# Patient Record
Sex: FEMALE | Race: WHITE | NOT HISPANIC OR LATINO | Employment: OTHER | URBAN - METROPOLITAN AREA
[De-identification: names, ages, dates, MRNs, and addresses within clinical notes are randomized per-mention and may not be internally consistent; named-entity substitution may affect disease eponyms.]

---

## 2017-01-03 ENCOUNTER — OFFICE VISIT (OUTPATIENT)
Dept: INTERNAL MEDICINE | Facility: CLINIC | Age: 82
End: 2017-01-03
Attending: INTERNAL MEDICINE
Payer: MEDICARE

## 2017-01-03 VITALS
SYSTOLIC BLOOD PRESSURE: 122 MMHG | HEART RATE: 76 BPM | HEIGHT: 65 IN | DIASTOLIC BLOOD PRESSURE: 68 MMHG | WEIGHT: 129 LBS | BODY MASS INDEX: 21.49 KG/M2 | OXYGEN SATURATION: 97 %

## 2017-01-03 DIAGNOSIS — M25.561 ACUTE PAIN OF RIGHT KNEE: ICD-10-CM

## 2017-01-03 DIAGNOSIS — I48.20 CHRONIC ATRIAL FIBRILLATION: ICD-10-CM

## 2017-01-03 DIAGNOSIS — I10 ESSENTIAL HYPERTENSION: Primary | ICD-10-CM

## 2017-01-03 DIAGNOSIS — I42.9 CARDIOMYOPATHY: ICD-10-CM

## 2017-01-03 DIAGNOSIS — R29.6 FREQUENT FALLS: ICD-10-CM

## 2017-01-03 DIAGNOSIS — I25.10 CORONARY ARTERY DISEASE INVOLVING NATIVE CORONARY ARTERY WITHOUT ANGINA PECTORIS, UNSPECIFIED WHETHER NATIVE OR TRANSPLANTED HEART: ICD-10-CM

## 2017-01-03 PROCEDURE — 1160F RVW MEDS BY RX/DR IN RCRD: CPT | Mod: S$GLB,,, | Performed by: INTERNAL MEDICINE

## 2017-01-03 PROCEDURE — 1157F ADVNC CARE PLAN IN RCRD: CPT | Mod: S$GLB,,, | Performed by: INTERNAL MEDICINE

## 2017-01-03 PROCEDURE — 99214 OFFICE O/P EST MOD 30 MIN: CPT | Mod: S$GLB,,, | Performed by: INTERNAL MEDICINE

## 2017-01-03 PROCEDURE — 1159F MED LIST DOCD IN RCRD: CPT | Mod: S$GLB,,, | Performed by: INTERNAL MEDICINE

## 2017-01-03 NOTE — MR AVS SNAPSHOT
Jan  [facility], [address] [address]  Phone: [phone]  Fax: [phone]                  Elizabeth DAILEY Rosalba   1/3/2017 9:45 AM   Office Visit    Description:  Female : 1928   Provider:  CHADWICK Montoya MD   Department:  [facility]           Reason for Visit     Follow-up           Diagnoses this Visit        Comments    Essential hypertension    -  Primary     Coronary artery disease involving native coronary artery without angina pectoris, unspecified whether native or transplanted heart         Chronic atrial fibrillation         Cardiomyopathy                To Do List           Future Appointments        Provider Department Dept Phone    2017 9:45 AM MD Jan Breen [phone]      Goals (5 Years of Data)     None      Follow-Up and Disposition     Return in about 4 months (around 5/3/2017).      [facility] -  Assistance  Registered nurses in the [facility] provide clinical advisement, health education, appointment booking, and other advisory services.  Call for this free service at [phone].             Medications           Message regarding Medications     Verify the changes and/or additions to your medication regime listed below are the same as discussed with your clinician today.  If any of these changes or additions are incorrect, please notify your healthcare provider.             Verify that the below list of medications is an accurate representation of the medications you are currently taking.  If none reported, the list may be blank. If incorrect, please contact your healthcare provider. Carry this list with you in case of emergency.           Current Medications     amlodipine (NORVASC) 5 MG tablet TAKE 1 TABLET BY MOUTH DAILY    aspirin (ECOTRIN) 325 MG EC tablet Take 325 mg by mouth once daily.    carvedilol (COREG) 25 MG tablet TAKE 1 TABLET BY MOUTH TWICE DAILY    cholecalciferol,  "vitamin D3, (VITAMIN D3) 1,000 unit capsule Take 1,000 Units by mouth once daily.    diclofenac sodium (VOLTAREN) 1 % Gel Apply 4 g topically 4 (four) times daily as needed.    enalapril (VASOTEC) 20 MG tablet TAKE 1 TABLET BY MOUTH TWICE DAILY    fish oil-omega-3 fatty acids 300-1,000 mg capsule Take 1 g by mouth once daily.    glucosamine-chondroitin 500-400 mg tablet Take 1 tablet by mouth 3 (three) times daily.    omeprazole (PRILOSEC) 20 MG capsule Take 20 mg by mouth once daily.    PNV w/o calcium-iron fum-FA (M-VIT) 27-1 mg Tab Take by mouth.    simvastatin (ZOCOR) 20 MG tablet TAKE 1 TABLET BY MOUTH AT BEDTIME    venlafaxine (EFFEXOR-XR) 75 MG 24 hr capsule TAKE 1 CAPSULE BY MOUTH EVERY MORNING WITH FOOD           Clinical Reference Information           Vital Signs - Last Recorded  Most recent update: 1/3/2017 10:02 AM by Karey Rasmussen MA    BP Pulse Ht Wt SpO2 BMI    122/68 76 5' 5" (1.651 m) 58.5 kg (129 lb) 97% 21.47 kg/m2      Blood Pressure          Most Recent Value    BP  122/68      Allergies as of 1/3/2017     Pcn [Penicillins]      Immunizations Administered on Date of Encounter - 1/3/2017     None      "

## 2017-01-04 NOTE — PROGRESS NOTES
Subjective:       Patient ID: Elizabeth Navarrete is a 88 y.o. female.    Chief Complaint: Follow-up    HPI Comments: Fell 5 days ago and landed on right knee and butt. Tripped on her feet. Scooted to phone and called daughter.  Feels fine now.  Last fall about 4-6 months prior.    Hypertension   This is a chronic problem. The current episode started more than 1 year ago. The problem is controlled. Pertinent negatives include no chest pain or shortness of breath.     Review of Systems   Constitutional: Negative.    Respiratory: Negative for shortness of breath.    Cardiovascular: Negative for chest pain.   Musculoskeletal: Positive for arthralgias.   Psychiatric/Behavioral: Negative for dysphoric mood.       Objective:      Physical Exam   Cardiovascular: Normal rate.  An irregularly irregular rhythm present. Exam reveals no gallop and no friction rub.    Murmur heard.   Systolic murmur is present with a grade of 2/6   Pulses:       Carotid pulses are 2+ on the right side, and 2+ on the left side.  Musculoskeletal: She exhibits no edema.        Right knee: She exhibits decreased range of motion, deformity, LCL laxity, abnormal patellar mobility and MCL laxity. Tenderness found. Lateral joint line tenderness noted.        Left knee: Normal.        Arms:       Legs:  Has DP pulse through Ramiro's. Toes are warm with no obvious probs.    Calcinosis on right patellar tendon.    Ecchymosis under left arm.       Assessment:       1. Essential hypertension    2. Coronary artery disease involving native coronary artery without angina pectoris, unspecified whether native or transplanted heart    3. Chronic atrial fibrillation    4. Cardiomyopathy    5. Acute pain of right knee    6. Frequent falls        Plan:       Per orders and D/C instructions.  Continue meds/diet for CAD, A. Fib., DCMP, HTN, and high cholest. which are stable. Seh refuses a walker or cane, and refuses to carry a cell phone or alert device.       Screening: The  patient was screened for depression with the PHQ2 questionnaire and possible health consequences were discussed with the patient, who understands (15 minutes spent). The patient was screened for the misuse of alcohol, by asking the number of drinks per average week, and if pt has had more than 5 drinks (more than 3 for women and elderly) in 1 day within the past year. The health and legal consequences of misuse were discussed (15 minutes spent). The patient was screened for obesity (BMI>30), If the current BMI > 30, then the possible consequences of obesity, as well as the benefits of diet, exercise, and weight loss were discussed (15 minutes spent).

## 2017-05-17 ENCOUNTER — OFFICE VISIT (OUTPATIENT)
Dept: INTERNAL MEDICINE | Facility: CLINIC | Age: 82
End: 2017-05-17
Attending: INTERNAL MEDICINE
Payer: MEDICARE

## 2017-05-17 VITALS
HEIGHT: 65 IN | WEIGHT: 138 LBS | OXYGEN SATURATION: 91 % | BODY MASS INDEX: 22.99 KG/M2 | HEART RATE: 76 BPM | SYSTOLIC BLOOD PRESSURE: 138 MMHG | DIASTOLIC BLOOD PRESSURE: 72 MMHG

## 2017-05-17 DIAGNOSIS — I10 ESSENTIAL HYPERTENSION: ICD-10-CM

## 2017-05-17 DIAGNOSIS — I42.9 CARDIOMYOPATHY, UNSPECIFIED TYPE: ICD-10-CM

## 2017-05-17 DIAGNOSIS — M17.11 PRIMARY OSTEOARTHRITIS OF RIGHT KNEE: ICD-10-CM

## 2017-05-17 DIAGNOSIS — I34.0 NON-RHEUMATIC MITRAL REGURGITATION: ICD-10-CM

## 2017-05-17 DIAGNOSIS — Z86.59 HISTORY OF DEPRESSION: ICD-10-CM

## 2017-05-17 DIAGNOSIS — I27.20 PULMONARY HTN: ICD-10-CM

## 2017-05-17 DIAGNOSIS — I48.20 CHRONIC ATRIAL FIBRILLATION: ICD-10-CM

## 2017-05-17 DIAGNOSIS — I65.29 INTERNAL CAROTID ARTERY STENOSIS, UNSPECIFIED LATERALITY: ICD-10-CM

## 2017-05-17 DIAGNOSIS — I25.10 CORONARY ARTERY DISEASE INVOLVING NATIVE CORONARY ARTERY WITHOUT ANGINA PECTORIS, UNSPECIFIED WHETHER NATIVE OR TRANSPLANTED HEART: Primary | ICD-10-CM

## 2017-05-17 PROCEDURE — G0439 PPPS, SUBSEQ VISIT: HCPCS | Mod: S$GLB,,, | Performed by: INTERNAL MEDICINE

## 2017-05-17 NOTE — PROGRESS NOTES
Subjective:       Patient ID: Elizabeth Navarrete is a 89 y.o. female.    Chief Complaint: Follow-up (4 month)    HPI Comments: Gained 9 lb.  Gets SOB walking from garage to my office.    Annual Wellness Exam:    Cognitive Impairment: None    Mental Conditions: None    Depression Risk Factors: None    Functional Ability:    Steady gait: No  Self reliant: No   Safe home: Yes  Hearing Difficulties: Yes   Vision Difficulties: N0o  Physical Impairment: None    Living Will: See Patient Demographics    Functional assessment:  Able to do all ADL's  Fall Risk: Moderate  Urinary incontinence: yes  Energy level: Good  Mental well-being: Fair    HEIDS Measure screening dates:  BMI:  23     DEXA:               See Health Maintenance Report  colon screen:    See Health Maintenance Report      Mammogram:   See Health Maintenance Report                   Glaucoma screen:  per Opth[facility]                    Vaccines: See Health Maintenance Report  Flu:            Pneumovax:  Zostavax:       Pain management: Mild knee pain      The patient's current health status is: Fair   Patient was educated on all medical problems. See A/P.                             Hypertension   This is a chronic problem. The current episode started more than 1 year ago. The problem is controlled. Pertinent negatives include no chest pain or shortness of breath.     Review of Systems   Constitutional: Negative.    Respiratory: Negative for shortness of breath.    Cardiovascular: Negative for chest pain.   Psychiatric/Behavioral: Negative for dysphoric mood.       Objective:      Physical Exam   Cardiovascular: Normal rate.  An irregularly irregular rhythm present. Exam reveals no gallop and no friction rub.    Murmur heard.   Systolic murmur is present with a grade of 2/6   Pulses:       Carotid pulses are 2+ on the right side, and 2+ on the left side.  Pulmonary/Chest: She has rales in the right lower field and the left lower field.   Musculoskeletal: She exhibits no  edema.        Right knee: She exhibits decreased range of motion, deformity, LCL laxity, abnormal patellar mobility and MCL laxity. Tenderness found. Lateral joint line tenderness noted.        Left knee: Normal.        Arms:       Legs:  Has DP pulse through Ramiro's. Toes are warm with no obvious probs.    Calcinosis on right patellar tendon.    Ecchymosis under left arm.       Assessment:       1. Coronary artery disease involving native coronary artery without angina pectoris, unspecified whether native or transplanted heart    2. Cardiomyopathy, unspecified type    3. Chronic atrial fibrillation    4. Essential hypertension    5. Internal carotid artery stenosis, unspecified laterality    6. Non-rheumatic mitral regurgitation    7. Primary osteoarthritis of right knee    8. Pulmonary HTN    9. History of depression        Plan:       Per orders and D/C instructions.  Continue meds/diet for A fib, CAD, CMP, MVR, pulm HTN, carotid stenosis, DJD, depression, HTN, and high cholest. which are stable.

## 2017-05-17 NOTE — MR AVS SNAPSHOT
Jan  [facility] [facility], [address] [address]  Phone: [phone]  Fax: [phone]                  Elizabeth Navarrete   2017 9:45 AM   Office Visit    Description:  Female : 1928   Provider:  CHADWICK Montoya MD   Department:  Jan           Reason for Visit     Follow-up           Diagnoses this Visit        Comments    Coronary artery disease involving native coronary artery without angina pectoris, unspecified whether native or transplanted heart    -  Primary     Cardiomyopathy, unspecified type         Chronic atrial fibrillation         Essential hypertension                To Do List           Future Appointments        Provider Department Dept Phone    2017 10:15 AM MD Jan Breen [phone]      Goals (5 Years of Data)     None      Follow-Up and Disposition     Return in about 4 months (around 2017).      [facility]     [facility] Care [facility] -  Assistance  Unless otherwise directed by your provider, please contact [facility], our nurse care line that is available for  assistance.     Registered nurses in the [facility] provide: appointment scheduling, clinical advisement, health education, and other advisory services.  Call: [phone] (toll free)               Medications           Message regarding Medications     Verify the changes and/or additions to your medication regime listed below are the same as discussed with your clinician today.  If any of these changes or additions are incorrect, please notify your healthcare provider.             Verify that the below list of medications is an accurate representation of the medications you are currently taking.  If none reported, the list may be blank. If incorrect, please contact your healthcare provider. Carry this list with you in case of emergency.           Current Medications     amlodipine (NORVASC) 5 MG tablet TAKE 1 TABLET BY MOUTH DAILY    aspirin  "(ECOTRIN) 325 MG EC tablet Take 325 mg by mouth once daily.    carvedilol (COREG) 25 MG tablet TAKE 1 TABLET BY MOUTH TWICE DAILY    cholecalciferol, vitamin D3, (VITAMIN D3) 1,000 unit capsule Take 1,000 Units by mouth once daily.    diclofenac sodium (VOLTAREN) 1 % Gel Apply 4 g topically 4 (four) times daily as needed.    enalapril (VASOTEC) 20 MG tablet TAKE 1 TABLET BY MOUTH TWICE DAILY    fish oil-omega-3 fatty acids 300-1,000 mg capsule Take 1 g by mouth once daily.    glucosamine-chondroitin 500-400 mg tablet Take 1 tablet by mouth 3 (three) times daily.    omeprazole (PRILOSEC) 20 MG capsule Take 20 mg by mouth once daily.    PNV w/o calcium-iron fum-FA (M-VIT) 27-1 mg Tab Take by mouth.    simvastatin (ZOCOR) 20 MG tablet TAKE 1 TABLET BY MOUTH AT BEDTIME    venlafaxine (EFFEXOR-XR) 75 MG 24 hr capsule TAKE 1 CAPSULE BY MOUTH EVERY MORNING WITH FOOD           Clinical Reference Information           Your Vitals Were     BP Pulse Height Weight SpO2 BMI    138/72 101 5' 5" (1.651 m) 62.6 kg (138 lb) 91% 22.96 kg/m2      Blood Pressure          Most Recent Value    BP  138/72      Allergies as of 5/17/2017     Pcn [Penicillins]      Immunizations Administered on Date of Encounter - 5/17/2017     None      Language Assistance Services     ATTENTION: Language assistance services are available, free of charge. Please call [phone].      ATENCIÓN: Si habla estelleañol, tiene a naidu disposición servicios gratuitos de asistencia lingüística. Llkaylie al [phone].     SESAR Ý: N?u b?n nói Ti?ng Vi?t, có các d?ch v? h? tr? ngôn ng? mi?n phí dành cho b?n. G?i s? [phone].         [facility] complies with applicable Federal civil rights laws and does not discriminate on the basis of race, color, national origin, age, disability, or sex.        "

## 2017-06-15 ENCOUNTER — HOSPITAL ENCOUNTER (INPATIENT)
Facility: HOSPITAL | Age: 82
LOS: 8 days | Discharge: NURSING, REHAB OR LONG-TERM CARE | DRG: 064 | End: 2017-06-23
Attending: EMERGENCY MEDICINE | Admitting: NURSE PRACTITIONER
Payer: MEDICARE

## 2017-06-15 DIAGNOSIS — R13.12 OROPHARYNGEAL DYSPHAGIA: ICD-10-CM

## 2017-06-15 DIAGNOSIS — G81.94 HEMIPARESIS, LEFT: ICD-10-CM

## 2017-06-15 DIAGNOSIS — I63.411 EMBOLIC STROKE INVOLVING RIGHT MIDDLE CEREBRAL ARTERY: ICD-10-CM

## 2017-06-15 DIAGNOSIS — I63.131 EMBOLIC STROKE INVOLVING RIGHT CAROTID ARTERY: ICD-10-CM

## 2017-06-15 DIAGNOSIS — I51.7 ENLARGED LA (LEFT ATRIUM): ICD-10-CM

## 2017-06-15 DIAGNOSIS — I48.20 CHRONIC ATRIAL FIBRILLATION: ICD-10-CM

## 2017-06-15 DIAGNOSIS — I65.22 INTERNAL CAROTID ARTERY STENOSIS, LEFT: ICD-10-CM

## 2017-06-15 DIAGNOSIS — I50.32 CHRONIC DIASTOLIC HEART FAILURE: ICD-10-CM

## 2017-06-15 DIAGNOSIS — I10 ESSENTIAL HYPERTENSION: ICD-10-CM

## 2017-06-15 DIAGNOSIS — I63.9 CEREBROVASCULAR ACCIDENT (CVA), UNSPECIFIED MECHANISM: Primary | ICD-10-CM

## 2017-06-15 LAB
ALBUMIN SERPL BCP-MCNC: 3.6 G/DL
ALP SERPL-CCNC: 91 U/L
ALT SERPL W/O P-5'-P-CCNC: 14 U/L
ANION GAP SERPL CALC-SCNC: 9 MMOL/L
AST SERPL-CCNC: 26 U/L
BASOPHILS # BLD AUTO: 0.02 K/UL
BASOPHILS NFR BLD: 0.2 %
BILIRUB SERPL-MCNC: 0.7 MG/DL
BUN SERPL-MCNC: 19 MG/DL
CALCIUM SERPL-MCNC: 9.6 MG/DL
CHLORIDE SERPL-SCNC: 107 MMOL/L
CHOLEST/HDLC SERPL: 2.9 {RATIO}
CO2 SERPL-SCNC: 25 MMOL/L
CREAT SERPL-MCNC: 0.9 MG/DL
DIFFERENTIAL METHOD: ABNORMAL
EOSINOPHIL # BLD AUTO: 0 K/UL
EOSINOPHIL NFR BLD: 0.1 %
ERYTHROCYTE [DISTWIDTH] IN BLOOD BY AUTOMATED COUNT: 12.6 %
EST. GFR  (AFRICAN AMERICAN): >60 ML/MIN/1.73 M^2
EST. GFR  (NON AFRICAN AMERICAN): 56.9 ML/MIN/1.73 M^2
GLUCOSE SERPL-MCNC: 127 MG/DL
HCT VFR BLD AUTO: 40.1 %
HDL/CHOLESTEROL RATIO: 34.7 %
HDLC SERPL-MCNC: 118 MG/DL
HDLC SERPL-MCNC: 41 MG/DL
HGB BLD-MCNC: 13.5 G/DL
LDLC SERPL CALC-MCNC: 67.4 MG/DL
LYMPHOCYTES # BLD AUTO: 0.5 K/UL
LYMPHOCYTES NFR BLD: 4.5 %
MCH RBC QN AUTO: 30.3 PG
MCHC RBC AUTO-ENTMCNC: 33.7 %
MCV RBC AUTO: 90 FL
MONOCYTES # BLD AUTO: 0.6 K/UL
MONOCYTES NFR BLD: 5.6 %
NEUTROPHILS # BLD AUTO: 10.2 K/UL
NEUTROPHILS NFR BLD: 89.3 %
NONHDLC SERPL-MCNC: 77 MG/DL
PLATELET # BLD AUTO: 248 K/UL
PMV BLD AUTO: 10.6 FL
POTASSIUM SERPL-SCNC: 3.8 MMOL/L
PROT SERPL-MCNC: 7.1 G/DL
RBC # BLD AUTO: 4.45 M/UL
SODIUM SERPL-SCNC: 141 MMOL/L
TRIGL SERPL-MCNC: 48 MG/DL
TSH SERPL DL<=0.005 MIU/L-ACNC: 1.04 UIU/ML
WBC # BLD AUTO: 11.46 K/UL

## 2017-06-15 PROCEDURE — 99291 CRITICAL CARE FIRST HOUR: CPT | Mod: ,,, | Performed by: EMERGENCY MEDICINE

## 2017-06-15 PROCEDURE — 80053 COMPREHEN METABOLIC PANEL: CPT

## 2017-06-15 PROCEDURE — 84443 ASSAY THYROID STIM HORMONE: CPT

## 2017-06-15 PROCEDURE — 99291 CRITICAL CARE FIRST HOUR: CPT | Mod: 25

## 2017-06-15 PROCEDURE — 93005 ELECTROCARDIOGRAM TRACING: CPT

## 2017-06-15 PROCEDURE — 12000002 HC ACUTE/MED SURGE SEMI-PRIVATE ROOM

## 2017-06-15 PROCEDURE — 80061 LIPID PANEL: CPT

## 2017-06-15 PROCEDURE — 93010 ELECTROCARDIOGRAM REPORT: CPT | Mod: ,,, | Performed by: INTERNAL MEDICINE

## 2017-06-15 PROCEDURE — 94761 N-INVAS EAR/PLS OXIMETRY MLT: CPT

## 2017-06-15 PROCEDURE — 25500020 PHARM REV CODE 255: Performed by: EMERGENCY MEDICINE

## 2017-06-15 PROCEDURE — 83036 HEMOGLOBIN GLYCOSYLATED A1C: CPT

## 2017-06-15 PROCEDURE — 27000221 HC OXYGEN, UP TO 24 HOURS

## 2017-06-15 PROCEDURE — 85025 COMPLETE CBC W/AUTO DIFF WBC: CPT

## 2017-06-15 RX ORDER — METHYLPREDNISOLONE 4 MG
1000 TABLET, DOSE PACK ORAL DAILY
Status: ON HOLD | COMMUNITY
End: 2017-06-23 | Stop reason: HOSPADM

## 2017-06-15 RX ORDER — ASPIRIN 325 MG
325 TABLET, DELAYED RELEASE (ENTERIC COATED) ORAL DAILY
Status: DISCONTINUED | OUTPATIENT
Start: 2017-06-16 | End: 2017-06-16

## 2017-06-15 RX ORDER — SODIUM CHLORIDE 0.9 % (FLUSH) 0.9 %
3 SYRINGE (ML) INJECTION EVERY 8 HOURS
Status: DISCONTINUED | OUTPATIENT
Start: 2017-06-15 | End: 2017-06-23 | Stop reason: HOSPADM

## 2017-06-15 RX ORDER — CLOPIDOGREL BISULFATE 75 MG/1
75 TABLET ORAL DAILY
Status: DISCONTINUED | OUTPATIENT
Start: 2017-06-16 | End: 2017-06-16

## 2017-06-15 RX ORDER — PANTOPRAZOLE SODIUM 40 MG/1
40 TABLET, DELAYED RELEASE ORAL DAILY
Status: DISCONTINUED | OUTPATIENT
Start: 2017-06-16 | End: 2017-06-19

## 2017-06-15 RX ORDER — ATORVASTATIN CALCIUM 20 MG/1
40 TABLET, FILM COATED ORAL DAILY
Status: DISCONTINUED | OUTPATIENT
Start: 2017-06-16 | End: 2017-06-16

## 2017-06-15 RX ORDER — VENLAFAXINE HYDROCHLORIDE 75 MG/1
75 CAPSULE, EXTENDED RELEASE ORAL DAILY
Status: DISCONTINUED | OUTPATIENT
Start: 2017-06-16 | End: 2017-06-16

## 2017-06-15 RX ORDER — LABETALOL HYDROCHLORIDE 5 MG/ML
10 INJECTION, SOLUTION INTRAVENOUS
Status: DISCONTINUED | OUTPATIENT
Start: 2017-06-15 | End: 2017-06-23 | Stop reason: HOSPADM

## 2017-06-15 RX ORDER — HEPARIN SODIUM 5000 [USP'U]/ML
5000 INJECTION, SOLUTION INTRAVENOUS; SUBCUTANEOUS EVERY 8 HOURS
Status: DISCONTINUED | OUTPATIENT
Start: 2017-06-16 | End: 2017-06-23 | Stop reason: HOSPADM

## 2017-06-15 RX ADMIN — IOHEXOL 75 ML: 350 INJECTION, SOLUTION INTRAVENOUS at 08:06

## 2017-06-16 PROBLEM — R13.12 OROPHARYNGEAL DYSPHAGIA: Status: ACTIVE | Noted: 2017-06-16

## 2017-06-16 PROBLEM — I63.131 EMBOLIC STROKE INVOLVING RIGHT CAROTID ARTERY: Status: ACTIVE | Noted: 2017-06-15

## 2017-06-16 PROBLEM — G81.94 HEMIPARESIS, LEFT: Status: ACTIVE | Noted: 2017-06-16

## 2017-06-16 LAB
ALBUMIN SERPL BCP-MCNC: 3.3 G/DL
ALP SERPL-CCNC: 84 U/L
ALT SERPL W/O P-5'-P-CCNC: 15 U/L
ANION GAP SERPL CALC-SCNC: 12 MMOL/L
APTT BLDCRRT: 24.5 SEC
AST SERPL-CCNC: 29 U/L
BASOPHILS # BLD AUTO: 0.02 K/UL
BASOPHILS NFR BLD: 0.2 %
BILIRUB SERPL-MCNC: 0.8 MG/DL
BUN SERPL-MCNC: 15 MG/DL
CALCIUM SERPL-MCNC: 9.5 MG/DL
CHLORIDE SERPL-SCNC: 106 MMOL/L
CK MB SERPL-MCNC: 7.3 NG/ML
CK MB SERPL-RTO: 2.1 %
CK SERPL-CCNC: 348 U/L
CO2 SERPL-SCNC: 24 MMOL/L
CREAT SERPL-MCNC: 0.8 MG/DL
DIASTOLIC DYSFUNCTION: YES
DIFFERENTIAL METHOD: ABNORMAL
EOSINOPHIL # BLD AUTO: 0 K/UL
EOSINOPHIL NFR BLD: 0.3 %
ERYTHROCYTE [DISTWIDTH] IN BLOOD BY AUTOMATED COUNT: 12.4 %
EST. GFR  (AFRICAN AMERICAN): >60 ML/MIN/1.73 M^2
EST. GFR  (NON AFRICAN AMERICAN): >60 ML/MIN/1.73 M^2
ESTIMATED AVG GLUCOSE: 114 MG/DL
ESTIMATED PA SYSTOLIC PRESSURE: 32.16
GLUCOSE SERPL-MCNC: 84 MG/DL
HBA1C MFR BLD HPLC: 5.6 %
HCT VFR BLD AUTO: 39.7 %
HGB BLD-MCNC: 12.9 G/DL
INR PPP: 1.1
LYMPHOCYTES # BLD AUTO: 0.9 K/UL
LYMPHOCYTES NFR BLD: 9.1 %
MAGNESIUM SERPL-MCNC: 1.9 MG/DL
MCH RBC QN AUTO: 29.7 PG
MCHC RBC AUTO-ENTMCNC: 32.5 %
MCV RBC AUTO: 91 FL
MITRAL VALVE REGURGITATION: ABNORMAL
MONOCYTES # BLD AUTO: 1.1 K/UL
MONOCYTES NFR BLD: 10.7 %
NEUTROPHILS # BLD AUTO: 8.1 K/UL
NEUTROPHILS NFR BLD: 79.5 %
PHOSPHATE SERPL-MCNC: 2.9 MG/DL
PLATELET # BLD AUTO: 240 K/UL
PMV BLD AUTO: 10.9 FL
POTASSIUM SERPL-SCNC: 3.7 MMOL/L
PROT SERPL-MCNC: 6.6 G/DL
PROTHROMBIN TIME: 11.3 SEC
RBC # BLD AUTO: 4.35 M/UL
RETIRED EF AND QEF - SEE NOTES: 50 (ref 55–65)
SODIUM SERPL-SCNC: 142 MMOL/L
TRICUSPID VALVE REGURGITATION: ABNORMAL
TROPONIN I SERPL DL<=0.01 NG/ML-MCNC: 0.04 NG/ML
WBC # BLD AUTO: 10.17 K/UL

## 2017-06-16 PROCEDURE — 84484 ASSAY OF TROPONIN QUANT: CPT

## 2017-06-16 PROCEDURE — 99222 1ST HOSP IP/OBS MODERATE 55: CPT | Mod: ,,, | Performed by: NURSE PRACTITIONER

## 2017-06-16 PROCEDURE — 84100 ASSAY OF PHOSPHORUS: CPT

## 2017-06-16 PROCEDURE — 80053 COMPREHEN METABOLIC PANEL: CPT

## 2017-06-16 PROCEDURE — 97530 THERAPEUTIC ACTIVITIES: CPT

## 2017-06-16 PROCEDURE — 93306 TTE W/DOPPLER COMPLETE: CPT | Mod: PBBFAC | Performed by: INTERNAL MEDICINE

## 2017-06-16 PROCEDURE — 92610 EVALUATE SWALLOWING FUNCTION: CPT

## 2017-06-16 PROCEDURE — G8997 SWALLOW GOAL STATUS: HCPCS | Mod: CL

## 2017-06-16 PROCEDURE — G8996 SWALLOW CURRENT STATUS: HCPCS | Mod: CN

## 2017-06-16 PROCEDURE — 83735 ASSAY OF MAGNESIUM: CPT

## 2017-06-16 PROCEDURE — 99223 1ST HOSP IP/OBS HIGH 75: CPT | Mod: AI,GC,, | Performed by: PSYCHIATRY & NEUROLOGY

## 2017-06-16 PROCEDURE — 97165 OT EVAL LOW COMPLEX 30 MIN: CPT

## 2017-06-16 PROCEDURE — 25000003 PHARM REV CODE 250: Performed by: NURSE PRACTITIONER

## 2017-06-16 PROCEDURE — 20600001 HC STEP DOWN PRIVATE ROOM

## 2017-06-16 PROCEDURE — 97802 MEDICAL NUTRITION INDIV IN: CPT

## 2017-06-16 PROCEDURE — 97161 PT EVAL LOW COMPLEX 20 MIN: CPT

## 2017-06-16 PROCEDURE — 63600175 PHARM REV CODE 636 W HCPCS: Performed by: PSYCHIATRY & NEUROLOGY

## 2017-06-16 PROCEDURE — 82553 CREATINE MB FRACTION: CPT

## 2017-06-16 PROCEDURE — 85610 PROTHROMBIN TIME: CPT

## 2017-06-16 PROCEDURE — 36415 COLL VENOUS BLD VENIPUNCTURE: CPT

## 2017-06-16 PROCEDURE — 85025 COMPLETE CBC W/AUTO DIFF WBC: CPT

## 2017-06-16 PROCEDURE — 85730 THROMBOPLASTIN TIME PARTIAL: CPT

## 2017-06-16 PROCEDURE — 93306 TTE W/DOPPLER COMPLETE: CPT

## 2017-06-16 PROCEDURE — 86580 TB INTRADERMAL TEST: CPT | Performed by: PSYCHIATRY & NEUROLOGY

## 2017-06-16 RX ORDER — ATORVASTATIN CALCIUM 20 MG/1
40 TABLET, FILM COATED ORAL DAILY
Status: DISCONTINUED | OUTPATIENT
Start: 2017-06-17 | End: 2017-06-21

## 2017-06-16 RX ORDER — ACETAMINOPHEN 325 MG/1
650 TABLET ORAL EVERY 6 HOURS PRN
Status: DISCONTINUED | OUTPATIENT
Start: 2017-06-16 | End: 2017-06-21

## 2017-06-16 RX ORDER — METOPROLOL TARTRATE 25 MG/1
12.5 TABLET ORAL 2 TIMES DAILY
Status: DISCONTINUED | OUTPATIENT
Start: 2017-06-16 | End: 2017-06-17

## 2017-06-16 RX ORDER — METOPROLOL TARTRATE 25 MG/1
12.5 TABLET ORAL 2 TIMES DAILY
Status: DISCONTINUED | OUTPATIENT
Start: 2017-06-16 | End: 2017-06-16

## 2017-06-16 RX ORDER — CLOPIDOGREL BISULFATE 75 MG/1
75 TABLET ORAL DAILY
Status: DISCONTINUED | OUTPATIENT
Start: 2017-06-17 | End: 2017-06-20

## 2017-06-16 RX ORDER — ASPIRIN 81 MG/1
81 TABLET ORAL DAILY
Status: DISCONTINUED | OUTPATIENT
Start: 2017-06-17 | End: 2017-06-19

## 2017-06-16 RX ADMIN — Medication 3 ML: at 02:06

## 2017-06-16 RX ADMIN — Medication 5 UNITS: at 03:06

## 2017-06-16 RX ADMIN — HEPARIN SODIUM 5000 UNITS: 5000 INJECTION, SOLUTION INTRAVENOUS; SUBCUTANEOUS at 02:06

## 2017-06-16 RX ADMIN — HEPARIN SODIUM 5000 UNITS: 5000 INJECTION, SOLUTION INTRAVENOUS; SUBCUTANEOUS at 06:06

## 2017-06-16 RX ADMIN — Medication 3 ML: at 06:06

## 2017-06-16 NOTE — CONSULTS
"  [facility]  Adult Nutrition  Consult Note    SUMMARY     Recommendations    1. TF recommendations: Isosource 1.5 @ 35 mL/hr to provide 1260 calories, 57 grams of protein, 642 mL fluid.    - Hold for residuals >400 mL; additional fluid per MD.  2. If able to advance diet, recommend regular, texture per SLP.   3. RD to monitor & follow-up.    Goals: Meet % EEN, EPN  Nutrition Goal Status: new  Communication of RD Recs: reviewed with RN    Reason for Assessment    Reason for Assessment: nurse/nurse practitioner consult  Diagnosis: stroke/CVA  Relevent Medical History: CAD, HTN   Interdisciplinary Rounds: did not attend     General Information Comments: ST recommends pt remain NPO. Pt disoriented at time of visit.  Nutrition Discharge Planning: Unable to determine.    Nutrition Prescription Ordered    Current Diet Order: NPO    Nutrition Risk Screen     Nutrition Risk Screen: dysphagia or difficulty swallowing    Nutrition/Diet History    Patient Reported Diet/Restrictions/Preferences: other (see comments) (FLAQUITA)     Factors Affecting Nutritional Intake: NPO, difficulty/impaired swallowing    Labs/Tests/Procedures/Meds    Pertinent Labs Reviewed: reviewed, pertinent  Pertinent Medications Reviewed: reviewed, pertinent  Pertinent Medications Comments: Statin, Heparin    Physical Findings    Overall Physical Appearance: underweight  Oral/Mouth Cavity: WDL  Skin: intact    Anthropometrics    Height: 5' 5" (165.1 cm)  Weight Method: Bed Scale  Weight: 57.8 kg (127 lb 6.8 oz)     Ideal Body Weight (IBW), Female: 125 lb  % Ideal Body Weight, Female (lb): 101.94 lb     BMI (Calculated): 21.2  BMI Grade: 18.5-24.9 - normal    Estimated/Assessed Needs    Weight Used For Calorie Calculations: 57.8 kg (127 lb 6.8 oz)      Energy Need Method: Bertie-St Yeung, other (see comments) (0036-5364 kcal/d)     Weight Used For Protein Calculations: 57.8 kg (127 lb 6.8 oz)  Protein Requirements: 58-70 g/d    Fluid " Need Method: RDA Method, other (see comments) (Per MD or 1 mL/kcal)     Assessment and Plan    Inadequate energy intake r/t inability to consume sufficient energy AEB NPO with no alternate means of nutrition.  Status: New    Monitor and Evaluation    Food and Nutrient Intake: energy intake, food and beverage intake, enteral nutrition intake  Food and Nutrient Adminstration: diet order, enteral and parenteral nutrition administration     Physical Activity and Function: nutrition-related ADLs and IADLs  Anthropometric Measurements: weight change, weight, body mass index  Biochemical Data, Medical Tests and Procedures: electrolyte and renal panel, gastrointestinal profile, glucose/endocrine profile, inflammatory profile, lipid profile  Nutrition-Focused Physical Findings: overall appearance    Nutrition Risk    Level of Risk: other (see comments) (2x/week)    Nutrition Follow-Up    RD Follow-up?: Yes

## 2017-06-16 NOTE — ASSESSMENT & PLAN NOTE
89 y/o female with PMHx of A fib, HTN presenting with left hemiparesis. Imaging remarkable for R hemispheric infarcts; embolic pattern. CTA remarkable for 90% stenosis of the right carotid artery and R M2. Etiology of stroke is likely artery to artery - atheroembolic from symptomatic R IC rather than cardio-embolic. Vascular surgery consult deferred as patient does not want surgical management of the R IC even if offered; will medically optimize.     Of note patient has prior history of strokes noted on imaging with a CHADsVASc of 7 => high risk; 1 year stroke risk of 15.7%. Patient and family aware that anticoagulation would be optimal management in the setting of known A fib and prior strokes for secondary stroke prevention but patient chosen to NOT be anticoagulated due to concerns by the family regarding patient's frequent falls.   -Antithrombotics for secondary stroke prevention: Antiplatelets:  Aspirin: 325 mg oral now and daily and Clopidogrel: 75 mg oral daily   -Statins for secondary stroke prevention and hyperlipidemia, if present: Atorvastatin- 40 mg oral daily,   -Aggressive risk factor modification: Hypertension, Carotid Artery disease and CHF   -Rehab Efforts: Physical Therapy, Occupational Therapy and Speech and Language Pathology,   -Diagnostics: Ordered/Pending: none  -VTE Prophylaxis: Heparin 5000 units SQ every 8 hours

## 2017-06-16 NOTE — PLAN OF CARE
Problem: Patient Care Overview  Goal: Plan of Care Review  Outcome: Ongoing (interventions implemented as appropriate)  Plan of care reviewed with patient and daughter.  Verbalized understanding.  Patient is alert and aphasic. Fail barium swallow.  NGT in place. Waiting for confirmation of placement to start feeding and medication.  VSS.  Resting comfortably in bed will continue to monitor.

## 2017-06-16 NOTE — PROGRESS NOTES
[facility]  Vascular Neurology  Comprehensive Stroke Center  Progress Note    Neurologic Chief Complaint: left hemiparesis    Subjective:     Interval History: Patient is seen for follow-up neurological assessment and treatment recommendations:   NPO per SLP; NG tube placed today for meds and feeds. No events overnight    HPI, Past Medical, Family, and Social History remains the same as documented in the initial encounter.     Review of Systems   Constitutional: Positive for fatigue. Negative for fever.   Eyes: Negative for visual disturbance.   Respiratory: Negative for cough and shortness of breath.    Cardiovascular: Negative for chest pain.     Scheduled Meds:   aspirin  325 mg Oral Daily    atorvastatin  40 mg Oral Daily    clopidogrel  75 mg Oral Daily    heparin (porcine)  5,000 Units Subcutaneous Q8H    metoprolol tartrate  12.5 mg Oral BID    pantoprazole  40 mg Oral Daily    sodium chloride 0.9%  3 mL Intravenous Q8H    tuberculin  5 Units Intradermal Once    venlafaxine  75 mg Oral Daily     Continuous Infusions:   sodium chloride 0.9%       PRN Meds:labetalol, sodium chloride 0.9%    Objective:     Vital Signs (Most Recent):  Temp: 98.1 °F (36.7 °C) (06/16/17 1205)  Pulse: 90 (06/16/17 1500)  Resp: 18 (06/16/17 1205)  BP: (!) 155/85 (06/16/17 0805)  SpO2: (!) 92 % (06/16/17 1205)  BP Location: Right arm    Vital Signs Range (Last 24H):  Temp:  [98.1 °F (36.7 °C)-98.8 °F (37.1 °C)]   Pulse:  []   Resp:  [16-61]   BP: (155-197)/(73-98)   SpO2:  [92 %-98 %]   BP Location: Right arm    Physical Exam   Constitutional: She appears well-developed and well-nourished.   HENT:   Head: Normocephalic and atraumatic.   Eyes: Pupils are equal, round, and reactive to light.       Neurological Exam:   LOC: alert and follows requests  Language: No aphasia  Speech: Dysarthria  Visual Fields (CN II): Full  EOM (CN III, IV, VI): Full/intact  Pupils (CN III, IV, VI): PERRL  Facial Movement  (CN VII): symmetric facial expression  Tongue (CN XII): to midline  Motor*: Arm Left:  Plegic (0/5), Leg Left:   Paretic:  4/5, Arm Right:   Normal (5/5), Leg Right:   Normal (5/5)  Cerebellar*: Normal limb  Sensation: intact to light touch, temperature and vibration  Tone: Arm-Left: normal; Leg-Left: normal; Arm-Right: normal; Leg-Right: normal   Extinction to bilateral stimulation    NIH Stroke Scale:    Level of Consciousness: 0 - alert  LOC Questions: 0 - answers both correctly  LOC Commands: 0 - performs both correctly  Best Gaze: 0 - normal  Visual: 0 - no visual loss  Facial Palsy: 1 - minor  Motor Left Arm: 4 - no movement  Motor Right Arm: 0 - no drift  Motor Left Le - drift  Motor Right Le - no drift  Limb Ataxia: 0 - absent  Sensory: 0 - normal  Best Language: 0 - no aphasia  Dysarthria: 2 - near to unintelligible  Extinction and Inattention: 1 - partial neglect  NIH Stroke Scale Total: 9  [id number] Scale:   Age: 2 - 75 years old or older  CHF History: 1 - yes  HTN History: 1 - yes  Stroke/TIA/Thromboembolism History: 2 - yes  Vascular Disease History: 0 - no  Diabetes Mellitus in History: 0 - no  Female: 1 - yes  [id number] Scale Total: 7      Laboratory:  CMP:   Recent Labs  Lab 17  0349   CALCIUM 9.5   ALBUMIN 3.3*   PROT 6.6      K 3.7   CO2 24      BUN 15   CREATININE 0.8   ALKPHOS 84   ALT 15   AST 29   BILITOT 0.8     BMP:   Recent Labs  Lab 17  0349      K 3.7      CO2 24   BUN 15   CREATININE 0.8   CALCIUM 9.5     CBC:   Recent Labs  Lab 17  0350   WBC 10.17   RBC 4.35   HGB 12.9   HCT 39.7      MCV 91   MCH 29.7   MCHC 32.5     Lipid Panel:   Recent Labs  Lab 06/15/17  1942   CHOL 118*   LDLCALC 67.4   HDL 41   TRIG 48     Coagulation:   Recent Labs  Lab 17  0349   INR 1.1   APTT 24.5     Platelet Aggregation Study: No results for input(s): PLTAGG, PLTAGINTERP, PLTAGREGLACO, ADPPLTAGGREG in the last 168 hours.  Hgb A1C:   Recent  Labs  Lab 06/15/17  1942   HGBA1C 5.6     TSH:   Recent Labs  Lab 06/15/17  1942   TSH 1.041       Diagnostic Results:  I have personally reviewed:   Findings:     MRI brain WO contrast (6/16/17)  R insular cortex, right corona radiata and right parietal lobe infarct. +cytotoxic cerebral edema               Assessment/Plan:     * Embolic stroke involving right carotid artery    88 y/o female with PMHx of A fib, HTN presenting with left hemiparesis. Imaging remarkable for R hemispheric infarcts; embolic pattern. CTA remarkable for 90% stenosis of the right carotid artery and R M2. Etiology of stroke is likely artery to artery - atheroembolic from symptomatic R IC rather than cardio-embolic. Vascular surgery consult deferred as patient does not want surgical management of the R IC even if offered; will medically optimize.     Of note patient has prior history of strokes noted on imaging with a CHADsVASc of 7 => high risk; 1 year stroke risk of 15.7%. Patient and family aware that anticoagulation would be optimal management in the setting of known A fib and prior strokes for secondary stroke prevention but patient chosen to NOT be anticoagulated due to concerns by the family regarding patient's frequent falls.   -Antithrombotics for secondary stroke prevention: Antiplatelets:  Aspirin: 325 mg oral now and daily and Clopidogrel: 75 mg oral daily   -Statins for secondary stroke prevention and hyperlipidemia, if present: Atorvastatin- 40 mg oral daily,   -Aggressive risk factor modification: Hypertension, Carotid Artery disease and CHF   -Rehab Efforts: Physical Therapy, Occupational Therapy and Speech and Language Pathology,   -Diagnostics: Ordered/Pending: none  -VTE Prophylaxis: Heparin 5000 units SQ every 8 hours        Hemiparesis, left    2/2 stroke  PT/OT recommending SNF        Oropharyngeal dysphagia    2/2 stroke  - SLP. Strict NPO  - NG tube placed for meds and feeds  - continue to monitor for progress         Chronic diastolic heart failure    -Stroke Risk Factor  -grade 2 on most recent ECHO        Enlarged LA (left atrium)    -as previously identified on ECHO        Chronic atrial fibrillation    -Stroke Risk Factor  -patient and family opted to not be on anticoagulation at this time because of concerns of falling        Internal carotid artery stenosis    -High grade stenosis of the right ICA  -previously identified and again seen on imaging  - likely etiology of most recent stroke        History of CHF (congestive heart failure)    -Stroke Risk Factor  -holding home meds to allow for adequate cerebral perfusion         CAD (coronary artery disease)    -Stroke Risk Factor  -ASA 325mg        Essential hypertension    -Stroke Risk Factor  -metoprolol started  - SBP goal 150-160 in setting of hemodynamically significant stenosis of the R ICA            Day Valdez MD  Comprehensive Stroke Center  Department of Vascular Neurology   [facility]

## 2017-06-16 NOTE — SUBJECTIVE & OBJECTIVE
Past Medical History:   Diagnosis Date    CAD (coronary artery disease) 10/10/2012    CRF (chronic renal failure) 10/10/2012    Family history of breast cancer 10/10/2012    History of cardiac arrhythmia 10/10/2012    History of CHF (congestive heart failure) 10/10/2012    History of depression 10/10/2012    History of echocardiogram 10/10/2012    HTN (hypertension) 10/10/2012    Internal carotid artery stenosis 10/10/2012    Personal history of gallstones 10/10/2012     Past Surgical History:   Procedure Laterality Date    EYE SURGERY      HYSTERECTOMY       Family History   Problem Relation Age of Onset    Cancer Mother      breast    Heart disease Neg Hx      Social History   Substance Use Topics    Smoking status: Never Smoker    Smokeless tobacco: Not on file    Alcohol use No     Review of patient's allergies indicates:   Allergen Reactions    Pcn [penicillins] Rash     Medications: I have reviewed the current medication administration record.      (Not in a hospital admission)    Review of Systems   Constitutional: Negative for fever.   HENT: Negative for congestion.    Eyes: Positive for visual disturbance.   Respiratory: Negative for cough.    Cardiovascular: Negative for chest pain.   Gastrointestinal: Negative for abdominal pain, diarrhea and vomiting.   Genitourinary: Negative for hematuria.   Musculoskeletal: Positive for neck pain.   Skin: Negative for wound.   Neurological: Positive for facial asymmetry, speech difficulty and weakness.   Psychiatric/Behavioral: Negative for behavioral problems.     Objective:     Vital Signs (Most Recent):  Pulse: 93 (06/15/17 2010)  Resp: 20 (06/15/17 2010)  BP: (!) 169/79 (06/15/17 2023)  SpO2: (!) 94 % (06/15/17 2023)    Vital Signs Range (Last 24H):  Pulse:  []   Resp:  [18-22]   BP: (155-197)/(73-98)   SpO2:  [94 %-97 %]     Physical Exam   Constitutional: She appears well-developed and well-nourished.   HENT:   Head: Normocephalic  and atraumatic.   Neck: Normal range of motion. Neck supple.   Cardiovascular: An irregularly irregular rhythm present.   Pulmonary/Chest: Effort normal.   Abdominal: Soft.   Skin: Skin is warm and dry.   Psychiatric: She has a normal mood and affect. Her behavior is normal.       Neurological Exam:   LOC: follows requests and drowsy  Language: No aphasia  Speech: Dysarthria  Orientation: Person, Place, Not oriented to time  Memory: Age correct, Abnormalities: No month correct  Visual Fields (CN II): Hemianopsia  left  EOM (CN III, IV, VI): Full/intact  Oculocephalics: normal  Pupils (CN III, IV, VI): PERRL  Facial Sensation (CN V): Symmetric  Facial Movement (CN VII): symmetric facial expression  Motor*: Arm Left:  Paretic:  3/5, Leg Left:   Normal (5/5), Arm Right:   Normal (5/5), Leg Right:   Normal (5/5)  Cerebellar*: Normal limb  Sensation: intact to light touch  Tone: Arm-Left: normal; Leg-Left: normal; Arm-Right: normal; Leg-Right: normal    Stroke Team Times:   Last Known Normal Date and Time : 6/15/477158:30  Symptom Onset Date and Time:6/15/523520:00  Stroke Team Called Date and Time: 6/15/807975:21  Stroke Team Arrived Date and Time: 6/15/718343:21  CT Interpretation Time: 19:40    NIH Stroke Scale:  Interval: baseline (upon arrival/admit)  Level of Consciousness: 0 - alert  LOC Questions: 1 - answers one correctly  LOC Commands: 0 - performs both correctly  Best Gaze: 0 - normal  Visual: 1 - partial hemianopia  Facial Palsy: 1 - minor  Motor Left Arm: 2 - can't resist gravity  Motor Right Arm: 0 - no drift  Motor Left Le - no drift  Motor Right Le - no drift  Limb Ataxia: 0 - absent  Sensory: 0 - normal  Best Language: 0 - no aphasia  Dysarthria: 1 - mild to moderate dysarthria  Extinction and Inattention: 1 - partial neglect  NIH Stroke Scale Total: 7  Modified Ortega Scale:   Timeline: Prior to symptoms onset  Modified Ortega Score: 0 - no symptoms    [id number] Scale:   Age: 2 - 75 years old  or older  CHF History: 1 - yes  HTN History: 1 - yes  Stroke/TIA/Thromboembolism History: 2 - yes  Vascular Disease History: 1 - yes  Diabetes Mellitus in History: 0 - no  Female: 1 - yes  [id number] Scale Total: 8      Laboratory:  CMP:   Recent Labs  Lab 06/15/17  1942   CALCIUM 9.6   ALBUMIN 3.6   PROT 7.1      K 3.8   CO2 25      BUN 19   CREATININE 0.9   ALKPHOS 91   ALT 14   AST 26   BILITOT 0.7     CBC:   Recent Labs  Lab 06/15/17  1942   WBC 11.46   RBC 4.45   HGB 13.5   HCT 40.1      MCV 90   MCH 30.3   MCHC 33.7     Lipid Panel:   Recent Labs  Lab 06/15/17  1942   CHOL 118*   LDLCALC 67.4   HDL 41   TRIG 48     Coagulation: No results for input(s): INR, APTT in the last 168 hours.    Invalid input(s): PT  Hgb A1C: No results for input(s): HGBA1C in the last 168 hours.  TSH:   Recent Labs  Lab 06/15/17  1942   TSH 1.041       Diagnostic Results:  Brain Imaging: CTA Multiphase 6/15/2017  Partial M2 occlusion with good collateral flow; no hemorrhage.  High grade stenosis of the right ICA

## 2017-06-16 NOTE — ASSESSMENT & PLAN NOTE
-  Encourage mobility, OOB in chair at least 3 hours per day, and ambulation.  -  PT/OT evaluate and treat.  Functional status: PT evaluation pending. Participating with OT.  -  SLP speech and cognitive evaluate and treat.   Cognitive/Speech/Language status:  NPO, oropharyngeal dysphagia.   Nutrition/Swallow Status:  failed bedside swallow evaluation.  Speech recommended NPO status.  -  Monitor sleep disturbances and establish consistent sleep-wake cycle.  -  Monitor for bowel and bladder dysfunction.    -  Monitor for shoulder pain and subluxation.  -  Monitor for spasticity.  -  Monitor for skin breakdown and pressure ulcers (early mobility, repositioning/weight shifting every 20-30 minutes when sitting, turn patient every 2 hours, proper mattress/overlay and chair cushioning, and pressure relief/heel protector boots)  -  DVT prophylaxis:  Plavix & Heparin SQ.  -  Reviewed discharge options with patient (inpatient rehab, SNF, home health therapy, and outpatient therapy).  Encourage participation with therapy.

## 2017-06-16 NOTE — PLAN OF CARE
Problem: Physical Therapy Goal  Goal: Physical Therapy Goal  Goals to be met by: 17     Patient will increase functional independence with mobility by performin. Supine to sit with Moderate Assistance  2. Sit to supine with Moderate Assistance  3. Sit to stand transfer with Moderate Assistance  4. Bed to chair transfer with Moderate Assistance using Rolling Walker or appropriate AD.   5. Gait  x 10 feet with Maximum Assistance using Rolling Walker.   6. Lower extremity exercise program x10 reps with supervision for strengthening and endurance with functional mobility.   7. Pt will perform static standing x 2 minutes using Rolling Walker and demonstrated upright/midline trunk orientation with Minimal Assistance for balance.   8. Pt will tolerated sitting x 8 minutes with Minimal Assistance to maintain midline orientation & balance while performing dynamic activities.           PT evaluation completed. POC and goals established.    Violeta Franco, PT, DPT  2017

## 2017-06-16 NOTE — H&P
[facility]  Vascular Neurology  Comprehensive Stroke Center  History & Physical    Inpatient consult to Vascular (Stroke) Neurology  Consult performed by: ANDREA WILKES  Consult ordered by: ADITYA GRAHAM III  Reason for consult: Stroke        Subjective:     History of Present Illness:  89 y/o female with history of afib, HTN, CHF, CKD who presented via EMS for slurred speech and left sided weakness.  Patient was last seen normal at 10:30am this morning.  Patient lives alone.  Family tried to call her about an hour prior to arrival but got no answer.  He went over to house and found patient on the bathroom floor.  Patient stated she fell but was not sure why.  Patient has no prior strokes or TIA.  She has never had these symptoms before.  Patient in known afib NOT on anticoagulation.  Family stated patient falls frequently and refuses to use a walker.  They decided against anticoagulation due to concerns of falling.           Past Medical History:   Diagnosis Date    CAD (coronary artery disease) 10/10/2012    CRF (chronic renal failure) 10/10/2012    Family history of breast cancer 10/10/2012    History of cardiac arrhythmia 10/10/2012    History of CHF (congestive heart failure) 10/10/2012    History of depression 10/10/2012    History of echocardiogram 10/10/2012    HTN (hypertension) 10/10/2012    Internal carotid artery stenosis 10/10/2012    Personal history of gallstones 10/10/2012     Past Surgical History:   Procedure Laterality Date    EYE SURGERY      HYSTERECTOMY       Family History   Problem Relation Age of Onset    Cancer Mother      breast    Heart disease Neg Hx      Social History   Substance Use Topics    Smoking status: Never Smoker    Smokeless tobacco: Not on file    Alcohol use No     Review of patient's allergies indicates:   Allergen Reactions    Pcn [penicillins] Rash     Medications: I have reviewed the current medication administration  record.      (Not in a hospital admission)    Review of Systems   Constitutional: Negative for fever.   HENT: Negative for congestion.    Eyes: Positive for visual disturbance.   Respiratory: Negative for cough.    Cardiovascular: Negative for chest pain.   Gastrointestinal: Negative for abdominal pain, diarrhea and vomiting.   Genitourinary: Negative for hematuria.   Musculoskeletal: Positive for neck pain.   Skin: Negative for wound.   Neurological: Positive for facial asymmetry, speech difficulty and weakness.   Psychiatric/Behavioral: Negative for behavioral problems.     Objective:     Vital Signs (Most Recent):  Pulse: 93 (06/15/17 2010)  Resp: 20 (06/15/17 2010)  BP: (!) 169/79 (06/15/17 2023)  SpO2: (!) 94 % (06/15/17 2023)    Vital Signs Range (Last 24H):  Pulse:  []   Resp:  [18-22]   BP: (155-197)/(73-98)   SpO2:  [94 %-97 %]     Physical Exam   Constitutional: She appears well-developed and well-nourished.   HENT:   Head: Normocephalic and atraumatic.   Neck: Normal range of motion. Neck supple.   Cardiovascular: An irregularly irregular rhythm present.   Pulmonary/Chest: Effort normal.   Abdominal: Soft.   Skin: Skin is warm and dry.   Psychiatric: She has a normal mood and affect. Her behavior is normal.       Neurological Exam:   LOC: follows requests and drowsy  Language: No aphasia  Speech: Dysarthria  Orientation: Person, Place, Not oriented to time  Memory: Age correct, Abnormalities: No month correct  Visual Fields (CN II): Hemianopsia  left  EOM (CN III, IV, VI): Full/intact  Oculocephalics: normal  Pupils (CN III, IV, VI): PERRL  Facial Sensation (CN V): Symmetric  Facial Movement (CN VII): symmetric facial expression  Motor*: Arm Left:  Paretic:  3/5, Leg Left:   Normal (5/5), Arm Right:   Normal (5/5), Leg Right:   Normal (5/5)  Cerebellar*: Normal limb  Sensation: intact to light touch  Tone: Arm-Left: normal; Leg-Left: normal; Arm-Right: normal; Leg-Right: normal    Stroke Team Times:    Last Known Normal Date and Time : 6/15/959880:30  Symptom Onset Date and Time:6/15/591857:00  Stroke Team Called Date and Time: 6/15/689624:21  Stroke Team Arrived Date and Time: 6/15/582612:21  CT Interpretation Time: 19:40    NIH Stroke Scale:  Interval: baseline (upon arrival/admit)  Level of Consciousness: 1 - drowsy  LOC Questions: 1 - answers one correctly  LOC Commands: 0 - performs both correctly  Best Gaze: 0 - normal  Visual: 1 - partial hemianopia  Facial Palsy: 1 - minor  Motor Left Arm: 2 - can't resist gravity  Motor Right Arm: 0 - no drift  Motor Left Le - no drift  Motor Right Le - no drift  Limb Ataxia: 0 - absent  Sensory: 0 - normal  Best Language: 0 - no aphasia  Dysarthria: 1 - mild to moderate dysarthria  Extinction and Inattention: 1 - partial neglect  NIH Stroke Scale Total: 7  Modified Greenup Scale:   Timeline: Prior to symptoms onset  Modified Greenup Score: 0 - no symptoms    [id number] Scale:   Age: 2 - 75 years old or older  CHF History: 1 - yes  HTN History: 1 - yes  Stroke/TIA/Thromboembolism History: 2 - yes  Vascular Disease History: 1 - yes  Diabetes Mellitus in History: 0 - no  Female: 1 - yes  [id number] Scale Total: 8      Laboratory:  CMP:   Recent Labs  Lab 06/15/17  1942   CALCIUM 9.6   ALBUMIN 3.6   PROT 7.1      K 3.8   CO2 25      BUN 19   CREATININE 0.9   ALKPHOS 91   ALT 14   AST 26   BILITOT 0.7     CBC:   Recent Labs  Lab 06/15/17  1942   WBC 11.46   RBC 4.45   HGB 13.5   HCT 40.1      MCV 90   MCH 30.3   MCHC 33.7     Lipid Panel:   Recent Labs  Lab 06/15/17  1942   CHOL 118*   LDLCALC 67.4   HDL 41   TRIG 48     Coagulation: No results for input(s): INR, APTT in the last 168 hours.    Invalid input(s): PT  Hgb A1C: No results for input(s): HGBA1C in the last 168 hours.  TSH:   Recent Labs  Lab 06/15/17  1942   TSH 1.041       Diagnostic Results:  Brain Imaging: CTA Multiphase 6/15/2017  Partial M2 occlusion with good collateral flow; no  hemorrhage.  High grade stenosis of the right ICA      Assessment/Plan:     Patient is a 89 y.o. year old female with:    * Embolic stroke involving right middle cerebral artery    89 y/o female with known afib now with new right embolic MCA infarct.  Patient has NOT been anticoagulated due to concerns by the family regarding patient's frequent falls.  -Antithrombotics for secondary stroke prevention: Antiplatelets:  Aspirin: 325 mg oral now and daily and Clopidogrel: 75 mg oral daily,   -Statins for secondary stroke prevention and hyperlipidemia, if present: Atorvastatin- 40 mg oral daily, -Aggressive risk factor modification: Hypertension, Carotid Artery disease and CHF,   -Rehab Efforts: Physical Therapy, Occupational Therapy and Speech and Language Pathology,   -Diagnostics: Ordered/Pending HgbA1C to assess blood glucose levels, Lipid Profile to assess cholesterol levels, MRI head without contrast to assess brain parenchyma, Transcranial Doppler to assess vasculature, TSH to assess thyroid function and VTE Prophylaxis: Heparin 5000 units SQ every 8 hours                                    Cardiomyopathy    -Stroke Risk Factor  -as previously identified        Enlarged LA (left atrium)    -as previously identified        Chronic atrial fibrillation    -Stroke Risk Factor  -will start on dual antiplatelet therapy until size of infarct can be evaluated by MRI.  Initial discussion of anticoagulation had with patient's family.  Will need further discussions once imaging complete and a more specific plan has been established.        Internal carotid artery stenosis    -High grade stenosis of the right ICA  -previously identified and again seen on imaging        CKD (chronic kidney disease) stage 3, GFR 30-59 ml/min    Creatine 0.9        History of CHF (congestive heart failure)    -Stroke Risk Factor  -holding home meds to allow for adequate cerebral perfusion         CAD (coronary artery disease)    -Stroke Risk  Factor  -ASA 325mg        Essential hypertension    -Stroke Risk Factor  -hold home meds to allow for adequate cerebral perfusion            Thrombolysis Candidate? No  1. Contraindications: Outside of treament window      Interventional Revascularization Candidate?  Partial occlusion versus stenosis M2 outside of the 6 hour window with non-disabling symptoms    Research Candidate? No-->     Nancy Benedict NP  [facility] Stroke Center  Department of Vascular Neurology   [facility]

## 2017-06-16 NOTE — ED TRIAGE NOTES
89 year old female pt presents to the ed with complaints of a possible stroke.pt was last seen normal at noon. Pt was called again at six . Pt found on bathroom floor by ems positioned on the floor of the bathroom. Pt is awake alert and oriented to herself. Pt denies any chest pain or sob.

## 2017-06-16 NOTE — CONSULTS
PM&R consult received.    Reason for consult:  assess rehabilitation needs    Reviewed patient history and current admission.  Full consult to follow.    MAGALY Hope, FNP-C  Physical Medicine & Rehabilitation   06/16/2017  Spectralink: [id number]

## 2017-06-16 NOTE — HOSPITAL COURSE
6/16/17: Evaluated by OT and SLP. Bed mobility MaxA.  Sit to stand MaxA and transfers MaxA.  Functional ambulated for 5 steps to chair MaxA.  UBD MaxA and LBD TotalA. BSS failed and is NPO 2/2 oropharyngeal dysphagia.   6/17/17: Evaluated by PT. Bed mobility MaxA. Sitting EOB x 12 minutes with min A to maintain balance. No ambulation noted.  6/18/17: Evaluated by OT. Bed mobility ModA-MaxA and TotalA for rolling right.  Sit to stand MaxA. UBD MaxA and LBD MaxA.

## 2017-06-16 NOTE — ASSESSMENT & PLAN NOTE
-High grade stenosis of the right ICA  -previously identified and again seen on imaging  - likely etiology of most recent stroke

## 2017-06-16 NOTE — ED PROVIDER NOTES
Encounter Date: 6/15/2017    SCRIBE #1 NOTE: I, Ha East, am scribing for, and in the presence of,  Dr. Hodges . I have scribed the entire note.       History     Chief Complaint   Patient presents with    Cerebrovascular Accident     pt's family states called pt aqnd at noon she was normal. since then she was found with left side facial droop and left side weakness     Review of patient's allergies indicates:   Allergen Reactions    Pcn [penicillins] Rash     Time patient was seen by the provider: 7:27 PM    The patient is a 89 y.o. female with hx of: HTN, CAD, CHF,cardiac arrhythmia, and A-fib that presents to the ED with a complaint of a cerebrovascular accident. Per her daughter she spoke with the pt on the phone at 10:30AM today (pt has to ambulate to retreive the phone). At 6:00PM today, her daughter called again and there was no answer. Per EMS they found the pt on the bathroom floor on her left side against the cabinetry. EMS picked her up and tried to sit her down, but she was unable to maintain an upright position. EMS endorses slurred speech and a left facial droop. She states it was hard to understand what she was saying and even harder when her dentures were removed. EMS states pt has great coordination on the right side and was able to do a visual field test.       The history is provided by a relative and the EMS personnel.     Past Medical History:   Diagnosis Date    CAD (coronary artery disease) 10/10/2012    CRF (chronic renal failure) 10/10/2012    Family history of breast cancer 10/10/2012    History of cardiac arrhythmia 10/10/2012    History of CHF (congestive heart failure) 10/10/2012    History of depression 10/10/2012    History of echocardiogram 10/10/2012    HTN (hypertension) 10/10/2012    Internal carotid artery stenosis 10/10/2012    Personal history of gallstones 10/10/2012     Past Surgical History:   Procedure Laterality Date    EYE SURGERY      HYSTERECTOMY        Family History   Problem Relation Age of Onset    Cancer Mother      breast    Heart disease Neg Hx      Social History   Substance Use Topics    Smoking status: Never Smoker    Smokeless tobacco: Not on file    Alcohol use No     Review of Systems   Constitutional: Negative for fever.   HENT: Negative for sore throat.    Respiratory: Negative for shortness of breath.    Cardiovascular: Negative for chest pain.   Gastrointestinal: Negative for nausea.   Genitourinary: Negative for dysuria.   Musculoskeletal: Negative for back pain.   Skin: Negative for rash.   Neurological: Positive for speech difficulty. Negative for weakness.        Left facial droop and left sided weakness    Hematological: Does not bruise/bleed easily.       Physical Exam     Initial Vitals [06/15/17 1918]   BP Pulse Resp Temp SpO2   (!) 160/98 106 18 -- 96 %     Physical Exam    Nursing note and vitals reviewed.  Constitutional: She appears well-developed and well-nourished.   HENT:   Head: Normocephalic and atraumatic.   Neck: Neck supple.   Cardiovascular:   Pt has an irregularly irregular heart rhythm. Pt is tachycardic.     Pulmonary/Chest: Breath sounds normal. No respiratory distress.   Abdominal: Soft. She exhibits no distension.   Neurological:   Pt has a left sided facial droop and slurred speech. Pt has 4/5 strength on her left side and normal right sided strength.    Psychiatric: She has a normal mood and affect. Her behavior is normal. Judgment and thought content normal.         ED Course   Critical Care  Date/Time: 7/2/2017 1:23 PM  Performed by: ADITYA GRAHAM III  Authorized by: BERRY ROJO   Direct patient critical care time: 15 minutes  Additional history critical care time: 5 minutes  Ordering / reviewing critical care time: 5 minutes  Documentation critical care time: 5 minutes  Consulting other physicians critical care time: 5 minutes  Total critical care time (exclusive of procedural time) : 35  minutes  Critical care was necessary to treat or prevent imminent or life-threatening deterioration of the following conditions: CNS failure or compromise.  Critical care was time spent personally by me on the following activities: discussions with consultants, evaluation of patient's response to treatment, obtaining history from patient or surrogate, ordering and review of laboratory studies, pulse oximetry, review of old charts, re-evaluation of patient's condition, ordering and review of radiographic studies, ordering and performing treatments and interventions, examination of patient and development of treatment plan with patient or surrogate.  Comments: Pt required numerous evals of her neuro status during her course in the ED for her life-threatening stroke within interventional window.        Labs Reviewed   CBC W/ AUTO DIFFERENTIAL - Abnormal; Notable for the following:        Result Value    Gran # 10.2 (*)     Lymph # 0.5 (*)     Gran% 89.3 (*)     Lymph% 4.5 (*)     All other components within normal limits   COMPREHENSIVE METABOLIC PANEL - Abnormal; Notable for the following:     Glucose 127 (*)     eGFR if non  56.9 (*)     All other components within normal limits   LIPID PANEL - Abnormal; Notable for the following:     Cholesterol 118 (*)     All other components within normal limits   TSH   HEMOGLOBIN A1C   PROTIME-INR     EKG Readings: (Independently Interpreted)   A fib with RVR, no ST elevation or depression.        X-Rays:   Independently Interpreted Readings:   Head CT: Possible right sided stroke in the right thalamus    Other Readings:  CTA: Partial occlusion of the proximal anterior M2 division on the right     Medical Decision Making:   History:   I obtained history from: EMS provider and someone other than patient.       <> Summary of History: Family provided history  Old Medical Records: I decided to obtain old medical records.  Initial Assessment:   89 y.o. female presents  with an acute onset of left sided stroke symptoms not within TPA window, but within the interventional window. Emergently called stroke within minutes of arrival. Will perform STAT head CT and STAT CTA, and reevaluate.     Differential Diagnosis:   My initial differential diagnoses include but are not limited to: stroke, intracranial hemorrhage, subarachnoid hemorrhage,and  subarachnoid hematoma.     Independently Interpreted Test(s):   I have ordered and independently interpreted X-rays - see prior notes.  I have ordered and independently interpreted EKG Reading(s) - see prior notes  Clinical Tests:   Lab Tests: Ordered and Reviewed  Radiological Study: Ordered  Medical Tests: Ordered  Other:   I discussed test(s) with the performing physician.       <> Summary of the Findings: CT head: possible right stroke  CTA head: occlusion on  right  I have discussed this case with another health care provider.       <> Summary of the Discussion: Neuro            Scribe Attestation:   Scribe #1: I performed the above scribed service and the documentation accurately describes the services I performed. I attest to the accuracy of the note.    Attending Attestation:           Physician Attestation for Scribe:  Physician Attestation Statement for Scribe #1: I, Dr. Hodges , reviewed documentation, as scribed by Ha East  in my presence, and it is both accurate and complete.         Attending ED Notes:   Pt saw vascular neurology and they decided not to take pt to intervention. Will admit for stroke management.           ED Course     Clinical Impression:   The primary encounter diagnosis was Cerebrovascular accident (CVA), unspecified mechanism. Diagnoses of Embolic stroke involving right middle cerebral artery, Chronic atrial fibrillation, Embolic stroke involving right carotid artery, Internal carotid artery stenosis, left, Oropharyngeal dysphagia, Essential hypertension, Chronic diastolic heart failure, Enlarged LA (left  atrium), and Hemiparesis, left were also pertinent to this visit.    Disposition:   Disposition: Admitted  Condition: Stable       Ousmane Hodges III, MD  07/02/17 9020

## 2017-06-16 NOTE — HPI
Elizabeth Navarrete is a 89-year-old female with PMHx of A-fib (not on any anticoagulation per family 2/2 concern of falling), HTN, CHF, & CKD.  Patient [place] to [facility] on [place]15/17 with slurred speech and L side weakness. CTH revealed no acute pathology. TPA was not administered due to outside treatment window. CTA showed high grade stenosis (80-90%) of the R internal carotid. She was not an IR candidate due to outside treatment window. MRI revealed acute R basal ganglia infarct, as well as the periphery of the R parietal lobe and R parietal-occiptal lobe. Etiology of the CVA is likely artery to artery atheroembolic.     Functional History: Patient lives in [place] alone in a single story home with 2 steps to enter. Her daughter lives near by and can assist her as needed.  Prior to admission, independent with transfers and mobility.  DME: none.

## 2017-06-16 NOTE — HPI
88 y/o female with history of afib, HTN, CHF, CKD who presented via EMS for slurred speech and left sided weakness.  Patient was last seen normal at 10:30am this morning.  Patient lives alone.  Family tried to call her about an hour prior to arrival but got no answer.  He went over to house and found patient on the bathroom floor.  Patient stated she fell but was not sure why.  Patient has no prior strokes or TIA.  She has never had these symptoms before.  Patient in known afib NOT on anticoagulation.  Family stated patient falls frequently and refuses to use a walker.  They decided against anticoagulation due to concerns of falling.

## 2017-06-16 NOTE — PT/OT/SLP EVAL
Speech Language Pathology  Evaluation    Elizabeth Navarrete   MRN: [id number]   Admitting Diagnosis: Embolic stroke involving right middle cerebral artery    Diet recommendations: Solid Diet Level: NPO  Liquid Diet Level: NPO   Strict NPO  MBS 6/19    SLP Treatment Date: 06/16/17  Speech Start Time: 0917     Speech Stop Time: 0926     Speech Total (min): 9 min       TREATMENT BILLABLE MINUTES:  Eval Swallow and Oral Function 9    Diagnosis: Embolic stroke involving right middle cerebral artery      Past Medical History:   Diagnosis Date    CAD (coronary artery disease) 10/10/2012    CRF (chronic renal failure) 10/10/2012    Family history of breast cancer 10/10/2012    History of cardiac arrhythmia 10/10/2012    History of CHF (congestive heart failure) 10/10/2012    History of depression 10/10/2012    History of echocardiogram 10/10/2012    HTN (hypertension) 10/10/2012    Internal carotid artery stenosis 10/10/2012    Personal history of gallstones 10/10/2012     Past Surgical History:   Procedure Laterality Date    EYE SURGERY      HYSTERECTOMY         Has the patient been evaluated by SLP for swallowing? : Yes  Keep patient NPO?: Yes   General Precautions: Standard,              Prior diet: No reported restrictions; no other SLP notes in Epic.      Subjective:  Pt awake; sitting in chair at BS  Patient goals: none stated    Pain/Comfort  Pain Rating 1: 0/10  Pain Rating Post-Intervention 1: 0/10    Objective:        Oral Musculature Evaluation  Oral Musculature: left weakness  Structural Abnormalities: L weak  Dentition: edentulous  Mucosal Quality: dry  Mandibular Strength and Mobility: impaired  Oral Labial Strength and Mobility: impaired retraction, impaired pursing  Lingual Strength and Mobility: impaired strength  Buccal Strength and Mobility: impaired  Volitional Cough: weak  Volitional Swallow: able to demonstrate  Voice Prior to PO Intake: clear     Bedside Swallow Eval:  Consistencies Assessed: Thin  liquids 1 ice chip, 1/4 spoonful, 1/2 spoonful and Puree 1/4 spoonful x2  Oral Phase: anterior loss and decreased closure around utensil  Pharyngeal Phase: no cough with 1/4 spoonful thin liquid, double swallow with ice chip, cough with 1/2 spoonful thin liquid; cough, multiple swallows with co globus sensation on puree.     Additional Treatment:    Skilled education provided on aspiration precautions and diet recommendations.Whiteboard updated with diet recommendations/aspiration precautions.  No further questions.                                   Assessment:  Elizabeth Navarrete is a 89 y.o. female with a medical diagnosis of Embolic stroke involving right middle cerebral artery and presents with oropharyngeal dysphagia. Pt is not safe for PO at this time.     Do you have any cultural, spiritual, Worship conflicts, given your current situation?: no     Discharge recommendations: Discharge Facility/Level Of Care Needs: nursing facility, skilled     Goals:    SLP Goals        Problem: SLP Goal    Goal Priority Disciplines Outcome   SLP Goal     SLP    Description:  Speech Language Pathology Goals  Goals expected to be met by 6/23  1. Pt will complete MBS to determine least restrictive diet.  2. Pt will participate in speech language cognitive eval to determine need for intervention.  3. Pt will perform oropharyngeal strengthening exercises X10 per session provided min cues to improve safe swallow function.                              Plan:   Patient to be seen Therapy Frequency: 5 x/week   Plan of Care expires: 07/15/17  Plan of Care reviewed with: patient  SLP Follow-up?: Yes  SLP - Next Visit Date: 06/19/17      Gail Ross M.A. CCC-SLP  Speech Language Pathologist  [phone]  6/16/2017

## 2017-06-16 NOTE — PLAN OF CARE
Problem: SLP Goal  Goal: SLP Goal  Speech Language Pathology Goals  Goals expected to be met by 6/23  1. Pt will complete MBS to determine least restrictive diet.  2. Pt will participate in speech language cognitive eval to determine need for intervention.  3. Pt will perform oropharyngeal strengthening exercises X10 per session provided min cues to improve safe swallow function.             Swallow evaluation completed with initiated POC.  Recommending MBS 6/19.    Gail Ross M.A. CCC-SLP  Speech Language Pathologist  [phone]  6/16/2017

## 2017-06-16 NOTE — ASSESSMENT & PLAN NOTE
-Stroke Risk Factor  -metoprolol started  - SBP goal 150-160 in setting of hemodynamically significant stenosis of the R ICA

## 2017-06-16 NOTE — PROGRESS NOTES
Pt arrived to MRI on continuous tele, HR 87, pt on RA, tele room notified that pt will be off portable tele & placed on MRI compatible monitor, will place pt back on tele post MRI, pt to go to room 704 post MRI, family @ bs

## 2017-06-16 NOTE — ASSESSMENT & PLAN NOTE
-Stroke Risk Factor  -patient and family opted to not be on anticoagulation at this time because of concerns of falling

## 2017-06-16 NOTE — ASSESSMENT & PLAN NOTE
2/2 stroke  - SLP. Strict NPO  - NG tube placed for meds and feeds  - continue to monitor for progress

## 2017-06-16 NOTE — ASSESSMENT & PLAN NOTE
89 y/o female with known afib now with new right embolic MCA infarct.  Patient has NOT been anticoagulated due to concerns by the family regarding patient's frequent falls.  -Antithrombotics for secondary stroke prevention: Antiplatelets:  Aspirin: 325 mg oral now and daily and Clopidogrel: 75 mg oral daily,   -Statins for secondary stroke prevention and hyperlipidemia, if present: Atorvastatin- 40 mg oral daily, -Aggressive risk factor modification: Hypertension, Carotid Artery disease and CHF,   -Rehab Efforts: Physical Therapy, Occupational Therapy and Speech and Language Pathology,   -Diagnostics: Ordered/Pending HgbA1C to assess blood glucose levels, Lipid Profile to assess cholesterol levels, MRI head without contrast to assess brain parenchyma, Transcranial Doppler to assess vasculature, TSH to assess thyroid function and VTE Prophylaxis: Heparin 5000 units SQ every 8 hours

## 2017-06-16 NOTE — ASSESSMENT & PLAN NOTE
-Stroke Risk Factor  -will start on dual antiplatelet therapy until size of infarct can be evaluated by MRI.  Initial discussion of anticoagulation had with patient's family.  Will need further discussions once imaging complete and a more specific plan has been established.

## 2017-06-16 NOTE — SUBJECTIVE & OBJECTIVE
Past Medical History:   Diagnosis Date    CAD (coronary artery disease) 10/10/2012    CRF (chronic renal failure) 10/10/2012    Family history of breast cancer 10/10/2012    History of cardiac arrhythmia 10/10/2012    History of CHF (congestive heart failure) 10/10/2012    History of depression 10/10/2012    History of echocardiogram 10/10/2012    HTN (hypertension) 10/10/2012    Internal carotid artery stenosis 10/10/2012    Personal history of gallstones 10/10/2012     Past Surgical History:   Procedure Laterality Date    EYE SURGERY      HYSTERECTOMY       Review of patient's allergies indicates:   Allergen Reactions    Pcn [penicillins] Rash       Scheduled Medications:    aspirin  325 mg Oral Daily    atorvastatin  40 mg Oral Daily    clopidogrel  75 mg Oral Daily    heparin (porcine)  5,000 Units Subcutaneous Q8H    metoprolol tartrate  12.5 mg Oral BID    pantoprazole  40 mg Oral Daily    sodium chloride 0.9%  3 mL Intravenous Q8H    venlafaxine  75 mg Oral Daily       PRN Medications: labetalol, sodium chloride 0.9%    Family History     Problem Relation (Age of Onset)    Cancer Mother        Social History Main Topics    Smoking status: Never Smoker    Smokeless tobacco: Not on file    Alcohol use No    Drug use: No    Sexual activity: No     Review of Systems   Constitutional: Negative for chills, fatigue and fever.   HENT: Negative for facial swelling, trouble swallowing and voice change.    Eyes: Positive for visual disturbance. Negative for photophobia.   Respiratory: Negative for cough, shortness of breath and wheezing.    Cardiovascular: Negative for chest pain and palpitations.   Gastrointestinal: Negative for abdominal distention, nausea and vomiting.   Genitourinary: Negative for difficulty urinating and flank pain.   Musculoskeletal: Positive for gait problem. Negative for arthralgias.   Skin: Negative for color change and rash.   Neurological: Positive for facial  asymmetry, speech difficulty and weakness. Negative for numbness and headaches.   Psychiatric/Behavioral: Negative for agitation and confusion.     Objective:     Vital Signs (Most Recent):  Temp: 98.7 °F (37.1 °C) (17 0805)  Pulse: 73 (17 1100)  Resp: 18 (17 0805)  BP: (!) 155/85 (17 0805)  SpO2: 96 % (17 08)    Vital Signs (24h Range):  Temp:  [98.4 °F (36.9 °C)-98.8 °F (37.1 °C)] 98.7 °F (37.1 °C)  Pulse:  [] 73  Resp:  [16-61] 18  SpO2:  [93 %-98 %] 96 %  BP: (155-197)/(73-98) 155/85     Body mass index is 21.2 kg/m².    Physical Exam   Constitutional: She appears well-developed and well-nourished.   HENT:   Head: Normocephalic and atraumatic.   Eyes: EOM are normal. Pupils are equal, round, and reactive to light.   Neck: Normal range of motion.   Cardiovascular: Normal rate.    Irregular rhythm noted   Pulmonary/Chest: No respiratory distress.   Abdominal: Soft. She exhibits no distension.   NGT in place   Musculoskeletal: Normal range of motion.   Neurological:   Neurologic:  -  Mental Status:  AAOx3.  Follows commands.  Answers correct age and .  Recent and remote memory intact.  Recalls 3/3 objects.    -  Speech and language:  mild dysarthria.    -L inattention  -  Coordination:  Finger to nose exam:  RUE unable to assess, LUE-dysmetria.  Heel to shin:  RLE normal, LLE abnormal.   -  Motor:  RUE: 5/5.  LUE: 0/5 (paretic).  RLE: 5/5.  LLE: 3/5.  - pronator drift.: unable to assess 2/2 LUE paresis  -  Tone:  normal  -  Sensory:  Intact to light touch and pin prick bilaterally.        Diagnostic Results:   Labs: Reviewed  CT: Reviewed  MRI: Reviewed  CTA: reviewed

## 2017-06-16 NOTE — PLAN OF CARE
Visit with pt, daughter at bedside.  Discussed initial recs for skilled nursing.  Pt has PEG in place.  Reviewed list of facilities, daughter would like to begin with referral to [facility] in [place], referral in [facility].  Daughter will review list and make visits over weekend for additional choices.  Will follow.       06/16/17 1520   Discharge Assessment   Assessment Type Discharge Planning Assessment   Confirmed/corrected address and phone number on facesheet? Yes   Assessment information obtained from? Patient   Expected Length of Stay (days) 3   Communicated expected length of stay with patient/caregiver yes   Prior to hospitilization cognitive status: Alert/Oriented   Prior to hospitalization functional status: Independent   Current cognitive status: Alert/Oriented   Current Functional Status: Needs Assistance   Arrived From home or self-care   Lives With alone   Able to Return to Prior Arrangements no   Is patient able to care for self after discharge? No   Readmission Within The Last 30 Days no previous admission in last 30 days   Patient currently being followed by outpatient case management? No   Patient currently receives home health services? No   Does the patient currently use HME? No   Patient currently receives private duty nursing? N/A   Patient currently receives any other outside agency services? No   Equipment Currently Used at Home none   Do you have any problems affording any of your prescribed medications? No   Is the patient taking medications as prescribed? yes   Do you have any financial concerns preventing you from receiving the healthcare you need? No   Does the patient have transportation to healthcare appointments? No   On Dialysis? No   Does the patient receive services at the [facility]? No   Are there any open cases? No   Discharge Plan A Skilled Nursing Facility  (Nursing Home SNF)   Discharge Plan B Rehab   Patient/Family In Agreement With Plan yes

## 2017-06-16 NOTE — SUBJECTIVE & OBJECTIVE
Neurologic Chief Complaint: left hemiparesis    Subjective:     Interval History: Patient is seen for follow-up neurological assessment and treatment recommendations:   NPO per SLP; NG tube placed today for meds and feeds. No events overnight    HPI, Past Medical, Family, and Social History remains the same as documented in the initial encounter.     Review of Systems   Constitutional: Positive for fatigue. Negative for fever.   Eyes: Negative for visual disturbance.   Respiratory: Negative for cough and shortness of breath.    Cardiovascular: Negative for chest pain.     Scheduled Meds:   aspirin  325 mg Oral Daily    atorvastatin  40 mg Oral Daily    clopidogrel  75 mg Oral Daily    heparin (porcine)  5,000 Units Subcutaneous Q8H    metoprolol tartrate  12.5 mg Oral BID    pantoprazole  40 mg Oral Daily    sodium chloride 0.9%  3 mL Intravenous Q8H    tuberculin  5 Units Intradermal Once    venlafaxine  75 mg Oral Daily     Continuous Infusions:   sodium chloride 0.9%       PRN Meds:labetalol, sodium chloride 0.9%    Objective:     Vital Signs (Most Recent):  Temp: 98.1 °F (36.7 °C) (06/16/17 1205)  Pulse: 90 (06/16/17 1500)  Resp: 18 (06/16/17 1205)  BP: (!) 155/85 (06/16/17 0805)  SpO2: (!) 92 % (06/16/17 1205)  BP Location: Right arm    Vital Signs Range (Last 24H):  Temp:  [98.1 °F (36.7 °C)-98.8 °F (37.1 °C)]   Pulse:  []   Resp:  [16-61]   BP: (155-197)/(73-98)   SpO2:  [92 %-98 %]   BP Location: Right arm    Physical Exam   Constitutional: She appears well-developed and well-nourished.   HENT:   Head: Normocephalic and atraumatic.   Eyes: Pupils are equal, round, and reactive to light.       Neurological Exam:   LOC: alert and follows requests  Language: No aphasia  Speech: Dysarthria  Visual Fields (CN II): Full  EOM (CN III, IV, VI): Full/intact  Pupils (CN III, IV, VI): PERRL  Facial Movement (CN VII): symmetric facial expression  Tongue (CN XII): to midline  Motor*: Arm Left:  Plegic  (0/5), Leg Left:   Paretic:  4/5, Arm Right:   Normal (5/5), Leg Right:   Normal (5/5)  Cerebellar*: Normal limb  Sensation: intact to light touch, temperature and vibration  Tone: Arm-Left: normal; Leg-Left: normal; Arm-Right: normal; Leg-Right: normal   Extinction to bilateral stimulation    NIH Stroke Scale:    Level of Consciousness: 0 - alert  LOC Questions: 0 - answers both correctly  LOC Commands: 0 - performs both correctly  Best Gaze: 0 - normal  Visual: 0 - no visual loss  Facial Palsy: 1 - minor  Motor Left Arm: 4 - no movement  Motor Right Arm: 0 - no drift  Motor Left Le - drift  Motor Right Le - no drift  Limb Ataxia: 0 - absent  Sensory: 0 - normal  Best Language: 0 - no aphasia  Dysarthria: 2 - near to unintelligible  Extinction and Inattention: 1 - partial neglect  NIH Stroke Scale Total: 9  [id number] Scale:   Age: 2 - 75 years old or older  CHF History: 1 - yes  HTN History: 1 - yes  Stroke/TIA/Thromboembolism History: 2 - yes  Vascular Disease History: 0 - no  Diabetes Mellitus in History: 0 - no  Female: 1 - yes  [id number] Scale Total: 7      Laboratory:  CMP:   Recent Labs  Lab 17  0349   CALCIUM 9.5   ALBUMIN 3.3*   PROT 6.6      K 3.7   CO2 24      BUN 15   CREATININE 0.8   ALKPHOS 84   ALT 15   AST 29   BILITOT 0.8     BMP:   Recent Labs  Lab 17  0349      K 3.7      CO2 24   BUN 15   CREATININE 0.8   CALCIUM 9.5     CBC:   Recent Labs  Lab 17  0350   WBC 10.17   RBC 4.35   HGB 12.9   HCT 39.7      MCV 91   MCH 29.7   MCHC 32.5     Lipid Panel:   Recent Labs  Lab 06/15/17  1942   CHOL 118*   LDLCALC 67.4   HDL 41   TRIG 48     Coagulation:   Recent Labs  Lab 17   INR 1.1   APTT 24.5     Platelet Aggregation Study: No results for input(s): PLTAGG, PLTAGINTERP, PLTAGREGLACO, ADPPLTAGGREG in the last 168 hours.  Hgb A1C:   Recent Labs  Lab 06/15/17  1942   HGBA1C 5.6     TSH:   Recent Labs  Lab 06/15/17  1942   TSH 1.041        Diagnostic Results:  I have personally reviewed:   Findings:     MRI brain WO contrast (6/16/17)  R insular cortex, right corona radiata and right parietal lobe infarct. +cytotoxic cerebral edema

## 2017-06-16 NOTE — ED NOTES
Pain: Denies at present.    Psychosocial: Patient is calm and cooperative. Patients insight and judgement are appropriate to situation. Appears clean, well maintained, with clothing appropriate to environment. No evidence of delusions, hallucinations, or psychosis.    Airway: Bilateral chest rise and fall. RR regular and non-labored. Air entry patent and clear x 5 lobes of the lungs. No crepitus or subcutaneous emphysema noted on palpation.     Circulatory: Skin warm, dry, and pink. Apical and radial pulses strong and regular. Capillary refill/skin blanching less than 3 seconds to distal of 4 extremities. Placed on CM in NSR without ectopy.    Abdomen: Abdomen obese, soft and non-distended. Positive normo-active bowel sounds x 4 quadrants.     Urinary: Patient reports routine urination without pain, frequency, or urgency. Voids independently. Reports urine appears charanjit/yellow in color.    Extremities: No redness, heat, swelling, deformity, or pain.     Skin: Intact with no bruising/discolorations noted.

## 2017-06-16 NOTE — PLAN OF CARE
Problem: Patient Care Overview  Goal: Plan of Care Review  Outcome: Ongoing (interventions implemented as appropriate)  1. TF recommendations: Isosource 1.5 @ 35 mL/hr to provide 1260 calories, 57 grams of protein, 642 mL fluid.    - Hold for residuals >400 mL; additional fluid per MD.  2. If able to advance diet, recommend regular, texture per SLP.   3. RD to monitor & follow-up.

## 2017-06-16 NOTE — PLAN OF CARE
Problem: Patient Care Overview  Goal: Individualization & Mutuality  Outcome: Ongoing (interventions implemented as appropriate)  POC reviewed with patient, daughter and son in law tonight. Pt has remained free from falls/injury falls tonight. Remains with left facial drooping, LUE weak & no hand grasp. No new skin impairments noted-skin tear to left elbow. Afebrile. Safety precautions maintained-bed alarm & camera in use. Continuous telemetry monitoring in progress-AFIB with rate controlled. Turn schedule maintained Q2 hours for pressure relief. Failed ETELVINA in ED/NPO pending ST eval.

## 2017-06-16 NOTE — CONSULTS
[facility]  Physical Medicine & Rehab  Consult Note    Patient Name: Elizabeth Navarrete  MRN: [id number]  Admission Date: 6/15/2017  Hospital Length of Stay: 1 days  Collaborating Physician : Anthony Dutton MD    Consults  Subjective:     Principal Problem: Embolic stroke involving right middle cerebral artery    HPI: Elizabeth Navarrete is a 89-year-old female with PMHx of A-fib (not on any anticoagulation per family 2/2 concern of falling), HTN, CHF, & CKD.  Patient presented to [facility] on 6/15/17 with slurred speech and L side weakness. CTH revealed no acute pathology. TPA was not administered due to outside treatment window. CTA showed high grade stenosis (80-90%) of the R internal carotid. She was not an IR candidate due to outside treatment window. MRI revealed acute R basal ganglia infarct, as well as the periphery of the R parietal lobe and R parietal-occiptal lobe.   Etiology of the CVA is likely artery to artery atheroembolic.     Functional History: Patient lives in [place] alone in a single story home with 2 steps to enter. Her daughter lives near by and can assist her as needed.  Prior to admission, independent with transfers and mobility.  DME: none.      Hospital Course: 6/16/17: Evaluated by OT and SLP. Bed mobility MaxA.  Sit to stand MaxA and transfers MaxA.  Functional ambulated for 5 steps to chair MaxA.  UBD MaxA and LBD TotalA. BSS failed and is NPO 2/2 oropharyngeal dysphagia.       Past Medical History:   Diagnosis Date    CAD (coronary artery disease) 10/10/2012    CRF (chronic renal failure) 10/10/2012    Family history of breast cancer 10/10/2012    History of cardiac arrhythmia 10/10/2012    History of CHF (congestive heart failure) 10/10/2012    History of depression 10/10/2012    History of echocardiogram 10/10/2012    HTN (hypertension) 10/10/2012    Internal carotid artery stenosis 10/10/2012    Personal history of gallstones 10/10/2012     Past Surgical History:   Procedure  Laterality Date    EYE SURGERY      HYSTERECTOMY       Review of patient's allergies indicates:   Allergen Reactions    Pcn [penicillins] Rash       Scheduled Medications:    aspirin  325 mg Oral Daily    atorvastatin  40 mg Oral Daily    clopidogrel  75 mg Oral Daily    heparin (porcine)  5,000 Units Subcutaneous Q8H    metoprolol tartrate  12.5 mg Oral BID    pantoprazole  40 mg Oral Daily    sodium chloride 0.9%  3 mL Intravenous Q8H    venlafaxine  75 mg Oral Daily       PRN Medications: labetalol, sodium chloride 0.9%    Family History     Problem Relation (Age of Onset)    Cancer Mother        Social History Main Topics    Smoking status: Never Smoker    Smokeless tobacco: Not on file    Alcohol use No    Drug use: No    Sexual activity: No     Review of Systems   Constitutional: Negative for chills, fatigue and fever.   HENT: Negative for facial swelling, trouble swallowing and voice change.    Eyes: Positive for visual disturbance. Negative for photophobia.   Respiratory: Negative for cough, shortness of breath and wheezing.    Cardiovascular: Negative for chest pain and palpitations.   Gastrointestinal: Negative for abdominal distention, nausea and vomiting.   Genitourinary: Negative for difficulty urinating and flank pain.   Musculoskeletal: Positive for gait problem. Negative for arthralgias.   Skin: Negative for color change and rash.   Neurological: Positive for speech difficulty and weakness. Negative for numbness and headaches.   Psychiatric/Behavioral: Negative for agitation and confusion.     Objective:     Vital Signs (Most Recent):  Temp: 98.7 °F (37.1 °C) (06/16/17 0805)  Pulse: 73 (06/16/17 1100)  Resp: 18 (06/16/17 0805)  BP: (!) 155/85 (06/16/17 0805)  SpO2: 96 % (06/16/17 0805)    Vital Signs (24h Range):  Temp:  [98.4 °F (36.9 °C)-98.8 °F (37.1 °C)] 98.7 °F (37.1 °C)  Pulse:  [] 73  Resp:  [16-61] 18  SpO2:  [93 %-98 %] 96 %  BP: (155-197)/(73-98) 155/85     Body mass  index is 21.2 kg/m².    Physical Exam   Constitutional: She appears well-developed and well-nourished.   HENT:   Head: Normocephalic and atraumatic.   Eyes: EOM are normal. Pupils are equal, round, and reactive to light.   Neck: Normal range of motion.   Cardiovascular: Normal rate.    Irregular rhythm noted   Pulmonary/Chest: No respiratory distress.   Abdominal: Soft. She exhibits no distension.   NGT in place   Musculoskeletal: Normal range of motion.   Neurological:   Neurologic:  -  Mental Status:  AAOx3.  Follows commands.  Answers correct age and .  Recent and remote memory intact.  Recalls 3/3 objects.    -  Speech and language:  mild dysarthria.    -L inattention  -  Coordination:  Finger to nose exam:  RUE unable to assess, LUE-dysmetria.  Heel to shin:  RLE normal, LLE abnormal.   -  Motor:  RUE: 5/5.  LUE: 0/5 (paretic).  RLE: 5/5.  LLE: 3/5.  - pronator drift.: unable to assess 2/2 LUE paresis  -  Tone:  normal  -  Sensory:  Intact to light touch and pin prick bilaterally.        Diagnostic Results:   Labs: Reviewed  CT: Reviewed  MRI: Reviewed  CTA: reviewed    Assessment/Plan:     Chronic atrial fibrillation    -continue Plavix PO and Heparin SQ per VN.         * Embolic stroke involving right middle cerebral artery    -  Encourage mobility, OOB in chair at least 3 hours per day, and ambulation.  -  PT/OT evaluate and treat.  Functional status: PT evaluation pending. Participating with OT.  -  SLP speech and cognitive evaluate and treat.   Cognitive/Speech/Language status:  NPO, oropharyngeal dysphagia.   Nutrition/Swallow Status:  failed bedside swallow evaluation.  Speech recommended NPO status.  -  Monitor sleep disturbances and establish consistent sleep-wake cycle.  -  Monitor for bowel and bladder dysfunction.    -  Monitor for shoulder pain and subluxation.  -  Monitor for spasticity.  -  Monitor for skin breakdown and pressure ulcers (early mobility, repositioning/weight shifting every  20-30 minutes when sitting, turn patient every 2 hours, proper mattress/overlay and chair cushioning, and pressure relief/heel protector boots)  -  DVT prophylaxis:  Plavix & Heparin SQ.  -  Reviewed discharge options with patient (inpatient rehab, SNF, home health therapy, and outpatient therapy).  Encourage participation with therapy.          Participating with OT, but PT evaluation pending. Recommend early mobility, OOB to chair 3 hours per day, and continuation of PT/OT to promote and better assess patient's endurance and ability to tolerate rehab program.  Will follow patient's progress and discuss with rehab team for rehab recommendations.      Thank you for your consult.      Bruna Negrete NP  Department of Physical Medicine & Rehab  [facility]

## 2017-06-16 NOTE — PT/OT/SLP EVAL
Occupational Therapy  Evaluation    Elizabeth Navarerte   MRN: [id number]   Admitting Diagnosis: Embolic stroke involving right middle cerebral artery    OT Date of Treatment: 06/16/17   OT Start Time: 0850  OT Stop Time: 0915  OT Total Time (min): 25 min    Billable Minutes:  Evaluation 20; TA: 15 minutes    Diagnosis: Embolic stroke involving right middle cerebral artery   L side weakness, L inattention; dysarthria    Past Medical History:   Diagnosis Date    CAD (coronary artery disease) 10/10/2012    CRF (chronic renal failure) 10/10/2012    Family history of breast cancer 10/10/2012    History of cardiac arrhythmia 10/10/2012    History of CHF (congestive heart failure) 10/10/2012    History of depression 10/10/2012    History of echocardiogram 10/10/2012    HTN (hypertension) 10/10/2012    Internal carotid artery stenosis 10/10/2012    Personal history of gallstones 10/10/2012      Past Surgical History:   Procedure Laterality Date    EYE SURGERY      HYSTERECTOMY         Referring physician: Jak  Date referred to OT: 6/15/2017    General Precautions: Standard, aspiration, fall, NPO  Orthopedic Precautions: N/A  Braces: N/A    Patient History:  Living Environment  Living Environment Comment: Pt reported that she lives alone in [facility] with 2 PEGGY and 0 hand rail. Home has tub/shower combo. Prior to admit, pt reported using 0 DME for functional mobility or showering. She reported she required 0 assistance with dressing/self care. She does not drive. She reported her daughters assist her as needed.  Equipment Currently Used at Home: none    Prior level of function:   Bed Mobility/Transfers: independent  Grooming: independent  Bathing: independent  Upper Body Dressing: independent  Lower Body Dressing: independent  Toileting: independent  Home Management Skills: independent  Homemaking Responsibilities: Yes  Driving License: No  Occupation: Retired  Leisure and Hobbies: watching TV and mainly just staying around  the home   Dominant hand: right    Subjective:  Communicated with RN prior to session.  Chief Complaint: no reports  Patient/Family stated goals: no reports    Pain/Comfort  Pain Rating 1: 0/10  Pain Rating Post-Intervention 1: 0/10    Objective:  Patient found with: telemetry, peripheral IV    Cognitive Exam:  Oriented to: Person, Place and Time  Follows Commands/attention: 3 step commands with delayed processing  Communication: dysarthria  Memory:  Questionable  Safety awareness/insight to disability: impaired  Coping skills/emotional control: Appropriate to situation    Visual/perceptual:  Impaired  L inattention; partial hemianopia    Physical Exam:  Postural examination/scapula alignment: Rounded shoulder, Head forward, Posterior pelvic tilt and Kyphosis  Skin integrity: Dry; Abrasion to back; multiple skin tears to R UE noted; Pt with increased skin breakdown noted to L UE  Edema: None noted    Sensation:   Impaired  light/touch L UE and proprioception L  UE    Upper Extremity Range of Motion:  Right Upper Extremity: WFL  Left Upper Extremity: Deficits: P ROM WFL; AROM limited for shoulder flex >80*    Upper Extremity Strength:  Right Upper Extremity: 4+/5  Left Upper Extremity: 3/5 shoulder, bicep, forearm; 1/5 wrist; 0/5 hand   Strength: 4/5 R; 0/5 L    Fine motor coordination:   Impaired L hand inability to complete fine motor task (isolation, opposition)    Gross motor coordination: L side deficits     Functional Mobility:  Bed Mobility:  Scooting/Bridging: Maximum Assistance (lateral weightshift to EOB)  Supine to Sit: Maximum Assistance    Transfers:  Sit <> Stand Assistance: Maximum Assistance  Sit <> Stand Assistive Device: No Assistive Device  Bed <> Chair Technique: Stand Pivot  Bed <> Chair Transfer Assistance: Maximum Assistance  Bed <> Chair Assistive Device: No Assistive Device    Functional Ambulation: Max(A) to complete weightshift for 5 steps to chair    Activities of Daily  Living:  Feeding Level of Assistance: Activity did not occur (NPO)  UE Dressing Level of Assistance: Maximum assistance (to don gown as jacket)  LE Dressing Level of Assistance: Total assistance  Grooming Position: Seated, bedside chair (dentures and to wash face)  Grooming Level of Assistance: Minimum assistance    Balance:   Static Sit: Poor; Pt with L lateral lean and poor control. Required tactile cues for L UE weightbearing  Dynamic Sit: Poor; Unable to take challenges without core instability  Static Stand: Poor; Max(A) x1 to maintain  Dynamic stand: Poor; Total(A)    Additional:  -Pt alert and agreeable to therapy eval  -Edu on OT role in care  -Edu on use of light button for staff assistance with verbalized and teach back understanding  -In chair: Pt positioned for midline; pillow added to L UE for elevation with extension and abduction   -Communication board updated; no family present for education    **Addendum: TA charged: OT to return [id number]: Per RN request to assist pt back to bed. Daughter present and verified pt's social hx obtained by pt this morning. Pt required max(A) x1 person for sit<>stand from chair. RN to assist with R UE 2/2 pushing. Pt required min(A) for lateral scooting to front of chair for prep. Required mod(A) for sit<>supine from L EOB. RN present and [facility] on level of assistance for pt    AM-PAC 6 CLICK ADL  How much help from another person does this patient currently need?  1 = Unable, Total/Dependent Assistance  2 = A lot, Maximum/Moderate Assistance  3 = A little, Minimum/Contact Guard/Supervision  4 = None, Modified [place]/Independent    Putting on and taking off regular lower body clothing? : 1  Bathing (including washing, rinsing, drying)?: 2  Toileting, which includes using toilet, bedpan, or urinal? : 2  Putting on and taking off regular upper body clothing?: 2  Taking care of personal grooming such as brushing teeth?: 2  Eating meals?: 1  Total Score: 10    AM-PAC Raw  "Score CMS "G-Code Modifier Level of Impairment Assistance   6 % Total / Unable   7 - 9 CM 80 - 100% Maximal Assist   10-14 CL 60 - 80% Moderate Assist   15 - 19 CK 40 - 60% Moderate Assist   20 - 22 CJ 20 - 40% Minimal Assist   23 CI 1-20% SBA / CGA   24 CH 0% Independent/ Mod I       Patient left up in chair with all lines intact, call button in reach and RN and SLP notified    Assessment:  Elizabeth Navarrete is a 89 y.o. female with a medical diagnosis of Embolic stroke involving right middle cerebral artery and presents with L side weakness, L inattention with partial hemianopia, dysarthria, impaired postural control and overall debility with decline in functional indep with all daily tasks and activities. Pt with co-morb of CHF, cardiomyopathy, chronic a-fib and HTN. Pt demo cognitive deficits with impaired perception, sequencing, safety awareness and overall decline in PLOF with cognitive task. Pt displays decline in occupational performance with functional task with decrease in ability to carry out appropriate interactions, sequencing of ADLs/self care and performance of prior tasks. Pt demo physical deficits with balance (static/dynamic), functional mobility, bed mobility, UB strength, endurance for age appropriate activities, fine motor skills, gross motor skills, coordination deficits, sensory impairments and decline in prior level of functional indep with daily tasks and activities. She will cont to benefit from skilled OT services at this time. Her current functional status interferes with her role as daughter, grandmother, indep home dweller, and care taker to self.       Rehab identified problem list/impairments: Rehab identified problem list/impairments: weakness, impaired endurance, impaired sensation, impaired self care skills, impaired functional mobilty, gait instability, impaired balance, visual deficits, decreased upper extremity function, decreased lower extremity function, decreased safety " awareness, abnormal tone, impaired skin, edema, impaired fine motor    Rehab potential is good.    Activity tolerance: Good    Discharge recommendations: Discharge Facility/Level Of Care Needs: nursing facility, skilled     Barriers to discharge: Barriers to Discharge: Inaccessible home environment, Decreased caregiver support    Equipment recommendations: wheelchair, 3-in-1 commode, bath bench     GOALS:    Occupational Therapy Goals        Problem: Occupational Therapy Goal    Goal Priority Disciplines Outcome Interventions   Occupational Therapy Goal     OT, PT/OT Ongoing (interventions implemented as appropriate)    Description:  Goals to be met by: 6/30     Patient will increase functional independence with ADLs by performing:    UE Dressing while seated with Set-up Assistance and Minimal Assistance.  LE Dressing with Set-up Assistance and Moderate Assistance.  Grooming while standing with Set-up Assistance and Minimal Assistance.  Sitting at edge of bed x10 minutes for dynamic functional task with Contact Guard Assistance.  Stand pivot transfers with Minimal Assistance.  Toilet transfer to bedside commode with Minimal Assistance.  Pt/CG verbalized understanding for s/s of stroke.  CG demo understanding for positioning of L UE.   CG demo understanding for L UE exercises.                       PLAN:  Patient to be seen 6 x/week to address the above listed problems via self-care/home management, therapeutic activities, therapeutic exercises, neuromuscular re-education, cognitive retraining  Plan of Care expires: 07/15/17  Plan of Care reviewed with: patient    LAVONNE Lopez  06/16/2017

## 2017-06-17 LAB
ALBUMIN SERPL BCP-MCNC: 3.4 G/DL
ALP SERPL-CCNC: 88 U/L
ALT SERPL W/O P-5'-P-CCNC: 19 U/L
ANION GAP SERPL CALC-SCNC: 15 MMOL/L
AST SERPL-CCNC: 36 U/L
BASOPHILS # BLD AUTO: 0.04 K/UL
BASOPHILS NFR BLD: 0.3 %
BILIRUB SERPL-MCNC: 1.2 MG/DL
BUN SERPL-MCNC: 14 MG/DL
CALCIUM SERPL-MCNC: 9.5 MG/DL
CHLORIDE SERPL-SCNC: 106 MMOL/L
CO2 SERPL-SCNC: 22 MMOL/L
CREAT SERPL-MCNC: 0.8 MG/DL
DIFFERENTIAL METHOD: ABNORMAL
EOSINOPHIL # BLD AUTO: 0.1 K/UL
EOSINOPHIL NFR BLD: 0.5 %
ERYTHROCYTE [DISTWIDTH] IN BLOOD BY AUTOMATED COUNT: 12.6 %
EST. GFR  (AFRICAN AMERICAN): >60 ML/MIN/1.73 M^2
EST. GFR  (NON AFRICAN AMERICAN): >60 ML/MIN/1.73 M^2
GLUCOSE SERPL-MCNC: 98 MG/DL
HCT VFR BLD AUTO: 41.7 %
HGB BLD-MCNC: 13.9 G/DL
LYMPHOCYTES # BLD AUTO: 1.4 K/UL
LYMPHOCYTES NFR BLD: 11.8 %
MCH RBC QN AUTO: 30.2 PG
MCHC RBC AUTO-ENTMCNC: 33.3 %
MCV RBC AUTO: 91 FL
MONOCYTES # BLD AUTO: 1.3 K/UL
MONOCYTES NFR BLD: 11.1 %
NEUTROPHILS # BLD AUTO: 9.2 K/UL
NEUTROPHILS NFR BLD: 76.1 %
PLATELET # BLD AUTO: 251 K/UL
PMV BLD AUTO: 10.6 FL
POTASSIUM SERPL-SCNC: 3.3 MMOL/L
PROT SERPL-MCNC: 6.8 G/DL
RBC # BLD AUTO: 4.61 M/UL
SODIUM SERPL-SCNC: 143 MMOL/L
WBC # BLD AUTO: 12.08 K/UL

## 2017-06-17 PROCEDURE — 20600001 HC STEP DOWN PRIVATE ROOM

## 2017-06-17 PROCEDURE — 36415 COLL VENOUS BLD VENIPUNCTURE: CPT

## 2017-06-17 PROCEDURE — 25000003 PHARM REV CODE 250: Performed by: NURSE PRACTITIONER

## 2017-06-17 PROCEDURE — 25000003 PHARM REV CODE 250: Performed by: PSYCHIATRY & NEUROLOGY

## 2017-06-17 PROCEDURE — 85025 COMPLETE CBC W/AUTO DIFF WBC: CPT

## 2017-06-17 PROCEDURE — 97530 THERAPEUTIC ACTIVITIES: CPT

## 2017-06-17 PROCEDURE — 99233 SBSQ HOSP IP/OBS HIGH 50: CPT | Mod: GC,,, | Performed by: PSYCHIATRY & NEUROLOGY

## 2017-06-17 PROCEDURE — 80053 COMPREHEN METABOLIC PANEL: CPT

## 2017-06-17 RX ORDER — DEXTROSE MONOHYDRATE, SODIUM CHLORIDE, AND POTASSIUM CHLORIDE 50; .745; 4.5 G/1000ML; G/1000ML; G/1000ML
INJECTION, SOLUTION INTRAVENOUS CONTINUOUS
Status: DISCONTINUED | OUTPATIENT
Start: 2017-06-17 | End: 2017-06-17

## 2017-06-17 RX ORDER — CARVEDILOL 6.25 MG/1
6.25 TABLET ORAL 2 TIMES DAILY
Status: DISCONTINUED | OUTPATIENT
Start: 2017-06-17 | End: 2017-06-18

## 2017-06-17 RX ADMIN — PANTOPRAZOLE SODIUM 40 MG: 40 TABLET, DELAYED RELEASE ORAL at 01:06

## 2017-06-17 RX ADMIN — Medication 12.5 MG: at 01:06

## 2017-06-17 RX ADMIN — HEPARIN SODIUM 5000 UNITS: 5000 INJECTION, SOLUTION INTRAVENOUS; SUBCUTANEOUS at 10:06

## 2017-06-17 RX ADMIN — Medication 10 ML: at 12:06

## 2017-06-17 RX ADMIN — ATORVASTATIN CALCIUM 40 MG: 20 TABLET, FILM COATED ORAL at 01:06

## 2017-06-17 RX ADMIN — HEPARIN SODIUM 5000 UNITS: 5000 INJECTION, SOLUTION INTRAVENOUS; SUBCUTANEOUS at 05:06

## 2017-06-17 RX ADMIN — Medication 3 ML: at 10:06

## 2017-06-17 RX ADMIN — ACETAMINOPHEN 650 MG: 325 TABLET ORAL at 03:06

## 2017-06-17 RX ADMIN — HEPARIN SODIUM 5000 UNITS: 5000 INJECTION, SOLUTION INTRAVENOUS; SUBCUTANEOUS at 12:06

## 2017-06-17 RX ADMIN — HEPARIN SODIUM 5000 UNITS: 5000 INJECTION, SOLUTION INTRAVENOUS; SUBCUTANEOUS at 01:06

## 2017-06-17 RX ADMIN — ASPIRIN 81 MG: 81 TABLET, COATED ORAL at 01:06

## 2017-06-17 RX ADMIN — POTASSIUM BICARBONATE 50 MEQ: 25 TABLET, EFFERVESCENT ORAL at 01:06

## 2017-06-17 RX ADMIN — CLOPIDOGREL 75 MG: 75 TABLET, FILM COATED ORAL at 01:06

## 2017-06-17 RX ADMIN — CARVEDILOL 6.25 MG: 6.25 TABLET, FILM COATED ORAL at 10:06

## 2017-06-17 RX ADMIN — Medication 3 ML: at 06:06

## 2017-06-17 RX ADMIN — Medication 3 ML: at 01:06

## 2017-06-17 RX ADMIN — DEXTROSE MONOHYDRATE, SODIUM CHLORIDE, AND POTASSIUM CHLORIDE: 50; 4.5; .745 INJECTION, SOLUTION INTRAVENOUS at 08:06

## 2017-06-17 NOTE — PROGRESS NOTES
Notified stroke team of redness and swelling just above IV site in R arm. Pt was not receiving any fluids through the IV site. Team to take a look when round on pt. WCTM

## 2017-06-17 NOTE — ASSESSMENT & PLAN NOTE
2/2 stroke  - SLP. Strict NPO  - NGT in , on TFs  - cont speech therapy recs, may require PEG eventually if unable to pass swallow studies

## 2017-06-17 NOTE — ASSESSMENT & PLAN NOTE
2/2 stroke  - SLP. Strict NPO  - NG tube under fluoro to be placed today; issues with NG tube coiling in the stomach on prior attempts at bedside.  - continue to monitor for progress

## 2017-06-17 NOTE — PLAN OF CARE
Problem: Patient Care Overview  Goal: Plan of Care Review  Outcome: Ongoing (interventions implemented as appropriate)  POC reviewed with pt. Pt verbalized understanding. Pt remained free from falls, skin breakdown, and injury. Call light remained within reach and side rails up x2. Neuro checks and vital signs done every 4 hours. Refer to flowsheets for full assessment and VS info. NGT placed under fluro. TF started at 1700. NAD. Will continue to monitor.

## 2017-06-17 NOTE — ASSESSMENT & PLAN NOTE
-High grade stenosis of the right ICA  -previously identified and again seen on imaging  - likely etiology of most recent stroke  -maximum medical management as patient does not want any surgical intervention.

## 2017-06-17 NOTE — PLAN OF CARE
Problem: Physical Therapy Goal  Goal: Physical Therapy Goal  Goals to be met by: 17     Patient will increase functional independence with mobility by performin. Supine to sit with Moderate Assistance  2. Sit to supine with Moderate Assistance  3. Sit to stand transfer with Moderate Assistance  4. Bed to chair transfer with Moderate Assistance using Rolling Walker or appropriate AD.   5. Gait  x 10 feet with Maximum Assistance using Rolling Walker.   6. Lower extremity exercise program x10 reps with supervision for strengthening and endurance with functional mobility.   7. Pt will perform static standing x 2 minutes using Rolling Walker and demonstrated upright/midline trunk orientation with Minimal Assistance for balance.   8. Pt will tolerated sitting x 8 minutes with Minimal Assistance to maintain midline orientation & balance while performing dynamic activities.      Outcome: Ongoing (interventions implemented as appropriate)  Goals remain appropriate

## 2017-06-17 NOTE — ASSESSMENT & PLAN NOTE
89 y/o female with PMHx of A fib, HTN presenting with left hemiparesis. Imaging remarkable for R hemispheric infarcts; embolic pattern. CTA remarkable for 90% stenosis of the right carotid artery and R M2. Etiology of stroke is likely artery to artery - atheroembolic from symptomatic R IC rather than cardio-embolic. Vascular surgery consult deferred as patient does not want surgical management of the R IC even if offered; will medically optimize.     Of note patient has prior history of strokes noted on imaging with a CHADsVASc of 7 => high risk; 1 year stroke risk of 15.7%. Patient and family aware that anticoagulation would be optimal management in the setting of known A fib and prior strokes for secondary stroke prevention but patient chosen to NOT be anticoagulated due to concerns by the family regarding patient's frequent falls.   -Antithrombotics for secondary stroke prevention: Antiplatelets:  Aspirin: 325 mg oral now and daily and Clopidogrel: 75 mg oral daily   -Statins for secondary stroke prevention and hyperlipidemia, if present: Atorvastatin- 40 mg oral daily,   -Aggressive risk factor modification: Hypertension, Carotid Artery disease and CHF   -Rehab Efforts: Physical Therapy, Occupational Therapy and Speech and Language Pathology.   -Diagnostics: Ordered/Pending: none  -VTE Prophylaxis: Heparin 5000 units SQ every 8 hours

## 2017-06-17 NOTE — PROGRESS NOTES
New Ng tube 18g inserted. Several manual repositioning done from previous one placed. Unable to get clearance to use from abdominal x -ray. X-ray results stated it was looped in hiatal hernia. Treatment team notified and orders given to remove Ng tube with possible placement under fluoroscopy to be considered by treatment team on dayshift. Pt tolerated procedure well. Some bloody drainage noted upon removal.

## 2017-06-17 NOTE — PLAN OF CARE
Problem: Patient Care Overview  Goal: Plan of Care Review  Outcome: Ongoing (interventions implemented as appropriate)  Pt is AA&O x 1. Hypertension noted. Treatment team notified. No orders given at this time. Pt has been unable to get medications due to inability to get Ng tube correctly placed. Pt in NAD. Denies pain. Incontinence noted. Pt dried and repositioned routinely. Telemetry monitoring; A-fibb noted. Safety maintained with bed in lowest position and call light within reach. Regular rounding done on this pt.

## 2017-06-17 NOTE — ASSESSMENT & PLAN NOTE
- SBP goal 150-160 in setting of hemodynamically significant stenosis of the R ICA  - add on home amlodipine 5  - increase coreg to 12.5 BID

## 2017-06-17 NOTE — SUBJECTIVE & OBJECTIVE
Neurologic Chief Complaint: left hemiparesis    Subjective:     Interval History: Patient is seen for follow-up neurological assessment and treatment recommendations:   NG tube to be placed on fluoro today    HPI, Past Medical, Family, and Social History remains the same as documented in the initial encounter.     Review of Systems   Constitutional: Negative for fever.   Eyes: Negative for visual disturbance.   Respiratory: Negative for cough and shortness of breath.    Cardiovascular: Negative for chest pain.     Scheduled Meds:   aspirin  81 mg Per NG tube Daily    atorvastatin  40 mg Per NG tube Daily    carvedilol  6.25 mg Oral BID    clopidogrel  75 mg Per NG tube Daily    heparin (porcine)  5,000 Units Subcutaneous Q8H    pantoprazole  40 mg Oral Daily    sodium chloride 0.9%  3 mL Intravenous Q8H     Continuous Infusions:   sodium chloride 0.9%       PRN Meds:acetaminophen, labetalol, sodium chloride 0.9%    Objective:     Vital Signs (Most Recent):  Temp: 97.8 °F (36.6 °C) (06/17/17 1200)  Pulse: 70 (06/17/17 1200)  Resp: 18 (06/17/17 1200)  BP: (!) 187/86 (06/17/17 1200)  SpO2: 97 % (06/17/17 1200)  BP Location: Right arm    Vital Signs Range (Last 24H):  Temp:  [97.6 °F (36.4 °C)-99.4 °F (37.4 °C)]   Pulse:  [60-90]   Resp:  [18]   BP: (166-188)/(66-91)   SpO2:  [95 %-97 %]   BP Location: Right arm    Physical Exam   Constitutional: She appears well-developed and well-nourished.   HENT:   Head: Normocephalic and atraumatic.   Eyes: Pupils are equal, round, and reactive to light.       Neurological Exam:   LOC: alert and follows requests  Language: No aphasia  Speech: Dysarthria  Visual Fields (CN II): Full  EOM (CN III, IV, VI): Full/intact  Pupils (CN III, IV, VI): PERRL  Facial Movement (CN VII): left lower facial asymmetry  Tongue (CN XII): to midline  Motor*: Arm Left:  Paretic (2/5), Leg Left:   Paretic:  4/5, Arm Right:   Normal (5/5), Leg Right:   Normal (5/5)  Cerebellar*: Normal  limb  Sensation: intact to light touch, temperature and vibration  Tone: Arm-Left: normal; Leg-Left: normal; Arm-Right: normal; Leg-Right: normal   Extinction to bilateral stimulation    NIH Stroke Scale:    Level of Consciousness: 0 - alert  LOC Questions: 0 - answers both correctly  LOC Commands: 0 - performs both correctly  Best Gaze: 0 - normal  Visual: 0 - no visual loss  Facial Palsy: 1 - minor  Motor Left Arm: 3 - no effort against gravity  Motor Right Arm: 0 - no drift  Motor Left Le - drift  Motor Right Le - no drift  Limb Ataxia: 0 - absent  Sensory: 0 - normal  Best Language: 0 - no aphasia  Dysarthria: 2 - near to unintelligible  Extinction and Inattention: 1 - partial neglect  NIH Stroke Scale Total: 8  [id number] Scale:   Age: 2 - 75 years old or older  CHF History: 1 - yes  HTN History: 1 - yes  Stroke/TIA/Thromboembolism History: 2 - yes  Vascular Disease History: 0 - no  Diabetes Mellitus in History: 0 - no  Female: 1 - yes  [id number] Scale Total: 7      Laboratory:  CMP:     Recent Labs  Lab 17  0507   CALCIUM 9.5   ALBUMIN 3.4*   PROT 6.8      K 3.3*   CO2 22*      BUN 14   CREATININE 0.8   ALKPHOS 88   ALT 19   AST 36   BILITOT 1.2*     BMP:     Recent Labs  Lab 17  0507      K 3.3*      CO2 22*   BUN 14   CREATININE 0.8   CALCIUM 9.5     CBC:     Recent Labs  Lab 17  0507   WBC 12.08   RBC 4.61   HGB 13.9   HCT 41.7      MCV 91   MCH 30.2   MCHC 33.3     Lipid Panel:     Recent Labs  Lab 06/15/17  1942   CHOL 118*   LDLCALC 67.4   HDL 41   TRIG 48     Coagulation:     Recent Labs  Lab 17  0349   INR 1.1   APTT 24.5     Platelet Aggregation Study: No results for input(s): PLTAGG, PLTAGINTERP, PLTAGREGLACO, ADPPLTAGGREG in the last 168 hours.  Hgb A1C:     Recent Labs  Lab 06/15/17  1942   HGBA1C 5.6     TSH:     Recent Labs  Lab 06/15/17  1942   TSH 1.041       Diagnostic Results:  I have personally reviewed:   Findings:     MRI  brain WO contrast (6/16/17)  R insular cortex, right corona radiata and right parietal lobe infarct. +cytotoxic cerebral edema

## 2017-06-17 NOTE — HOSPITAL COURSE
88 y/o female with PMHx of A fib, HTN presenting with left hemiparesis. Imaging remarkable for R hemispheric infarcts; embolic pattern. CTA remarkable for 90% stenosis of the right carotid artery and R M2. Etiology of stroke is likely artery to artery - atheroembolic from symptomatic R IC rather than cardio-embolic. Vascular surgery consult deferred as patient does not want surgical management of the R IC even if offered; will medically optimize. Of note patient has prior history of strokes noted on imaging with a CHADsVASc of 7 => high risk; 1 year stroke risk of 15.7%. Patient and family aware that anticoagulation would be optimal management in the setting of known A fib and prior strokes for secondary stroke prevention but patient chosen to NOT be anticoagulated due to concerns by the family regarding patient's frequent falls. Patient started on maximum medical management with aspirin, clopidogrel and atorvastatin. Patient seen and evaluated by PT/OT/SLP. Patient kept NPO given dysphagia post stroke and given poor improvement over course of admission, PEG tube was 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. PT/OT evaluated patient and determined SNF to be safest discharge. Patient hemodynamically stable on the day of discharge.

## 2017-06-17 NOTE — PT/OT/SLP PROGRESS
"Physical Therapy  Treatment    Elizabeth Navarrete   MRN: [id number]   Admitting Diagnosis: Embolic stroke involving right carotid artery    PT Received On: 06/17/17                     Billable Minutes:  Therapeutic Activity 23    Treatment Type: Treatment  PT/PTA: PTA     PTA Visit Number: 1       General Precautions: Standard, aspiration, fall, NPO  Orthopedic Precautions: N/A   Braces:      Do you have any cultural, spiritual, Catholic conflicts, given your current situation?: none stated    Subjective:  Communicated with RN prior to session.  "okay."    Pain/Comfort  Pain Rating 1: 0/10  Pain Rating Post-Intervention 1: 0/10    Objective:   Patient found with: telemetry, peripheral IV    Functional Mobility:  Bed Mobility:   Supine <> sit with max A for trunk and LEs management.  HOB elevated and used of (R) bedrail      Balance:   Static Sit: FAIR-: Maintains without assist but inconsistent   Dynamic Sit: FAIR: Cannot move trunk without losing balance  Sitting EOB x 12 minutes with min A to maintain balance     Therapeutic Activities and Exercises:  Ankle pumps 20  Quad sets 20  Glut sets 20  Hip flexion 20  Long arc quads 20  Knee flex 20  ABduction 20  ADduction 20      AM-PAC 6 CLICK MOBILITY  How much help from another person does this patient currently need?   1 = Unable, Total/Dependent Assistance  2 = A lot, Maximum/Moderate Assistance  3 = A little, Minimum/Contact Guard/Supervision  4 = None, Modified [place]/Independent         AM-PAC Raw Score CMS G-Code Modifier Level of Impairment Assistance   6 % Total / Unable   7 - 9 CM 80 - 100% Maximal Assist   10 - 14 CL 60 - 80% Moderate Assist   15 - 19 CK 40 - 60% Moderate Assist   20 - 22 CJ 20 - 40% Minimal Assist   23 CI 1-20% SBA / CGA   24 CH 0% Independent/ Mod I     Patient left supine with all lines intact and call button in reach.    Assessment:  Elizabeth Navarrete is a 89 y.o. female with a medical diagnosis of Embolic stroke involving right " carotid artery Pt tolerated tx well today. Pt was able to tolerate reps in strengthening exercises. Pt able to increase sitting EOB which shows increased endurance.  Pt will continue to benefit from skilled PT.     Rehab identified problem list/impairments: Rehab identified problem list/impairments: weakness, impaired endurance, impaired functional mobilty, gait instability, impaired balance, decreased coordination, decreased lower extremity function, decreased safety awareness, decreased ROM, impaired coordination    Rehab potential is fair.    Activity tolerance: Fair    Discharge recommendations: Discharge Facility/Level Of Care Needs: nursing facility, skilled     Barriers to discharge: Barriers to Discharge: Inaccessible home environment, Decreased caregiver support    Equipment recommendations: Equipment Needed After Discharge: wheelchair, 3-in-1 commode, bath bench     GOALS:    Physical Therapy Goals        Problem: Physical Therapy Goal    Goal Priority Disciplines Outcome Goal Variances Interventions   Physical Therapy Goal     PT/OT, PT Ongoing (interventions implemented as appropriate)     Description:  Goals to be met by: 17     Patient will increase functional independence with mobility by performin. Supine to sit with Moderate Assistance  2. Sit to supine with Moderate Assistance  3. Sit to stand transfer with Moderate Assistance  4. Bed to chair transfer with Moderate Assistance using Rolling Walker or appropriate AD.   5. Gait  x 10 feet with Maximum Assistance using Rolling Walker.   6. Lower extremity exercise program x10 reps with supervision for strengthening and endurance with functional mobility.   7. Pt will perform static standing x 2 minutes using Rolling Walker and demonstrated upright/midline trunk orientation with Minimal Assistance for balance.   8. Pt will tolerated sitting x 8 minutes with Minimal Assistance to maintain midline orientation & balance while performing  dynamic activities.                       PLAN:    Patient to be seen 6 x/week  to address the above listed problems via gait training, therapeutic activities, therapeutic exercises, neuromuscular re-education  Plan of Care expires: 07/16/17  Plan of Care reviewed with: patient      Jane BETTENCOURT Ayala, PTA  06/17/2017

## 2017-06-17 NOTE — ASSESSMENT & PLAN NOTE
-Stroke Risk Factor  -patient and family opted to not be on anticoagulation at this time because of concerns of falling  - coreg for rate control

## 2017-06-17 NOTE — ASSESSMENT & PLAN NOTE
88 y/o female with PMHx of A fib, HTN presenting with left hemiparesis. Imaging remarkable for R hemispheric infarcts; embolic pattern. CTA remarkable for 90% stenosis of the right carotid artery and R M2. Etiology of stroke is likely artery to artery - atheroembolic from symptomatic R IC rather than cardio-embolic. Vascular surgery consult deferred as patient does not want surgical management of the R IC even if offered; will medically optimize.     Of note patient has prior history of strokes noted on imaging with a CHADsVASc of 7 => high risk; 1 year stroke risk of 15.7%. Patient and family aware that anticoagulation would be optimal management in the setting of known A fib and prior strokes for secondary stroke prevention but patient chosen to NOT be anticoagulated due to concerns by the family regarding patient's frequent falls.   -Antithrombotics for secondary stroke prevention: Antiplatelets:  Aspirin: 81 mg oral now and daily and Clopidogrel: 75 mg oral daily   -Statins for secondary stroke prevention and hyperlipidemia, if present: Atorvastatin- 40 mg oral daily,   -Aggressive risk factor modification: Hypertension, Carotid Artery disease and CHF   -Rehab Efforts: Physical Therapy, Occupational Therapy and Speech and Language Pathology.   -Diagnostics: Ordered/Pending: none  -VTE Prophylaxis: Heparin 5000 units SQ every 8 hours

## 2017-06-17 NOTE — ASSESSMENT & PLAN NOTE
-Stroke Risk Factor  - 's overnight  -metoprolol discontinued - low dose coreg (home med) started given need for better BP and HR control.   - SBP goal 150-160 in setting of hemodynamically significant stenosis of the R ICA

## 2017-06-18 LAB — POCT GLUCOSE: 146 MG/DL (ref 70–110)

## 2017-06-18 PROCEDURE — 97535 SELF CARE MNGMENT TRAINING: CPT

## 2017-06-18 PROCEDURE — 99233 SBSQ HOSP IP/OBS HIGH 50: CPT | Mod: GC,,, | Performed by: PSYCHIATRY & NEUROLOGY

## 2017-06-18 PROCEDURE — 25000003 PHARM REV CODE 250: Performed by: HOSPITALIST

## 2017-06-18 PROCEDURE — 63600175 PHARM REV CODE 636 W HCPCS: Performed by: HOSPITALIST

## 2017-06-18 PROCEDURE — 97110 THERAPEUTIC EXERCISES: CPT

## 2017-06-18 PROCEDURE — 25000003 PHARM REV CODE 250: Performed by: PSYCHIATRY & NEUROLOGY

## 2017-06-18 PROCEDURE — 25000003 PHARM REV CODE 250: Performed by: NURSE PRACTITIONER

## 2017-06-18 PROCEDURE — 20600001 HC STEP DOWN PRIVATE ROOM

## 2017-06-18 RX ORDER — ENALAPRIL MALEATE 10 MG/1
20 TABLET ORAL DAILY
Status: DISCONTINUED | OUTPATIENT
Start: 2017-06-18 | End: 2017-06-18

## 2017-06-18 RX ORDER — CARVEDILOL 12.5 MG/1
12.5 TABLET ORAL 2 TIMES DAILY
Status: DISCONTINUED | OUTPATIENT
Start: 2017-06-18 | End: 2017-06-18

## 2017-06-18 RX ORDER — AMLODIPINE BESYLATE 5 MG/1
5 TABLET ORAL DAILY
Status: DISCONTINUED | OUTPATIENT
Start: 2017-06-18 | End: 2017-06-21

## 2017-06-18 RX ORDER — CARVEDILOL 12.5 MG/1
12.5 TABLET ORAL 2 TIMES DAILY
Status: DISCONTINUED | OUTPATIENT
Start: 2017-06-18 | End: 2017-06-21

## 2017-06-18 RX ORDER — MAGNESIUM SULFATE HEPTAHYDRATE 40 MG/ML
2 INJECTION, SOLUTION INTRAVENOUS ONCE
Status: COMPLETED | OUTPATIENT
Start: 2017-06-18 | End: 2017-06-18

## 2017-06-18 RX ADMIN — AMLODIPINE BESYLATE 5 MG: 5 TABLET ORAL at 08:06

## 2017-06-18 RX ADMIN — CLOPIDOGREL 75 MG: 75 TABLET, FILM COATED ORAL at 08:06

## 2017-06-18 RX ADMIN — HEPARIN SODIUM 5000 UNITS: 5000 INJECTION, SOLUTION INTRAVENOUS; SUBCUTANEOUS at 10:06

## 2017-06-18 RX ADMIN — HEPARIN SODIUM 5000 UNITS: 5000 INJECTION, SOLUTION INTRAVENOUS; SUBCUTANEOUS at 05:06

## 2017-06-18 RX ADMIN — ASPIRIN 81 MG: 81 TABLET, COATED ORAL at 08:06

## 2017-06-18 RX ADMIN — Medication 3 ML: at 05:06

## 2017-06-18 RX ADMIN — HEPARIN SODIUM 5000 UNITS: 5000 INJECTION, SOLUTION INTRAVENOUS; SUBCUTANEOUS at 02:06

## 2017-06-18 RX ADMIN — ACETAMINOPHEN 650 MG: 325 TABLET ORAL at 08:06

## 2017-06-18 RX ADMIN — CARVEDILOL 12.5 MG: 12.5 TABLET, FILM COATED ORAL at 10:06

## 2017-06-18 RX ADMIN — PANTOPRAZOLE SODIUM 40 MG: 40 TABLET, DELAYED RELEASE ORAL at 08:06

## 2017-06-18 RX ADMIN — Medication 3 ML: at 10:06

## 2017-06-18 RX ADMIN — CARVEDILOL 12.5 MG: 12.5 TABLET, FILM COATED ORAL at 08:06

## 2017-06-18 RX ADMIN — ATORVASTATIN CALCIUM 40 MG: 20 TABLET, FILM COATED ORAL at 08:06

## 2017-06-18 RX ADMIN — POTASSIUM BICARBONATE 50 MEQ: 25 TABLET, EFFERVESCENT ORAL at 08:06

## 2017-06-18 RX ADMIN — MAGNESIUM SULFATE IN WATER 2 G: 40 INJECTION, SOLUTION INTRAVENOUS at 08:06

## 2017-06-18 RX ADMIN — Medication 3 ML: at 02:06

## 2017-06-18 NOTE — PROGRESS NOTES
[facility]  Vascular Neurology  Comprehensive Stroke Center  Progress Note    Neurologic Chief Complaint: left hemiparesis    Subjective:     Interval History: Patient is seen for follow-up neurological assessment and treatment recommendations:   Started on TFs per NGT. Doing well. No acute events over night. Slurred speech w/ left hemiparesis    HPI, Past Medical, Family, and Social History remains the same as documented in the initial encounter.     Review of Systems   Constitutional: Negative for fever.   Eyes: Negative for visual disturbance.   Respiratory: Negative for cough and shortness of breath.    Cardiovascular: Negative for chest pain.   Gastrointestinal: Negative for abdominal distention and abdominal pain.   Genitourinary: Negative for dysuria.   Musculoskeletal: Negative for arthralgias and myalgias.   Neurological: Negative for weakness and light-headedness.   Psychiatric/Behavioral: Negative for confusion.     Scheduled Meds:   amlodipine  5 mg Per NG tube Daily    aspirin  81 mg Per NG tube Daily    atorvastatin  40 mg Per NG tube Daily    carvedilol  12.5 mg Per NG tube BID    clopidogrel  75 mg Per NG tube Daily    heparin (porcine)  5,000 Units Subcutaneous Q8H    pantoprazole  40 mg Oral Daily    sodium chloride 0.9%  3 mL Intravenous Q8H     Continuous Infusions:   sodium chloride 0.9%       PRN Meds:acetaminophen, labetalol, sodium chloride 0.9%    Objective:     Vital Signs (Most Recent):  Temp: 98.4 °F (36.9 °C) (06/18/17 1200)  Pulse: 72 (06/18/17 1200)  Resp: 18 (06/18/17 1200)  BP: 119/74 (06/18/17 1200)  SpO2: (!) 93 % (06/18/17 1200)  BP Location: Right arm    Vital Signs Range (Last 24H):  Temp:  [98.3 °F (36.8 °C)-99.1 °F (37.3 °C)]   Pulse:  [66-99]   Resp:  [18]   BP: (119-174)/(74-95)   SpO2:  [93 %-97 %]   BP Location: Right arm    Physical Exam   Constitutional: She appears well-developed and well-nourished.   HENT:   Head: Normocephalic and  atraumatic.   Eyes: Pupils are equal, round, and reactive to light.   Neck: Normal range of motion.   Cardiovascular: Normal rate, regular rhythm and normal heart sounds.    No murmur heard.  Pulmonary/Chest: Effort normal and breath sounds normal. No respiratory distress.   Abdominal: Soft. Bowel sounds are normal. She exhibits no distension. There is no tenderness.   NGT in    Neurological: She is alert.   Skin: Skin is warm. There is pallor.       Neurological Exam:   LOC: alert and follows requests  Language: No aphasia  Speech: Dysarthria  Visual Fields (CN II): Full  EOM (CN III, IV, VI): Full/intact  Pupils (CN III, IV, VI): PERRL  Facial Movement (CN VII): left lower facial asymmetry  Tongue (CN XII): to midline  Motor*: Arm Left:  Paretic (2/5), Leg Left:   Paretic:  4/5, Arm Right:   Normal (5/5), Leg Right:   Normal (5/5)  Cerebellar*: Normal limb  Sensation: intact to light touch, temperature and vibration  Tone: Arm-Left: normal; Leg-Left: normal; Arm-Right: normal; Leg-Right: normal     NIH Stroke Scale:    Level of Consciousness: 0 - alert  LOC Questions: 0 - answers both correctly  LOC Commands: 0 - performs both correctly  Best Gaze: 0 - normal  Visual: 0 - no visual loss  Facial Palsy: 1 - minor  Motor Left Arm: 4 - no movement  Motor Right Arm: 0 - no drift  Motor Left Le - drift  Motor Right Le - no drift  Limb Ataxia: 0 - absent  Sensory: 0 - normal  Best Language: 0 - no aphasia  Dysarthria: 2 - near to unintelligible  Extinction and Inattention: 1 - partial neglect  NIH Stroke Scale Total: 9  [id number] Scale:   Age: 2 - 75 years old or older  CHF History: 1 - yes  HTN History: 1 - yes  Stroke/TIA/Thromboembolism History: 2 - yes  Vascular Disease History: 0 - no  Diabetes Mellitus in History: 0 - no  Female: 1 - yes  [id number] Scale Total: 7      Laboratory:  CMP: No results for input(s): GLUCOSE, CALCIUM, ALBUMIN, PROT, NA, K, CO2, CL, BUN, CREATININE, ALKPHOS, ALT, AST, BILITOT  in the last 24 hours.  CBC:   Recent Labs  Lab 06/17/17  0507   WBC 12.08   RBC 4.61   HGB 13.9   HCT 41.7      MCV 91   MCH 30.2   MCHC 33.3         Assessment/Plan:     6/17 - 6/18/17-  NG placed under , started on TFs    * Embolic stroke involving right carotid artery    89 y/o female with PMHx of A fib, HTN presenting with left hemiparesis. Imaging remarkable for R hemispheric infarcts; embolic pattern. CTA remarkable for 90% stenosis of the right carotid artery and R M2. Etiology of stroke is likely artery to artery - atheroembolic from symptomatic R IC rather than cardio-embolic. Vascular surgery consult deferred as patient does not want surgical management of the R IC even if offered; will medically optimize.     Of note patient has prior history of strokes noted on imaging with a CHADsVASc of 7 => high risk; 1 year stroke risk of 15.7%. Patient and family aware that anticoagulation would be optimal management in the setting of known A fib and prior strokes for secondary stroke prevention but patient chosen to NOT be anticoagulated due to concerns by the family regarding patient's frequent falls.   -Antithrombotics for secondary stroke prevention: Antiplatelets:  Aspirin: 81 mg oral now and daily and Clopidogrel: 75 mg oral daily   -Statins for secondary stroke prevention and hyperlipidemia, if present: Atorvastatin- 40 mg oral daily,   -Aggressive risk factor modification: Hypertension, Carotid Artery disease and CHF   -Rehab Efforts: Physical Therapy, Occupational Therapy and Speech and Language Pathology.   -Diagnostics: Ordered/Pending: none  -VTE Prophylaxis: Heparin 5000 units SQ every 8 hours        Hemiparesis, left    2/2 stroke  PT/OT recommending SNF        Chronic atrial fibrillation    -Stroke Risk Factor  -patient and family opted to not be on anticoagulation at this time because of concerns of falling  - coreg for rate control         Internal carotid artery stenosis    -High grade  stenosis of the right ICA  -previously identified and again seen on imaging  - likely etiology of most recent stroke  -maximum medical management as patient does not want any surgical intervention.        Oropharyngeal dysphagia    2/2 stroke  - SLP. Strict NPO  - NGT in , on TFs  - cont speech therapy recs, may require PEG eventually if unable to pass swallow studies        Essential hypertension    - SBP goal 150-160 in setting of hemodynamically significant stenosis of the R ICA  - add on home amlodipine 5  - increase coreg to 12.5 BID        Enlarged LA (left atrium)    -as previously identified on ECHO        Chronic diastolic heart failure    -Stroke Risk Factor  -grade 2 on most recent ECHO  - on coreg        CAD (coronary artery disease)    -Stroke Risk Factor  -ASA 81mg        History of CHF (congestive heart failure)    -Stroke Risk Factor  -holding home meds to allow for adequate cerebral perfusion           Cont speech therapy efforts. May eventually require a PEG tube if unable to pass swallow study     Anabella Alex MD  Comprehensive Stroke Center  Department of Vascular Neurology   [facility]

## 2017-06-18 NOTE — PLAN OF CARE
Problem: Occupational Therapy Goal  Goal: Occupational Therapy Goal  Goals to be met by: 6/30     Patient will increase functional independence with ADLs by performing:    UE Dressing while seated with Set-up Assistance and Minimal Assistance.  LE Dressing with Set-up Assistance and Moderate Assistance.  Grooming while standing with Set-up Assistance and Minimal Assistance.  Sitting at edge of bed x10 minutes for dynamic functional task with Contact Guard Assistance.  Stand pivot transfers with Minimal Assistance.  Toilet transfer to bedside commode with Minimal Assistance.  Pt/CG verbalized understanding for s/s of stroke.  CG demo understanding for positioning of L UE.   CG demo understanding for L UE exercises.      Outcome: Ongoing (interventions implemented as appropriate)  Goals remain appropriate

## 2017-06-18 NOTE — SUBJECTIVE & OBJECTIVE
Neurologic Chief Complaint: left hemiparesis    Subjective:     Interval History: Patient is seen for follow-up neurological assessment and treatment recommendations:   Started on TFs per NGT. Doing well. No acute events over night. Slurred speech w/ left hemiparesis    HPI, Past Medical, Family, and Social History remains the same as documented in the initial encounter.     Review of Systems   Constitutional: Negative for fever.   Eyes: Negative for visual disturbance.   Respiratory: Negative for cough and shortness of breath.    Cardiovascular: Negative for chest pain.   Gastrointestinal: Negative for abdominal distention and abdominal pain.   Genitourinary: Negative for dysuria.   Musculoskeletal: Negative for arthralgias and myalgias.   Neurological: Negative for weakness and light-headedness.   Psychiatric/Behavioral: Negative for confusion.     Scheduled Meds:   amlodipine  5 mg Per NG tube Daily    aspirin  81 mg Per NG tube Daily    atorvastatin  40 mg Per NG tube Daily    carvedilol  12.5 mg Per NG tube BID    clopidogrel  75 mg Per NG tube Daily    heparin (porcine)  5,000 Units Subcutaneous Q8H    pantoprazole  40 mg Oral Daily    sodium chloride 0.9%  3 mL Intravenous Q8H     Continuous Infusions:   sodium chloride 0.9%       PRN Meds:acetaminophen, labetalol, sodium chloride 0.9%    Objective:     Vital Signs (Most Recent):  Temp: 98.4 °F (36.9 °C) (06/18/17 1200)  Pulse: 72 (06/18/17 1200)  Resp: 18 (06/18/17 1200)  BP: 119/74 (06/18/17 1200)  SpO2: (!) 93 % (06/18/17 1200)  BP Location: Right arm    Vital Signs Range (Last 24H):  Temp:  [98.3 °F (36.8 °C)-99.1 °F (37.3 °C)]   Pulse:  [66-99]   Resp:  [18]   BP: (119-174)/(74-95)   SpO2:  [93 %-97 %]   BP Location: Right arm    Physical Exam   Constitutional: She appears well-developed and well-nourished.   HENT:   Head: Normocephalic and atraumatic.   Eyes: Pupils are equal, round, and reactive to light.   Neck: Normal range of motion.    Cardiovascular: Normal rate, regular rhythm and normal heart sounds.    No murmur heard.  Pulmonary/Chest: Effort normal and breath sounds normal. No respiratory distress.   Abdominal: Soft. Bowel sounds are normal. She exhibits no distension. There is no tenderness.   NGT in    Neurological: She is alert.   Skin: Skin is warm. There is pallor.       Neurological Exam:   LOC: alert and follows requests  Language: No aphasia  Speech: Dysarthria  Visual Fields (CN II): Full  EOM (CN III, IV, VI): Full/intact  Pupils (CN III, IV, VI): PERRL  Facial Movement (CN VII): left lower facial asymmetry  Tongue (CN XII): to midline  Motor*: Arm Left:  Paretic (2/5), Leg Left:   Paretic:  4/5, Arm Right:   Normal (5/5), Leg Right:   Normal (5/5)  Cerebellar*: Normal limb  Sensation: intact to light touch, temperature and vibration  Tone: Arm-Left: normal; Leg-Left: normal; Arm-Right: normal; Leg-Right: normal     NIH Stroke Scale:    Level of Consciousness: 0 - alert  LOC Questions: 0 - answers both correctly  LOC Commands: 0 - performs both correctly  Best Gaze: 0 - normal  Visual: 0 - no visual loss  Facial Palsy: 1 - minor  Motor Left Arm: 4 - no movement  Motor Right Arm: 0 - no drift  Motor Left Le - drift  Motor Right Le - no drift  Limb Ataxia: 0 - absent  Sensory: 0 - normal  Best Language: 0 - no aphasia  Dysarthria: 2 - near to unintelligible  Extinction and Inattention: 1 - partial neglect  NIH Stroke Scale Total: 9  [id number] Scale:   Age: 2 - 75 years old or older  CHF History: 1 - yes  HTN History: 1 - yes  Stroke/TIA/Thromboembolism History: 2 - yes  Vascular Disease History: 0 - no  Diabetes Mellitus in History: 0 - no  Female: 1 - yes  [id number] Scale Total: 7      Laboratory:  CMP: No results for input(s): GLUCOSE, CALCIUM, ALBUMIN, PROT, NA, K, CO2, CL, BUN, CREATININE, ALKPHOS, ALT, AST, BILITOT in the last 24 hours.  CBC:   Recent Labs  Lab 17  0507   WBC 12.08   RBC 4.61   HGB 13.9    HCT 41.7      MCV 91   MCH 30.2   MCHC 33.3

## 2017-06-18 NOTE — PLAN OF CARE
Problem: Patient Care Overview  Goal: Plan of Care Review  Outcome: Ongoing (interventions implemented as appropriate)  POC reviewed with pt and daughter. Both verbalized understanding. Pt remained free from falls, skin breakdown, and injury. Call light remained within reach and side rails up x2. Neuro checks and vital signs done every 4 hours. Refer to flowsheets for full assessment and VS info. TF @ pt goal of 35 ml/hr. Pt tolerating well.  No acute events this shift. Will continue to monitor.

## 2017-06-18 NOTE — PLAN OF CARE
"Problem: Patient Care Overview  Goal: Plan of Care Review  POC reviewed with patient.  Verbalized understanding.  Patient remains free from falls, skin breakdown, and injury.  Call light remained within reach and side rails up X2.  Neuro checks and vital signs done every 4 hours.  Patient was awake all night during shift.  When asked why, patient stated "can't sleep".  Will continue to monitor.  Christin Machado RN        "

## 2017-06-18 NOTE — PT/OT/SLP PROGRESS
Occupational Therapy  Treatment    Elizabeth Navarrete   MRN: [id number]   Admitting Diagnosis: Embolic stroke involving right carotid artery    OT Date of Treatment: 06/18/17   OT Start Time: 1337  OT Stop Time: 1413  OT Total Time (min): 36 min    Billable Minutes:  Self Care/Home Management 20 and Therapeutic Exercise 16    General Precautions: Standard, aspiration, fall, NPO (cardiac)    Subjective:  Communicated with RN prior to session.  Pt reported that she wanted to have her back rubbed.  Pt's daughter reported that they would like to have pt's toe nails clipped while in hospital.  Pain/Comfort  Pain Rating 1: 0/10  Pain Rating Post-Intervention 1: 0/10    Objective:  Patient found with: telemetry, NG tube, SCD, bed alarm     Functional Mobility:  Bed Mobility:  Rolling/Turning to Left: Moderate assistance (x 3 trials)  Rolling/Turning Right: Total assistance (x 2 trials)  Scooting/Bridging: Maximum Assistance (scooting forward on EOB & to the left along EOB; dependent drawsheet transfer up HOB while supine)  Supine to Sit: Maximum Assistance  Sit to Supine: Maximum Assistance    Transfers:   Sit <> Stand Assistance: Maximum Assistance (from EOB)  Sit <> Stand Assistive Device: No Assistive Device    Activities of Daily Living:     UE Dressing Level of Assistance: Maximum assistance (seated EOB)  LE Dressing Level of Assistance: Maximum assistance (seated EOB donning socks)    Therapeutic Activities and Exercises:  Pt required SBA-Min (A) with postural control while seated EOB due to posterior & leftward leaning.  Pt required verbal & physical cues to facilitate postural control while seated EOB.  Pt performed AROM exercises with RUE in all planes x 10 reps each while seated EOB & PROM with LUE in all planes x 10 reps each while supine.  Pt performed dynamic activities while seated EOB to facilitate postural control (pt's daughter requested OT to determine if pt could sign her name still & pt found to be able to).   "Provided elevated positioning of LUE at end of session on pillow to facilitate shoulder approximation.  Pt had no further questions & when asked whether there were any concerns pt reported none.      AM-PAC 6 CLICK ADL   How much help from another person does this patient currently need?   1 = Unable, Total/Dependent Assistance  2 = A lot, Maximum/Moderate Assistance  3 = A little, Minimum/Contact Guard/Supervision  4 = None, Modified [place]/Independent    Putting on and taking off regular lower body clothing? : 2  Bathing (including washing, rinsing, drying)?: 2  Toileting, which includes using toilet, bedpan, or urinal? : 2  Putting on and taking off regular upper body clothing?: 2  Taking care of personal grooming such as brushing teeth?: 2  Eating meals?: 1  Total Score: 11     AM-PAC Raw Score CMS "G-Code Modifier Level of Impairment Assistance   6 % Total / Unable   7 - 8 CM 80 - 100% Maximal Assist   9-13 CL 60 - 80% Moderate Assist   14 - 19 CK 40 - 60% Moderate Assist   20 - 22 CJ 20 - 40% Minimal Assist   23 CI 1-20% SBA / CGA   24 CH 0% Independent/ Mod I       Patient left supine with all lines intact, call button in reach, bed alarm on, RN notified, daughter present and white board updated.    ASSESSMENT:  Elizabeth Navarrete is a 89 y.o. female with a medical diagnosis of Embolic stroke involving right carotid artery and presents with fair participation and motivation.  Pt continues to require (A) with all activities & with postural control.    Rehab identified problem list/impairments: Rehab identified problem list/impairments: weakness, impaired endurance, impaired self care skills, impaired functional mobilty, impaired sensation, impaired balance, decreased lower extremity function, decreased upper extremity function, decreased coordination, decreased safety awareness    Rehab potential is fair.    Activity tolerance: Fair    Discharge recommendations: Discharge Facility/Level Of Care Needs: " nursing facility, skilled     GOALS:    Occupational Therapy Goals        Problem: Occupational Therapy Goal    Goal Priority Disciplines Outcome Interventions   Occupational Therapy Goal     OT, PT/OT Ongoing (interventions implemented as appropriate)    Description:  Goals to be met by: 6/30     Patient will increase functional independence with ADLs by performing:    UE Dressing while seated with Set-up Assistance and Minimal Assistance.  LE Dressing with Set-up Assistance and Moderate Assistance.  Grooming while standing with Set-up Assistance and Minimal Assistance.  Sitting at edge of bed x10 minutes for dynamic functional task with Contact Guard Assistance.  Stand pivot transfers with Minimal Assistance.  Toilet transfer to bedside commode with Minimal Assistance.  Pt/CG verbalized understanding for s/s of stroke.  CG demo understanding for positioning of L UE.   CG demo understanding for L UE exercises.                       Plan:  Patient to be seen 6 x/week to address the above listed problems via self-care/home management, neuromuscular re-education, cognitive retraining, sensory integration, therapeutic activities, therapeutic exercises  Plan of Care expires: 07/15/17  Plan of Care reviewed with: patient, daughter         Elizabeth LAVONNE López  06/18/2017

## 2017-06-19 LAB — TB INDURATION 48 - 72 HR READ: 0 MM

## 2017-06-19 PROCEDURE — 25000003 PHARM REV CODE 250: Performed by: PSYCHIATRY & NEUROLOGY

## 2017-06-19 PROCEDURE — 99233 SBSQ HOSP IP/OBS HIGH 50: CPT | Mod: GC,,, | Performed by: PSYCHIATRY & NEUROLOGY

## 2017-06-19 PROCEDURE — 97530 THERAPEUTIC ACTIVITIES: CPT

## 2017-06-19 PROCEDURE — 92611 MOTION FLUOROSCOPY/SWALLOW: CPT

## 2017-06-19 PROCEDURE — 97110 THERAPEUTIC EXERCISES: CPT

## 2017-06-19 PROCEDURE — 25000003 PHARM REV CODE 250: Performed by: NURSE PRACTITIONER

## 2017-06-19 PROCEDURE — 20600001 HC STEP DOWN PRIVATE ROOM

## 2017-06-19 PROCEDURE — 99232 SBSQ HOSP IP/OBS MODERATE 35: CPT | Mod: ,,, | Performed by: NURSE PRACTITIONER

## 2017-06-19 PROCEDURE — G8996 SWALLOW CURRENT STATUS: HCPCS | Mod: CM

## 2017-06-19 PROCEDURE — G8997 SWALLOW GOAL STATUS: HCPCS | Mod: CK

## 2017-06-19 PROCEDURE — 25000003 PHARM REV CODE 250: Performed by: HOSPITALIST

## 2017-06-19 RX ORDER — NAPROXEN SODIUM 220 MG/1
81 TABLET, FILM COATED ORAL DAILY
Status: DISCONTINUED | OUTPATIENT
Start: 2017-06-19 | End: 2017-06-21

## 2017-06-19 RX ORDER — PANTOPRAZOLE SODIUM 40 MG/1
40 FOR SUSPENSION ORAL DAILY
Status: DISCONTINUED | OUTPATIENT
Start: 2017-06-19 | End: 2017-06-21

## 2017-06-19 RX ADMIN — ASPIRIN 81 MG CHEWABLE TABLET 81 MG: 81 TABLET CHEWABLE at 09:06

## 2017-06-19 RX ADMIN — Medication 3 ML: at 05:06

## 2017-06-19 RX ADMIN — HEPARIN SODIUM 5000 UNITS: 5000 INJECTION, SOLUTION INTRAVENOUS; SUBCUTANEOUS at 02:06

## 2017-06-19 RX ADMIN — HEPARIN SODIUM 5000 UNITS: 5000 INJECTION, SOLUTION INTRAVENOUS; SUBCUTANEOUS at 09:06

## 2017-06-19 RX ADMIN — POTASSIUM BICARBONATE 50 MEQ: 25 TABLET, EFFERVESCENT ORAL at 09:06

## 2017-06-19 RX ADMIN — Medication 3 ML: at 09:06

## 2017-06-19 RX ADMIN — ATORVASTATIN CALCIUM 40 MG: 20 TABLET, FILM COATED ORAL at 09:06

## 2017-06-19 RX ADMIN — HEPARIN SODIUM 5000 UNITS: 5000 INJECTION, SOLUTION INTRAVENOUS; SUBCUTANEOUS at 05:06

## 2017-06-19 RX ADMIN — CARVEDILOL 12.5 MG: 12.5 TABLET, FILM COATED ORAL at 09:06

## 2017-06-19 RX ADMIN — CLOPIDOGREL 75 MG: 75 TABLET, FILM COATED ORAL at 08:06

## 2017-06-19 RX ADMIN — AMLODIPINE BESYLATE 5 MG: 5 TABLET ORAL at 09:06

## 2017-06-19 RX ADMIN — SODIUM CHLORIDE 500 ML: 0.9 INJECTION, SOLUTION INTRAVENOUS at 07:06

## 2017-06-19 RX ADMIN — POTASSIUM BICARBONATE 25 MEQ: 25 TABLET, EFFERVESCENT ORAL at 11:06

## 2017-06-19 RX ADMIN — PANTOPRAZOLE SODIUM 40 MG: 40 GRANULE, DELAYED RELEASE ORAL at 09:06

## 2017-06-19 RX ADMIN — Medication 3 ML: at 02:06

## 2017-06-19 NOTE — PLAN OF CARE
Pt scheduled for MBSS today, [facility] is currently reviewing case for admission.  Will follow.    Yu Cruz RN  Case Management  [id number]

## 2017-06-19 NOTE — ASSESSMENT & PLAN NOTE
- SBP goal 150-160 in setting of hemodynamically significant stenosis of the R ICA  - amlodipine 5mg BID  - Coreg to 12.5 BID

## 2017-06-19 NOTE — PROGRESS NOTES
[facility]  Vascular Neurology  Comprehensive Stroke Center  Progress Note    Neurologic Chief Complaint: left hemiparesis    Subjective:     Interval History: Patient is seen for follow-up neurological assessment and treatment recommendations:   No acute events overnight. Modified barium swallow study today    HPI, Past Medical, Family, and Social History remains the same as documented in the initial encounter.     Review of Systems   Constitutional: Negative for fever.   Eyes: Negative for visual disturbance.   Respiratory: Negative for cough and shortness of breath.    Cardiovascular: Negative for chest pain.     Scheduled Meds:   amlodipine  5 mg Per NG tube Daily    aspirin  81 mg Per NG tube Daily    atorvastatin  40 mg Per NG tube Daily    carvedilol  12.5 mg Per NG tube BID    clopidogrel  75 mg Per NG tube Daily    heparin (porcine)  5,000 Units Subcutaneous Q8H    pantoprazole  40 mg Per NG tube Daily    sodium chloride 0.9%  500 mL Intravenous Once    sodium chloride 0.9%  3 mL Intravenous Q8H     Continuous Infusions:     PRN Meds:acetaminophen, labetalol    Objective:     Vital Signs (Most Recent):  Temp: 98.4 °F (36.9 °C) (06/19/17 1205)  Pulse: 83 (06/19/17 1500)  Resp: 18 (06/19/17 1205)  BP: 138/86 (06/19/17 1205)  SpO2: (!) 93 % (06/19/17 1205)  BP Location: Right arm    Vital Signs Range (Last 24H):  Temp:  [97.6 °F (36.4 °C)-98.6 °F (37 °C)]   Pulse:  [71-98]   Resp:  [17-20]   BP: (138-177)/(77-86)   SpO2:  [93 %-96 %]   BP Location: Right arm    Physical Exam   Constitutional: She appears well-developed and well-nourished.   HENT:   Head: Normocephalic and atraumatic.   Eyes: Pupils are equal, round, and reactive to light.       Neurological Exam:   LOC: alert and follows requests  Language: No aphasia  Speech: Dysarthria  Visual Fields (CN II): Full  EOM (CN III, IV, VI): Full/intact  Pupils (CN III, IV, VI): PERRL  Facial Movement (CN VII): left lower facial  asymmetry  Tongue (CN XII): to midline  Motor*: Arm Left:  Paretic (2/5), Leg Left:   Paretic:  4/5, Arm Right:   Normal (5/5), Leg Right:   Normal (5/5)  Cerebellar*: Normal limb  Sensation: intact to light touch, temperature and vibration  Tone: Arm-Left: normal; Leg-Left: normal; Arm-Right: normal; Leg-Right: normal   Extinction to bilateral stimulation    NIH Stroke Scale:    Level of Consciousness: 0 - alert  LOC Questions: 0 - answers both correctly  LOC Commands: 0 - performs both correctly  Best Gaze: 0 - normal  Visual: 0 - no visual loss  Facial Palsy: 1 - minor  Motor Left Arm: 3 - no effort against gravity  Motor Right Arm: 0 - no drift  Motor Left Le - drift  Motor Right Le - no drift  Limb Ataxia: 0 - absent  Sensory: 0 - normal  Best Language: 0 - no aphasia  Dysarthria: 2 - near to unintelligible  Extinction and Inattention: 1 - partial neglect  NIH Stroke Scale Total: 8      Laboratory:  CMP:   No results for input(s): GLUCOSE, CALCIUM, ALBUMIN, PROT, NA, K, CO2, CL, BUN, CREATININE, ALKPHOS, ALT, AST, BILITOT in the last 24 hours.  BMP:     Recent Labs  Lab 17  0507      K 3.3*      CO2 22*   BUN 14   CREATININE 0.8   CALCIUM 9.5     CBC:     Recent Labs  Lab 17  0507   WBC 12.08   RBC 4.61   HGB 13.9   HCT 41.7      MCV 91   MCH 30.2   MCHC 33.3     Lipid Panel:     Recent Labs  Lab 06/15/17  1942   CHOL 118*   LDLCALC 67.4   HDL 41   TRIG 48     Coagulation:     Recent Labs  Lab 17  0349   INR 1.1   APTT 24.5     Platelet Aggregation Study: No results for input(s): PLTAGG, PLTAGINTERP, PLTAGREGLACO, ADPPLTAGGREG in the last 168 hours.  Hgb A1C:     Recent Labs  Lab 06/15/17  1942   HGBA1C 5.6     TSH:     Recent Labs  Lab 06/15/17  1942   TSH 1.041       Diagnostic Results:  I have personally reviewed:   Findings:     MRI brain WO contrast (17)  R insular cortex, right corona radiata and right parietal lobe infarct. +cytotoxic cerebral edema                Assessment/Plan:     6/17 - 6/18/17-  NG placed under , started on TFs    * Embolic stroke involving right carotid artery    89 y/o female with PMHx of A fib, HTN presenting with left hemiparesis. Imaging remarkable for R hemispheric infarcts; embolic pattern. CTA remarkable for 90% stenosis of the right carotid artery and R M2. Etiology of stroke is likely artery to artery - atheroembolic from symptomatic R IC rather than cardio-embolic. Vascular surgery consult deferred as patient does not want surgical management of the R IC even if offered; will medically optimize.     Of note patient has prior history of strokes noted on imaging with a CHADsVASc of 7 => high risk; 1 year stroke risk of 15.7%. Patient and family aware that anticoagulation would be optimal management in the setting of known A fib and prior strokes for secondary stroke prevention but patient chosen to NOT be anticoagulated due to concerns by the family regarding patient's frequent falls.   -Antithrombotics for secondary stroke prevention: Antiplatelets:  Aspirin: 81 mg oral now and daily and Clopidogrel: 75 mg oral daily   -Statins for secondary stroke prevention and hyperlipidemia, if present: Atorvastatin- 40 mg oral daily,   -Aggressive risk factor modification: Hypertension, Carotid Artery disease and CHF   -Rehab Efforts: Physical Therapy, Occupational Therapy and Speech and Language Pathology.   -Diagnostics: Ordered/Pending: none  -VTE Prophylaxis: Heparin 5000 units SQ every 8 hours        Hemiparesis, left    2/2 stroke  PT/OT recommending SNF        Oropharyngeal dysphagia    2/2 stroke  - SLP. S/p modified barium swallow study today - Pleasure puree feeds with speech ok; not cleared for liquids.   - NGT coiled in the nasopharynx and needs to be removed. Plan for re-placement under fluoro tomorrow if needed.   - cont aggressive ST        Chronic diastolic heart failure    -Stroke Risk Factor  -grade 2 on most recent ECHO  - on  coreg        Enlarged LA (left atrium)    -as previously identified on ECHO        Chronic atrial fibrillation    -Stroke Risk Factor  -patient and family opted to not be on anticoagulation at this time because of concerns of increased bleeding risk from falls  - coreg for rate control         Internal carotid artery stenosis    -High grade stenosis of the right ICA  -previously identified and again seen on imaging  - likely etiology of most recent stroke  -maximum medical management as patient does not want any surgical intervention.        History of CHF (congestive heart failure)    -Stroke Risk Factor  -holding home meds to allow for adequate cerebral perfusion         CAD (coronary artery disease)    -Stroke Risk Factor  -ASA 81mg        Essential hypertension    - SBP goal 150-160 in setting of hemodynamically significant stenosis of the R ICA  - amlodipine 5mg BID  - Coreg to 12.5 BID          Dispo: pending placement and progression of swallow function; if not until alternative options have been determined.     Day Valdez MD  Comprehensive Stroke Center  Department of Vascular Neurology   [facility]

## 2017-06-19 NOTE — ASSESSMENT & PLAN NOTE
-continue Plavix PO and Heparin SQ per VN during hospitalization.  - patient and family opted to not be on home anticoagulation at this time because of concerns of falling.

## 2017-06-19 NOTE — PROGRESS NOTES
[facility]  Physical Medicine & Rehab  Progress Note    Patient Name: Elizabeth Navarrete  MRN: [id number]  Admission Date: 6/15/2017  Length of Stay: 4 days  Collaborating Physician : Anthony Dutton MD    Subjective:     Interval History (06/19/2017):  Patient is seen for follow-up rehab evaluation and recommendations.  No acute events over night.  Participating with OT/PT. Patient still has not passed swallow study and remains NPO with NGT and tube feedings.      HPI, Past Medical, Surgical, Family, and Social History remains the same as documented in the initial encounter.      Principal Problem:Embolic stroke involving right carotid artery    Hospital Course:   6/16/17: Evaluated by OT and SLP. Bed mobility MaxA.  Sit to stand MaxA and transfers MaxA.  Functional ambulated for 5 steps to chair MaxA.  UBD MaxA and LBD TotalA. BSS failed and is NPO 2/2 oropharyngeal dysphagia.   6/17/17: Evaluated by PT. Bed mobility MaxA. Sitting EOB x 12 minutes with min A to maintain balance. No ambulation noted.  6/18/17: Evaluated by OT. Bed mobility ModA-MaxA and TotalA for rolling right.  Sit to stand MaxA. UBD MaxA and LBD MaxA.            Scheduled Medications:    amlodipine  5 mg Per NG tube Daily    aspirin  81 mg Per NG tube Daily    atorvastatin  40 mg Per NG tube Daily    carvedilol  12.5 mg Per NG tube BID    clopidogrel  75 mg Per NG tube Daily    heparin (porcine)  5,000 Units Subcutaneous Q8H    pantoprazole  40 mg Per NG tube Daily    sodium chloride 0.9%  3 mL Intravenous Q8H       PRN Medications: acetaminophen, labetalol, sodium chloride 0.9%    Review of Systems   Constitutional: Negative for chills, fatigue and fever.   HENT: Negative for facial swelling, trouble swallowing and voice change.    Eyes: Positive for visual disturbance. Negative for photophobia.   Respiratory: Negative for cough, shortness of breath and wheezing.    Cardiovascular: Negative for chest pain and palpitations.    Gastrointestinal: Negative for abdominal distention, nausea and vomiting.   Genitourinary: Negative for difficulty urinating and flank pain.   Musculoskeletal: Positive for gait problem. Negative for arthralgias.   Skin: Negative for color change and rash.   Neurological: Positive for speech difficulty and weakness. Negative for numbness and headaches.   Psychiatric/Behavioral: Negative for agitation and confusion.     Objective:     Vital Signs (Most Recent):  Temp: 98.4 °F (36.9 °C) (17 1205)  Pulse: 82 (17 1205)  Resp: 18 (17 1205)  BP: 138/86 (17 1205)  SpO2: (!) 93 % (17 1205)    Vital Signs (24h Range):  Temp:  [97.6 °F (36.4 °C)-98.6 °F (37 °C)] 98.4 °F (36.9 °C)  Pulse:  [71-98] 82  Resp:  [17-20] 18  SpO2:  [93 %-96 %] 93 %  BP: (138-177)/(69-86) 138/86     Physical Exam   Constitutional: She appears well-developed and well-nourished.   HENT:   Head: Normocephalic and atraumatic.   Eyes: EOM are normal. Pupils are equal, round, and reactive to light.   Neck: Normal range of motion.   Cardiovascular: Normal rate.    Irregular rhythm noted   Pulmonary/Chest: No respiratory distress.   Abdominal: Soft. She exhibits no distension.   NGT in place   Musculoskeletal: Normal range of motion.   Neurological:   Neurologic:  -  Mental Status:  AAOx3.  Follows commands.  Answers correct age and .  Recent and remote memory intact.  Recalls 3/3 objects.    -  Speech and language:  mild dysarthria.    -L inattention  -  Coordination:  Finger to nose exam:  RUE unable to assess, LUE-dysmetria.  Heel to shin:  RLE normal, LLE abnormal.   -  Motor:  RUE: 5/5.  LUE: 0/5 (paretic).  RLE: 5/5.  LLE: 3/5.  - pronator drift.: unable to assess 2/2 LUE paresis  -  Tone:  normal  -  Sensory:  Intact to light touch and pin prick bilaterally.    Skin: Skin is warm and dry.   Psychiatric: She has a normal mood and affect. Her behavior is normal.         Diagnostic Results:   Labs: Reviewed  CT:  Reviewed  MRI: Reviewed  CTA: reviewed  Assessment/Plan:      Oropharyngeal dysphagia    -SLP evaluate and treat.  -currently NPO  -tolerating NGT with TF        Chronic atrial fibrillation    -continue Plavix PO and Heparin SQ per VN during hospitalization.  - patient and family opted to not be on home anticoagulation at this time because of concerns of falling.        Internal carotid artery stenosis    -maximum medical management as the patient does not want any surgical intervention        * Embolic stroke involving right carotid artery    -  Encourage mobility, OOB in chair at least 3 hours per day, and ambulation.  -  PT/OT evaluate and treat.  Functional status: Participating with OT/PT  -  SLP speech and cognitive evaluate and treat.   Cognitive/Speech/Language status:  NPO, oropharyngeal dysphagia.   Nutrition/Swallow Status:  failed bedside swallow evaluation.  Speech recommended NPO status.  -  Monitor sleep disturbances and establish consistent sleep-wake cycle.  -  Monitor for bowel and bladder dysfunction.    -  Monitor for shoulder pain and subluxation.  -  Monitor for spasticity.  -  Monitor for skin breakdown and pressure ulcers (early mobility, repositioning/weight shifting every 20-30 minutes when sitting, turn patient every 2 hours, proper mattress/overlay and chair cushioning, and pressure relief/heel protector boots)  -  DVT prophylaxis:  Plavix & Heparin SQ.  -  Reviewed discharge options with patient (inpatient rehab, SNF, home health therapy, and outpatient therapy).  Encourage participation with therapy.          Patient is more appropriate for SNF and will recommend SNF.  Agree with therapy recommendations for SNF.  Will sign off.  Please call with questions/concerns or re-consult if situation changes.        Thank you for your consult.     Bruna Negrete NP  Department of Physical Medicine & Rehab   [facility]

## 2017-06-19 NOTE — ASSESSMENT & PLAN NOTE
2/2 stroke  - SLP. S/p modified barium swallow study today - Pleasure puree feeds with speech ok; not cleared for liquids.   - NGT coiled in the nasopharynx and needs to be removed. Plan for re-placement under fluoro tomorrow if needed.   - cont aggressive ST

## 2017-06-19 NOTE — ASSESSMENT & PLAN NOTE
-Stroke Risk Factor  -patient and family opted to not be on anticoagulation at this time because of concerns of increased bleeding risk from falls  - coreg for rate control

## 2017-06-19 NOTE — PT/OT/SLP PROGRESS
Occupational Therapy      Elizabeth ASIM Rosalba  MRN: [id number]    Patient not seen today secondary to Other (Comment) (pt off the floor for MBSS). Will follow-up tomorrow.    LAVONNE Brown  6/19/2017

## 2017-06-19 NOTE — ASSESSMENT & PLAN NOTE
-  Encourage mobility, OOB in chair at least 3 hours per day, and ambulation.  -  PT/OT evaluate and treat.  Functional status: Participating with OT/PT  -  SLP speech and cognitive evaluate and treat.   Cognitive/Speech/Language status:  NPO, oropharyngeal dysphagia.   Nutrition/Swallow Status:  failed bedside swallow evaluation.  Speech recommended NPO status.  -  Monitor sleep disturbances and establish consistent sleep-wake cycle.  -  Monitor for bowel and bladder dysfunction.    -  Monitor for shoulder pain and subluxation.  -  Monitor for spasticity.  -  Monitor for skin breakdown and pressure ulcers (early mobility, repositioning/weight shifting every 20-30 minutes when sitting, turn patient every 2 hours, proper mattress/overlay and chair cushioning, and pressure relief/heel protector boots)  -  DVT prophylaxis:  Plavix & Heparin SQ.  -  Reviewed discharge options with patient (inpatient rehab, SNF, home health therapy, and outpatient therapy).  Encourage participation with therapy.

## 2017-06-19 NOTE — PT/OT/SLP PROGRESS
Physical Therapy  Treatment    Elizabeth Navarrete   MRN: [id number]   Admitting Diagnosis: Embolic stroke involving right carotid artery    PT Received On: 06/19/17  PT Start Time: 1300     PT Stop Time: 1339    PT Total Time (min): 39 min       Billable Minutes:  Therapeutic Activity 15 min and Therapeutic Exercise 24 min    Treatment Type: Treatment  PT/PTA: PT     PTA Visit Number: 0       General Precautions: Standard, aspiration, fall, NPO (HOB 30-45 degrees)  Orthopedic Precautions: N/A   Braces:      Do you have any cultural, spiritual, Mormonism conflicts, given your current situation?: none stated    Subjective:  Communicated with nurse prior to session.      Pain/Comfort  Pain Rating 1: 0/10  Pain Rating Post-Intervention 1: 0/10    Objective:   Patient found with: telemetry, SCD, bed alarm, NG tube (hep lock IV)    Functional Mobility:  Bed Mobility: pt rolled to B side for PT to change diaper and clean pt.   Rolling/Turning to Left: Maximum assistance (pt needed verbal cues for hand placement and sequencing for functional mobility. )  Rolling/Turning Right: Total assistance  Supine to Sit: Total Assistance (pt sat on E OB  x 10 min with CGA to mod assist. pt leaned to L side. pt needed verbal cues to keep head upright with sitting. )  Sit to Supine: Maximum Assistance    Transfers:  Sit <> Stand Assistance: Maximum Assistance (pt was not able to come to upright posture with standing. )  Sit <> Stand Assistive Device: No Assistive Device    Gait:   Gait Distance: not tested. pt could not come to upright posture with standing.       Therapeutic Activities and Exercises:  Pt performed AAROM there exer BLE in supine x 15 reps.      AM-PAC 6 CLICK MOBILITY  How much help from another person does this patient currently need?   1 = Unable, Total/Dependent Assistance  2 = A lot, Maximum/Moderate Assistance  3 = A little, Minimum/Contact Guard/Supervision  4 = None, Modified [place]/Independent    Turning over in bed  (including adjusting bedclothes, sheets and blankets)?: 2  Sitting down on and standing up from a chair with arms (e.g., wheelchair, bedside commode, etc.): 2  Moving from lying on back to sitting on the side of the bed?: 2  Moving to and from a bed to a chair (including a wheelchair)?: 1  Need to walk in hospital room?: 1  Climbing 3-5 steps with a railing?: 1  Total Score: 9    AM-PAC Raw Score CMS G-Code Modifier Level of Impairment Assistance   6 % Total / Unable   7 - 9 CM 80 - 100% Maximal Assist   10 - 14 CL 60 - 80% Moderate Assist   15 - 19 CK 40 - 60% Moderate Assist   20 - 22 CJ 20 - 40% Minimal Assist   23 CI 1-20% SBA / CGA   24 CH 0% Independent/ Mod I     Patient left supine with all lines intact, call button in reach and bed alarm on.    Assessment:  Elizabeth Navarrete is a 89 y.o. female with a medical diagnosis of Embolic stroke involving right carotid artery and presents with decreased strength, balance, mobility, transfers and decreased distance ambulated. Pt would benefit from cont PT to address deficits and will need SNF placement to maximize rehab potential. Pt will benefit from skilled PT 6x/wk to progress pt physically and decrease complications from immobility.  .    Rehab identified problem list/impairments: Rehab identified problem list/impairments: weakness, impaired endurance, impaired functional mobilty, gait instability, impaired balance, decreased lower extremity function, decreased safety awareness    Rehab potential is good.    Activity tolerance: Good    Discharge recommendations: Discharge Facility/Level Of Care Needs: nursing facility, skilled     Barriers to discharge: Barriers to Discharge: Inaccessible home environment, Decreased caregiver support    Equipment recommendations: Equipment Needed After Discharge:  (will determine DME needs closer to discharge.)     GOALS:    Physical Therapy Goals        Problem: Physical Therapy Goal    Goal Priority Disciplines Outcome Goal  Variances Interventions   Physical Therapy Goal     PT/OT, PT Ongoing (interventions implemented as appropriate)     Description:  Goals to be met by: 17     Patient will increase functional independence with mobility by performin. Supine to sit with Moderate Assistance - not met  2. Sit to supine with Moderate Assistance - not met  3. Sit to stand transfer with Moderate Assistance  4. Bed to chair transfer with Moderate Assistance using Rolling Walker or appropriate AD. - not met  5. Gait  x 10 feet with Maximum Assistance using Rolling Walker. - not met  6. Lower extremity exercise program x10 reps with supervision for strengthening and endurance with functional mobility. - not met  7. Pt will perform static standing x 2 minutes using Rolling Walker and demonstrated upright/midline trunk orientation with Minimal Assistance for balance. - not met  8. Pt will tolerated sitting x 8 minutes with Minimal Assistance to maintain midline orientation & balance while performing dynamic activities. - not met                       PLAN:    Patient to be seen 6 x/week  to address the above listed problems via gait training, therapeutic activities, therapeutic exercises  Plan of Care expires: 17  Plan of Care reviewed with: patient         Bhakti Cifuentes, PT  2017

## 2017-06-19 NOTE — PLAN OF CARE
Problem: Patient Care Overview  Goal: Plan of Care Review  Pt. Kept at 30-45 degrees to prevent aspiration of tube feeding.  Pt. Repositioned with assistance of x2 staff. Pt. Without c/o pain. Bed in low pos., call bell within reach, bed alarm set. Will cont. To monitor.

## 2017-06-19 NOTE — PLAN OF CARE
Problem: Physical Therapy Goal  Goal: Physical Therapy Goal  Goals to be met by: 17     Patient will increase functional independence with mobility by performin. Supine to sit with Moderate Assistance - not met  2. Sit to supine with Moderate Assistance - not met  3. Sit to stand transfer with Moderate Assistance  4. Bed to chair transfer with Moderate Assistance using Rolling Walker or appropriate AD. - not met  5. Gait  x 10 feet with Maximum Assistance using Rolling Walker. - not met  6. Lower extremity exercise program x10 reps with supervision for strengthening and endurance with functional mobility. - not met  7. Pt will perform static standing x 2 minutes using Rolling Walker and demonstrated upright/midline trunk orientation with Minimal Assistance for balance. - not met  8. Pt will tolerated sitting x 8 minutes with Minimal Assistance to maintain midline orientation & balance while performing dynamic activities. - not met     Goals remain appropriate. Bhakti Cifuentes, PT' 2017

## 2017-06-19 NOTE — PLAN OF CARE
Problem: SLP Goal  Goal: SLP Goal  Speech Language Pathology Goals  Goals expected to be met by 6/23  1. Pt will complete MBS to determine least restrictive diet.  2. Pt will participate in speech language cognitive eval to determine need for intervention.  3. Pt will perform oropharyngeal strengthening exercises X10 per session provided min cues to improve safe swallow function.            Outcome: Ongoing (interventions implemented as appropriate)  MBSS completed. Patient with moderate Oropharyngeal Dysphagia characterized by aspiration of thin liquids, nectar-thickened liquids and honey-thickened liquids.  See report for full details. REC: NPO with alternative means nutrition/hydration/medication and initiate Dysphagia therapy to continue to improve strength and coordination of swallow mechanism.  Pleasure feeds of puree ok with SLP using strict aspiration precautions and multiple swallows (2+) with ea bite.  Findings reviewed with MD team and nurse following assessment.  Thank you.    DAVID Malhotra., [facility]-SLP  Speech-Language Pathology  Pager: [phone]  6/19/2017

## 2017-06-19 NOTE — SUBJECTIVE & OBJECTIVE
Neurologic Chief Complaint: left hemiparesis    Subjective:     Interval History: Patient is seen for follow-up neurological assessment and treatment recommendations:   No acute events overnight. Modified barium swallow study today    HPI, Past Medical, Family, and Social History remains the same as documented in the initial encounter.     Review of Systems   Constitutional: Negative for fever.   Eyes: Negative for visual disturbance.   Respiratory: Negative for cough and shortness of breath.    Cardiovascular: Negative for chest pain.     Scheduled Meds:   amlodipine  5 mg Per NG tube Daily    aspirin  81 mg Per NG tube Daily    atorvastatin  40 mg Per NG tube Daily    carvedilol  12.5 mg Per NG tube BID    clopidogrel  75 mg Per NG tube Daily    heparin (porcine)  5,000 Units Subcutaneous Q8H    pantoprazole  40 mg Per NG tube Daily    sodium chloride 0.9%  500 mL Intravenous Once    sodium chloride 0.9%  3 mL Intravenous Q8H     Continuous Infusions:     PRN Meds:acetaminophen, labetalol    Objective:     Vital Signs (Most Recent):  Temp: 98.4 °F (36.9 °C) (06/19/17 1205)  Pulse: 83 (06/19/17 1500)  Resp: 18 (06/19/17 1205)  BP: 138/86 (06/19/17 1205)  SpO2: (!) 93 % (06/19/17 1205)  BP Location: Right arm    Vital Signs Range (Last 24H):  Temp:  [97.6 °F (36.4 °C)-98.6 °F (37 °C)]   Pulse:  [71-98]   Resp:  [17-20]   BP: (138-177)/(77-86)   SpO2:  [93 %-96 %]   BP Location: Right arm    Physical Exam   Constitutional: She appears well-developed and well-nourished.   HENT:   Head: Normocephalic and atraumatic.   Eyes: Pupils are equal, round, and reactive to light.       Neurological Exam:   LOC: alert and follows requests  Language: No aphasia  Speech: Dysarthria  Visual Fields (CN II): Full  EOM (CN III, IV, VI): Full/intact  Pupils (CN III, IV, VI): PERRL  Facial Movement (CN VII): left lower facial asymmetry  Tongue (CN XII): to midline  Motor*: Arm Left:  Paretic (2/5), Leg Left:   Paretic:  4/5,  Arm Right:   Normal (5/5), Leg Right:   Normal (5/5)  Cerebellar*: Normal limb  Sensation: intact to light touch, temperature and vibration  Tone: Arm-Left: normal; Leg-Left: normal; Arm-Right: normal; Leg-Right: normal   Extinction to bilateral stimulation    NIH Stroke Scale:    Level of Consciousness: 0 - alert  LOC Questions: 0 - answers both correctly  LOC Commands: 0 - performs both correctly  Best Gaze: 0 - normal  Visual: 0 - no visual loss  Facial Palsy: 1 - minor  Motor Left Arm: 3 - no effort against gravity  Motor Right Arm: 0 - no drift  Motor Left Le - drift  Motor Right Le - no drift  Limb Ataxia: 0 - absent  Sensory: 0 - normal  Best Language: 0 - no aphasia  Dysarthria: 2 - near to unintelligible  Extinction and Inattention: 1 - partial neglect  NIH Stroke Scale Total: 8      Laboratory:  CMP:   No results for input(s): GLUCOSE, CALCIUM, ALBUMIN, PROT, NA, K, CO2, CL, BUN, CREATININE, ALKPHOS, ALT, AST, BILITOT in the last 24 hours.  BMP:     Recent Labs  Lab 17  0507      K 3.3*      CO2 22*   BUN 14   CREATININE 0.8   CALCIUM 9.5     CBC:     Recent Labs  Lab 17  0507   WBC 12.08   RBC 4.61   HGB 13.9   HCT 41.7      MCV 91   MCH 30.2   MCHC 33.3     Lipid Panel:     Recent Labs  Lab 06/15/17  1942   CHOL 118*   LDLCALC 67.4   HDL 41   TRIG 48     Coagulation:     Recent Labs  Lab 17  0349   INR 1.1   APTT 24.5     Platelet Aggregation Study: No results for input(s): PLTAGG, PLTAGINTERP, PLTAGREGLACO, ADPPLTAGGREG in the last 168 hours.  Hgb A1C:     Recent Labs  Lab 06/15/17  1942   HGBA1C 5.6     TSH:     Recent Labs  Lab 06/15/17  1942   TSH 1.041       Diagnostic Results:  I have personally reviewed:   Findings:     MRI brain WO contrast (17)  R insular cortex, right corona radiata and right parietal lobe infarct. +cytotoxic cerebral edema

## 2017-06-19 NOTE — PROCEDURES
Modifed Barium Swallow Study  Speech Start Time: 1625  Speech Stop Time: 1634  Speech Total (min): 9 min    SLP Treatment Date: 06/19/17    Reason for Referral  Patient was referred for a Modified Barium Swallow Study to assess the efficiency of his/her swallow function, rule out aspiration and make recommendations regarding safe dietary consistencies, effective compensatory strategies, and safe eating environment.     Diagnosis   Embolic stroke involving right carotid artery   The primary encounter diagnosis was Cerebrovascular accident (CVA), unspecified mechanism. Diagnoses of Embolic stroke involving right middle cerebral artery, Chronic atrial fibrillation, Embolic stroke involving right carotid artery, Internal carotid artery stenosis, left, and Oropharyngeal dysphagia were also pertinent to this visit.       Past Medical History:   Diagnosis Date    CAD (coronary artery disease) 10/10/2012    CRF (chronic renal failure) 10/10/2012    Family history of breast cancer 10/10/2012    History of cardiac arrhythmia 10/10/2012    History of CHF (congestive heart failure) 10/10/2012    History of depression 10/10/2012    History of echocardiogram 10/10/2012    HTN (hypertension) 10/10/2012    Internal carotid artery stenosis 10/10/2012    Personal history of gallstones 10/10/2012     Past Surgical History:   Procedure Laterality Date    EYE SURGERY      HYSTERECTOMY          General Precautions: aspiration, fall, NPO     MRI 6/15/17: Regions of acute ischemia are noted within the right basal ganglia, as well as the periphery of the right parietal lobe and posterior right parietal-occipital lobe.    CXR 6/17/2017: Since the previous exam, nasogastric tube has been manipulated, however remains within what appears to be either a hiatal hernia or partially looped within the distal esophagus, either way, repositioning as warranted.  No acute change otherwise.    Recommendations:  -  NPO with alternative means  nutrition/hydration/medication   -  ST to initiate Dysphagia therapy to improve timing of initiation of swallow and coordination of swallow   -  Pleasure feedings of Puree ok with therapy provided strict adherence to aspiration precautions and use of multiple (2+ swallows) between bites   - Strict oral care (3x/daily) advised due to open-mouth breathing posture and dry lingual surface.     Oral Peripheral Examination  Oral Musculature Evaluation  Oral Musculature: left weakness  Structural Abnormalities: Left weakness   Dentition: edentulous  Secretion Management: left corner drooling  Mucosal Quality: adequate  Mandibular Strength and Mobility: impaired  Oral Labial Strength and Mobility: impaired retraction, impaired pursing  Lingual Strength and Mobility: impaired strength, impaired protrusion  Buccal Strength and Mobility: impaired  Volitional Cough: weak  Volitional Swallow: delayed   Voice Prior to PO Intake: clear, low intensity    Visualization:  · Pt was seen in the lateral view  · Pt was pleasant , cooperative and willingly accepted all trials  · Pt seen with NG tube in place    Consistencies Assessed  Thin liquids tsp sip, Nectar thick liquids tsp sipsx2, Honey thick liquids tsp sipx1 and Puree 1/2 tsp bitex1, tsp bites x2    Oral Preparation / Oral Phase  Patient with poor bolus formation and control with anterior spillage of thin and nectar-thickened liquids and oral residue present post swallow on all trials presented. No presence of naso-pharyngeal reflux noted.  Patient with mouth breathing t/o assessment.     Pharyngeal Phase  Patient with moderately delayed initiation of swallow, delayed eppiglotic inversion and premature spillage with aspiration of thin liquids, nectar-thickened liquids and honey-thickened liquids.  Patient with pooling to the level of the pyriforms with liquid consistencies.     Thin liquids  · Pt with Aspiration during the swallow with delayed cough response  · Pt with delayed  swallow initiation   · Pt with reduced BOT retraction  · Pt with delayed epiglottic inversion   · Moderate pooling to the level of pyriforms  · Patient instructed to use multiple swallows and throat clear to attempt to clear material aspirated and residuals in pyriforms  · Strategies attempted were not effective in eliminating or reducing the occurrence of aspiration     Nectar-thickened liquids  · Pt with Aspiration during the swallow with cough response  · Pt with delayed swallow initiation   · Pt with reduced BOT retraction  · Pt with delayed epiglottic inversion   · Moderate scattered stasis along BOT  · Moderate pooling in the valleculae and pyriforms  · Mild stasis in valleculae  · Patient instructed to use multiple swallows and throat clear to attempt to clear material aspirated  · Strategies attempted were not effective in eliminating or reducing the occurrence of aspiration     Honey-thickened liquids  · Pt with Aspiration during the swallow with delayed cough response  · Pt with delayed swallow initiation   · Pt with reduced BOT retraction  · Pt with delayed epiglottic inversion   · Mild pooling and stasis in valleculae  · Scattered stasis along posterior pharyngeal wall  · Patient instructed to use multiple swallows and throat clear to attempt to clear material aspirated  · Strategies attempted were  not effective in eliminating or reducing the occurrence of aspiration     Puree  · No aspiration/penetration  · Pt with delayed initiation of swallow  · Pt with reduced BOT retraction  · Pooling in the valleculae  · Mild-moderate stasis scattered along BOT and in valleculae   · Use of double swallows effective in clearing stasis      Cervical Esophageal Phase  Limited assessment due to patient positioning and danyel aspiration of trials presented. Mild pooling at the level of the UES noted x2.  No retrograde movement observed.     Impressions  Patient with moderate Oropharyngeal Dysphagia as characterized by  decreased oral control of the bolus and delayed initiation of swallow with danyel aspiration of thin, nectar-thickened and honey-thickened liquids. Use of reduced bolus size and throat clears not effective in reducing occurence of aspiration/penetration.  While no aspiration/penetration was observed with presentations of puree, Patient deemed at risk of aspiration/penetration with subsequent bites of puree due to scattered stasis along BOT and in valleculae.  Pateint requires use of multiple swallows to clear residue along BOT and in valleculae with therapeutic/pleasure feeds of puree.     Prognosis : Felt to be good provided strict adherence to aspiration precautions and continued participation with Dysphagia therapy.     Plan/Education  NPO with alternative means nutrition/hydration/medication.  ST to follow up at the bedside 5x/week to provide skilled intervention and Dysphagia therapy. Pleasure feedings of puree with SLP ok provided use of multiple swallows (2+) between ea bite.     SLP role, MBS procedure, findings and recommendations were discussed with Patient  Results and recommendations were discussed with Medical Team and nurse following study    Care Plan    SLP Goals        Problem: SLP Goal    Goal Priority Disciplines Outcome   SLP Goal     SLP Ongoing (interventions implemented as appropriate)   Description:  Speech Language Pathology Goals  Goals expected to be met by 6/23  1. Pt will complete MBS to determine least restrictive diet.  2. Pt will participate in speech language cognitive eval to determine need for intervention.  3. Pt will perform oropharyngeal strengthening exercises X10 per session provided min cues to improve safe swallow function.                              G-Codes  Functional Assessment Tool Used: NOMS  Score: 2  Functional Limitations: Swallowing  Swallow Current Status (): CM  Swallow Goal Status (): ABDIRAHMAN Plummer, CCC-SLP  Speech-Language  Pathology  Pager: [phone]  6/19/2017

## 2017-06-19 NOTE — PLAN OF CARE
Problem: Patient Care Overview  Goal: Plan of Care Review  POC reviewed with patient.  Verbalized understanding.  Patient remains free from falls, skin breakdown, and injury.  Call light remained within reach and side rails up X2.  Neuro checks and virtal signs done every 4 hours.  Ongoing interventions implemented as appropriate.  Will continue to monitor.  Patient is very pleasant.

## 2017-06-19 NOTE — SUBJECTIVE & OBJECTIVE
Scheduled Medications:    amlodipine  5 mg Per NG tube Daily    aspirin  81 mg Per NG tube Daily    atorvastatin  40 mg Per NG tube Daily    carvedilol  12.5 mg Per NG tube BID    clopidogrel  75 mg Per NG tube Daily    heparin (porcine)  5,000 Units Subcutaneous Q8H    pantoprazole  40 mg Per NG tube Daily    sodium chloride 0.9%  3 mL Intravenous Q8H       PRN Medications: acetaminophen, labetalol, sodium chloride 0.9%    Review of Systems   Constitutional: Negative for chills, fatigue and fever.   HENT: Negative for facial swelling, trouble swallowing and voice change.    Eyes: Positive for visual disturbance. Negative for photophobia.   Respiratory: Negative for cough, shortness of breath and wheezing.    Cardiovascular: Negative for chest pain and palpitations.   Gastrointestinal: Negative for abdominal distention, nausea and vomiting.   Genitourinary: Negative for difficulty urinating and flank pain.   Musculoskeletal: Positive for gait problem. Negative for arthralgias.   Skin: Negative for color change and rash.   Neurological: Positive for speech difficulty and weakness. Negative for numbness and headaches.   Psychiatric/Behavioral: Negative for agitation and confusion.     Objective:     Vital Signs (Most Recent):  Temp: 98.4 °F (36.9 °C) (06/19/17 1205)  Pulse: 82 (06/19/17 1205)  Resp: 18 (06/19/17 1205)  BP: 138/86 (06/19/17 1205)  SpO2: (!) 93 % (06/19/17 1205)    Vital Signs (24h Range):  Temp:  [97.6 °F (36.4 °C)-98.6 °F (37 °C)] 98.4 °F (36.9 °C)  Pulse:  [71-98] 82  Resp:  [17-20] 18  SpO2:  [93 %-96 %] 93 %  BP: (138-177)/(69-86) 138/86     Physical Exam   Constitutional: She appears well-developed and well-nourished.   HENT:   Head: Normocephalic and atraumatic.   Eyes: EOM are normal. Pupils are equal, round, and reactive to light.   Neck: Normal range of motion.   Cardiovascular: Normal rate.    Irregular rhythm noted   Pulmonary/Chest: No respiratory distress.   Abdominal: Soft. She  exhibits no distension.   NGT in place   Musculoskeletal: Normal range of motion.   Neurological:   Neurologic:  -  Mental Status:  AAOx3.  Follows commands.  Answers correct age and .  Recent and remote memory intact.  Recalls 3/3 objects.    -  Speech and language:  mild dysarthria.    -L inattention  -  Coordination:  Finger to nose exam:  RUE unable to assess, LUE-dysmetria.  Heel to shin:  RLE normal, LLE abnormal.   -  Motor:  RUE: 5/5.  LUE: 0/5 (paretic).  RLE: 5/5.  LLE: 3/5.  - pronator drift.: unable to assess 2/2 LUE paresis  -  Tone:  normal  -  Sensory:  Intact to light touch and pin prick bilaterally.    Skin: Skin is warm and dry.   Psychiatric: She has a normal mood and affect. Her behavior is normal.

## 2017-06-20 ENCOUNTER — ANESTHESIA EVENT (OUTPATIENT)
Dept: SURGERY | Facility: HOSPITAL | Age: 82
DRG: 064 | End: 2017-06-20
Payer: MEDICARE

## 2017-06-20 PROCEDURE — 99233 SBSQ HOSP IP/OBS HIGH 50: CPT | Mod: GC,,, | Performed by: PSYCHIATRY & NEUROLOGY

## 2017-06-20 PROCEDURE — 25000003 PHARM REV CODE 250: Performed by: PHYSICIAN ASSISTANT

## 2017-06-20 PROCEDURE — 99222 1ST HOSP IP/OBS MODERATE 55: CPT | Mod: GC,,, | Performed by: SURGERY

## 2017-06-20 PROCEDURE — 63600175 PHARM REV CODE 636 W HCPCS: Performed by: PHYSICIAN ASSISTANT

## 2017-06-20 PROCEDURE — 97803 MED NUTRITION INDIV SUBSEQ: CPT

## 2017-06-20 PROCEDURE — 25000003 PHARM REV CODE 250: Performed by: HOSPITALIST

## 2017-06-20 PROCEDURE — 97535 SELF CARE MNGMENT TRAINING: CPT

## 2017-06-20 PROCEDURE — 92526 ORAL FUNCTION THERAPY: CPT

## 2017-06-20 PROCEDURE — 25000003 PHARM REV CODE 250: Performed by: NURSE PRACTITIONER

## 2017-06-20 PROCEDURE — 97110 THERAPEUTIC EXERCISES: CPT

## 2017-06-20 PROCEDURE — 92523 SPEECH SOUND LANG COMPREHEN: CPT

## 2017-06-20 PROCEDURE — 20600001 HC STEP DOWN PRIVATE ROOM

## 2017-06-20 PROCEDURE — 25000003 PHARM REV CODE 250: Performed by: PSYCHIATRY & NEUROLOGY

## 2017-06-20 PROCEDURE — 97530 THERAPEUTIC ACTIVITIES: CPT

## 2017-06-20 RX ORDER — POTASSIUM CHLORIDE 7.45 MG/ML
10 INJECTION INTRAVENOUS
Status: DISPENSED | OUTPATIENT
Start: 2017-06-20 | End: 2017-06-20

## 2017-06-20 RX ORDER — SODIUM CHLORIDE 9 MG/ML
INJECTION, SOLUTION INTRAVENOUS CONTINUOUS
Status: DISCONTINUED | OUTPATIENT
Start: 2017-06-20 | End: 2017-06-22

## 2017-06-20 RX ADMIN — AMLODIPINE BESYLATE 5 MG: 5 TABLET ORAL at 09:06

## 2017-06-20 RX ADMIN — POTASSIUM CHLORIDE 10 MEQ: 10 INJECTION, SOLUTION INTRAVENOUS at 02:06

## 2017-06-20 RX ADMIN — HEPARIN SODIUM 5000 UNITS: 5000 INJECTION, SOLUTION INTRAVENOUS; SUBCUTANEOUS at 05:06

## 2017-06-20 RX ADMIN — POTASSIUM CHLORIDE 10 MEQ: 10 INJECTION, SOLUTION INTRAVENOUS at 04:06

## 2017-06-20 RX ADMIN — ATORVASTATIN CALCIUM 40 MG: 20 TABLET, FILM COATED ORAL at 09:06

## 2017-06-20 RX ADMIN — ASPIRIN 81 MG CHEWABLE TABLET 81 MG: 81 TABLET CHEWABLE at 09:06

## 2017-06-20 RX ADMIN — Medication 3 ML: at 05:06

## 2017-06-20 RX ADMIN — HEPARIN SODIUM 5000 UNITS: 5000 INJECTION, SOLUTION INTRAVENOUS; SUBCUTANEOUS at 02:06

## 2017-06-20 RX ADMIN — PANTOPRAZOLE SODIUM 40 MG: 40 GRANULE, DELAYED RELEASE ORAL at 09:06

## 2017-06-20 RX ADMIN — POTASSIUM CHLORIDE 10 MEQ: 10 INJECTION, SOLUTION INTRAVENOUS at 11:06

## 2017-06-20 RX ADMIN — Medication 3 ML: at 08:06

## 2017-06-20 RX ADMIN — CLOPIDOGREL 75 MG: 75 TABLET, FILM COATED ORAL at 09:06

## 2017-06-20 RX ADMIN — SODIUM CHLORIDE: 0.9 INJECTION, SOLUTION INTRAVENOUS at 08:06

## 2017-06-20 RX ADMIN — CARVEDILOL 12.5 MG: 12.5 TABLET, FILM COATED ORAL at 09:06

## 2017-06-20 RX ADMIN — Medication 3 ML: at 02:06

## 2017-06-20 NOTE — PT/OT/SLP PROGRESS
Physical Therapy  Treatment    Elizabeth Navarrete   MRN: [id number]   Admitting Diagnosis: Embolic stroke involving right carotid artery    PT Received On: 06/20/17  PT Start Time: 0924     PT Stop Time: 1005    PT Total Time (min): 41 min       Billable Minutes:  Therapeutic Activity 23 min and Therapeutic Exercise 18 min     Treatment Type: Treatment  PT/PTA: PT     PTA Visit Number: 0       General Precautions: Standard, fall, aspiration (HOB 30-45 degrees)  Orthopedic Precautions: N/A   Braces:      Do you have any cultural, spiritual, Taoism conflicts, given your current situation?: none stated    Subjective:  Communicated with nurse prior to session.      Pain/Comfort  Pain Rating 1: 0/10  Pain Rating Post-Intervention 1: 0/10    Objective:   Patient found with: SCD, bed alarm (B Keyon Gwendolyn boots, hep lock IV)    Functional Mobility:  Bed Mobility:   Rolling/Turning to Left: Moderate assistance  Rolling/Turning Right: Maximum assistance  Scooting/Bridging: Maximum Assistance (pt scooted x 2 reps to head of bed.)  Supine to Sit: Maximum Assistance (pt sat on EOB x 10 min  with CGA to mod assist. pt leaned to L side and pt needed verbal cues to keep head upright. )  Sit to Supine: Maximum Assistance    Transfers:  Sit <> Stand Assistance:  (not tested.)    Gait:   Gait Distance: not tested. pt could not come to upright posture with standing.    Therapeutic Activities and Exercises:  Pt performed AAROM there exer BLE x 15 reps in supine.      AM-PAC 6 CLICK MOBILITY  How much help from another person does this patient currently need?   1 = Unable, Total/Dependent Assistance  2 = A lot, Maximum/Moderate Assistance  3 = A little, Minimum/Contact Guard/Supervision  4 = None, Modified [place]/Independent    Turning over in bed (including adjusting bedclothes, sheets and blankets)?: 2  Sitting down on and standing up from a chair with arms (e.g., wheelchair, bedside commode, etc.): 2  Moving from lying on back to sitting  on the side of the bed?: 2  Moving to and from a bed to a chair (including a wheelchair)?: 2  Need to walk in hospital room?: 1  Climbing 3-5 steps with a railing?: 1  Total Score: 10    AM-PAC Raw Score CMS G-Code Modifier Level of Impairment Assistance   6 % Total / Unable   7 - 9 CM 80 - 100% Maximal Assist   10 - 14 CL 60 - 80% Moderate Assist   15 - 19 CK 40 - 60% Moderate Assist   20 - 22 CJ 20 - 40% Minimal Assist   23 CI 1-20% SBA / CGA   24 CH 0% Independent/ Mod I     Patient left supine with all lines intact and call button in reach. Bed alarm on.    Assessment:  Elizabeth Navarrete is a 89 y.o. female with a medical diagnosis of Embolic stroke involving right carotid artery and presents with decreased strength, mobility, transfers and decreased distance ambulated. Pt would benefit from cont PT to address deficits and will need SNF placement to maximize rehab potential. Pt will benefit from skilled PT 6x/wk to progress pt physically and decrease complications from immobility.     Rehab identified problem list/impairments: Rehab identified problem list/impairments: weakness, impaired endurance, impaired functional mobilty, gait instability, impaired balance, decreased lower extremity function, decreased safety awareness    Rehab potential is fair.    Activity tolerance: Good    Discharge recommendations: Discharge Facility/Level Of Care Needs: nursing facility, skilled     Barriers to discharge: Barriers to Discharge: Decreased caregiver support, Inaccessible home environment    Equipment recommendations: Equipment Needed After Discharge:  (will determine DME needs closer to discharge)     GOALS:    Physical Therapy Goals        Problem: Physical Therapy Goal    Goal Priority Disciplines Outcome Goal Variances Interventions   Physical Therapy Goal     PT/OT, PT Ongoing (interventions implemented as appropriate)     Description:  Goals to be met by: 6/23/17     Patient will increase functional independence  with mobility by performin. Supine to sit with Moderate Assistance - not met  2. Sit to supine with Moderate Assistance - not met  3. Sit to stand transfer with Moderate Assistance  4. Bed to chair transfer with Moderate Assistance using Rolling Walker or appropriate AD. - not met  5. Gait  x 10 feet with Maximum Assistance using Rolling Walker. - not met  6. Lower extremity exercise program x10 reps with supervision for strengthening and endurance with functional mobility. - not met  7. Pt will perform static standing x 2 minutes using Rolling Walker and demonstrated upright/midline trunk orientation with Minimal Assistance for balance. - not met  8. Pt will tolerated sitting x 8 minutes with Minimal Assistance to maintain midline orientation & balance while performing dynamic activities. - not met                       PLAN:    Patient to be seen 6 x/week  to address the above listed problems via gait training, therapeutic activities, therapeutic exercises  Plan of Care expires: 17  Plan of Care reviewed with: patient         Bhakti SINGH Cifuentes, PT  2017

## 2017-06-20 NOTE — PLAN OF CARE
Problem: Occupational Therapy Goal  Goal: Occupational Therapy Goal  Goals to be met by: 6/30     Patient will increase functional independence with ADLs by performing:    UE Dressing while seated with Set-up Assistance and Minimal Assistance.  LE Dressing with Set-up Assistance and Moderate Assistance.  Grooming while standing with Set-up Assistance and Minimal Assistance.  Sitting at edge of bed x10 minutes for dynamic functional task with Contact Guard Assistance.  Stand pivot transfers with Minimal Assistance.  Toilet transfer to bedside commode with Minimal Assistance.  Pt/CG verbalized understanding for s/s of stroke.  CG demo understanding for positioning of L UE.   CG demo understanding for L UE exercises.      Outcome: Ongoing (interventions implemented as appropriate)  Goals remain appropriate, continue with OT POC.

## 2017-06-20 NOTE — PROGRESS NOTES
"[facility]  Adult Nutrition  Progress Note    SUMMARY     General Information Comments: Pt had MBSS yesterday, 6/19. Strict NPO. NG d/c at this time. Possible PEG placement, per rounds. K 3.3.     Recommendations    1. When medically able, resume TF recommendations: Isosource 1.5 @ 35 mL/hr x 24 hrs.   - This will provide 1260 calories, 57 grams of protein, 642 mL fluid.  - Meets > 85% EEN/EPN    RD following     Goals: Meet >/=75 % EEN/EPN  Nutrition Goal Status: goal not met  Communication of RD Recs: discussed on rounds    Continuum of Care Plan    Nursing Team: obtain daily wts    Reason for Assessment    Reason for Assessment: RD follow-up  Diagnosis: stroke/CVA  Relevent Medical History: CAD, HTN   Interdisciplinary Rounds: attended  Nutrition Discharge Planning: Pt to d/c on TF and meet >75% EEN/EPN    Nutrition Prescription Ordered    Current Diet Order: NPO     Nutrition Risk Screen     Nutrition Risk Screen: dysphagia or difficulty swallowing    Nutrition/Diet History    Patient Reported Diet/Restrictions/Preferences: other (see comments) (FLAQUITA)  Food Preferences: FLAQUITA  Factors Affecting Nutritional Intake: NPO, difficulty/impaired swallowing    Labs/Tests/Procedures/Meds    Pertinent Labs Reviewed: reviewed, pertinent  Pertinent Labs Comments: K 3.3  Pertinent Medications Reviewed: reviewed, pertinent  Pertinent Medications Comments: aspirin, pantoprazole    Physical Findings    Overall Physical Appearance: underweight  Tubes: nasogastric tube (NG d/c at this time)  Oral/Mouth Cavity: WDL  Skin:  (skin tear on elbow)    Anthropometrics    Temp: 97.8 °F (36.6 °C)  Height: 5' 5" (165.1 cm)  Weight Method: Bed Scale  Weight: 57.8 kg (127 lb 6.8 oz)  Ideal Body Weight (IBW), Female: 125 lb  % Ideal Body Weight, Female (lb): 101.94 lb  BMI (Calculated): 21.2  BMI Grade: 18.5-24.9 - normal     Estimated/Assessed Needs    Weight Used For Calorie Calculations: 57.8 kg (127 lb 6.8 oz)   Energy " Need Method:  (1300 kcal/d (PAL 1.25))  RMR ([place]-St. Jeor Equation): 1003.88  Weight Used For Protein Calculations: 57.8 kg (127 lb 6.8 oz)  Protein Requirements: 58-70 g/d  Fluid Need Method: RDA Method (1300 mL/d or per MD)    Nutrition Diagnosis    Inadequate energy intake r/t inability to consume sufficient energy AEB NPO with no alternate means of nutrition.  Status: continues    Monitor and Evaluation    Food and Nutrient Intake: energy intake, food and beverage intake, enteral nutrition intake  Food and Nutrient Adminstration: diet order, enteral and parenteral nutrition administration  Physical Activity and Function: nutrition-related ADLs and IADLs  Anthropometric Measurements: weight change, weight, body mass index  Biochemical Data, Medical Tests and Procedures: electrolyte and renal panel, gastrointestinal profile, glucose/endocrine profile, inflammatory profile, lipid profile  Nutrition-Focused Physical Findings: overall appearance    Nutrition Risk    Level of Risk:  (f/u 2x/week)    Nutrition Follow-Up    RD Follow-up?: Yes     Ct Leonardo RD, LDN

## 2017-06-20 NOTE — PT/OT/SLP EVAL
"Speech Language Pathology Evaluation    Elizabeth Navarrete   MRN: [id number]   Admitting Diagnosis: Embolic stroke involving right carotid artery    Diet recommendations: Solid Diet Level: NPO  Liquid Diet Level: NPO strict aspiration precautions    SLP Treatment Date: 06/20/17  Speech Start Time: 0840     Speech Stop Time: 0908     Speech Total (min): 28 min       TREATMENT BILLABLE MINUTES:  Eval 18  and Treatment Swallowing Dysfunction 10    Diagnosis: Embolic stroke involving right carotid artery      Past Medical History:   Diagnosis Date    CAD (coronary artery disease) 10/10/2012    CRF (chronic renal failure) 10/10/2012    Family history of breast cancer 10/10/2012    History of cardiac arrhythmia 10/10/2012    History of CHF (congestive heart failure) 10/10/2012    History of depression 10/10/2012    History of echocardiogram 10/10/2012    HTN (hypertension) 10/10/2012    Internal carotid artery stenosis 10/10/2012    Personal history of gallstones 10/10/2012     Past Surgical History:   Procedure Laterality Date    EYE SURGERY      HYSTERECTOMY         Has the patient been evaluated by SLP for swallowing? : Yes  Keep patient NPO?: Yes   General Precautions: Standard, aspiration, fall          Social Hx: Patient lives alone    Prior diet: regular/thin    Occupational/hobbies/homemaking: homemaker; graduated from     Subjective:  " I can't speak right"   Patient goals: improve her speech     Pain/Comfort  Pain Rating 1: 0/10  Pain Rating Post-Intervention 1: 0/10    Objective:        Oral Musculature Evaluation  Oral Musculature: left weakness  Structural Abnormalities: Left weakness   Dentition: edentulous  Secretion Management: left corner drooling  Mucosal Quality: adequate  Mandibular Strength and Mobility: impaired  Oral Labial Strength and Mobility: impaired retraction, impaired pursing  Lingual Strength and Mobility: impaired strength, impaired protrusion  Buccal Strength and Mobility: " impaired  Volitional Cough: weak  Volitional Swallow: delayed   Voice Prior to PO Intake: clear, low intensity     Cognitive Status:  Behavioral Observations: alert and appropriate-  Memory and Orientation: Pt oriented to person, place, month and year. She recalled up to 5 digits and 4 words; after a delay, she recalled 0/3 objects and with cues 2/3  Attention: fair to good  Problem Solving: responses were accurate but simple at times with difficulty generating multiple solutions to task. She compared and contrasted objects but had difficulty sequencing 4 items. She completed categorizational task with 100% acc. She named only 3 items during word fluency when 15-20 is wnl  Pragmatics: functional  Executive Function: mental flexibility    Language: yes    Auditory Comprehension: wfl for complex questions/commands    Verbal Expression: wfl; able to express wants/needs; named 5/6 pictured objects and iwth cues 6/6    Motor Speech: mild to mod dysarthria    Voice: clear        Reading: fucntional for simple sentences    Writing: to be tested    Visual-Spatial: to be tested      Additional Treatment:    Reviewed results of yesterday's MBS and need for npo. Pt given 1/2 tsp of pudding x3. Pt with mild swallow delay and delay in initiating dry swallow. Difficulty initiating the 2nd dry swallow. Pt with a throat clear and delayed cough response on 1st teaspoon and a cough response following the next 2 trials. No further po given due to high risk for aspiration. Encouraged pt to use effortful swallow.     FIM:  Social Interaction: 6 Complete [place]--The patient interacts appropriately with staff, other patients, and family members (e.g., controls temper, accepts criticism, is aware that words and actions have an impact on others), and does not require medication for   Problem Solvin Supervision--The patient requires supervision (e.g., cueing or coaxing) to solve less routine problems only under stressful or  unfamiliar conditions, but no more than 10% of the time.   Comprehension: 5 Standby Prompting--The patient understands directions and conversation about basic daily needs more than 90% of the time.  The patient requires prompting (slowed speech rate, use of repetition, stressing particular words or phrases, pauses, visual or   Expression: 4 Minimal Prompting--The patient expresses basic daily needs and ideas 75 to 90% of the time.   Memory: 5 Supervision-The patient requires prompting (e.g., cueing, repetition, reminders) only under stressful or unfamiliar conditions, but no more than 10% of the time.     Assessment:  Elizabeth Navarrete is a 89 y.o. female with a SLP diagnosis of Dysphagia, Dysarthria and Cognitive-Linguistic Impairment. She present with severe pharyngeal dysphagia and min to  Mod dysarthria.     Do you have any cultural, spiritual, Quaker conflicts, given your current situation?: none noted    Discharge recommendations: Discharge Facility/Level Of Care Needs: nursing facility, skilled     Goals:    SLP Goals        Problem: SLP Goal    Goal Priority Disciplines Outcome   SLP Goal     SLP Ongoing (interventions implemented as appropriate)   Description:  Speech Language Pathology Goals  Goals expected to be met by 6/27    1. Pt will perform oropharyngeal strengthening exercises X10 per session provided min cues to improve safe swallow function.  2. Pt will recall 3/3 speech strategies  3 Pt will complete dysarthria task with 80% acc and mod a to improve speech   4 Pt will complete dysphagia exercises with 80% acc and mod a to improve dysphagia  5 Pt will complete mental manipulation task with 80% acc and min a to improve cognition                                Plan:   Patient to be seen Therapy Frequency: 5 x/week   Plan of Care expires: 07/15/17  Plan of Care reviewed with: patient  SLP Follow-up?: Yes  SLP - Next Visit Date: 06/19/17           SIVAKUMAR Roa,  CCC-SLP  06/20/2017

## 2017-06-20 NOTE — PT/OT/SLP PROGRESS
Occupational Therapy  Treatment    Elizabeth Navarrete   MRN: [id number]   Admitting Diagnosis: Embolic stroke involving right carotid artery    OT Date of Treatment: 06/20/17   OT Start Time: 1100  OT Stop Time: 1124  OT Total Time (min): 24 min    Billable Minutes:  Self Care/Home Management 24    General Precautions: Standard, aspiration, fall  Orthopedic Precautions: N/A  Braces: N/A         Subjective:  Communicated with RN prior to session.  Pt agreeable to therapy.   Pain/Comfort  Pain Rating 1: 1/10 (lumbar spine)    Objective:  Patient found with: SCD, bed alarm     Functional Mobility:  Bed Mobility:  Rolling/Turning to Left: Moderate assistance, With side rail  Supine to Sit: Moderate Assistance  Sit to Supine: Maximum Assistance    Transfers:   Sit <> Stand Assistance: Maximum Assistance  Sit <> Stand Assistive Device: No Assistive Device    Functional Ambulation: unable to take lateral steps    Activities of Daily Living:  UE Dressing Level of Assistance: Maximum assistance (for changing gown)  LE Dressing Level of Assistance: Total assistance (for donning socks)  Grooming Position: Seated, EOB  Grooming Level of Assistance: Minimum assistance (oral care)  Toileting Where Assessed: Bed level  Toileting Level of Assistance: Total assistance (for changing soiled diaper)    Balance:   Static Sit: POOR: Needs MODERATE assist to maintain brief moments where she could maintain but no more than 30 seconds at a time  Dynamic Sit: FAIR: Cannot move trunk without losing balance  Static Stand: 0: Needs MAXIMAL assist to maintain   Dynamic stand: 0: N/A    Therapeutic Activities and Exercises:  In addition to self care exercises PROM performed B UE all planes and ranges 1x10.   Provided pt with positioning with B LE elevated to reduce strain on lumbar spine, pt reported pain relief with positioning.   Recommended heat packs to RN if not otherwise contraindicated but cautioned to place towel between hot pack and skin as pt has  "fragile skin. Recommend no longer than 20 minutes at a time to prevent adverse event.     AM-PAC 6 CLICK ADL   How much help from another person does this patient currently need?   1 = Unable, Total/Dependent Assistance  2 = A lot, Maximum/Moderate Assistance  3 = A little, Minimum/Contact Guard/Supervision  4 = None, Modified [place]/Independent    Putting on and taking off regular lower body clothing? : 2  Bathing (including washing, rinsing, drying)?: 2  Toileting, which includes using toilet, bedpan, or urinal? : 2  Putting on and taking off regular upper body clothing?: 2  Taking care of personal grooming such as brushing teeth?: 2  Eating meals?: 1  Total Score: 11     AM-PAC Raw Score CMS "G-Code Modifier Level of Impairment Assistance   6 % Total / Unable   7 - 8 CM 80 - 100% Maximal Assist   9-13 CL 60 - 80% Moderate Assist   14 - 19 CK 40 - 60% Moderate Assist   20 - 22 CJ 20 - 40% Minimal Assist   23 CI 1-20% SBA / CGA   24 CH 0% Independent/ Mod I       Patient left supine with call button in reach and RN present    ASSESSMENT:  Elizabeth Navarrete is a 89 y.o. female with a medical diagnosis of Embolic stroke involving right carotid artery and presents with decline in ADLs and functional mobility. Pt would benefit from skilled OT services in order to maximize independence with ADLs and facilitate safe discharge.    Rehab identified problem list/impairments: Rehab identified problem list/impairments: weakness, impaired sensation, impaired endurance, impaired self care skills, impaired functional mobilty, decreased upper extremity function, decreased lower extremity function, decreased safety awareness    Rehab potential is good.    Activity tolerance: Good    Discharge recommendations: Discharge Facility/Level Of Care Needs: nursing facility, skilled     Barriers to discharge: Barriers to Discharge: Decreased caregiver support, Inaccessible home environment    Equipment recommendations: other (see " comments) (TBD)     GOALS:    Occupational Therapy Goals        Problem: Occupational Therapy Goal    Goal Priority Disciplines Outcome Interventions   Occupational Therapy Goal     OT, PT/OT Ongoing (interventions implemented as appropriate)    Description:  Goals to be met by: 6/30     Patient will increase functional independence with ADLs by performing:    UE Dressing while seated with Set-up Assistance and Minimal Assistance.  LE Dressing with Set-up Assistance and Moderate Assistance.  Grooming while standing with Set-up Assistance and Minimal Assistance.  Sitting at edge of bed x10 minutes for dynamic functional task with Contact Guard Assistance.  Stand pivot transfers with Minimal Assistance.  Toilet transfer to bedside commode with Minimal Assistance.  Pt/CG verbalized understanding for s/s of stroke.  CG demo understanding for positioning of L UE.   CG demo understanding for L UE exercises.                       Plan:  Patient to be seen 6 x/week to address the above listed problems via self-care/home management, therapeutic activities, therapeutic exercises, neuromuscular re-education  Plan of Care expires: 07/15/17  Plan of Care reviewed with: patient         LAVONNE Brown  06/20/2017

## 2017-06-20 NOTE — PLAN OF CARE
Problem: Physical Therapy Goal  Goal: Physical Therapy Goal  Goals to be met by: 17     Patient will increase functional independence with mobility by performin. Supine to sit with Moderate Assistance - not met  2. Sit to supine with Moderate Assistance - not met  3. Sit to stand transfer with Moderate Assistance  4. Bed to chair transfer with Moderate Assistance using Rolling Walker or appropriate AD. - not met  5. Gait  x 10 feet with Maximum Assistance using Rolling Walker. - not met  6. Lower extremity exercise program x10 reps with supervision for strengthening and endurance with functional mobility. - not met  7. Pt will perform static standing x 2 minutes using Rolling Walker and demonstrated upright/midline trunk orientation with Minimal Assistance for balance. - not met  8. Pt will tolerated sitting x 8 minutes with Minimal Assistance to maintain midline orientation & balance while performing dynamic activities. - not met      Goals remain appropriate. Bhakti Cifuentes, PT 2017

## 2017-06-20 NOTE — PLAN OF CARE
Problem: SLP Goal  Goal: SLP Goal  Speech Language Pathology Goals  Goals expected to be met by 6/23  1. Pt will complete MBS to determine least restrictive diet.MET 6/19  2. Pt will participate in speech language cognitive eval to determine need for intervention.   3. Pt will perform oropharyngeal strengthening exercises X10 per session provided min cues to improve safe swallow function.             Pt with good participation in session.     SIVAKUMAR Roa, CCC-SLP  6/20/2017

## 2017-06-20 NOTE — ASSESSMENT & PLAN NOTE
2/2 stroke  - SLP. S/p modified barium swallow study 6/19/17- Pleasure puree feeds with speech ok; not cleared for liquids. On reassessment - strict NPO per speech.   - NGT coiled in the nasopharynx and needs to be removed.  - cont aggressive ST  -gen surg consulted for PEG -discussed with patient and daugther

## 2017-06-20 NOTE — CONSULTS
[facility]  General Surgery  Consult Note    Inpatient consult to General Surgery  Consult performed by: LEON SIMMONS  Consult ordered by: JACKELYN EDWARD  Reason for consult: PEG  Assessment/Recommendations: To OR for PEG tomorrow  Counseled on risks of surgery in an 89 year old with significant cardiovascular morbidity. Plan for local monitored anesthesia. Daughter wishes to proceed.  Hold tube feeds at midnight, hold plavix now.  Consent obtained        Subjective:     Chief Complaint/Reason for Admission: stroke    History of Present Illness: Pt failed MBSS after stroke. She has combined heart failure, afib, severe carotid artery stenosis. She is on aspirin and plavix. Her daughter is at the bedside, the patient is awake but poorly responsive.    No current facility-administered medications on file prior to encounter.      Current Outpatient Prescriptions on File Prior to Encounter   Medication Sig    amlodipine (NORVASC) 5 MG tablet TAKE 1 TABLET BY MOUTH DAILY    aspirin (ECOTRIN) 325 MG EC tablet Take 325 mg by mouth once daily.    carvedilol (COREG) 25 MG tablet TAKE 1 TABLET BY MOUTH TWICE DAILY    enalapril (VASOTEC) 20 MG tablet TAKE 1 TABLET BY MOUTH TWICE DAILY    fish oil-omega-3 fatty acids 300-1,000 mg capsule Take 1 g by mouth once daily.    omeprazole (PRILOSEC) 20 MG capsule Take 20 mg by mouth once daily.    PNV w/o calcium-iron fum-FA (M-VIT) 27-1 mg Tab Take by mouth.    simvastatin (ZOCOR) 20 MG tablet TAKE 1 TABLET BY MOUTH AT BEDTIME    venlafaxine (EFFEXOR-XR) 75 MG 24 hr capsule TAKE 1 CAPSULE BY MOUTH EVERY MORNING WITH FOOD    cholecalciferol, vitamin D3, (VITAMIN D3) 1,000 unit capsule Take 1,000 Units by mouth once daily.    diclofenac sodium (VOLTAREN) 1 % Gel Apply 4 g topically 4 (four) times daily as needed.    glucosamine-chondroitin 500-400 mg tablet Take 1 tablet by mouth 3 (three) times daily.       Review of patient's allergies indicates:    Allergen Reactions    Pcn [penicillins] Rash       Past Medical History:   Diagnosis Date    CAD (coronary artery disease) 10/10/2012    CRF (chronic renal failure) 10/10/2012    Family history of breast cancer 10/10/2012    History of cardiac arrhythmia 10/10/2012    History of CHF (congestive heart failure) 10/10/2012    History of depression 10/10/2012    History of echocardiogram 10/10/2012    HTN (hypertension) 10/10/2012    Internal carotid artery stenosis 10/10/2012    Personal history of gallstones 10/10/2012     Past Surgical History:   Procedure Laterality Date    EYE SURGERY      HYSTERECTOMY       Family History     Problem Relation (Age of Onset)    Cancer Mother        Social History Main Topics    Smoking status: Never Smoker    Smokeless tobacco: Not on file    Alcohol use No    Drug use: No    Sexual activity: No     Review of Systems   Unable to perform ROS: Acuity of condition     Objective:     Vital Signs (Most Recent):  Temp: 98.1 °F (36.7 °C) (06/20/17 1200)  Pulse: 78 (06/20/17 1200)  Resp: 17 (06/20/17 1200)  BP: 133/71 (06/20/17 1200)  SpO2: 98 % (06/20/17 1200) Vital Signs (24h Range):  Temp:  [97.8 °F (36.6 °C)-99.6 °F (37.6 °C)] 98.1 °F (36.7 °C)  Pulse:  [61-94] 78  Resp:  [17-18] 17  SpO2:  [90 %-98 %] 98 %  BP: (133-156)/(63-81) 133/71     Weight: 57.8 kg (127 lb 6.8 oz)  Body mass index is 21.2 kg/m².    No intake or output data in the 24 hours ending 06/20/17 1415    Physical Exam   Constitutional: She appears well-developed and well-nourished. No distress.   Cardiovascular:   Irregular, normal rate   Pulmonary/Chest: Effort normal and breath sounds normal. No respiratory distress. She has no wheezes. She has no rales.   Abdominal: Soft. Bowel sounds are normal. She exhibits no distension. There is no tenderness.   Musculoskeletal: Normal range of motion. She exhibits no edema.   Neurological: She is alert.   Skin: Skin is warm and dry.   Psychiatric: She has a  normal mood and affect. Her behavior is normal.       No labs    Assessment/Plan:     Active Diagnoses:    Diagnosis Date Noted POA    PRINCIPAL PROBLEM:  Embolic stroke involving right carotid artery [I63.131] 06/15/2017 Yes    Oropharyngeal dysphagia [R13.12] 06/16/2017 Yes    Hemiparesis, left [G81.94] 06/16/2017 Yes    Chronic atrial fibrillation [I48.2] 09/06/2016 Yes    Chronic diastolic heart failure [I50.32] 09/06/2016 Yes    Enlarged LA (left atrium) [I51.7] 09/06/2016 Yes    Essential hypertension [I10] 10/10/2012 Yes    CAD (coronary artery disease) [I25.10] 10/10/2012 Yes    Internal carotid artery stenosis [I65.29] 10/10/2012 Yes      Problems Resolved During this Admission:    Diagnosis Date Noted Date Resolved POA       Thank you for your consult. I will follow-up with patient. Please contact us if you have any additional questions.    Bruna Frazier MD  General Surgery  [facility]

## 2017-06-20 NOTE — ASSESSMENT & PLAN NOTE
89 y/o female with PMHx of A fib, HTN presenting with left hemiparesis. Imaging remarkable for R hemispheric infarcts; embolic pattern. CTA remarkable for 90% stenosis of the right carotid artery and R M2. Etiology of stroke is likely artery to artery - atheroembolic from symptomatic R IC rather than cardio-embolic. Vascular surgery consult deferred as patient does not want surgical management of the R IC even if offered; will medically optimize.     Of note patient has prior history of strokes noted on imaging with a CHADsVASc of 7 => high risk; 1 year stroke risk of 15.7%. Patient and family aware that anticoagulation would be optimal management in the setting of known A fib and prior strokes for secondary stroke prevention but patient chosen to NOT be anticoagulated due to concerns by the family regarding patient's frequent falls.   -Antithrombotics for secondary stroke prevention: Antiplatelets:  Aspirin: 81 mg oral now and daily and Clopidogrel: 75 mg oral daily   -Statins for secondary stroke prevention and hyperlipidemia, if present: Atorvastatin- 40 mg oral daily,   -Aggressive risk factor modification: Hypertension, Carotid Artery disease and CHF   -Rehab Efforts: Physical Therapy, Occupational Therapy and Speech and Language Pathology.   -Diagnostics: Ordered/Pending: none  -VTE Prophylaxis: Heparin 5000 units SQ every 8 hours

## 2017-06-20 NOTE — ANESTHESIA PREPROCEDURE EVALUATION
06/20/2017  Elizabeth Navarrete is a 89 y.o., female who failed MBSS after stroke. She has combined heart failure, afib, severe carotid artery stenosis.    Pre-operative evaluation for PLACEMENT-TUBE-PEG (N/A)    LDA:  20G R forearm    Previous Airway:  None on file    Past Surgical History:   Procedure Laterality Date    EYE SURGERY      HYSTERECTOMY           Vital Signs Range (Last 24H):  Temp:  [36.6 °C (97.8 °F)-37.6 °C (99.6 °F)]   Pulse:  [61-94]   Resp:  [17-18]   BP: (133-156)/(63-81)   SpO2:  [90 %-98 %]       CBC:     Recent Labs  Lab 06/15/17  1942 06/16/17  0350 06/17/17  0507   WBC 11.46 10.17 12.08   RBC 4.45 4.35 4.61   HGB 13.5 12.9 13.9   HCT 40.1 39.7 41.7    240 251   MCV 90 91 91   MCH 30.3 29.7 30.2   MCHC 33.7 32.5 33.3       CMP:   Recent Labs  Lab 06/15/17  1942 06/16/17  0349 06/17/17  0507    142 143   K 3.8 3.7 3.3*    106 106   CO2 25 24 22*   BUN 19 15 14   CREATININE 0.9 0.8 0.8   * 84 98   MG  --  1.9  --    PHOS  --  2.9  --    CALCIUM 9.6 9.5 9.5   ALBUMIN 3.6 3.3* 3.4*   PROT 7.1 6.6 6.8   ALKPHOS 91 84 88   ALT 14 15 19   AST 26 29 36   BILITOT 0.7 0.8 1.2*       INR:    Recent Labs  Lab 06/16/17  0349   INR 1.1   APTT 24.5         Diagnostic Studies:      EKG:  Atrial fibrillation with rapid ventricular response with premature  ventricular or aberrantly conducted complexes **Baseline Artifact**  Possible Septal infarct ,age undetermined  Left ventricular hypertrophy  ST and/or T wave abnormalities secondary to hypertrophy  Abnormal ECG  No previous ECGs available    2D Echo:  6/16/17:  CONCLUSIONS     1 - Low normal to mildly depressed left ventricular systolic function (EF 50-55%).     2 - Impaired LV relaxation, elevated LAP (grade 2 diastolic dysfunction).     3 - The estimated PA systolic pressure is 32 mmHg.     4 - Mild mitral regurgitation.      5 - Trivial tricuspid regurgitation.     6 - Moderate left atrial enlargement.     Anesthesia Evaluation    I have reviewed the Patient Summary Reports.     I have reviewed the Medications.     Review of Systems  Anesthesia Hx:  No problems with previous Anesthesia    EENT/Dental:EENT/Dental Normal   Cardiovascular:   Hypertension CAD   CHF    Pulmonary:  Pulmonary Normal    Renal/:   Chronic Renal Disease    Hepatic/GI:  Hepatic/GI Normal    Neurological:   CVA Denies Seizures.    Endocrine:  Endocrine Normal        Physical Exam  General:  Cachexia    Airway/Jaw/Neck:  Airway Findings: Mouth Opening: Normal Tongue: Normal  Mallampati: I  Jaw/Neck Findings:  Neck ROM: Normal ROM     Eyes/Ears/Nose:  EYES/EARS/NOSE FINDINGS: Normal   Dental:  Dental Findings: Edentulous   Chest/Lungs:  Chest/Lungs Findings: Clear to auscultation, Normal Respiratory Rate     Heart/Vascular:  Heart Findings: Rate: Normal  Rhythm: Irregularly Irregular        Mental Status:  Mental Status Findings: Normal        Anesthesia Plan  Type of Anesthesia, risks & benefits discussed:  Anesthesia Type:  general  Patient's Preference:   Intra-op Monitoring Plan: standard ASA monitors  Intra-op Monitoring Plan Comments:   Post Op Pain Control Plan:   Post Op Pain Control Plan Comments:   Induction:   IV  Beta Blocker:  Patient is on a Beta-Blocker and has received one dose within the past 24 hours (No further documentation required).       Informed Consent: Patient representative understands risks and agrees with Anesthesia plan.  Questions answered. Anesthesia consent signed with patient representative.  ASA Score: 4     Day of Surgery Review of History & Physical:    H&P update referred to the surgeon.         Ready For Surgery From Anesthesia Perspective.

## 2017-06-20 NOTE — PROGRESS NOTES
[facility]  Vascular Neurology  Comprehensive Stroke Center  Progress Note    Neurologic Chief Complaint: left hemiparesis    Subjective:     Interval History: Patient is seen for follow-up neurological assessment and treatment recommendations:   Failed MBSS, needs peg for nutrition and meds.   Some activation in the arm today, strength in the leg greater than arm    HPI, Past Medical, Family, and Social History remains the same as documented in the initial encounter.     Review of Systems   Constitutional: Negative for fever.   HENT: Positive for trouble swallowing.    Eyes: Negative for visual disturbance.   Neurological: Positive for weakness.     Scheduled Meds:   amlodipine  5 mg Per NG tube Daily    aspirin  81 mg Per NG tube Daily    atorvastatin  40 mg Per NG tube Daily    carvedilol  12.5 mg Per NG tube BID    clopidogrel  75 mg Per NG tube Daily    heparin (porcine)  5,000 Units Subcutaneous Q8H    pantoprazole  40 mg Per NG tube Daily    potassium chloride  10 mEq Intravenous Q1H    sodium chloride 0.9%  3 mL Intravenous Q8H     Continuous Infusions:     PRN Meds:acetaminophen, labetalol    Objective:     Vital Signs (Most Recent):  Temp: 98.1 °F (36.7 °C) (06/20/17 1200)  Pulse: 78 (06/20/17 1200)  Resp: 17 (06/20/17 1200)  BP: 133/71 (06/20/17 1200)  SpO2: 98 % (06/20/17 1200)  BP Location: Left arm    Vital Signs Range (Last 24H):  Temp:  [97.8 °F (36.6 °C)-99.6 °F (37.6 °C)]   Pulse:  [61-94]   Resp:  [17-18]   BP: (133-156)/(63-81)   SpO2:  [90 %-98 %]   BP Location: Left arm    Physical Exam   Constitutional: She appears well-developed and well-nourished.   HENT:   Head: Normocephalic and atraumatic.   Eyes: EOM are normal.   Cardiovascular: Normal rate.    Pulmonary/Chest: Effort normal.   Neurological: She is alert.       Neurological Exam:   LOC: alert and follows requests  Language: No aphasia  Speech: Dysarthria  Visual Fields (CN II): Full  EOM (CN III, IV, VI):  Full/intact  Facial Movement (CN VII): left lower facial asymmetry  Motor*: Arm Left:  Paretic (2/5), Leg Left:   Paretic:  4/5, Arm Right:   Normal (5/5), Leg Right:   Normal (5/5)  Sensation: intact to light touch, temperature and vibration  Tone: Arm-Left: normal; Leg-Left: normal; Arm-Right: normal; Leg-Right: normal   Extinction to bilateral stimulation    NIH Stroke Scale:    Level of Consciousness: 0 - alert  LOC Questions: 0 - answers both correctly  LOC Commands: 0 - performs both correctly  Best Gaze: 0 - normal  Visual: 0 - no visual loss  Facial Palsy: 1 - minor  Motor Left Arm: 3 - no effort against gravity  Motor Right Arm: 0 - no drift  Motor Left Le - drift  Motor Right Le - no drift  Limb Ataxia: 0 - absent  Sensory: 0 - normal  Best Language: 0 - no aphasia  Dysarthria: 2 - near to unintelligible  Extinction and Inattention: 1 - partial neglect  NIH Stroke Scale Total: 8      Laboratory:  CMP:   No results for input(s): GLUCOSE, CALCIUM, ALBUMIN, PROT, NA, K, CO2, CL, BUN, CREATININE, ALKPHOS, ALT, AST, BILITOT in the last 24 hours.  BMP:     Recent Labs  Lab 17  0507      K 3.3*      CO2 22*   BUN 14   CREATININE 0.8   CALCIUM 9.5     CBC:     Recent Labs  Lab 17  0507   WBC 12.08   RBC 4.61   HGB 13.9   HCT 41.7      MCV 91   MCH 30.2   MCHC 33.3     Lipid Panel:     Recent Labs  Lab 06/15/17  1942   CHOL 118*   LDLCALC 67.4   HDL 41   TRIG 48     Coagulation:     Recent Labs  Lab 17  0349   INR 1.1   APTT 24.5     Platelet Aggregation Study: No results for input(s): PLTAGG, PLTAGINTERP, PLTAGREGLACO, ADPPLTAGGREG in the last 168 hours.  Hgb A1C:     Recent Labs  Lab 06/15/17  1942   HGBA1C 5.6     TSH:     Recent Labs  Lab 06/15/17  1942   TSH 1.041       Diagnostic Results:  I have personally reviewed:   Findings:     MRI brain WO contrast (17)  R insular cortex, right corona radiata and right parietal lobe infarct. +cytotoxic cerebral edema            6/16/17 - echo   Moderately enlarged L A    1 - Low normal to mildly depressed left ventricular systolic function (EF 50-55%).     2 - Impaired LV relaxation, elevated LAP (grade 2 diastolic dysfunction).     3 - The estimated PA systolic pressure is 32 mmHg.     4 - Mild mitral regurgitation.     5 - Trivial tricuspid regurgitation.     6 - Moderate left atrial enlargement.     Assessment/Plan:     6/17 - 6/18/17-  NG placed under , started on TFs  6/19/17 - strict NPO per speech. surg called for PEG placement consult     Hemiparesis, left    2/2 stroke  PT/OT recommending SNF        Oropharyngeal dysphagia    2/2 stroke  - SLP. S/p modified barium swallow study 6/19/17- Pleasure puree feeds with speech ok; not cleared for liquids. On reassessment - strict NPO per speech.   - NGT coiled in the nasopharynx and needs to be removed.  - cont aggressive ST  -gen surg consulted for PEG -discussed with patient and daugther         Chronic diastolic heart failure    -Stroke Risk Factor  -grade 2 on most recent ECHO  - on coreg        Enlarged LA (left atrium)    -as previously identified on ECHO        Chronic atrial fibrillation    -Stroke Risk Factor  -patient and family opted to not be on anticoagulation at this time because of concerns of increased bleeding risk from falls  - coreg for rate control         Internal carotid artery stenosis    -High grade stenosis of the right ICA  -previously identified and again seen on imaging  - likely etiology of most recent stroke  -maximum medical management as patient does not want any surgical intervention.        History of CHF (congestive heart failure)    -Stroke Risk Factor  -holding home meds to allow for adequate cerebral perfusion         CAD (coronary artery disease)    -Stroke Risk Factor  -ASA 81mg        Essential hypertension    - SBP goal 150-160 in setting of hemodynamically significant stenosis of the R ICA  - amlodipine 5mg BID  - Coreg to 12.5 BID        *  Embolic stroke involving right carotid artery    89 y/o female with PMHx of A fib, HTN presenting with left hemiparesis. Imaging remarkable for R hemispheric infarcts; embolic pattern. CTA remarkable for 90% stenosis of the right carotid artery and R M2. Etiology of stroke is likely artery to artery - atheroembolic from symptomatic R IC rather than cardio-embolic. Vascular surgery consult deferred as patient does not want surgical management of the R IC even if offered; will medically optimize.     Of note patient has prior history of strokes noted on imaging with a CHADsVASc of 7 => high risk; 1 year stroke risk of 15.7%. Patient and family aware that anticoagulation would be optimal management in the setting of known A fib and prior strokes for secondary stroke prevention but patient chosen to NOT be anticoagulated due to concerns by the family regarding patient's frequent falls.   -Antithrombotics for secondary stroke prevention: Antiplatelets:  Aspirin: 81 mg oral now and daily and Clopidogrel: 75 mg oral daily   -Statins for secondary stroke prevention and hyperlipidemia, if present: Atorvastatin- 40 mg oral daily,   -Aggressive risk factor modification: Hypertension, Carotid Artery disease and CHF   -Rehab Efforts: Physical Therapy, Occupational Therapy and Speech and Language Pathology.   -Diagnostics: Ordered/Pending: none  -VTE Prophylaxis: Heparin 5000 units SQ every 8 hours            SUMMER Ann  Comprehensive Stroke Center  Department of Vascular Neurology   [facility]

## 2017-06-20 NOTE — SUBJECTIVE & OBJECTIVE
Neurologic Chief Complaint: left hemiparesis    Subjective:     Interval History: Patient is seen for follow-up neurological assessment and treatment recommendations:   Failed MBSS, needs peg for nutrition and meds.   Some activation in the arm today, strength in the leg greater than arm    HPI, Past Medical, Family, and Social History remains the same as documented in the initial encounter.     Review of Systems   Constitutional: Negative for fever.   HENT: Positive for trouble swallowing.    Eyes: Negative for visual disturbance.   Neurological: Positive for weakness.     Scheduled Meds:   amlodipine  5 mg Per NG tube Daily    aspirin  81 mg Per NG tube Daily    atorvastatin  40 mg Per NG tube Daily    carvedilol  12.5 mg Per NG tube BID    clopidogrel  75 mg Per NG tube Daily    heparin (porcine)  5,000 Units Subcutaneous Q8H    pantoprazole  40 mg Per NG tube Daily    potassium chloride  10 mEq Intravenous Q1H    sodium chloride 0.9%  3 mL Intravenous Q8H     Continuous Infusions:     PRN Meds:acetaminophen, labetalol    Objective:     Vital Signs (Most Recent):  Temp: 98.1 °F (36.7 °C) (06/20/17 1200)  Pulse: 78 (06/20/17 1200)  Resp: 17 (06/20/17 1200)  BP: 133/71 (06/20/17 1200)  SpO2: 98 % (06/20/17 1200)  BP Location: Left arm    Vital Signs Range (Last 24H):  Temp:  [97.8 °F (36.6 °C)-99.6 °F (37.6 °C)]   Pulse:  [61-94]   Resp:  [17-18]   BP: (133-156)/(63-81)   SpO2:  [90 %-98 %]   BP Location: Left arm    Physical Exam   Constitutional: She appears well-developed and well-nourished.   HENT:   Head: Normocephalic and atraumatic.   Eyes: EOM are normal.   Cardiovascular: Normal rate.    Pulmonary/Chest: Effort normal.   Neurological: She is alert.       Neurological Exam:   LOC: alert and follows requests  Language: No aphasia  Speech: Dysarthria  Visual Fields (CN II): Full  EOM (CN III, IV, VI): Full/intact  Facial Movement (CN VII): left lower facial asymmetry  Motor*: Arm Left:  Paretic  (2/5), Leg Left:   Paretic:  4/5, Arm Right:   Normal (5/5), Leg Right:   Normal (5/5)  Sensation: intact to light touch, temperature and vibration  Tone: Arm-Left: normal; Leg-Left: normal; Arm-Right: normal; Leg-Right: normal   Extinction to bilateral stimulation    NIH Stroke Scale:    Level of Consciousness: 0 - alert  LOC Questions: 0 - answers both correctly  LOC Commands: 0 - performs both correctly  Best Gaze: 0 - normal  Visual: 0 - no visual loss  Facial Palsy: 1 - minor  Motor Left Arm: 3 - no effort against gravity  Motor Right Arm: 0 - no drift  Motor Left Le - drift  Motor Right Le - no drift  Limb Ataxia: 0 - absent  Sensory: 0 - normal  Best Language: 0 - no aphasia  Dysarthria: 2 - near to unintelligible  Extinction and Inattention: 1 - partial neglect  NIH Stroke Scale Total: 8      Laboratory:  CMP:   No results for input(s): GLUCOSE, CALCIUM, ALBUMIN, PROT, NA, K, CO2, CL, BUN, CREATININE, ALKPHOS, ALT, AST, BILITOT in the last 24 hours.  BMP:     Recent Labs  Lab 17  0507      K 3.3*      CO2 22*   BUN 14   CREATININE 0.8   CALCIUM 9.5     CBC:     Recent Labs  Lab 17  0507   WBC 12.08   RBC 4.61   HGB 13.9   HCT 41.7      MCV 91   MCH 30.2   MCHC 33.3     Lipid Panel:     Recent Labs  Lab 06/15/17  1942   CHOL 118*   LDLCALC 67.4   HDL 41   TRIG 48     Coagulation:     Recent Labs  Lab 17  0349   INR 1.1   APTT 24.5     Platelet Aggregation Study: No results for input(s): PLTAGG, PLTAGINTERP, PLTAGREGLACO, ADPPLTAGGREG in the last 168 hours.  Hgb A1C:     Recent Labs  Lab 06/15/17  1942   HGBA1C 5.6     TSH:     Recent Labs  Lab 06/15/17  1942   TSH 1.041       Diagnostic Results:  I have personally reviewed:   Findings:     MRI brain WO contrast (17)  R insular cortex, right corona radiata and right parietal lobe infarct. +cytotoxic cerebral edema           17 - echo   Moderately enlarged L A    1 - Low normal to mildly depressed left  ventricular systolic function (EF 50-55%).     2 - Impaired LV relaxation, elevated LAP (grade 2 diastolic dysfunction).     3 - The estimated PA systolic pressure is 32 mmHg.     4 - Mild mitral regurgitation.     5 - Trivial tricuspid regurgitation.     6 - Moderate left atrial enlargement.

## 2017-06-20 NOTE — PLAN OF CARE
Problem: Patient Care Overview  Goal: Plan of Care Review  Outcome: Ongoing (interventions implemented as appropriate)  Patient has remained free of falls this shift. She is AAOx2 and VS's have been stable. She has been sleeping most of the shift, nurse has repositioned pillows or checked the thermostat and replaced blanket every time she entered the room. Patient still shows sighs of the stroke (left side facial droop), but she is apparently trying to relearn how to feed herself. Daughter is pleased with her progress.

## 2017-06-21 ENCOUNTER — ANESTHESIA (OUTPATIENT)
Dept: SURGERY | Facility: HOSPITAL | Age: 82
DRG: 064 | End: 2017-06-21
Payer: MEDICARE

## 2017-06-21 PROBLEM — Z93.1 S/P PERCUTANEOUS ENDOSCOPIC GASTROSTOMY (PEG) TUBE PLACEMENT: Status: ACTIVE | Noted: 2017-06-21

## 2017-06-21 LAB — POCT GLUCOSE: 93 MG/DL (ref 70–110)

## 2017-06-21 PROCEDURE — 25000003 PHARM REV CODE 250: Performed by: PHYSICIAN ASSISTANT

## 2017-06-21 PROCEDURE — 25000003 PHARM REV CODE 250: Performed by: NURSE PRACTITIONER

## 2017-06-21 PROCEDURE — 71000044 HC DOSC ROUTINE RECOVERY FIRST HOUR: Performed by: SURGERY

## 2017-06-21 PROCEDURE — 20600001 HC STEP DOWN PRIVATE ROOM

## 2017-06-21 PROCEDURE — 36000704 HC OR TIME LEV I 1ST 15 MIN: Performed by: SURGERY

## 2017-06-21 PROCEDURE — 36000705 HC OR TIME LEV I EA ADD 15 MIN: Performed by: SURGERY

## 2017-06-21 PROCEDURE — 25000003 PHARM REV CODE 250: Performed by: NURSE ANESTHETIST, CERTIFIED REGISTERED

## 2017-06-21 PROCEDURE — 71000015 HC POSTOP RECOV 1ST HR: Performed by: SURGERY

## 2017-06-21 PROCEDURE — 99233 SBSQ HOSP IP/OBS HIGH 50: CPT | Mod: GC,,, | Performed by: PSYCHIATRY & NEUROLOGY

## 2017-06-21 PROCEDURE — 37000009 HC ANESTHESIA EA ADD 15 MINS: Performed by: SURGERY

## 2017-06-21 PROCEDURE — D9220A PRA ANESTHESIA: Mod: ANES,,, | Performed by: ANESTHESIOLOGY

## 2017-06-21 PROCEDURE — 97530 THERAPEUTIC ACTIVITIES: CPT

## 2017-06-21 PROCEDURE — 63600175 PHARM REV CODE 636 W HCPCS: Performed by: NURSE ANESTHETIST, CERTIFIED REGISTERED

## 2017-06-21 PROCEDURE — 97535 SELF CARE MNGMENT TRAINING: CPT

## 2017-06-21 PROCEDURE — 92507 TX SP LANG VOICE COMM INDIV: CPT

## 2017-06-21 PROCEDURE — 37000008 HC ANESTHESIA 1ST 15 MINUTES: Performed by: SURGERY

## 2017-06-21 PROCEDURE — 97110 THERAPEUTIC EXERCISES: CPT

## 2017-06-21 PROCEDURE — 25000003 PHARM REV CODE 250: Performed by: SURGERY

## 2017-06-21 PROCEDURE — 27800903 OPTIME MED/SURG SUP & DEVICES OTHER IMPLANTS: Performed by: SURGERY

## 2017-06-21 PROCEDURE — 0DH63UZ INSERTION OF FEEDING DEVICE INTO STOMACH, PERCUTANEOUS APPROACH: ICD-10-PCS | Performed by: SURGERY

## 2017-06-21 PROCEDURE — D9220A PRA ANESTHESIA: Mod: CRNA,,, | Performed by: NURSE ANESTHETIST, CERTIFIED REGISTERED

## 2017-06-21 PROCEDURE — 92526 ORAL FUNCTION THERAPY: CPT

## 2017-06-21 PROCEDURE — 63600175 PHARM REV CODE 636 W HCPCS: Performed by: PHYSICIAN ASSISTANT

## 2017-06-21 RX ORDER — LIDOCAINE HCL/PF 100 MG/5ML
SYRINGE (ML) INTRAVENOUS
Status: DISCONTINUED | OUTPATIENT
Start: 2017-06-21 | End: 2017-06-21

## 2017-06-21 RX ORDER — FENTANYL CITRATE 50 UG/ML
INJECTION, SOLUTION INTRAMUSCULAR; INTRAVENOUS
Status: DISCONTINUED | OUTPATIENT
Start: 2017-06-21 | End: 2017-06-21

## 2017-06-21 RX ORDER — CARVEDILOL 12.5 MG/1
12.5 TABLET ORAL 2 TIMES DAILY
Status: DISCONTINUED | OUTPATIENT
Start: 2017-06-21 | End: 2017-06-23 | Stop reason: HOSPADM

## 2017-06-21 RX ORDER — NAPROXEN SODIUM 220 MG/1
81 TABLET, FILM COATED ORAL DAILY
Status: DISCONTINUED | OUTPATIENT
Start: 2017-06-22 | End: 2017-06-23 | Stop reason: HOSPADM

## 2017-06-21 RX ORDER — ATORVASTATIN CALCIUM 20 MG/1
40 TABLET, FILM COATED ORAL DAILY
Status: DISCONTINUED | OUTPATIENT
Start: 2017-06-22 | End: 2017-06-23 | Stop reason: HOSPADM

## 2017-06-21 RX ORDER — ETOMIDATE 2 MG/ML
INJECTION INTRAVENOUS
Status: DISCONTINUED | OUTPATIENT
Start: 2017-06-21 | End: 2017-06-21

## 2017-06-21 RX ORDER — PANTOPRAZOLE SODIUM 40 MG/1
40 FOR SUSPENSION ORAL DAILY
Status: DISCONTINUED | OUTPATIENT
Start: 2017-06-22 | End: 2017-06-23 | Stop reason: HOSPADM

## 2017-06-21 RX ORDER — ACETAMINOPHEN 325 MG/1
650 TABLET ORAL EVERY 6 HOURS PRN
Status: DISCONTINUED | OUTPATIENT
Start: 2017-06-21 | End: 2017-06-23 | Stop reason: HOSPADM

## 2017-06-21 RX ORDER — MUPIROCIN 20 MG/G
OINTMENT TOPICAL DAILY
Status: DISCONTINUED | OUTPATIENT
Start: 2017-06-21 | End: 2017-06-23 | Stop reason: HOSPADM

## 2017-06-21 RX ORDER — AMLODIPINE BESYLATE 5 MG/1
5 TABLET ORAL DAILY
Status: DISCONTINUED | OUTPATIENT
Start: 2017-06-22 | End: 2017-06-23 | Stop reason: HOSPADM

## 2017-06-21 RX ORDER — LIDOCAINE HYDROCHLORIDE 10 MG/ML
INJECTION, SOLUTION EPIDURAL; INFILTRATION; INTRACAUDAL; PERINEURAL
Status: DISCONTINUED | OUTPATIENT
Start: 2017-06-21 | End: 2017-06-21 | Stop reason: HOSPADM

## 2017-06-21 RX ORDER — ACETAMINOPHEN 10 MG/ML
1000 INJECTION, SOLUTION INTRAVENOUS ONCE
Status: COMPLETED | OUTPATIENT
Start: 2017-06-21 | End: 2017-06-21

## 2017-06-21 RX ADMIN — FENTANYL CITRATE 25 MCG: 50 INJECTION, SOLUTION INTRAMUSCULAR; INTRAVENOUS at 01:06

## 2017-06-21 RX ADMIN — ETOMIDATE 6 MG: 2 INJECTION, SOLUTION INTRAVENOUS at 01:06

## 2017-06-21 RX ADMIN — CARVEDILOL 12.5 MG: 12.5 TABLET, FILM COATED ORAL at 09:06

## 2017-06-21 RX ADMIN — FENTANYL CITRATE 25 MCG: 50 INJECTION, SOLUTION INTRAMUSCULAR; INTRAVENOUS at 02:06

## 2017-06-21 RX ADMIN — LIDOCAINE HYDROCHLORIDE 100 MG: 20 INJECTION, SOLUTION INTRAVENOUS at 01:06

## 2017-06-21 RX ADMIN — HEPARIN SODIUM 5000 UNITS: 5000 INJECTION, SOLUTION INTRAVENOUS; SUBCUTANEOUS at 05:06

## 2017-06-21 RX ADMIN — ETOMIDATE 6 MG: 2 INJECTION, SOLUTION INTRAVENOUS at 02:06

## 2017-06-21 RX ADMIN — ACETAMINOPHEN 1000 MG: 10 INJECTION, SOLUTION INTRAVENOUS at 07:06

## 2017-06-21 RX ADMIN — Medication 3 ML: at 09:06

## 2017-06-21 RX ADMIN — ETOMIDATE 4 MG: 2 INJECTION, SOLUTION INTRAVENOUS at 01:06

## 2017-06-21 RX ADMIN — SODIUM CHLORIDE: 0.9 INJECTION, SOLUTION INTRAVENOUS at 03:06

## 2017-06-21 RX ADMIN — HEPARIN SODIUM 5000 UNITS: 5000 INJECTION, SOLUTION INTRAVENOUS; SUBCUTANEOUS at 09:06

## 2017-06-21 RX ADMIN — Medication 3 ML: at 05:06

## 2017-06-21 NOTE — NURSING
Pt. With c/o pain of 5/10 to left wrist. Noted redness and slight swelling to left wrist and top of left hand.  Notified Frank BRAVO of stroke team, and received phone orders to not give meds by mouth but to apply ice to site. Approx. 30 min. After ice application and elevating site on pillow pt. Reports decreased pain of 2-3/10.  Cont. To monitor.

## 2017-06-21 NOTE — PROGRESS NOTES
[facility]  Vascular Neurology  Comprehensive Stroke Center  Progress Note    Neurologic Chief Complaint: left hemiparesis    Subjective:     Interval History: Patient is seen for follow-up neurological assessment and treatment recommendations:   No acute events overnight. Plan for PEG tube placement today.     HPI, Past Medical, Family, and Social History remains the same as documented in the initial encounter.     Review of Systems   Constitutional: Negative for fever.   HENT: Positive for trouble swallowing.    Eyes: Negative for visual disturbance.   Neurological: Positive for weakness.     Scheduled Meds:   amlodipine  5 mg Per NG tube Daily    aspirin  81 mg Per NG tube Daily    atorvastatin  40 mg Per NG tube Daily    carvedilol  12.5 mg Per NG tube BID    heparin (porcine)  5,000 Units Subcutaneous Q8H    mupirocin   Topical (Top) Daily    pantoprazole  40 mg Per NG tube Daily    sodium chloride 0.9%  3 mL Intravenous Q8H     Continuous Infusions:   sodium chloride 0.9% 125 mL/hr at 06/21/17 0342     PRN Meds:acetaminophen, labetalol    Objective:     Vital Signs (Most Recent):  Temp: 99.5 °F (37.5 °C) (06/21/17 0810)  Pulse: 86 (06/21/17 1100)  Resp: 18 (06/21/17 0810)  BP: (!) 153/70 (06/21/17 0810)  SpO2: (!) 94 % (06/21/17 0810)  BP Location: Right arm    Vital Signs Range (Last 24H):  Temp:  [98.1 °F (36.7 °C)-99.5 °F (37.5 °C)]   Pulse:  [70-86]   Resp:  [17-18]   BP: (133-155)/(70-76)   SpO2:  [94 %-98 %]   BP Location: Right arm    Physical Exam   Constitutional: She appears well-developed and well-nourished.   HENT:   Head: Normocephalic and atraumatic.   Eyes: EOM are normal.   Cardiovascular: Normal rate.    Pulmonary/Chest: Effort normal.   Neurological: She is alert.       Neurological Exam:   LOC: alert and follows requests  Language: No aphasia  Speech: Dysarthria  Visual Fields (CN II): Full  EOM (CN III, IV, VI): Full/intact  Facial Movement (CN VII): left lower  facial asymmetry  Motor*: Arm Left:  Paretic (2/5), Leg Left:   Paretic:  4/5, Arm Right:   Normal (5/5), Leg Right:   Normal (5/5)  Sensation: intact to light touch, temperature and vibration  Tone: Arm-Left: normal; Leg-Left: normal; Arm-Right: normal; Leg-Right: normal   Extinction to bilateral stimulation    NIH Stroke Scale:    Level of Consciousness: 0 - alert  LOC Questions: 0 - answers both correctly  LOC Commands: 0 - performs both correctly  Best Gaze: 0 - normal  Visual: 0 - no visual loss  Facial Palsy: 1 - minor  Motor Left Arm: 3 - no effort against gravity  Motor Right Arm: 0 - no drift  Motor Left Le - drift  Motor Right Le - no drift  Limb Ataxia: 0 - absent  Sensory: 0 - normal  Best Language: 0 - no aphasia  Dysarthria: 2 - near to unintelligible  Extinction and Inattention: 1 - partial neglect  NIH Stroke Scale Total: 8      Laboratory:  CMP:   No results for input(s): GLUCOSE, CALCIUM, ALBUMIN, PROT, NA, K, CO2, CL, BUN, CREATININE, ALKPHOS, ALT, AST, BILITOT in the last 24 hours.  BMP:     Recent Labs  Lab 17  0507      K 3.3*      CO2 22*   BUN 14   CREATININE 0.8   CALCIUM 9.5     CBC:     Recent Labs  Lab 17  0507   WBC 12.08   RBC 4.61   HGB 13.9   HCT 41.7      MCV 91   MCH 30.2   MCHC 33.3     Lipid Panel:     Recent Labs  Lab 06/15/17  1942   CHOL 118*   LDLCALC 67.4   HDL 41   TRIG 48     Coagulation:     Recent Labs  Lab 17  0349   INR 1.1   APTT 24.5     Platelet Aggregation Study: No results for input(s): PLTAGG, PLTAGINTERP, PLTAGREGLACO, ADPPLTAGGREG in the last 168 hours.  Hgb A1C:     Recent Labs  Lab 06/15/17  1942   HGBA1C 5.6     TSH:     Recent Labs  Lab 06/15/17  1942   TSH 1.041       Diagnostic Results:  I have personally reviewed:   Findings:     MRI brain WO contrast (17)  R insular cortex, right corona radiata and right parietal lobe infarct. +cytotoxic cerebral edema           17 - echo   Moderately enlarged L  A    1 - Low normal to mildly depressed left ventricular systolic function (EF 50-55%).     2 - Impaired LV relaxation, elevated LAP (grade 2 diastolic dysfunction).     3 - The estimated PA systolic pressure is 32 mmHg.     4 - Mild mitral regurgitation.     5 - Trivial tricuspid regurgitation.     6 - Moderate left atrial enlargement.     Assessment/Plan:     6/17 - 6/18/17-  NG placed under , started on TFs  6/19/17 - strict NPO per speech. surg called for PEG placement consult       * Embolic stroke involving right carotid artery    89 y/o female with PMHx of A fib, HTN presenting with left hemiparesis. Imaging remarkable for R hemispheric infarcts; embolic pattern. CTA remarkable for 90% stenosis of the right carotid artery and R M2. Etiology of stroke is likely artery to artery - atheroembolic from symptomatic R IC rather than cardio-embolic. Vascular surgery consult deferred as patient does not want surgical management of the R IC even if offered; will medically optimize.     Of note patient has prior history of strokes noted on imaging with a CHADsVASc of 7 => high risk; 1 year stroke risk of 15.7%. Patient and family aware that anticoagulation would be optimal management in the setting of known A fib and prior strokes for secondary stroke prevention but patient chosen to NOT be anticoagulated due to concerns by the family regarding patient's frequent falls.   -Antithrombotics for secondary stroke prevention: Antiplatelets:  Aspirin: 81 mg oral now and daily and Clopidogrel: 75 mg oral daily   -Statins for secondary stroke prevention and hyperlipidemia, if present: Atorvastatin- 40 mg oral daily,   -Aggressive risk factor modification: Hypertension, Carotid Artery disease and CHF   -Rehab Efforts: Physical Therapy, Occupational Therapy and Speech and Language Pathology.   -Diagnostics: Ordered/Pending: none  -VTE Prophylaxis: Heparin 5000 units SQ every 8 hours        Hemiparesis, left    Left arm  predominant hemiparesis  2/2 stroke  PT/OT recommending SNF        Oropharyngeal dysphagia    2/2 stroke  - strict NPO per speech  - Plan for PEG tube placement today        Chronic diastolic heart failure    -Stroke Risk Factor  -grade 2 on most recent ECHO  - on coreg        Enlarged LA (left atrium)    -as previously identified on ECHO        Chronic atrial fibrillation    -Stroke Risk Factor  -patient and family opted to not be on anticoagulation at this time because of concerns of increased bleeding risk from falls  - coreg for rate control         Internal carotid artery stenosis    -High grade stenosis of the right ICA  -previously identified and again seen on imaging  - likely etiology of most recent stroke  -maximum medical management as patient does not want any surgical intervention.        History of CHF (congestive heart failure)    -Stroke Risk Factor  -holding home meds to allow for adequate cerebral perfusion         CAD (coronary artery disease)    -Stroke Risk Factor  -ASA 81mg        Essential hypertension    - SBP goal 150-160 in setting of hemodynamically significant stenosis of the R ICA  - amlodipine 5mg BID  - Coreg to 12.5 BID            Day Valdez MD  Comprehensive Stroke Center  Department of Vascular Neurology   [facility]

## 2017-06-21 NOTE — NURSING TRANSFER
Nursing Transfer Note      6/21/2017     Transfer From: dosc    Transfer via stretcher, bed    Transfer with cardiac monitoring    Transported by pct    Medicines sent: n/a    Chart send with patient: Yes    Notified: daughter    Patient reassessed at: 06/21/2017 at 1517 (date, time)    Upon arrival to floor: bed low, call light in reach

## 2017-06-21 NOTE — PT/OT/SLP PROGRESS
Physical Therapy  Treatment    Elizabeth Navarrete   MRN: [id number]   Admitting Diagnosis: Embolic stroke involving right carotid artery    PT Received On: 06/21/17  PT Start Time: 0806     PT Stop Time: 0846    PT Total Time (min): 40 min       Billable Minutes:  Therapeutic Activity 23 and Therapeutic Exercise 17    Treatment Type: Treatment  PT/PTA: PT     PTA Visit Number: 0       General Precautions: Standard, aspiration, fall (HOB 30-45 degrees)  Orthopedic Precautions: N/A   Braces:      Do you have any cultural, spiritual, Hindu conflicts, given your current situation?: none stated    Subjective:  Communicated with nurse prior to session.      Pain/Comfort  Pain Rating 1: 0/10  Pain Rating Post-Intervention 1: 0/10    Objective:   Patient found with: peripheral IV, telemetry, SCD, bed alarm (B marshall theresa boots)    Functional Mobility:  Bed Mobility:   Rolling/Turning to Left: Minimum assistance (pt needed verbal cues for hand placement and sequencing for functional mobility. )  Supine to Sit: Minimum Assistance (pt sat on EOB x 10 min with SBA. pt needed verbal cues to keep head upright and weight shifted to L side with arm on bedside table.. pt was min assist for weight shift.)  Sit to Supine: Maximum Assistance    Transfers:  Sit <> Stand Assistance: Moderate Assistance (pt stood 1 min x 2 reps with max assist for static standing.)  Sit <> Stand Assistive Device: No Assistive Device    Gait:   Gait Distance: not tested. pt was max assist static standing.    Therapeutic Activities and Exercises:  Pt performed AAROM there exer BLE in supine x 15 reps.      AM-PAC 6 CLICK MOBILITY  How much help from another person does this patient currently need?   1 = Unable, Total/Dependent Assistance  2 = A lot, Maximum/Moderate Assistance  3 = A little, Minimum/Contact Guard/Supervision  4 = None, Modified [place]/Independent    Turning over in bed (including adjusting bedclothes, sheets and blankets)?: 3  Sitting down on  and standing up from a chair with arms (e.g., wheelchair, bedside commode, etc.): 2  Moving from lying on back to sitting on the side of the bed?: 3  Moving to and from a bed to a chair (including a wheelchair)?: 2  Need to walk in hospital room?: 1  Climbing 3-5 steps with a railing?: 1  Total Score: 12    AM-PAC Raw Score CMS G-Code Modifier Level of Impairment Assistance   6 % Total / Unable   7 - 9 CM 80 - 100% Maximal Assist   10 - 14 CL 60 - 80% Moderate Assist   15 - 19 CK 40 - 60% Moderate Assist   20 - 22 CJ 20 - 40% Minimal Assist   23 CI 1-20% SBA / CGA   24 CH 0% Independent/ Mod I     Patient left supine with all lines intact, call button in reach and bed alarm on.    Assessment:  Elizabeth Navarrete is a 89 y.o. female with a medical diagnosis of Embolic stroke involving right carotid artery and presents with decreased strength, mobility, balance, transfers and decreased distance ambulated. Pt would benefit from cont PT to address deficits and will need SNF placement when medically stable. Pt will benefit from skilled PT 6 x/wk to progress physically and return to highest functional level.     Rehab identified problem list/impairments: Rehab identified problem list/impairments: weakness, impaired endurance, impaired functional mobilty, gait instability, decreased lower extremity function    Rehab potential is fair.    Activity tolerance: Good    Discharge recommendations: Discharge Facility/Level Of Care Needs: nursing facility, skilled     Barriers to discharge: Barriers to Discharge: Inaccessible home environment, Decreased caregiver support    Equipment recommendations: Equipment Needed After Discharge:  (will determine DME needs closer to discharge)     GOALS:    Physical Therapy Goals        Problem: Physical Therapy Goal    Goal Priority Disciplines Outcome Goal Variances Interventions   Physical Therapy Goal     PT/OT, PT Ongoing (interventions implemented as appropriate)     Description:   Goals to be met by: 17     Patient will increase functional independence with mobility by performin. Supine to sit with Moderate Assistance -  Met   2. Sit to supine with Moderate Assistance - not met  3. Sit to stand transfer with Moderate Assistance- met   4. Bed to chair transfer with Moderate Assistance using Rolling Walker or appropriate AD. - not met  5. Gait  x 10 feet with Maximum Assistance using Rolling Walker. - not met  6. Lower extremity exercise program x10 reps with supervision for strengthening and endurance with functional mobility. - not met  7. Pt will perform static standing x 2 minutes using Rolling Walker and demonstrated upright/midline trunk orientation with Minimal Assistance for balance. - not met  8. Pt will tolerated sitting x 8 minutes with Minimal Assistance to maintain midline orientation & balance while performing dynamic activities. - not met  9. Supine to sit with SBA-not met  10. Sit to/from stand with min assist. -not met                        PLAN:    Patient to be seen 6 x/week  to address the above listed problems via gait training, therapeutic activities, therapeutic exercises  Plan of Care expires: 17  Plan of Care reviewed with: patient         Bhakti SINGH Cifuentes, PT  2017

## 2017-06-21 NOTE — PROGRESS NOTES
Spoke with Caroline in stroke team. Okay to hold coreg 12.5 mg PO and heparin tonight and in Am because of surgery for peg tube placement.

## 2017-06-21 NOTE — SUBJECTIVE & OBJECTIVE
Neurologic Chief Complaint: left hemiparesis    Subjective:     Interval History: Patient is seen for follow-up neurological assessment and treatment recommendations:   No acute events overnight. Plan for PEG tube placement today.     HPI, Past Medical, Family, and Social History remains the same as documented in the initial encounter.     Review of Systems   Constitutional: Negative for fever.   HENT: Positive for trouble swallowing.    Eyes: Negative for visual disturbance.   Neurological: Positive for weakness.     Scheduled Meds:   amlodipine  5 mg Per NG tube Daily    aspirin  81 mg Per NG tube Daily    atorvastatin  40 mg Per NG tube Daily    carvedilol  12.5 mg Per NG tube BID    heparin (porcine)  5,000 Units Subcutaneous Q8H    mupirocin   Topical (Top) Daily    pantoprazole  40 mg Per NG tube Daily    sodium chloride 0.9%  3 mL Intravenous Q8H     Continuous Infusions:   sodium chloride 0.9% 125 mL/hr at 06/21/17 0342     PRN Meds:acetaminophen, labetalol    Objective:     Vital Signs (Most Recent):  Temp: 99.5 °F (37.5 °C) (06/21/17 0810)  Pulse: 86 (06/21/17 1100)  Resp: 18 (06/21/17 0810)  BP: (!) 153/70 (06/21/17 0810)  SpO2: (!) 94 % (06/21/17 0810)  BP Location: Right arm    Vital Signs Range (Last 24H):  Temp:  [98.1 °F (36.7 °C)-99.5 °F (37.5 °C)]   Pulse:  [70-86]   Resp:  [17-18]   BP: (133-155)/(70-76)   SpO2:  [94 %-98 %]   BP Location: Right arm    Physical Exam   Constitutional: She appears well-developed and well-nourished.   HENT:   Head: Normocephalic and atraumatic.   Eyes: EOM are normal.   Cardiovascular: Normal rate.    Pulmonary/Chest: Effort normal.   Neurological: She is alert.       Neurological Exam:   LOC: alert and follows requests  Language: No aphasia  Speech: Dysarthria  Visual Fields (CN II): Full  EOM (CN III, IV, VI): Full/intact  Facial Movement (CN VII): left lower facial asymmetry  Motor*: Arm Left:  Paretic (2/5), Leg Left:   Paretic:  4/5, Arm Right:   Normal  (5/5), Leg Right:   Normal (5/5)  Sensation: intact to light touch, temperature and vibration  Tone: Arm-Left: normal; Leg-Left: normal; Arm-Right: normal; Leg-Right: normal   Extinction to bilateral stimulation    NIH Stroke Scale:    Level of Consciousness: 0 - alert  LOC Questions: 0 - answers both correctly  LOC Commands: 0 - performs both correctly  Best Gaze: 0 - normal  Visual: 0 - no visual loss  Facial Palsy: 1 - minor  Motor Left Arm: 3 - no effort against gravity  Motor Right Arm: 0 - no drift  Motor Left Le - drift  Motor Right Le - no drift  Limb Ataxia: 0 - absent  Sensory: 0 - normal  Best Language: 0 - no aphasia  Dysarthria: 2 - near to unintelligible  Extinction and Inattention: 1 - partial neglect  NIH Stroke Scale Total: 8      Laboratory:  CMP:   No results for input(s): GLUCOSE, CALCIUM, ALBUMIN, PROT, NA, K, CO2, CL, BUN, CREATININE, ALKPHOS, ALT, AST, BILITOT in the last 24 hours.  BMP:     Recent Labs  Lab 17  0507      K 3.3*      CO2 22*   BUN 14   CREATININE 0.8   CALCIUM 9.5     CBC:     Recent Labs  Lab 17  0507   WBC 12.08   RBC 4.61   HGB 13.9   HCT 41.7      MCV 91   MCH 30.2   MCHC 33.3     Lipid Panel:     Recent Labs  Lab 06/15/17  1942   CHOL 118*   LDLCALC 67.4   HDL 41   TRIG 48     Coagulation:     Recent Labs  Lab 17  0349   INR 1.1   APTT 24.5     Platelet Aggregation Study: No results for input(s): PLTAGG, PLTAGINTERP, PLTAGREGLACO, ADPPLTAGGREG in the last 168 hours.  Hgb A1C:     Recent Labs  Lab 06/15/17  1942   HGBA1C 5.6     TSH:     Recent Labs  Lab 06/15/17  1942   TSH 1.041       Diagnostic Results:  I have personally reviewed:   Findings:     MRI brain WO contrast (17)  R insular cortex, right corona radiata and right parietal lobe infarct. +cytotoxic cerebral edema           17 - echo   Moderately enlarged L A    1 - Low normal to mildly depressed left ventricular systolic function (EF 50-55%).     2 -  Impaired LV relaxation, elevated LAP (grade 2 diastolic dysfunction).     3 - The estimated PA systolic pressure is 32 mmHg.     4 - Mild mitral regurgitation.     5 - Trivial tricuspid regurgitation.     6 - Moderate left atrial enlargement.

## 2017-06-21 NOTE — PT/OT/SLP PROGRESS
"Speech Language Pathology  Treatment    Elizabeth Navarrete   MRN: [id number]   Admitting Diagnosis: Embolic stroke involving right carotid artery    Diet recommendations: Solid Diet Level: NPO  Liquid Diet Level: NPO   Patient requires strict oral care   Patient ok for therapeutic feedings of puree with SLP provided strict aspiration precautions and use of double swallows between bites.     SLP Treatment Date: 06/21/17  Speech Start Time: 1030     Speech Stop Time: 1047     Speech Total (min): 17 min       TREATMENT BILLABLE MINUTES:  Speech Therapy Individual 9, Treatment Swallowing Dysfunction 8 and Total Time 17    Has the patient been evaluated by SLP for swallowing? : Yes  Keep patient NPO?: Yes   General Precautions: Standard, aspiration, fall, NPO          Subjective:  SLP communicated with nurse prior to session, nurse reports Patient NPO for PEG procedure later this service day.  Patient presents alert and cooperative  When discussing clear speech strategies, She explains, "It would help if I had my teeth here."   Patient denies pain.    Pain/Comfort  Pain Rating 1: 0/10  Pain Rating Post-Intervention 1: 0/10    Objective:   Patient found with: telemetry, SCD, pressure relief boots, bed alarm, peripheral IV. Pt found awake and alert in bed, on room air.  SLP educates Patietnt on SLP role, clear speech strategies and ongoing swallow precautions. Patient recalls 1 clear speech strategy I'ly immediately following review, then up to 3 with moderate verbal/visual cueing from SLP. She repeats multi-syllabic towards 5/5 attempts provided MIN A for demo of clear speech strategies. She repeats short sentences (4-6 words) with 90% intelligibility provided MIN A for demo of clear speech strategies. She completes BOT exercises for lingual protrusion x10 and WADA x5  with great effort 100% of attempts, provided minimal verbal/visual cueing from SLP. She completes laryngeal swallow exercises for effortful swallow x5 and falsetto " /i/ sound x10 ea with great effort across ea rep provided MIN A.  Pleasure feedings of puree held 2/2 Patient NPO for procedure later service day. SLP educates Patient on ongoing SLP POC and Patient v/u.   Patient requests to be repositioned on side. Nurse in room and SLP and nurse reposition Patient on side. No further questions. Whiteboard updated.     Assessment:  Elizabeth Navarrete is a 89 y.o. female with a medical diagnosis of Embolic stroke involving right carotid artery and presents with Dysphagia, Dysarthria and Cognitive-Linguistic Impairment.  She demonstrates great participation t/o tx tasks today. Patient is highly motivated.     Discharge recommendations: Discharge Facility/Level Of Care Needs: nursing facility, skilled     Goals:    SLP Goals        Problem: SLP Goal    Goal Priority Disciplines Outcome   SLP Goal     SLP Ongoing (interventions implemented as appropriate)   Description:  Speech Language Pathology Goals  Goals expected to be met by 6/27    1. Pt will perform oropharyngeal strengthening exercises X10 per session provided min cues to improve safe swallow function.  2. Pt will recall 3/3 speech strategies  3 Pt will complete dysarthria task with 80% acc and mod a to improve speech   4 Pt will complete dysphagia exercises with 80% acc and mod a to improve dysphagia  5 Pt will complete mental manipulation task with 80% acc and min a to improve cognition                                Plan:   Patient to be seen Therapy Frequency: 5 x/week   Plan of Care expires: 07/15/17  Plan of Care reviewed with: patient  SLP Follow-up?: Yes  SLP - Next Visit Date: 06/19/17        DAVID Malhotra., [facility]-SLP  Speech-Language Pathology  Pager: [phone]  6/21/2017

## 2017-06-21 NOTE — ANESTHESIA RELEASE NOTE
Anesthesia Release from PACU Note    Patient name: Elizabeth Navarrete    Procedure(s): Procedure(s) (LRB):  PLACEMENT-TUBE-PEG (N/A)    Anesthesia type: general    Post pain: adequate analgesia    Post assessment: no apparent complications    Last vitals:   Vitals:    06/21/17 1417   BP: 130/64   Pulse: 74   Resp:    Temp: 37.1 °C (98.8 °F)       Post vital signs: stable    Level of consciousness: alert     Nausea/Vomiting: no nausea/no vomiting    Complications: none    Airway Patency:  patent    Respiratory: unassisted    Cardiovascular: stable and blood pressure at baseline    Hydration: euvolemic

## 2017-06-21 NOTE — ANESTHESIA POSTPROCEDURE EVALUATION
"Anesthesia Post Evaluation    Patient: Elizabeth Navarrete    Procedure(s) Performed: Procedure(s) (LRB):  PLACEMENT-TUBE-PEG (N/A)    Final Anesthesia Type: general  Patient location during evaluation: PACU  Patient participation: Yes- Able to Participate  Level of consciousness: awake  Post-procedure vital signs: reviewed and stable  Pain management: adequate  Airway patency: patent  PONV status at discharge: No PONV  Anesthetic complications: no      Cardiovascular status: stable  Respiratory status: unassisted  Hydration status: euvolemic  Follow-up not needed.        Visit Vitals  /64 (BP Location: Left arm, Patient Position: Lying, BP Method: Automatic)   Pulse 74   Temp 37.1 °C (98.8 °F) (Temporal)   Resp 16   Ht 5' 5" (1.651 m)   Wt 57.8 kg (127 lb 6.8 oz)   LMP  (LMP Unknown)   SpO2 95%   Breastfeeding? No   BMI 21.20 kg/m²       Pain/Toño Score: Pain Assessment Performed: Yes (6/21/2017  2:59 PM)  Presence of Pain: denies (6/21/2017  2:59 PM)  Pain Rating Prior to Med Admin: 3 (6/20/2017  6:00 PM)  Toño Score: 10 (6/21/2017  2:59 PM)      "

## 2017-06-21 NOTE — PLAN OF CARE
Problem: Physical Therapy Goal  Goal: Physical Therapy Goal  Goals to be met by: 17     Patient will increase functional independence with mobility by performin. Supine to sit with Moderate Assistance -  Met   2. Sit to supine with Moderate Assistance - not met  3. Sit to stand transfer with Moderate Assistance- met   4. Bed to chair transfer with Moderate Assistance using Rolling Walker or appropriate AD. - not met  5. Gait  x 10 feet with Maximum Assistance using Rolling Walker. - not met  6. Lower extremity exercise program x10 reps with supervision for strengthening and endurance with functional mobility. - not met  7. Pt will perform static standing x 2 minutes using Rolling Walker and demonstrated upright/midline trunk orientation with Minimal Assistance for balance. - not met  8. Pt will tolerated sitting x 8 minutes with Minimal Assistance to maintain midline orientation & balance while performing dynamic activities. - not met  9. Supine to sit with SBA-not met  10. Sit to/from stand with min assist. -not met      Goals updated for time frame and content. Bhakti Cifuentes, PT 2017

## 2017-06-21 NOTE — PLAN OF CARE
Problem: SLP Goal  Goal: SLP Goal  Speech Language Pathology Goals  Goals expected to be met by 6/27    1. Pt will perform oropharyngeal strengthening exercises X10 per session provided min cues to improve safe swallow function.  2. Pt will recall 3/3 speech strategies  3 Pt will complete dysarthria task with 80% acc and mod a to improve speech   4 Pt will complete dysphagia exercises with 80% acc and mod a to improve dysphagia  5 Pt will complete mental manipulation task with 80% acc and min a to improve cognition              Outcome: Ongoing (interventions implemented as appropriate)  Pt progressing towards goals. Pt with great participation with tx today. See note for full details. ST to continue to follow.. Thank you.    DAVID Malhotra., CCC-SLP  Speech-Language Pathology  Pager: [phone]  6/21/2017

## 2017-06-21 NOTE — PLAN OF CARE
Problem: Patient Care Overview  Goal: Plan of Care Review  Outcome: Ongoing (interventions implemented as appropriate)  Patient has remained free of falls this shift.  She is AAOx2 and VS's have been stable. She has been NPO since beginning of shift and has received at least 1000 ml of normal saline for hydration. She is to go to surgery for peg tube placement this AM.

## 2017-06-21 NOTE — TRANSFER OF CARE
"Anesthesia Transfer of Care Note    Patient: Elizabeth Navarrete    Procedure(s) Performed: Procedure(s) (LRB):  PLACEMENT-TUBE-PEG (N/A)    Patient location: [facility]    Anesthesia Type: general    Transport from OR: Transported from OR on 2-3 L/min O2 by NC with adequate spontaneous ventilation    Post pain: adequate analgesia    Post assessment: no apparent anesthetic complications    Post vital signs: stable    Level of consciousness: sedated    Nausea/Vomiting: no nausea/vomiting    Complications: none    Transfer of care protocol was followed      Last vitals:   Visit Vitals  /64 (BP Location: Left arm, Patient Position: Lying, BP Method: Automatic)   Pulse 74   Temp 37.1 °C (98.8 °F) (Temporal)   Resp 16   Ht 5' 5" (1.651 m)   Wt 57.8 kg (127 lb 6.8 oz)   LMP  (LMP Unknown)   SpO2 95%   Breastfeeding? No   BMI 21.20 kg/m²     "

## 2017-06-21 NOTE — PROGRESS NOTES
Consult received on patient's skin tear to left upper back. Unknown etiology. See flow sheet below for additional documentation. Recommend applying bactroban daily and cover with telfa island dressing. Discussed with Dr. Day Valdez, orders placed for nursing. Nursing to continue care.     06/21/17 1009       Wound 06/21/17 1009 Skin tear upper back   Date First Assessed/Time First Assessed: 06/21/17 1009   Pre-existing: Yes  Wound Type: Skin tear  Side: Left  Orientation: upper  Location: back   Wound WDL ex   Drainage Amount none   Drainage Characteristics/Odor no odor   Wound Base moist;pink   Periwound Area redness   Wound Edges open   Wound Length (cm) 7   Wound Width (cm) 1   Depth (cm) 0   Non-staged Wound Description Partial thickness

## 2017-06-21 NOTE — PT/OT/SLP PROGRESS
Occupational Therapy  Treatment    Elizabeth Navarrete   MRN: [id number]   Admitting Diagnosis: Embolic stroke involving right carotid artery    OT Date of Treatment: 06/21/17   OT Start Time: 0936  OT Stop Time: 1000  OT Total Time (min): 24 min    Billable Minutes:  Self Care/Home Management 24    General Precautions: Standard, aspiration, fall  Orthopedic Precautions: N/A  Braces: N/A         Subjective:  Communicated with RN prior to session.  Pt agreeable to therapy.   Pain/Comfort  Pain Rating 1: 0/10    Objective:  Patient found with: peripheral IV, telemetry, SCD, bed alarm     Functional Mobility:  Bed Mobility:  Rolling/Turning to Left: With side rail, Minimum assistance  Rolling/Turning Right: Minimum assistance  Scooting/Bridging: Maximum Assistance  Supine to Sit: Minimum Assistance  Sit to Supine: Minimum Assistance    Transfers:   Sit <> Stand Assistance: Maximum Assistance  Sit <> Stand Assistive Device: No Assistive Device  Bed <> Chair Technique: Other (see comments) (pt does not have chair in room)  Bed <> Chair Transfer Assistance: Maximum Assistance (did not occur but pt would require max-total assist)    Functional Ambulation: Unable to take step towards HOB even when weight shift provided.    Activities of Daily Living:  Feeding Level of Assistance: Activity did not occur (pt will get PEG today)  UE Dressing Level of Assistance: Maximum assistance  LE Dressing Level of Assistance: Maximum assistance  Grooming Position: Seated, EOB  Grooming Level of Assistance: Minimum assistance  Toileting Where Assessed: Bed level  Toileting Level of Assistance: Maximum assistance    Balance:   Static Sit: FAIR-: Maintains without assist but inconsistent   Dynamic Sit: FAIR+: Maintains balance through MINIMAL excursions of active trunk motion    Therapeutic Activities and Exercises:  Pt sat EOB for ~15 minutes while engaging in L UE exercises (AAROM) 1x10 all planes all ranges.  She participated in functional reaching  "task using R hand with L hand in weightbearing position. Pt able to reach ipsilaterally and contralaterally with CGA for dynamic sitting balance.  Pt able to reach down and remove R sock with max A. She required mod A for trunk control.     AM-PAC 6 CLICK ADL   How much help from another person does this patient currently need?   1 = Unable, Total/Dependent Assistance  2 = A lot, Maximum/Moderate Assistance  3 = A little, Minimum/Contact Guard/Supervision  4 = None, Modified [place]/Independent    Putting on and taking off regular lower body clothing? : 2  Bathing (including washing, rinsing, drying)?: 2  Toileting, which includes using toilet, bedpan, or urinal? : 2  Putting on and taking off regular upper body clothing?: 2  Taking care of personal grooming such as brushing teeth?: 2  Eating meals?: 1  Total Score: 11     AM-PAC Raw Score CMS "G-Code Modifier Level of Impairment Assistance   6 % Total / Unable   7 - 8 CM 80 - 100% Maximal Assist   9-13 CL 60 - 80% Moderate Assist   14 - 19 CK 40 - 60% Moderate Assist   20 - 22 CJ 20 - 40% Minimal Assist   23 CI 1-20% SBA / CGA   24 CH 0% Independent/ Mod I       Patient left supine with call button in reach    ASSESSMENT:  Elizabeth Navarrete is a 89 y.o. female with a medical diagnosis of Embolic stroke involving right carotid artery and presents with decline in ADLs and functional mobility.   Pt presents with good motivation and cooperation with therapy.  Pt demonstrated improvements in core stability and head control.  She was able to engage in functional reaching and LB dressing. Pt would benefit from skilled OT services in order to maximize independence with ADLs and facilitate safe discharge.    Rehab identified problem list/impairments: Rehab identified problem list/impairments: weakness, impaired endurance, impaired sensation, impaired self care skills, impaired functional mobilty, impaired balance, decreased upper extremity function, decreased lower " extremity function, impaired coordination, decreased safety awareness    Rehab potential is good.    Activity tolerance: Good    Discharge recommendations: Discharge Facility/Level Of Care Needs: nursing facility, skilled     Barriers to discharge: Barriers to Discharge: Inaccessible home environment, Decreased caregiver support    Equipment recommendations: other (see comments) (TBD)     GOALS:    Occupational Therapy Goals        Problem: Occupational Therapy Goal    Goal Priority Disciplines Outcome Interventions   Occupational Therapy Goal     OT, PT/OT Ongoing (interventions implemented as appropriate)    Description:  Goals to be met by: 6/30     Patient will increase functional independence with ADLs by performing:    UE Dressing while seated with Set-up Assistance and Minimal Assistance.  LE Dressing with Set-up Assistance and Moderate Assistance.  Grooming while standing with Set-up Assistance and Minimal Assistance.  Sitting at edge of bed x10 minutes for dynamic functional task with Contact Guard Assistance.  Stand pivot transfers with Minimal Assistance.  Toilet transfer to bedside commode with Minimal Assistance.  Pt/CG verbalized understanding for s/s of stroke.  CG demo understanding for positioning of L UE.   CG demo understanding for L UE exercises.                       Plan:  Patient to be seen 6 x/week to address the above listed problems via self-care/home management, therapeutic exercises, therapeutic activities, neuromuscular re-education  Plan of Care expires: 07/15/17  Plan of Care reviewed with: patient, daughter         Bri JerezLAVONNE medrano  06/21/2017

## 2017-06-21 NOTE — OP NOTE
DATE OF PROCEDURE: 06/21/2017    PREOPERATIVE DIAGNOSES:   1. Stroke  2. Dysphagia.    POSTOPERATIVE DIAGNOSES:   1. Stroke  2. Dysphagia.     PROCEDURES PERFORMED:   1. Esophagogastroduodenoscopy  2. Percutaneous endoscopic gastrostomy.    ATTENDING SURGEON: Joshua Goldberg, M.D.     HOUSESTAFF SURGEON: Jason Meza MD    ANESTHESIA: Local plus monitored anesthesia care    ESTIMATED BLOOD LOSS: 5 mL     FINDINGS: 20-Ukrainian percutaneous gastrostomy @ 4cm placed without apparent complication.     SPECIMEN: None.     DRAINS: None.     COMPLICATIONS: None.     INDICATIONS: Elizabeth Navarrete is a 89 y.o.female referred to [facility] for stroke. The patient is expected to have prolonged dysphagia as a result and General Surgery was consulted for placement of a percutaneous gastrostomy tube. We did obtain informed consent from the patient's family who expressed understanding of the risks and benefits and gave consent to proceed.     OPERATIVE PROCEDURE: The patient was identified in preoperative holding and brought back to the operating room. Anesthesia was induced. A timeout procedure was performed and all team members present agreed this was the correct procedure on the correct patient.    We then directed our attention to the left upper quadrant for gastrostomy tube placement. The patient's abdomen was prepped and draped. An upper endoscope was introduced into the oropharynx and guided down into the esophagus and stomach. The stomach was insufflated with air. We intubated the duodenum and examined the first and second portions; no abnormalities were noted. We withdrew the endoscope into the stomach and identified an appropriate position for gastrostomy tube placement 2 finger-breadths below the left subcostal margin. Palpation of the anterior abdominal wall at this point was visualized endoscopically and transillumination from the endoscope was visualized through the anterior abdominal wall. We made a 1.5 cm  transverse skin incision. At this point, the stomach was cannulated with a catheter loaded on a needle. This was grasped by a snare which had been passed through the endoscope. The needle was removed, and a guidewire was placed, and the snare was used to grasp the guidewire. The endoscope, snare, and guidewire were all withdrawn from the patient's mouth. A 20-Urdu gastrostomy tube was loaded onto the guidewire and pulled through the anterior abdominal wall via Seldinger technique. Repeat endoscopy was performed with the gastrostomy tube at the 4 cm mike at the skin. There was no blanching of the gastric mucosa, and when the tube was twisted, the button did not grab the mucosa. The insufflation in the stomach was evacuated, and the endoscope was removed. The gastrostomy tube was secured in place using the supplied devices and connected to a bag for gravity drainage.    The patient tolerated the procedure well. I was present for and directed or performed the entire procedure. All sponge, instrument, and needle counts were correct at the termination of the PEG procedure.

## 2017-06-22 PROCEDURE — 97116 GAIT TRAINING THERAPY: CPT

## 2017-06-22 PROCEDURE — 92507 TX SP LANG VOICE COMM INDIV: CPT

## 2017-06-22 PROCEDURE — 25000003 PHARM REV CODE 250: Performed by: PSYCHIATRY & NEUROLOGY

## 2017-06-22 PROCEDURE — 25000003 PHARM REV CODE 250: Performed by: PHYSICIAN ASSISTANT

## 2017-06-22 PROCEDURE — 11721 DEBRIDE NAIL 6 OR MORE: CPT | Mod: ,,, | Performed by: PODIATRIST

## 2017-06-22 PROCEDURE — 92526 ORAL FUNCTION THERAPY: CPT

## 2017-06-22 PROCEDURE — 99233 SBSQ HOSP IP/OBS HIGH 50: CPT | Mod: GC,,, | Performed by: PSYCHIATRY & NEUROLOGY

## 2017-06-22 PROCEDURE — 97535 SELF CARE MNGMENT TRAINING: CPT

## 2017-06-22 PROCEDURE — 20600001 HC STEP DOWN PRIVATE ROOM

## 2017-06-22 PROCEDURE — 99223 1ST HOSP IP/OBS HIGH 75: CPT | Mod: 25,,, | Performed by: PODIATRIST

## 2017-06-22 PROCEDURE — 25000003 PHARM REV CODE 250: Performed by: NURSE PRACTITIONER

## 2017-06-22 PROCEDURE — 97110 THERAPEUTIC EXERCISES: CPT

## 2017-06-22 RX ORDER — CLOPIDOGREL BISULFATE 75 MG/1
75 TABLET ORAL DAILY
Status: DISCONTINUED | OUTPATIENT
Start: 2017-06-22 | End: 2017-06-23 | Stop reason: HOSPADM

## 2017-06-22 RX ADMIN — HEPARIN SODIUM 5000 UNITS: 5000 INJECTION, SOLUTION INTRAVENOUS; SUBCUTANEOUS at 01:06

## 2017-06-22 RX ADMIN — Medication 3 ML: at 05:06

## 2017-06-22 RX ADMIN — CARVEDILOL 12.5 MG: 12.5 TABLET, FILM COATED ORAL at 08:06

## 2017-06-22 RX ADMIN — ATORVASTATIN CALCIUM 40 MG: 20 TABLET, FILM COATED ORAL at 08:06

## 2017-06-22 RX ADMIN — PANTOPRAZOLE SODIUM 40 MG: 40 GRANULE, DELAYED RELEASE ORAL at 08:06

## 2017-06-22 RX ADMIN — HEPARIN SODIUM 5000 UNITS: 5000 INJECTION, SOLUTION INTRAVENOUS; SUBCUTANEOUS at 09:06

## 2017-06-22 RX ADMIN — AMLODIPINE BESYLATE 5 MG: 5 TABLET ORAL at 08:06

## 2017-06-22 RX ADMIN — ASPIRIN 81 MG CHEWABLE TABLET 81 MG: 81 TABLET CHEWABLE at 08:06

## 2017-06-22 RX ADMIN — CLOPIDOGREL 75 MG: 75 TABLET, FILM COATED ORAL at 08:06

## 2017-06-22 RX ADMIN — HEPARIN SODIUM 5000 UNITS: 5000 INJECTION, SOLUTION INTRAVENOUS; SUBCUTANEOUS at 05:06

## 2017-06-22 RX ADMIN — Medication 3 ML: at 09:06

## 2017-06-22 NOTE — PLAN OF CARE
Problem: SLP Goal  Goal: SLP Goal  Speech Language Pathology Goals  Goals expected to be met by 6/27    1. Pt will perform oropharyngeal strengthening exercises X10 per session provided min cues to improve safe swallow function.  2. Pt will recall 3/3 speech strategies  3 Pt will complete dysarthria task with 80% acc and mod a to improve speech   4 Pt will complete dysphagia exercises with 80% acc and mod a to improve dysphagia  5 Pt will complete mental manipulation task with 80% acc and min a to improve cognition              Outcome: Ongoing (interventions implemented as appropriate)  Pt participating well with all tasks.  Cont POC. SIVAKUMAR Oakes, CCC/SLP  6/22/2017

## 2017-06-22 NOTE — PLAN OF CARE
06/22/17 0747   Discharge Reassessment   Assessment Type Discharge Planning Reassessment   Can the patient answer the patient profile reliably? Yes, cognitively intact   How does the patient rate their overall health at the present time? Fair   Describe the patient's ability to walk at the present time. Major restrictions/daily assistance from another person   How often would a person be available to care for the patient? Often   Number of comorbid conditions (as recorded on the chart) Five or more   During the past month, has the patient often been bothered by feeling down, depressed or hopeless? No   During the past month, has the patient often been bothered by little interest or pleasure in doing things? No   Discharge plan remains the same: Yes   Provided patient/caregiver education on the expected discharge date and the discharge plan No   Discharge Plan A Skilled Nursing Facility   Discharge Plan B Skilled Nursing Facility   Change in patient condition or support system No   Explained to the the patient/caregiver why the discharge planned changed: No   Involved the patient/caregiver in establishing a new discharge plan: No       Discharge plan [facility]    Agustina Ag   [id number]

## 2017-06-22 NOTE — PROGRESS NOTES
Pt seen and examined. No issues with PEG    Ok to clamp, using for meds  Use for feeds this afternoon    Bruna Frazier PGY3  [phone]

## 2017-06-22 NOTE — PLAN OF CARE
Problem: Patient Care Overview  Goal: Plan of Care Review  Outcome: Ongoing (interventions implemented as appropriate)  POC reviewed with pt; able to verbalize acceptance and understanding.  VS stable.  AAOx3 - d/o to time; very slurred/dysarthric speech.  Questions answered/encouraged; reassurance provided.  Call light in reach, side rails up x3, nonskid socks on, bed alarm set, bed in lowest position with wheels locked.  Remains free from falls, skin breakdown, and injury.  C/o pain in abdomen; prn pain med given/repositioning provided.  incontinence care provided.  No acute events overnight.  SCDs/TEDs/ heel boots on bilaterally.  New PEG tube placed yesterday; med given through PEG around 2130; flushed without difficulty.  NS infusing at 125 mL/hr.  PEG still draining to gravity.  Will continue to monitor.

## 2017-06-22 NOTE — SUBJECTIVE & OBJECTIVE
Neurologic Chief Complaint: left hemiparesis    Subjective:     Interval History: Patient is seen for follow-up neurological assessment and treatment recommendations:   No acute events overnight. S/p PEG tube placement yesterday - some soreness reported around the site.    HPI, Past Medical, Family, and Social History remains the same as documented in the initial encounter.     Review of Systems   Constitutional: Negative for fever.   HENT: Positive for trouble swallowing.    Eyes: Negative for visual disturbance.   Neurological: Positive for weakness.     Scheduled Meds:   amlodipine  5 mg Per G Tube Daily    aspirin  81 mg Per G Tube Daily    atorvastatin  40 mg Per G Tube Daily    carvedilol  12.5 mg Per G Tube BID    clopidogrel  75 mg Per G Tube Daily    heparin (porcine)  5,000 Units Subcutaneous Q8H    mupirocin   Topical (Top) Daily    pantoprazole  40 mg Per G Tube Daily    sodium chloride 0.9%  3 mL Intravenous Q8H     Continuous Infusions:     PRN Meds:acetaminophen, labetalol    Objective:     Vital Signs (Most Recent):  Temp: 98.2 °F (36.8 °C) (06/22/17 1200)  Pulse: 75 (06/22/17 1200)  Resp: 19 (06/22/17 1200)  BP: 134/68 (06/22/17 1200)  SpO2: 100 % (06/22/17 1200)  BP Location: Left arm    Vital Signs Range (Last 24H):  Temp:  [97.8 °F (36.6 °C)-99.7 °F (37.6 °C)]   Pulse:  []   Resp:  [17-19]   BP: (134-191)/(66-87)   SpO2:  [95 %-100 %]   BP Location: Left arm    Physical Exam   Constitutional: She appears well-developed and well-nourished.   HENT:   Head: Normocephalic and atraumatic.   Eyes: EOM are normal.   Cardiovascular: Normal rate.    Pulmonary/Chest: Effort normal.   Neurological: She is alert.       Neurological Exam:   LOC: alert and follows requests  Language: No aphasia  Speech: Dysarthria  Visual Fields (CN II): Full  EOM (CN III, IV, VI): Full/intact  Facial Movement (CN VII): left lower facial asymmetry  Motor*: Arm Left:  Paretic (2/5), Leg Left:   Paretic:  4/5, Arm  Right:   Normal (5/5), Leg Right:   Normal (5/5)  Sensation: intact to light touch, temperature and vibration  Tone: Arm-Left: normal; Leg-Left: normal; Arm-Right: normal; Leg-Right: normal   Extinction to bilateral stimulation    NIH Stroke Scale:    Level of Consciousness: 0 - alert  LOC Questions: 0 - answers both correctly  LOC Commands: 0 - performs both correctly  Best Gaze: 0 - normal  Visual: 0 - no visual loss  Facial Palsy: 1 - minor  Motor Left Arm: 3 - no effort against gravity  Motor Right Arm: 0 - no drift  Motor Left Le - drift  Motor Right Le - no drift  Limb Ataxia: 0 - absent  Sensory: 0 - normal  Best Language: 0 - no aphasia  Dysarthria: 2 - near to unintelligible  Extinction and Inattention: 1 - partial neglect  NIH Stroke Scale Total: 8      Laboratory:  CMP:   No results for input(s): GLUCOSE, CALCIUM, ALBUMIN, PROT, NA, K, CO2, CL, BUN, CREATININE, ALKPHOS, ALT, AST, BILITOT in the last 24 hours.  BMP:     Recent Labs  Lab 17  0507      K 3.3*      CO2 22*   BUN 14   CREATININE 0.8   CALCIUM 9.5     CBC:     Recent Labs  Lab 17  0507   WBC 12.08   RBC 4.61   HGB 13.9   HCT 41.7      MCV 91   MCH 30.2   MCHC 33.3     Lipid Panel:     Recent Labs  Lab 06/15/17  1942   CHOL 118*   LDLCALC 67.4   HDL 41   TRIG 48     Coagulation:     Recent Labs  Lab 17  0349   INR 1.1   APTT 24.5     Platelet Aggregation Study: No results for input(s): PLTAGG, PLTAGINTERP, PLTAGREGLACO, ADPPLTAGGREG in the last 168 hours.  Hgb A1C:     Recent Labs  Lab 06/15/17  1942   HGBA1C 5.6     TSH:     Recent Labs  Lab 06/15/17  1942   TSH 1.041       Diagnostic Results:  I have personally reviewed:   Findings:     MRI brain WO contrast (17)  R insular cortex, right corona radiata and right parietal lobe infarct. +cytotoxic cerebral edema           17 - echo   Moderately enlarged L A    1 - Low normal to mildly depressed left ventricular systolic function (EF 50-55%).      2 - Impaired LV relaxation, elevated LAP (grade 2 diastolic dysfunction).     3 - The estimated PA systolic pressure is 32 mmHg.     4 - Mild mitral regurgitation.     5 - Trivial tricuspid regurgitation.     6 - Moderate left atrial enlargement.

## 2017-06-22 NOTE — PROGRESS NOTES
[facility]  Vascular Neurology  Comprehensive Stroke Center  Progress Note    Assessment/Plan:     6/17 - 6/18/17-  NG placed under , started on TFs  6/19/17 - strict NPO per speech. surg called for PEG placement consult       * Embolic stroke involving right carotid artery    89 y/o female with PMHx of A fib, HTN presenting with left hemiparesis. Imaging remarkable for R hemispheric infarcts; embolic pattern. CTA remarkable for 90% stenosis of the right carotid artery and R M2. Etiology of stroke is likely artery to artery - atheroembolic from symptomatic R IC rather than cardio-embolic. Vascular surgery consult deferred as patient does not want surgical management of the R IC even if offered; will medically optimize.     Of note patient has prior history of strokes noted on imaging with a CHADsVASc of 7 => high risk; 1 year stroke risk of 15.7%. Patient and family aware that anticoagulation would be optimal management in the setting of known A fib and prior strokes for secondary stroke prevention but patient chosen to NOT be anticoagulated due to concerns by the family regarding patient's frequent falls.   -Antithrombotics for secondary stroke prevention: Antiplatelets:  Aspirin: 81 mg oral now and daily and Clopidogrel: 75 mg oral daily   -Statins for secondary stroke prevention and hyperlipidemia, if present: Atorvastatin- 40 mg oral daily,   -Aggressive risk factor modification: Hypertension, Carotid Artery disease and CHF   -Rehab Efforts: Physical Therapy, Occupational Therapy and Speech and Language Pathology.   -Diagnostics: Ordered/Pending: none  -VTE Prophylaxis: Heparin 5000 units SQ every 8 hours        S/P percutaneous endoscopic gastrostomy (PEG) tube placement 6/21/17    - starting tube feeds today        Hemiparesis, left    Left arm predominant hemiparesis  2/2 stroke  PT/OT recommending SNF        Oropharyngeal dysphagia    2/2 stroke  - strict NPO per speech  - s/p PEG tube  placement on 6/21        Chronic diastolic heart failure    -Stroke Risk Factor  -grade 2 on most recent ECHO  - on coreg        Enlarged LA (left atrium)    -as previously identified on ECHO        Chronic atrial fibrillation    -Stroke Risk Factor  -patient and family opted to not be on anticoagulation at this time because of concerns of increased bleeding risk from falls  - coreg for rate control         Internal carotid artery stenosis    -High grade stenosis of the right ICA  -previously identified and again seen on imaging  - likely etiology of most recent stroke  -maximum medical management as patient does not want any surgical intervention.        History of CHF (congestive heart failure)    -Stroke Risk Factor  -holding home meds to allow for adequate cerebral perfusion         CAD (coronary artery disease)    -Stroke Risk Factor  -ASA 81mg        Essential hypertension    - SBP goal 150-160 in setting of hemodynamically significant stenosis of the R ICA  - amlodipine 5mg BID  - Coreg to 12.5 BID          Dispo: Plan for discharge to SNF tomorrow.     Neurologic Chief Complaint: left hemiparesis    Subjective:     Interval History: Patient is seen for follow-up neurological assessment and treatment recommendations:   No acute events overnight. S/p PEG tube placement yesterday - some soreness reported around the site.    HPI, Past Medical, Family, and Social History remains the same as documented in the initial encounter.     Review of Systems   Constitutional: Negative for fever.   HENT: Positive for trouble swallowing.    Eyes: Negative for visual disturbance.   Neurological: Positive for weakness.     Scheduled Meds:   amlodipine  5 mg Per G Tube Daily    aspirin  81 mg Per G Tube Daily    atorvastatin  40 mg Per G Tube Daily    carvedilol  12.5 mg Per G Tube BID    clopidogrel  75 mg Per G Tube Daily    heparin (porcine)  5,000 Units Subcutaneous Q8H    mupirocin   Topical (Top) Daily     pantoprazole  40 mg Per G Tube Daily    sodium chloride 0.9%  3 mL Intravenous Q8H     Continuous Infusions:     PRN Meds:acetaminophen, labetalol    Objective:     Vital Signs (Most Recent):  Temp: 98.2 °F (36.8 °C) (17 1200)  Pulse: 75 (17 1200)  Resp: 19 (17 1200)  BP: 134/68 (17 1200)  SpO2: 100 % (17 1200)  BP Location: Left arm    Vital Signs Range (Last 24H):  Temp:  [97.8 °F (36.6 °C)-99.7 °F (37.6 °C)]   Pulse:  []   Resp:  [17-19]   BP: (134-191)/(66-87)   SpO2:  [95 %-100 %]   BP Location: Left arm    Physical Exam   Constitutional: She appears well-developed and well-nourished.   HENT:   Head: Normocephalic and atraumatic.   Eyes: EOM are normal.   Cardiovascular: Normal rate.    Pulmonary/Chest: Effort normal.   Neurological: She is alert.       Neurological Exam:   LOC: alert and follows requests  Language: No aphasia  Speech: Dysarthria  Visual Fields (CN II): Full  EOM (CN III, IV, VI): Full/intact  Facial Movement (CN VII): left lower facial asymmetry  Motor*: Arm Left:  Paretic (2/5), Leg Left:   Paretic:  4/5, Arm Right:   Normal (5/5), Leg Right:   Normal (5/5)  Sensation: intact to light touch, temperature and vibration  Tone: Arm-Left: normal; Leg-Left: normal; Arm-Right: normal; Leg-Right: normal   Extinction to bilateral stimulation    NIH Stroke Scale:    Level of Consciousness: 0 - alert  LOC Questions: 0 - answers both correctly  LOC Commands: 0 - performs both correctly  Best Gaze: 0 - normal  Visual: 0 - no visual loss  Facial Palsy: 1 - minor  Motor Left Arm: 3 - no effort against gravity  Motor Right Arm: 0 - no drift  Motor Left Le - drift  Motor Right Le - no drift  Limb Ataxia: 0 - absent  Sensory: 0 - normal  Best Language: 0 - no aphasia  Dysarthria: 2 - near to unintelligible  Extinction and Inattention: 1 - partial neglect  NIH Stroke Scale Total: 8      Laboratory:  CMP:   No results for input(s): GLUCOSE, CALCIUM, ALBUMIN, PROT, NA,  K, CO2, CL, BUN, CREATININE, ALKPHOS, ALT, AST, BILITOT in the last 24 hours.  BMP:     Recent Labs  Lab 06/17/17  0507      K 3.3*      CO2 22*   BUN 14   CREATININE 0.8   CALCIUM 9.5     CBC:     Recent Labs  Lab 06/17/17  0507   WBC 12.08   RBC 4.61   HGB 13.9   HCT 41.7      MCV 91   MCH 30.2   MCHC 33.3     Lipid Panel:     Recent Labs  Lab 06/15/17  1942   CHOL 118*   LDLCALC 67.4   HDL 41   TRIG 48     Coagulation:     Recent Labs  Lab 06/16/17  0349   INR 1.1   APTT 24.5     Platelet Aggregation Study: No results for input(s): PLTAGG, PLTAGINTERP, PLTAGREGLACO, ADPPLTAGGREG in the last 168 hours.  Hgb A1C:     Recent Labs  Lab 06/15/17  1942   HGBA1C 5.6     TSH:     Recent Labs  Lab 06/15/17  1942   TSH 1.041       Diagnostic Results:  I have personally reviewed:   Findings:     MRI brain WO contrast (6/16/17)  R insular cortex, right corona radiata and right parietal lobe infarct. +cytotoxic cerebral edema           6/16/17 - echo   Moderately enlarged L A    1 - Low normal to mildly depressed left ventricular systolic function (EF 50-55%).     2 - Impaired LV relaxation, elevated LAP (grade 2 diastolic dysfunction).     3 - The estimated PA systolic pressure is 32 mmHg.     4 - Mild mitral regurgitation.     5 - Trivial tricuspid regurgitation.     6 - Moderate left atrial enlargement.       Day Valdez MD  Comprehensive Stroke Center  Department of Vascular Neurology   [facility]

## 2017-06-22 NOTE — PT/OT/SLP PROGRESS
"Speech Language Pathology  Treatment    Elizabeth Navarrete   MRN: [id number]   Admitting Diagnosis: Embolic stroke involving right carotid artery    Diet recommendations: Solid Diet Level: NPO  Liquid Diet Level: NPO strict aspiration precautions, continue therapeutic trials of puree with SLP only at this time    SLP Treatment Date: 06/22/17  Speech Start Time: 1055     Speech Stop Time: 1115     Speech Total (min): 20 min       TREATMENT BILLABLE MINUTES:  Speech Therapy Individual 8 and Treatment Swallowing Dysfunction 12    Has the patient been evaluated by SLP for swallowing? : Yes  Keep patient NPO?: Yes   General Precautions: Standard, aspiration, fall, NPO          Subjective:  "I can't get it to go up" re: pitch during laryngeal elevation exercises    Pain/Comfort  Pain Rating 1: 0/10  Pain Rating Post-Intervention 1: 0/10    Objective:   Patient found with: telemetry, peripheral IV     Pt alert and cooperative t/o session.  Pt unable to recall speech strategies despite assistance though fair intelligibility noted in conversation.  Speech strategies reviewed and some carryover evident initially though pt unable to recall any strategies by end of session.  OME's including lingual resistance excs completed x5 with min cues.  Decreased lingual strength evident.  Dysphagia excs completed x5-10 with fair-good ability given min cues.  Excs included effortful swallow, supraglottic swallow and falsetto /i/.  Limited pitch change observed with falsetto /i/, pt with some awareness and attempts to self correct.  Slightly improvement using pitch glide.  1/8 tspns of pudding presented x4 during excs with anterior loss x1 and delayed cough x1.  Vocal quality remained clear.  Education provided on continued SLP POC, speech strategies, swallow excs and cont npo.  Pt verbalized understanding.  White board updated.            Assessment:  Elizabeth Navarrete is a 89 y.o. female with a medical diagnosis of Embolic stroke involving right " carotid artery and presents with dysarthria, dysphagia, mild cognitive linguistic impairment.    Discharge recommendations: Discharge Facility/Level Of Care Needs: nursing facility, skilled     Goals:    SLP Goals        Problem: SLP Goal    Goal Priority Disciplines Outcome   SLP Goal     SLP Ongoing (interventions implemented as appropriate)   Description:  Speech Language Pathology Goals  Goals expected to be met by 6/27    1. Pt will perform oropharyngeal strengthening exercises X10 per session provided min cues to improve safe swallow function.  2. Pt will recall 3/3 speech strategies  3 Pt will complete dysarthria task with 80% acc and mod a to improve speech   4 Pt will complete dysphagia exercises with 80% acc and mod a to improve dysphagia  5 Pt will complete mental manipulation task with 80% acc and min a to improve cognition                                Plan:   Patient to be seen Therapy Frequency: 5 x/week   Plan of Care expires: 07/15/17  Plan of Care reviewed with: patient  SLP Follow-up?: Yes  SLP - Next Visit Date: 06/23/17           SIVAKUMAR Oakes, CCC-SLP  06/22/2017

## 2017-06-22 NOTE — PLAN OF CARE
Problem: Occupational Therapy Goal  Goal: Occupational Therapy Goal  Goals to be met by: 6/30     Patient will increase functional independence with ADLs by performing:    UE Dressing while seated with Set-up Assistance and Minimal Assistance.  LE Dressing with Set-up Assistance and Moderate Assistance.  Grooming while standing with Set-up Assistance and Minimal Assistance.  Sitting at edge of bed x10 minutes for dynamic functional task with Contact Guard Assistance.  Stand pivot transfers with Minimal Assistance.  Toilet transfer to bedside commode with Minimal Assistance.  Pt/CG verbalized understanding for s/s of stroke.  CG demo understanding for positioning of L UE.   CG demo understanding for L UE exercises.      Outcome: Ongoing (interventions implemented as appropriate)  Goals remain appropriate, continue with OT POC.    Pt progressing well towards goals.

## 2017-06-22 NOTE — PLAN OF CARE
Plan of care reviewed with patient. Vital signs stable. A&Ox4. Neuro- LUE no movement, slurred SLP. No complaints of pain. Up to chair this AM for 2 hours. Tube feedings restarted through PEG tube. Currently at 10 mL/hr with a goal of 35 mL/hr. 200 mL Free water bolus 4x daily. Bed alarm on. Free from falls and injuries. Will continue to monitor.

## 2017-06-22 NOTE — PT/OT/SLP PROGRESS
Occupational Therapy  Treatment    Elizabeth Navarrete   MRN: [id number]   Admitting Diagnosis: Embolic stroke involving right carotid artery    OT Date of Treatment: 06/22/17   OT Start Time: 0738  OT Stop Time: 0802  OT Total Time (min): 24 min    Billable Minutes:  Self Care/Home Management 24    General Precautions: Standard, aspiration, fall, NPO  Orthopedic Precautions: N/A      Subjective:  Communicated with RN prior to session.  Pt agreeable to therapy.   Pain/Comfort  Pain Rating 1: 0/10    Objective:  Patient found with: telemetry, pressure relief boots, bed alarm, peripheral IV (PEG drain)     Functional Mobility:  Bed Mobility:  Rolling/Turning to Left: Minimum assistance, With side rail  Rolling/Turning Right: Minimum assistance, With side rail  Scooting/Bridging: Moderate Assistance  Supine to Sit: Minimum Assistance    Transfers:   Sit <> Stand Assistance: Maximum Assistance  Sit <> Stand Assistive Device: No Assistive Device  Bed <> Chair Technique: Stand Pivot  Bed <> Chair Transfer Assistance: Maximum Assistance  Bed <> Chair Assistive Device: No Assistive Device    Functional Ambulation: Not able to take lateral steps     Activities of Daily Living:  Feeding Level of Assistance: Activity did not occur  Feeding adaptive equipment: none  UE Dressing Level of Assistance: Maximum assistance  UE adaptive equipment: none  LE Dressing Level of Assistance: Total assistance (for socks)  LE adaptive equipment: none  Grooming Position: bedside chair, Seated  Grooming Level of Assistance: Stand by assistance (for oral care with swab)  Toileting Where Assessed: Bed level  Toileting Level of Assistance: Maximum assistance (pt able to assist with rolling for diaper change)    Balance:   Static Sit: FAIR-: Maintains without assist but inconsistent   Dynamic Sit: FAIR: Cannot move trunk without losing balance  Static Stand: 0: Needs MAXIMAL assist to maintain   Dynamic stand: 0: N/A    Therapeutic Activities and  "Exercises:  Pt alert with eyes open 100% of session.  Pt rolled R and L ~2x each way for diaper change, requiring total assist for cleaning. Pt able to perform bridge once to assist with change. Pt sat EOB with fair- sitting balance, demonstrates difficulty with keeping feet on the floor due to posterior lean. Pt transferred to bedside chair with max A stand pivot.  Once in chair B UE propped on pillow to prevent lateral lean.  L UE AAROM/PROM performed 1x10 for shoulder flexion/extension, bicep flex/ext, wrist flex/ext.  Educated pt on use of pillows to prop UE to prevent edema and encouraged self PROM. Pt had no further questions when asked. Notified PT of time pt got up to chair to coordinate treatment times. Pt agrees not to get out of chair on her own.     AM-PAC 6 CLICK ADL   How much help from another person does this patient currently need?   1 = Unable, Total/Dependent Assistance  2 = A lot, Maximum/Moderate Assistance  3 = A little, Minimum/Contact Guard/Supervision  4 = None, Modified [place]/Independent    Putting on and taking off regular lower body clothing? : 2  Bathing (including washing, rinsing, drying)?: 2  Toileting, which includes using toilet, bedpan, or urinal? : 2  Putting on and taking off regular upper body clothing?: 2  Taking care of personal grooming such as brushing teeth?: 2  Eating meals?: 1  Total Score: 11     AM-PAC Raw Score CMS "G-Code Modifier Level of Impairment Assistance   6 % Total / Unable   7 - 8 CM 80 - 100% Maximal Assist   9-13 CL 60 - 80% Moderate Assist   14 - 19 CK 40 - 60% Moderate Assist   20 - 22 CJ 20 - 40% Minimal Assist   23 CI 1-20% SBA / CGA   24 CH 0% Independent/ Mod I       Patient left up in chair with all lines intact and call button in reach. RN notified pt up in chair.     ASSESSMENT:  Elizabeth Navarrete is a 89 y.o. female with a medical diagnosis of Embolic stroke involving right carotid artery and presents with decline in ADLs and functional " mobility. Pt would benefit from skilled OT services in order to maximize independence with ADLs and facilitate safe discharge.       Rehab identified problem list/impairments: Rehab identified problem list/impairments: weakness, impaired endurance, impaired sensation, impaired self care skills, impaired functional mobilty, gait instability, impaired balance, decreased upper extremity function, decreased lower extremity function, decreased coordination, decreased safety awareness    Rehab potential is good.    Activity tolerance: Good    Discharge recommendations: Discharge Facility/Level Of Care Needs: nursing facility, skilled     Barriers to discharge: Barriers to Discharge: Inaccessible home environment, Decreased caregiver support    Equipment recommendations: other (see comments) (TBD)     GOALS:    Occupational Therapy Goals        Problem: Occupational Therapy Goal    Goal Priority Disciplines Outcome Interventions   Occupational Therapy Goal     OT, PT/OT Ongoing (interventions implemented as appropriate)    Description:  Goals to be met by: 6/30     Patient will increase functional independence with ADLs by performing:    UE Dressing while seated with Set-up Assistance and Minimal Assistance.  LE Dressing with Set-up Assistance and Moderate Assistance.  Grooming while standing with Set-up Assistance and Minimal Assistance.  Sitting at edge of bed x10 minutes for dynamic functional task with Contact Guard Assistance.  Stand pivot transfers with Minimal Assistance.  Toilet transfer to bedside commode with Minimal Assistance.  Pt/CG verbalized understanding for s/s of stroke.  CG demo understanding for positioning of L UE.   CG demo understanding for L UE exercises.                       Plan:  Patient to be seen 6 x/week to address the above listed problems via self-care/home management, therapeutic activities, therapeutic exercises, neuromuscular re-education  Plan of Care expires: 07/15/17  Plan of Care  reviewed with: patient      Bri PANDEY Andrea, LAVONNE  06/22/2017

## 2017-06-22 NOTE — CONSULTS
Consult Note  Podiatry    Consult Requested By: Caio Gardiner MD  Reason for Consult: Nail trimming     SUBJECTIVE     History of Present Illness:  Elizabeth Navarrete is a 89 y.o. female  has a past medical history of CAD (coronary artery disease); CRF (chronic renal failure); Family history of breast cancer; History of cardiac arrhythmia; History of CHF (congestive heart failure); History of depression; History of echocardiogram; HTN (hypertension); Internal carotid artery stenosis; and Personal history of gallstones.  Admitted for stroke. Consulted for nail trimming. Pt. States she follows with Podiatrist Dr. Carmona at [facility] who trims her nails every couple of months. Denies pain to nails. Pt. Reports difficulty trimming her nails.     Scheduled Meds:   amlodipine  5 mg Per G Tube Daily    aspirin  81 mg Per G Tube Daily    atorvastatin  40 mg Per G Tube Daily    carvedilol  12.5 mg Per G Tube BID    clopidogrel  75 mg Per G Tube Daily    heparin (porcine)  5,000 Units Subcutaneous Q8H    mupirocin   Topical (Top) Daily    pantoprazole  40 mg Per G Tube Daily    sodium chloride 0.9%  3 mL Intravenous Q8H     Continuous Infusions:   sodium chloride 0.9% 125 mL/hr at 06/21/17 0342     PRN Meds:acetaminophen, labetalol    Review of patient's allergies indicates:   Allergen Reactions    Pcn [penicillins] Rash        Past Medical History:   Diagnosis Date    CAD (coronary artery disease) 10/10/2012    CRF (chronic renal failure) 10/10/2012    Family history of breast cancer 10/10/2012    History of cardiac arrhythmia 10/10/2012    History of CHF (congestive heart failure) 10/10/2012    History of depression 10/10/2012    History of echocardiogram 10/10/2012    HTN (hypertension) 10/10/2012    Internal carotid artery stenosis 10/10/2012    Personal history of gallstones 10/10/2012     Past Surgical History:   Procedure Laterality Date    EYE SURGERY      HYSTERECTOMY       Family History   Problem  Relation Age of Onset    Cancer Mother      breast    Heart disease Neg Hx      Social History   Substance Use Topics    Smoking status: Never Smoker    Smokeless tobacco: Not on file    Alcohol use No       Review of Systems:  Constitutional: no fever or chills  Respiratory: no cough or shortness of breath  Cardiovascular: no chest pain or palpitations  Gastrointestinal: no nausea or vomiting, tolerating diet  Integument/Breast: positive for dryness  Musculoskeletal: no arthralgias or myalgias  Neurological: history of numbness or paresthesias in the feet    OBJECTIVE     Vital Signs (Most Recent):  Temp: 97.8 °F (36.6 °C) (06/22/17 0715)  Pulse: 85 (06/22/17 0715)  Resp: 18 (06/22/17 0715)  BP: (!) 172/81 (06/22/17 0715)  SpO2: 96 % (06/22/17 0715)    Vital Signs Range (Last 24H):  Temp:  [97.8 °F (36.6 °C)-99.7 °F (37.6 °C)]   Pulse:  []   Resp:  [16-18]   BP: (130-191)/(64-87)   SpO2:  [95 %-97 %]     Physical Exam:  Vascular: Distal DP/PT pulses diminished b/l. CRT < 3 sec to tips of toes. No edema noted to b/l LE. No vericosities noted to b/l LEs.     Dermatologic: Toenails 1-5 bilaterally are elongated by 2-3 mm, thickened by 2-3 mm, discolored/yellowed, dystrophic, brittle with subungual debris.      Musculoskeletal: Equinus noted b/l ankles with < 10 deg DF noted. MMT 5/5 in DF/PF/Inv/Ev resistance with no reproduction of pain in any direction. Passive range of motion of ankle and pedal joints is painless b/l.    Neurological: Light touch, proprioception, and sharp/dull sensation are all decreased bilaterally. Protective threshold with the McNeal-Wienstein monofilament is decreased bilaterally.     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 Pt. tolerated well. No blood was drawn. Cleansed with alcohol swab.     F/u with Dr. Carmona as outpatient    Podiatry will sign off appreciate the consult.     Jayne Ward DPM PGY-2  Pager: [phone]

## 2017-06-22 NOTE — PLAN OF CARE
Problem: Physical Therapy Goal  Goal: Physical Therapy Goal  Goals to be met by: 17     Patient will increase functional independence with mobility by performin. Supine to sit with Moderate Assistance -  Met   2. Sit to supine with Moderate Assistance - not met  3. Sit to stand transfer with Moderate Assistance- met   4. Bed to chair transfer with Moderate Assistance using Rolling Walker or appropriate AD. - not met  5. Gait  x 10 feet with Maximum Assistance using Rolling Walker. -  Met  with hemiwalker  6. Lower extremity exercise program x10 reps with supervision for strengthening and endurance with functional mobility. -  Met   7. Pt will perform static standing x 2 minutes using Rolling Walker and demonstrated upright/midline trunk orientation with Minimal Assistance for balance. - not met  8. Pt will tolerated sitting x 8 minutes with Minimal Assistance to maintain midline orientation & balance while performing dynamic activities. - not met  9. Supine to sit with SBA-not met  10. Sit to/from stand with min assist. -not met  11. Pt receive gait training ~ 30 ft with AD and moderate assist- not met       Goals updated for content and are appropriate. Bhakti Cifuentes, PT 2017

## 2017-06-22 NOTE — PLAN OF CARE
[place] send updated information to [facility]. Plan is for pt to transfer to facility tomorrow 6/23/17.     Jason Baltazar, EDITH   [id number]

## 2017-06-22 NOTE — PT/OT/SLP PROGRESS
Physical Therapy  Treatment    Elizabeth Navarrete   MRN: [id number]   Admitting Diagnosis: Embolic stroke involving right carotid artery    PT Received On: 06/22/17  PT Start Time: 0826     PT Stop Time: 0851    PT Total Time (min): 25 min       Billable Minutes:  Gait [id number] min  and Therapeutic Exercise 15 min    Treatment Type: Treatment  PT/PTA: PT     PTA Visit Number: 0       General Precautions: Standard, fall, aspiration (HOB 30-45 degrees)  Orthopedic Precautions: N/A   Braces:      Do you have any cultural, spiritual, Uatsdin conflicts, given your current situation?: none stated    Subjective:  Communicated with nurse prior to session.      Pain/Comfort  Pain Rating 1: 0/10 (pt stated that she was sore in abdomen from PEG tube but no pain. )  Pain Rating Post-Intervention 1: 0/10    Objective:   Patient found with: peripheral IV, telemetry (russell to abdomen)    Functional Mobility:  Bed Mobility:   Rolling/Turning Right:  (not tested. pt was sitting in chair for treatment. )    Transfers:  Sit <> Stand Assistance: Moderate Assistance (pt needed verbal cues for hand placement with functional mobility. )  Sit <> Stand Assistive Device: No Assistive Device    Gait:   Gait Distance: 11 ft with WBQC, 11 ft with hemiwalker with sitting rest period between gait training. pt had more stability with hemiwalker. Pt had B steppage gait and needed constant verbal cues for sequencing with AD usage.   Assistance 1: Total assistance (pt was total assist for standing balance.)  Gait Assistive Device: Large base quad cane, Elian Walker  Gait Pattern: 3-point gait      Therapeutic Activities and Exercises:  Pt performed AROM there exer BLE in sitting x 15 reps with rest periods as needed.      AM-PAC 6 CLICK MOBILITY  How much help from another person does this patient currently need?   1 = Unable, Total/Dependent Assistance  2 = A lot, Maximum/Moderate Assistance  3 = A little, Minimum/Contact Guard/Supervision  4 = None,  Modified [place]/Independent    Turning over in bed (including adjusting bedclothes, sheets and blankets)?: 2  Sitting down on and standing up from a chair with arms (e.g., wheelchair, bedside commode, etc.): 2  Moving from lying on back to sitting on the side of the bed?: 2  Moving to and from a bed to a chair (including a wheelchair)?: 2  Need to walk in hospital room?: 2  Climbing 3-5 steps with a railing?: 1  Total Score: 11    AM-PAC Raw Score CMS G-Code Modifier Level of Impairment Assistance   6 % Total / Unable   7 - 9 CM 80 - 100% Maximal Assist   10 - 14 CL 60 - 80% Moderate Assist   15 - 19 CK 40 - 60% Moderate Assist   20 - 22 CJ 20 - 40% Minimal Assist   23 CI 1-20% SBA / CGA   24 CH 0% Independent/ Mod I     Patient left up in chair with all lines intact and call button in reach.    Assessment:  Elizabeth Navarrete is a 89 y.o. female with a medical diagnosis of Embolic stroke involving right carotid artery and presents with decreased strength, mobility, transfers and decreased distance ambulated. Pt would benefit from cont PT to address deficits and will need SNF placement when medically stable. Pt will benefit from skilled PT 6x/wk to progress pt physically and decrease complications from immobility.     Rehab identified problem list/impairments: Rehab identified problem list/impairments: impaired endurance, impaired functional mobilty, gait instability, impaired balance, decreased lower extremity function    Rehab potential is good.    Activity tolerance: Good    Discharge recommendations: Discharge Facility/Level Of Care Needs: nursing facility, skilled     Barriers to discharge: Barriers to Discharge: Decreased caregiver support, Inaccessible home environment    Equipment recommendations: Equipment Needed After Discharge:  (will determine DME needs closer to discharge)     GOALS:    Physical Therapy Goals        Problem: Physical Therapy Goal    Goal Priority Disciplines Outcome Goal  Variances Interventions   Physical Therapy Goal     PT/OT, PT Ongoing (interventions implemented as appropriate)     Description:  Goals to be met by: 17     Patient will increase functional independence with mobility by performin. Supine to sit with Moderate Assistance -  Met   2. Sit to supine with Moderate Assistance - not met  3. Sit to stand transfer with Moderate Assistance- met   4. Bed to chair transfer with Moderate Assistance using Rolling Walker or appropriate AD. - not met  5. Gait  x 10 feet with Maximum Assistance using Rolling Walker. -  Met  with hemiwalker  6. Lower extremity exercise program x10 reps with supervision for strengthening and endurance with functional mobility. -  Met   7. Pt will perform static standing x 2 minutes using Rolling Walker and demonstrated upright/midline trunk orientation with Minimal Assistance for balance. - not met  8. Pt will tolerated sitting x 8 minutes with Minimal Assistance to maintain midline orientation & balance while performing dynamic activities. - not met  9. Supine to sit with SBA-not met  10. Sit to/from stand with min assist. -not met  11. Pt receive gait training ~ 30 ft with AD and moderate assist- not met                         PLAN:    Patient to be seen 6 x/week  to address the above listed problems via gait training, therapeutic activities, therapeutic exercises  Plan of Care expires: 17  Plan of Care reviewed with: patient         Bhakti SINGH Cifuentes, PT  2017

## 2017-06-23 VITALS
HEIGHT: 65 IN | BODY MASS INDEX: 21.23 KG/M2 | WEIGHT: 127.44 LBS | RESPIRATION RATE: 17 BRPM | HEART RATE: 79 BPM | SYSTOLIC BLOOD PRESSURE: 131 MMHG | DIASTOLIC BLOOD PRESSURE: 73 MMHG | OXYGEN SATURATION: 97 % | TEMPERATURE: 98 F

## 2017-06-23 PROCEDURE — 92526 ORAL FUNCTION THERAPY: CPT

## 2017-06-23 PROCEDURE — 97530 THERAPEUTIC ACTIVITIES: CPT

## 2017-06-23 PROCEDURE — 92507 TX SP LANG VOICE COMM INDIV: CPT

## 2017-06-23 PROCEDURE — 99233 SBSQ HOSP IP/OBS HIGH 50: CPT | Mod: GC,,, | Performed by: PSYCHIATRY & NEUROLOGY

## 2017-06-23 PROCEDURE — 25000003 PHARM REV CODE 250: Performed by: PHYSICIAN ASSISTANT

## 2017-06-23 PROCEDURE — 25000003 PHARM REV CODE 250: Performed by: NURSE PRACTITIONER

## 2017-06-23 PROCEDURE — 97535 SELF CARE MNGMENT TRAINING: CPT

## 2017-06-23 PROCEDURE — 97116 GAIT TRAINING THERAPY: CPT

## 2017-06-23 PROCEDURE — 25000003 PHARM REV CODE 250: Performed by: PSYCHIATRY & NEUROLOGY

## 2017-06-23 RX ORDER — MUPIROCIN 20 MG/G
OINTMENT TOPICAL DAILY
Status: ON HOLD
Start: 2017-06-23 | End: 2019-01-01

## 2017-06-23 RX ORDER — ATORVASTATIN CALCIUM 40 MG/1
40 TABLET, FILM COATED ORAL DAILY
Start: 2017-06-23 | End: 2018-06-23

## 2017-06-23 RX ORDER — CLOPIDOGREL BISULFATE 75 MG/1
75 TABLET ORAL DAILY
Start: 2017-06-23 | End: 2018-06-23

## 2017-06-23 RX ADMIN — AMLODIPINE BESYLATE 5 MG: 5 TABLET ORAL at 08:06

## 2017-06-23 RX ADMIN — HEPARIN SODIUM 5000 UNITS: 5000 INJECTION, SOLUTION INTRAVENOUS; SUBCUTANEOUS at 01:06

## 2017-06-23 RX ADMIN — Medication 3 ML: at 01:06

## 2017-06-23 RX ADMIN — HEPARIN SODIUM 5000 UNITS: 5000 INJECTION, SOLUTION INTRAVENOUS; SUBCUTANEOUS at 05:06

## 2017-06-23 RX ADMIN — PANTOPRAZOLE SODIUM 40 MG: 40 GRANULE, DELAYED RELEASE ORAL at 08:06

## 2017-06-23 RX ADMIN — Medication 3 ML: at 05:06

## 2017-06-23 RX ADMIN — ATORVASTATIN CALCIUM 40 MG: 20 TABLET, FILM COATED ORAL at 08:06

## 2017-06-23 RX ADMIN — CLOPIDOGREL 75 MG: 75 TABLET, FILM COATED ORAL at 08:06

## 2017-06-23 RX ADMIN — ASPIRIN 81 MG CHEWABLE TABLET 81 MG: 81 TABLET CHEWABLE at 08:06

## 2017-06-23 RX ADMIN — CARVEDILOL 12.5 MG: 12.5 TABLET, FILM COATED ORAL at 08:06

## 2017-06-23 NOTE — SUBJECTIVE & OBJECTIVE
NIH Stroke Scale:    Level of Consciousness: 0 - alert  LOC Questions: 0 - answers both correctly  LOC Commands: 0 - performs both correctly  Best Gaze: 0 - normal  Visual: 0 - no visual loss  Facial Palsy: 1 - minor  Motor Left Arm: 3 - no effort against gravity  Motor Right Arm: 0 - no drift  Motor Left Le - drift  Motor Right Le - no drift  Limb Ataxia: 0 - absent  Sensory: 0 - normal  Best Language: 0 - no aphasia  Dysarthria: 2 - near to unintelligible  Extinction and Inattention: 1 - partial neglect  NIH Stroke Scale Total: 8  Modified Ortega Scale:   Timeline: At discharge  Modified Laclede Score: 4 - moderately severe disability

## 2017-06-23 NOTE — SUBJECTIVE & OBJECTIVE
Neurologic Chief Complaint: left hemiparesis    Subjective:     Interval History: Patient is seen for follow-up neurological assessment and treatment recommendations:   No acute events overnight.     HPI, Past Medical, Family, and Social History remains the same as documented in the initial encounter.     Review of Systems   Constitutional: Negative for fever.   HENT: Positive for trouble swallowing.    Eyes: Negative for visual disturbance.   Neurological: Positive for weakness.     Scheduled Meds:   amlodipine  5 mg Per G Tube Daily    aspirin  81 mg Per G Tube Daily    atorvastatin  40 mg Per G Tube Daily    carvedilol  12.5 mg Per G Tube BID    clopidogrel  75 mg Per G Tube Daily    heparin (porcine)  5,000 Units Subcutaneous Q8H    mupirocin   Topical (Top) Daily    pantoprazole  40 mg Per G Tube Daily    sodium chloride 0.9%  3 mL Intravenous Q8H     Continuous Infusions:     PRN Meds:acetaminophen, labetalol    Objective:     Vital Signs (Most Recent):  Temp: 97.9 °F (36.6 °C) (06/23/17 1100)  Pulse: 88 (06/23/17 1100)  Resp: 17 (06/23/17 1100)  BP: 131/73 (06/23/17 1100)  SpO2: 97 % (06/23/17 1100)  BP Location: Left arm    Vital Signs Range (Last 24H):  Temp:  [97.1 °F (36.2 °C)-98.9 °F (37.2 °C)]   Pulse:  []   Resp:  [17-18]   BP: (129-177)/(60-96)   SpO2:  [93 %-99 %]   BP Location: Left arm    Physical Exam   Constitutional: She appears well-developed and well-nourished.   HENT:   Head: Normocephalic and atraumatic.   Eyes: EOM are normal.   Cardiovascular: Normal rate.    Pulmonary/Chest: Effort normal.   Neurological: She is alert.       Neurological Exam:   LOC: alert and follows requests  Language: No aphasia  Speech: Dysarthria  Visual Fields (CN II): Full  EOM (CN III, IV, VI): Full/intact  Facial Movement (CN VII): left lower facial asymmetry  Motor*: Arm Left:  Paretic (2/5), Leg Left:   Paretic:  4/5, Arm Right:   Normal (5/5), Leg Right:   Normal (5/5)  Sensation: intact to  light touch, temperature and vibration  Tone: Arm-Left: normal; Leg-Left: normal; Arm-Right: normal; Leg-Right: normal   Extinction to bilateral stimulation    NIH Stroke Scale:    Level of Consciousness: 0 - alert  LOC Questions: 0 - answers both correctly  LOC Commands: 0 - performs both correctly  Best Gaze: 0 - normal  Visual: 0 - no visual loss  Facial Palsy: 1 - minor  Motor Left Arm: 3 - no effort against gravity  Motor Right Arm: 0 - no drift  Motor Left Le - drift  Motor Right Le - no drift  Limb Ataxia: 0 - absent  Sensory: 0 - normal  Best Language: 0 - no aphasia  Dysarthria: 2 - near to unintelligible  Extinction and Inattention: 1 - partial neglect  NIH Stroke Scale Total: 8      Laboratory:  CMP:   No results for input(s): GLUCOSE, CALCIUM, ALBUMIN, PROT, NA, K, CO2, CL, BUN, CREATININE, ALKPHOS, ALT, AST, BILITOT in the last 24 hours.  BMP:     Recent Labs  Lab 17  0507      K 3.3*      CO2 22*   BUN 14   CREATININE 0.8   CALCIUM 9.5     CBC:     Recent Labs  Lab 17  0507   WBC 12.08   RBC 4.61   HGB 13.9   HCT 41.7      MCV 91   MCH 30.2   MCHC 33.3     Lipid Panel:   No results for input(s): CHOL, LDLCALC, HDL, TRIG in the last 168 hours.  Coagulation:   No results for input(s): INR, APTT in the last 168 hours.    Invalid input(s): PT  Platelet Aggregation Study: No results for input(s): PLTAGG, PLTAGINTERP, PLTAGREGLACO, ADPPLTAGGREG in the last 168 hours.  Hgb A1C:   No results for input(s): HGBA1C in the last 168 hours.  TSH:   No results for input(s): TSH in the last 168 hours.    Diagnostic Results:  I have personally reviewed:   Findings:     MRI brain WO contrast (17)  R insular cortex, right corona radiata and right parietal lobe infarct. +cytotoxic cerebral edema           17 - echo   Moderately enlarged L A    1 - Low normal to mildly depressed left ventricular systolic function (EF 50-55%).     2 - Impaired LV relaxation, elevated LAP  (grade 2 diastolic dysfunction).     3 - The estimated PA systolic pressure is 32 mmHg.     4 - Mild mitral regurgitation.     5 - Trivial tricuspid regurgitation.     6 - Moderate left atrial enlargement.

## 2017-06-23 NOTE — PLAN OF CARE
Problem: Physical Therapy Goal  Goal: Physical Therapy Goal  Goals to be met by: 17     Patient will increase functional independence with mobility by performin. Supine to sit with Moderate Assistance -  Met   2. Sit to supine with Moderate Assistance - not met  3. Sit to stand transfer with Moderate Assistance- met   4. Bed to chair transfer with Moderate Assistance using Rolling Walker or appropriate AD. - not met  5. Gait  x 10 feet with Maximum Assistance using Rolling Walker. -  Met  with hemiwalker  6. Lower extremity exercise program x10 reps with supervision for strengthening and endurance with functional mobility. -  Met   7. Pt will perform static standing x 2 minutes using Rolling Walker and demonstrated upright/midline trunk orientation with Minimal Assistance for balance. - not met  8. Pt will tolerated sitting x 8 minutes with Minimal Assistance to maintain midline orientation & balance while performing dynamic activities. - not met  9. Supine to sit with SBA-not met  10. Sit to/from stand with min assist. -not met  11. Pt receive gait training ~ 30 ft with AD and moderate assist- not met        Patient goals remain appropriate.  Patient will continue to benefit from further PT services.  Salomón Segundo, PTA

## 2017-06-23 NOTE — PLAN OF CARE
Sw informed pt is able to transfer to [facility] today. [facility] is going to provide transportation and will pick pt up at approximately 17:30.     RN please call report to [facility] 1 ph: [phone].     Jason Baltazar, EDITH   [id number]

## 2017-06-23 NOTE — PLAN OF CARE
Problem: SLP Goal  Goal: SLP Goal  Speech Language Pathology Goals  Goals expected to be met by 6/27    1. Pt will perform oropharyngeal strengthening exercises X10 per session provided min cues to improve safe swallow function.  2. Pt will recall 3/3 speech strategies  3 Pt will complete dysarthria task with 80% acc and mod a to improve speech   4 Pt will complete dysphagia exercises with 80% acc and mod a to improve dysphagia  5 Pt will complete mental manipulation task with 80% acc and min a to improve cognition              Outcome: Ongoing (interventions implemented as appropriate)  Pt participating well with all tasks.  Cont POC. SIVAKUMAR Oakes, CCC/SLP  6/23/2017

## 2017-06-23 NOTE — PROGRESS NOTES
[facility]  Vascular Neurology  Comprehensive Stroke Center  Progress Note    Assessment/Plan:       * Embolic stroke involving right carotid artery    88 y/o female with PMHx of A fib, HTN presenting with left hemiparesis. Imaging remarkable for R hemispheric infarcts; embolic pattern. CTA remarkable for 90% stenosis of the right carotid artery and R M2. Etiology of stroke is likely artery to artery - atheroembolic from symptomatic R IC rather than cardio-embolic. Vascular surgery consult deferred as patient does not want surgical management of the R IC even if offered; will medically optimize.     Of note patient has prior history of strokes noted on imaging with a CHADsVASc of 7 => high risk; 1 year stroke risk of 15.7%. Patient and family aware that anticoagulation would be optimal management in the setting of known A fib and prior strokes for secondary stroke prevention but patient chosen to NOT be anticoagulated due to concerns by the family regarding patient's frequent falls.   -Antithrombotics for secondary stroke prevention: Antiplatelets:  Aspirin: 81 mg oral now and daily and Clopidogrel: 75 mg oral daily   -Statins for secondary stroke prevention and hyperlipidemia, if present: Atorvastatin- 40 mg oral daily,   -Aggressive risk factor modification: Hypertension, Carotid Artery disease and CHF   -Rehab Efforts: Physical Therapy, Occupational Therapy and Speech and Language Pathology.   -Diagnostics: Ordered/Pending: none  -VTE Prophylaxis: Heparin 5000 units SQ every 8 hours        S/P percutaneous endoscopic gastrostomy (PEG) tube placement 6/21/17    - tolerating tube feeds        Hemiparesis, left    Left arm predominant hemiparesis  2/2 stroke  PT/OT recommending SNF        Oropharyngeal dysphagia    2/2 stroke  - strict NPO per speech  - s/p PEG tube placement on 6/21; tolerating feeds        Chronic diastolic heart failure    -Stroke Risk Factor  -grade 2 on most recent ECHO  -  on coreg        Enlarged LA (left atrium)    -as previously identified on ECHO        Chronic atrial fibrillation    -Stroke Risk Factor  -patient and family opted to not be on anticoagulation at this time because of concerns of increased bleeding risk from falls  - coreg for rate control         Internal carotid artery stenosis    -High grade stenosis of the right ICA  -previously identified and again seen on imaging  - likely etiology of most recent stroke  -maximum medical management as patient does not want any surgical intervention.        History of CHF (congestive heart failure)    -Stroke Risk Factor  -holding home meds to allow for adequate cerebral perfusion         CAD (coronary artery disease)    -Stroke Risk Factor  -ASA 81mg        Essential hypertension    - SBP goal 150-160 in setting of hemodynamically significant stenosis of the R ICA  - amlodipine 5mg BID  - Coreg to 12.5 BID          Dispo: discharge to SNF today    Neurologic Chief Complaint: left hemiparesis    Subjective:     Interval History: Patient is seen for follow-up neurological assessment and treatment recommendations:   No acute events overnight.     HPI, Past Medical, Family, and Social History remains the same as documented in the initial encounter.     Review of Systems   Constitutional: Negative for fever.   HENT: Positive for trouble swallowing.    Eyes: Negative for visual disturbance.   Neurological: Positive for weakness.     Scheduled Meds:   amlodipine  5 mg Per G Tube Daily    aspirin  81 mg Per G Tube Daily    atorvastatin  40 mg Per G Tube Daily    carvedilol  12.5 mg Per G Tube BID    clopidogrel  75 mg Per G Tube Daily    heparin (porcine)  5,000 Units Subcutaneous Q8H    mupirocin   Topical (Top) Daily    pantoprazole  40 mg Per G Tube Daily    sodium chloride 0.9%  3 mL Intravenous Q8H     Continuous Infusions:     PRN Meds:acetaminophen, labetalol    Objective:     Vital Signs (Most Recent):  Temp: 97.9 °F  (36.6 °C) (17 1100)  Pulse: 88 (17 1100)  Resp: 17 (17 1100)  BP: 131/73 (17 1100)  SpO2: 97 % (17 1100)  BP Location: Left arm    Vital Signs Range (Last 24H):  Temp:  [97.1 °F (36.2 °C)-98.9 °F (37.2 °C)]   Pulse:  []   Resp:  [17-18]   BP: (129-177)/(60-96)   SpO2:  [93 %-99 %]   BP Location: Left arm    Physical Exam   Constitutional: She appears well-developed and well-nourished.   HENT:   Head: Normocephalic and atraumatic.   Eyes: EOM are normal.   Cardiovascular: Normal rate.    Pulmonary/Chest: Effort normal.   Neurological: She is alert.       Neurological Exam:   LOC: alert and follows requests  Language: No aphasia  Speech: Dysarthria  Visual Fields (CN II): Full  EOM (CN III, IV, VI): Full/intact  Facial Movement (CN VII): left lower facial asymmetry  Motor*: Arm Left:  Paretic (2/5), Leg Left:   Paretic:  4/5, Arm Right:   Normal (5/5), Leg Right:   Normal (5/5)  Sensation: intact to light touch, temperature and vibration  Tone: Arm-Left: normal; Leg-Left: normal; Arm-Right: normal; Leg-Right: normal   Extinction to bilateral stimulation    NIH Stroke Scale:    Level of Consciousness: 0 - alert  LOC Questions: 0 - answers both correctly  LOC Commands: 0 - performs both correctly  Best Gaze: 0 - normal  Visual: 0 - no visual loss  Facial Palsy: 1 - minor  Motor Left Arm: 3 - no effort against gravity  Motor Right Arm: 0 - no drift  Motor Left Le - drift  Motor Right Le - no drift  Limb Ataxia: 0 - absent  Sensory: 0 - normal  Best Language: 0 - no aphasia  Dysarthria: 2 - near to unintelligible  Extinction and Inattention: 1 - partial neglect  NIH Stroke Scale Total: 8      Laboratory:  CMP:   No results for input(s): GLUCOSE, CALCIUM, ALBUMIN, PROT, NA, K, CO2, CL, BUN, CREATININE, ALKPHOS, ALT, AST, BILITOT in the last 24 hours.  BMP:     Recent Labs  Lab 17  0507      K 3.3*      CO2 22*   BUN 14   CREATININE 0.8   CALCIUM 9.5     CBC:      Recent Labs  Lab 06/17/17  0507   WBC 12.08   RBC 4.61   HGB 13.9   HCT 41.7      MCV 91   MCH 30.2   MCHC 33.3     Lipid Panel:   No results for input(s): CHOL, LDLCALC, HDL, TRIG in the last 168 hours.  Coagulation:   No results for input(s): INR, APTT in the last 168 hours.    Invalid input(s): PT  Platelet Aggregation Study: No results for input(s): PLTAGG, PLTAGINTERP, PLTAGREGLACO, ADPPLTAGGREG in the last 168 hours.  Hgb A1C:   No results for input(s): HGBA1C in the last 168 hours.  TSH:   No results for input(s): TSH in the last 168 hours.    Diagnostic Results:  I have personally reviewed:   Findings:     MRI brain WO contrast (6/16/17)  R insular cortex, right corona radiata and right parietal lobe infarct. +cytotoxic cerebral edema           6/16/17 - echo   Moderately enlarged L A    1 - Low normal to mildly depressed left ventricular systolic function (EF 50-55%).     2 - Impaired LV relaxation, elevated LAP (grade 2 diastolic dysfunction).     3 - The estimated PA systolic pressure is 32 mmHg.     4 - Mild mitral regurgitation.     5 - Trivial tricuspid regurgitation.     6 - Moderate left atrial enlargement.       Day Valdez MD  Comprehensive Stroke Center  Department of Vascular Neurology   [facility]

## 2017-06-23 NOTE — PLAN OF CARE
Problem: Patient Care Overview  Goal: Plan of Care Review  Outcome: Ongoing (interventions implemented as appropriate)  POC reviewed with pt; able to verbalize acceptance and understanding.  VS stable.  AAOx4- speech slurred.  Questions answered/encouraged; reassurance provided.  Call light in reach, side rails up x3, nonskid socks on, bed alarm set, bed in lowest position with wheels locked.  SCDs and TEDs on; bilateral heel boots on.  PEG tube in place; tube feeds at goal rate of 35 mL/hr.  Pt tolerating well.  200 mL free water boluses done 4 x daily; done twice during shift.  Remains free from falls, skin breakdown, and injury.  Denies pain.  No acute events overnight.  Plan for SNF; transfer to [facility] possibly today.  Will continue to monitor.

## 2017-06-23 NOTE — PT/OT/SLP PROGRESS
"Speech Language Pathology  Treatment    Elizabeth Navarrete   MRN: [id number]   Admitting Diagnosis: Embolic stroke involving right carotid artery    Diet recommendations: Solid Diet Level: NPO  Liquid Diet Level: NPO strict aspiration precautions    SLP Treatment Date: 06/23/17  Speech Start Time: 0812     Speech Stop Time: 0837     Speech Total (min): 25 min       TREATMENT BILLABLE MINUTES:  Speech Therapy Individual 8 and Treatment Swallowing Dysfunction 17    Has the patient been evaluated by SLP for swallowing? : Yes  Keep patient NPO?: Yes   General Precautions: Standard, aspiration, fall, NPO          Subjective:  "It's hard to swallow."    Pain/Comfort  Pain Rating 1: 0/10  Pain Rating Post-Intervention 1: 0/10 (Pt reporting some itchiness of abdomen, lotion applied)    Objective:   Patient found with: telemetry, peripheral IV    Pt alert with positive affect, cooperative with all tasks.  She was unable to recall nay speech strategies presented yesterday, 0/5 recall at end of session following education. Speech intelligibility remains fair in conversation.  Lingual ROM and strengthening excs completed x10 with min cues and good ability.  Laryngeal elevation excs completed x5-10 given min cues and  increased time to initiate dry swallow.  Excs completed included falsetto /i/, effortful swallow and supraglottic swallow.  Higher pitch achieved with falsetto /i/ today though assistance required to lower tongue placement for maximum rise in pitch.  Pt with large tongue which occludes oral cavity with attempts.  1/4 tspns of pudding presented x5 with no overt s/s aspiration.  Mild anterior loss and L buccal pocketing observed.  Pt hesham to clear residue given min cues and multiple swallow.  White board remains accurate.            Assessment:  Elizabeth Navarrete is a 89 y.o. female with a medical diagnosis of Embolic stroke involving right carotid artery and presents with dysphagia, dysarthria, cognitive linguistic " impairment.    Discharge recommendations: Discharge Facility/Level Of Care Needs: nursing facility, skilled     Goals:    SLP Goals        Problem: SLP Goal    Goal Priority Disciplines Outcome   SLP Goal     SLP Ongoing (interventions implemented as appropriate)   Description:  Speech Language Pathology Goals  Goals expected to be met by 6/27    1. Pt will perform oropharyngeal strengthening exercises X10 per session provided min cues to improve safe swallow function.  2. Pt will recall 3/3 speech strategies  3 Pt will complete dysarthria task with 80% acc and mod a to improve speech   4 Pt will complete dysphagia exercises with 80% acc and mod a to improve dysphagia  5 Pt will complete mental manipulation task with 80% acc and min a to improve cognition                                Plan:   Patient to be seen Therapy Frequency: 5 x/week   Plan of Care expires: 07/15/17  Plan of Care reviewed with: patient  SLP Follow-up?: Yes  SLP - Next Visit Date: 06/26/17           SIVAKUMAR Oakes, CCC-SLP  06/23/2017

## 2017-06-23 NOTE — PT/OT/SLP PROGRESS
Occupational Therapy  Treatment    Elizabeth Navarrete   MRN: [id number]   Admitting Diagnosis: Embolic stroke involving right carotid artery    OT Date of Treatment: 06/23/17   OT Start Time: 1157  OT Stop Time: 1213  OT Total Time (min): 16 min    Billable Minutes:  Self Care/Home Management 16    General Precautions: Standard, aspiration, fall, NPO  Orthopedic Precautions: N/A    Subjective:  Communicated with RN prior to session.  Pt agreeable to therapy.   Pain/Comfort  Pain Rating 1: 0/10    Objective:  Patient found with: telemetry (PEG)     Functional Mobility:  Bed Mobility:  Rolling/Turning to Left: Minimum assistance, With side rail  Rolling/Turning Right: Minimum assistance, With side rail  Scooting/Bridging: Moderate Assistance  Supine to Sit: Minimum Assistance  Sit to Supine: Minimum Assistance    Transfers:   Sit <> Stand Assistance: Maximum Assistance  Sit <> Stand Assistive Device: No Assistive Device  Bed <> Chair Technique: Stand Pivot  Bed <> Chair Transfer Assistance: Maximum Assistance  Bed <> Chair Assistive Device: No Assistive Device    Functional Ambulation: DNT    Activities of Daily Living:  Feeding Level of Assistance: Activity did not occur  LE Dressing Level of Assistance: Total assistance     Balance:   Static Sit: FAIR-: Maintains without assist but inconsistent   Dynamic Sit: FAIR: Cannot move trunk without losing balance  Static Stand: 0: Needs MAXIMAL assist to maintain   Dynamic stand: 0: N/A    Therapeutic Activities and Exercises:  Pt found sitting up in chair upon arrival. She expressed need for bedpan. Pt was transferred back to bed with total assist stand pivot transfer. Pt returned to supine with max A. She was able to bridge for placement of bed pan. Required total assist with cleaning after voiding. Total assist to scoot up towards HOB. L UE positioned well.       AM-PAC 6 CLICK ADL   How much help from another person does this patient currently need?   1 = Unable, Total/Dependent  "Assistance  2 = A lot, Maximum/Moderate Assistance  3 = A little, Minimum/Contact Guard/Supervision  4 = None, Modified [place]/Independent    Putting on and taking off regular lower body clothing? : 2  Bathing (including washing, rinsing, drying)?: 2  Toileting, which includes using toilet, bedpan, or urinal? : 2  Putting on and taking off regular upper body clothing?: 2  Taking care of personal grooming such as brushing teeth?: 2  Eating meals?: 1  Total Score: 11     AM-PAC Raw Score CMS "G-Code Modifier Level of Impairment Assistance   6 % Total / Unable   7 - 8 CM 80 - 100% Maximal Assist   9-13 CL 60 - 80% Moderate Assist   14 - 19 CK 40 - 60% Moderate Assist   20 - 22 CJ 20 - 40% Minimal Assist   23 CI 1-20% SBA / CGA   24 CH 0% Independent/ Mod I       Patient left supine with all lines intact and call button in reach    ASSESSMENT:  Elizabeth Navarrete is a 89 y.o. female with a medical diagnosis of Embolic stroke involving right carotid artery and presents with decline in ADLs and functional mobility. Pt would benefit from skilled OT services in order to maximize independence with ADLs and facilitate safe discharge.    Rehab identified problem list/impairments: Rehab identified problem list/impairments: weakness, impaired endurance, impaired sensation, impaired self care skills, impaired functional mobilty, decreased upper extremity function, decreased lower extremity function    Rehab potential is good.    Activity tolerance: Good    Discharge recommendations: Discharge Facility/Level Of Care Needs: nursing facility, skilled     Barriers to discharge: Barriers to Discharge: Inaccessible home environment, Decreased caregiver support    Equipment recommendations: other (see comments) (TBD in next level of care)     GOALS:    Occupational Therapy Goals        Problem: Occupational Therapy Goal    Goal Priority Disciplines Outcome Interventions   Occupational Therapy Goal     OT, PT/OT Ongoing " (interventions implemented as appropriate)    Description:  Goals to be met by: 6/30     Patient will increase functional independence with ADLs by performing:    UE Dressing while seated with Set-up Assistance and Minimal Assistance.  LE Dressing with Set-up Assistance and Moderate Assistance.  Grooming while standing with Set-up Assistance and Minimal Assistance.  Sitting at edge of bed x10 minutes for dynamic functional task with Contact Guard Assistance.  Stand pivot transfers with Minimal Assistance.  Toilet transfer to bedside commode with Minimal Assistance.  Pt/CG verbalized understanding for s/s of stroke.  CG demo understanding for positioning of L UE.   CG demo understanding for L UE exercises.                       Plan:  Patient to be seen 6 x/week to address the above listed problems via self-care/home management, therapeutic activities, therapeutic exercises, neuromuscular re-education  Plan of Care expires: 07/15/17  Plan of Care reviewed with: patient         Bri LAVONNE Torres  06/23/2017

## 2017-06-23 NOTE — PLAN OF CARE
Salomón spoke with Ekaterina at [facility] who stated family is scheduled to visit facility to sign paperwork at 11:45. Sw will send NH orders when available. Pt potentially transfer to facility after paperwork is signed and orders approved by ELVIRA at [facility]. Salomón will follow.     Jason Baltazar, EDITH   [id number]

## 2017-06-23 NOTE — PLAN OF CARE
[facility]     Department of Hospital Medicine     [address] [place] [address]     [phone] after hours  [phone] fax       NURSING HOME ORDERS    06/23/2017    Admit to Nursing Home: Skilled Bed                                                  Diagnoses:  Active Hospital Problems    Diagnosis  POA    *Embolic stroke involving right carotid artery [I63.131]  Yes     Priority: 1 - High    S/P percutaneous endoscopic gastrostomy (PEG) tube placement 6/21/17 [Z93.1]  Not Applicable    Oropharyngeal dysphagia [R13.12]  Yes    Hemiparesis, left [G81.94]  Yes    Chronic atrial fibrillation [I48.2]  Yes     2016  No OAC due to falls      Chronic diastolic heart failure [I50.32]  Yes    Enlarged LA (left atrium) [I51.7]  Yes     9/16      Essential hypertension [I10]  Yes    CAD (coronary artery disease) [I25.10]  Yes    Internal carotid artery stenosis [I65.29]  Yes     50% Right ICA 7/12        Resolved Hospital Problems    Diagnosis Date Resolved POA   No resolved problems to display.       Patient is homebound due to:  Embolic stroke involving right carotid artery    Allergies:  Review of patient's allergies indicates:   Allergen Reactions    Pcn [penicillins] Rash       Vitals:       Every shift (Skilled Nursing patients)    Diet:  Tube Feeding:   Isosource 1.5     - Continuous at 35 mL per hour. Hold for residuals >400 mL    - Flush tube with 300 mL water every 8 hours    Acitivities:    - Up in a chair each morning as tolerated   - Ambulate with assistance to bathroom   - Scheduled walks once each shift (every 8 hours)    LABS:  Per facility protocol     Nursing Precautions:    - Aspiration precautions:             -  Suction at bedside          - Fall precautions per nursing home protocol   - Seizure precaution per shelter protocol   - Decubitus precautions:        -  for positioning   - Pressure reducing foam mattress   - Turn  patient every two hours. Use wedge pillows to anchor patient    CONSULTS:     Physical Therapy to evaluate and treat     Occupational Therapy to evaluate and treat     Speech Therapy  to evaluate and treat      MISCELLANEOUS CARE:        PEG Care:  Clean site every 24 hours     Routine Skin for Bedridden Patients:  Apply moisture barrier cream to all skin folds and wet areas in perineal area daily and after baths and all bowel movements.    Wound care: Left upper back; abrasion. Recommend applying bactroban daily and cover with telfa island dressing.     Medications: Discontinue all previous medication orders, if any. See new list below.     Elizabeth Navarrete   Home Medication Instructions SHANNAN:[id number]    Printed on:06/23/17 1110   Medication Information                      amlodipine (NORVASC) 5 MG tablet  TAKE 1 TABLET per G tube DAILY             aspirin (ECOTRIN) 325 MG EC tablet  Take 325 mg per G tube daily.             atorvastatin (LIPITOR) 40 MG tablet  1 tablet (40 mg total) by Per G Tube route once daily.             carvedilol (COREG) 25 MG tablet  TAKE 1 TABLET PER G TUBE TWICE DAILY             cholecalciferol, vitamin D3, (VITAMIN D3) 1,000 unit capsule  Take 1,000 Units PER G TUBE once daily.             clopidogrel (PLAVIX) 75 mg tablet  1 tablet (75 mg total) by Per G Tube route once daily.             diclofenac sodium (VOLTAREN) 1 % Gel  Apply 4 g topically 4 (four) times daily as needed.                        mupirocin (BACTROBAN) 2 % ointment  Apply topically once daily. Apply to left upper back wound, cover with telfa island             omeprazole (PRILOSEC) 20 MG capsule  Take 20 mg PER G TUBE daily.             venlafaxine (EFFEXOR-XR) 75 MG 24 hr capsule  TAKE 1 CAPSULE PER G TUBE EVERY MORNING                       _________________________________  Day Valdez MD  06/23/2017

## 2017-06-23 NOTE — PT/OT/SLP PROGRESS
Physical Therapy  Treatment    Elizabeth Navarrete   MRN: [id number]   Admitting Diagnosis: Embolic stroke involving right carotid artery    PT Received On: 06/23/17  PT Start Time: 1018     PT Stop Time: 1050    PT Total Time (min): 32 min       Billable Minutes:  Gait [id number], Therapeutic Activity 10 and Therapeutic Exercise 10    Treatment Type: Treatment  PT/PTA: PTA     PTA Visit Number: 1       General Precautions: Standard, aspiration, fall, NPO  Orthopedic Precautions: N/A   Braces:      Do you have any cultural, spiritual, Pentecostal conflicts, given your current situation?: none stated    Subjective:  Communicated with NSG prior to session.  I'm leaving today.    Pain/Comfort  Pain Rating 1: 0/10 (Patient compalined of soreness from PEG tube)    Objective:   Patient found with: telemetry    Functional Mobility:  Bed Mobility:   Supine to Sit: Moderate Assistance (VC's for technique)    Transfers:  Sit <> Stand Assistance: Total Assistance  Sit <> Stand Assistive Device: Hemiwalker  Bed <> Chair Technique: Stand Pivot  Bed <> Chair Assistance: Maximum Assistance  Bed <> Chair Assistive Device: No Assistive Device    Gait:   Gait Distance: 10 feet   Assistance 1: Total assistance  Gait Assistive Device: Elian Walker  Gait Pattern: 3-point gait  Gait Deviation(s): decreased shefali, increased time in double stance, decreased velocity of limb motion, decreased step length, decreased stride length, decreased weight-shifting ability, foot flat, backward lean, lateral lean.  Max VC's for sequence, posture, walker p[lacement.        Balance:   Static Sit: FAIR: Maintains without assist, but unable to take any challenges   Dynamic Sit: POOR: N/A  Static Stand: 0: Needs MAXIMAL assist to maintain   Dynamic stand: 0: N/A     Therapeutic Activities and Exercises:  Patient tolerated sitting EOB 3-4 min with  CGA/min assistance  Patient tolerated static standing with HW 20-30sec with mod assistance.  Patient performed seated  exercises AP,LAQ, and hip flexion x15 reps B LE's        AM-PAC 6 CLICK MOBILITY  How much help from another person does this patient currently need?   1 = Unable, Total/Dependent Assistance  2 = A lot, Maximum/Moderate Assistance  3 = A little, Minimum/Contact Guard/Supervision  4 = None, Modified [place]/Independent    Turning over in bed (including adjusting bedclothes, sheets and blankets)?: 2  Sitting down on and standing up from a chair with arms (e.g., wheelchair, bedside commode, etc.): 2  Moving from lying on back to sitting on the side of the bed?: 2  Moving to and from a bed to a chair (including a wheelchair)?: 2  Need to walk in hospital room?: 2  Climbing 3-5 steps with a railing?: 1  Total Score: 11    AM-PAC Raw Score CMS G-Code Modifier Level of Impairment Assistance   6 % Total / Unable   7 - 9 CM 80 - 100% Maximal Assist   10 - 14 CL 60 - 80% Moderate Assist   15 - 19 CK 40 - 60% Moderate Assist   20 - 22 CJ 20 - 40% Minimal Assist   23 CI 1-20% SBA / CGA   24 CH 0% Independent/ Mod I     Patient left up in chair with all lines intact, call button in reach and NSG notified.    Assessment:  Elizabeth Navarrete is a 89 y.o. female with a medical diagnosis of Embolic stroke involving right carotid artery and presents with decrease strength, mobility and balance.  Patient requires assistance with all mobility and transfers. Noted fair trunk control while seated but demonstrated poor control with gait.  Patient with decrease strength and coordination with L LE.  Patient able to follow commands and demonstrated better coordination with gait.  Patient progressing well toward goals.  Patient would benefit from further IP therapy.    Rehab identified problem list/impairments: Rehab identified problem list/impairments: weakness, impaired self care skills, gait instability, impaired endurance, impaired functional mobilty, impaired balance, decreased lower extremity function, decreased upper extremity  function, decreased ROM, impaired joint extensibility, decreased coordination    Rehab potential is fair.    Activity tolerance: Fair    Discharge recommendations: Discharge Facility/Level Of Care Needs: nursing facility, skilled     Barriers to discharge: Barriers to Discharge: Inaccessible home environment, Decreased caregiver support    Equipment recommendations: Equipment Needed After Discharge:  (TBD)     GOALS:    Physical Therapy Goals        Problem: Physical Therapy Goal    Goal Priority Disciplines Outcome Goal Variances Interventions   Physical Therapy Goal     PT/OT, PT Ongoing (interventions implemented as appropriate)     Description:  Goals to be met by: 17     Patient will increase functional independence with mobility by performin. Supine to sit with Moderate Assistance -  Met   2. Sit to supine with Moderate Assistance - not met  3. Sit to stand transfer with Moderate Assistance- met   4. Bed to chair transfer with Moderate Assistance using Rolling Walker or appropriate AD. - not met  5. Gait  x 10 feet with Maximum Assistance using Rolling Walker. -  Met  with hemiwalker  6. Lower extremity exercise program x10 reps with supervision for strengthening and endurance with functional mobility. -  Met   7. Pt will perform static standing x 2 minutes using Rolling Walker and demonstrated upright/midline trunk orientation with Minimal Assistance for balance. - not met  8. Pt will tolerated sitting x 8 minutes with Minimal Assistance to maintain midline orientation & balance while performing dynamic activities. - not met  9. Supine to sit with SBA-not met  10. Sit to/from stand with min assist. -not met  11. Pt receive gait training ~ 30 ft with AD and moderate assist- not met                         PLAN:    Patient to be seen 6 x/week  to address the above listed problems via gait training, therapeutic activities, therapeutic exercises, neuromuscular re-education  Plan of  Care expires: 07/16/17  Plan of Care reviewed with: patient         Salomón SIMON Sanya II, PTA  06/23/2017

## 2017-06-23 NOTE — DISCHARGE SUMMARY
[facility]  Vascular Neurology  Comprehensive Stroke Center  Discharge Summary     Summary:     Admit Date: 6/15/2017  7:21 PM    Discharge Date and Time: No discharge date for patient encounter.    Attending Physician: Timothy Crorigan MD     Discharge Provider: Day Valdez MD    History of Present Illness: 88 y/o female with history of afib, HTN, CHF, CKD who presented via EMS for slurred speech and left sided weakness.  Patient was last seen normal at 10:30am this morning.  Patient lives alone.  Family tried to call her about an hour prior to arrival but got no answer.  He went over to house and found patient on the bathroom floor.  Patient stated she fell but was not sure why.  Patient has no prior strokes or TIA.  She has never had these symptoms before.  Patient in known afib NOT on anticoagulation.  Family stated patient falls frequently and refuses to use a walker.  They decided against anticoagulation due to concerns of falling.      Hospital Course (synopsis of major diagnoses, care, treatment, and services provided during the course of the hospital stay): 88 y/o female with PMHx of A fib, HTN presenting with left hemiparesis. Imaging remarkable for R hemispheric infarcts; embolic pattern. CTA remarkable for 90% stenosis of the right carotid artery and R M2. Etiology of stroke is likely artery to artery - atheroembolic from symptomatic R IC rather than cardio-embolic. Vascular surgery consult deferred as patient does not want surgical management of the R IC even if offered; will medically optimize. Of note patient has prior history of strokes noted on imaging with a CHADsVASc of 7 => high risk; 1 year stroke risk of 15.7%. Patient and family aware that anticoagulation would be optimal management in the setting of known A fib and prior strokes for secondary stroke prevention but patient chosen to NOT be anticoagulated due to concerns by the family regarding patient's frequent  falls. Patient started on maximum medical management with aspirin, clopidogrel and atorvastatin. Patient seen and evaluated by PT/OT/SLP. Patient kept NPO given dysphagia post stroke and given poor improvement over course of admission, PEG tube was placed on . PT/OT evaluated patient and determined SNF to be safest discharge. Patient hemodynamically stable on the day of discharge.       NIH Stroke Scale:    Level of Consciousness: 0 - alert  LOC Questions: 0 - answers both correctly  LOC Commands: 0 - performs both correctly  Best Gaze: 0 - normal  Visual: 0 - no visual loss  Facial Palsy: 1 - minor  Motor Left Arm: 3 - no effort against gravity  Motor Right Arm: 0 - no drift  Motor Left Le - drift  Motor Right Le - no drift  Limb Ataxia: 0 - absent  Sensory: 0 - normal  Best Language: 0 - no aphasia  Dysarthria: 2 - near to unintelligible  Extinction and Inattention: 1 - partial neglect  NIH Stroke Scale Total: 8  Modified Ortega Scale:   Timeline: At discharge  Modified Ortega Score: 4 - moderately severe disability            Assessment/Plan:     Interventions: None    Complications: None    Research Candidate?:  No    Neurological deficit at discharge: Weakness: left, Dysarthria, dysphagia     Disposition: Skilled Nursing Facility    Final Active Diagnoses:    Diagnosis Date Noted POA    PRINCIPAL PROBLEM:  Embolic stroke involving right carotid artery [I63.131] 06/15/2017 Yes    S/P percutaneous endoscopic gastrostomy (PEG) tube placement 17 [Z93.1] 2017 Not Applicable    Oropharyngeal dysphagia [R13.12] 2017 Yes    Hemiparesis, left [G81.94] 2017 Yes    Chronic atrial fibrillation [I48.2] 2016 Yes    Chronic diastolic heart failure [I50.32] 2016 Yes    Enlarged LA (left atrium) [I51.7] 2016 Yes    Essential hypertension [I10] 10/10/2012 Yes    CAD (coronary artery disease) [I25.10] 10/10/2012 Yes    Internal carotid artery stenosis [I65.29]  10/10/2012 Yes      Problems Resolved During this Admission:    Diagnosis Date Noted Date Resolved POA     No new Assessment & Plan notes have been filed under this hospital service since the last note was generated.  Service: Vascular Neurology      Recommendations:     Post-discharge complication risks: Falls, Pneumonia, Seizure, Skin breakdown    Stroke Education given to: patient    Follow-up in Stroke Clinic in 30 days    Discharge Plan:  Antithrombotics: Aspirin 325mg, Clopidogrel 75mg  Statin: Atorvastatin 40mg  Aggresive risk factor modification:  Hypertension and Carotid Artery disease    Follow Up:  Follow-up Information     Caio Gardiner MD In 4 weeks.    Specialty:  Neurology  Contact information:  [address]  [facility] [address]  [phone]             DANNIE Montoya MD In 2 weeks.    Specialty:  Internal Medicine  Contact information:  [address] [facility]  [address]  [facility] [id number] [phone]                 Patient Instructions:     Diet Cardiac   Order Comments: See Stroke Patient Education Guide Booklet for details.     Call 911 for any of the following:   Order Comments: Call 911  right away if any of the following warning signs come on suddenly, even if the symptoms only last for a few minutes. With stroke, timing is very important.   - Warning Signs of Stroke:  - Weakness: You may feel a sudden weakness, tingling or loss of feeling on one side of your face or body.  - Vision Problems: You may have sudden double vision or trouble seeing in one or both eyes.  - Speech Problems: You may have sudden trouble talking, slured speech, or problems understanding others.  - Headache: You may have sudden, severe headache.  - Movement Problems: You may experience dizziness, a feeling of spinning, a loss of balance, a feeling of falling or blackouts.       Medications:  Reconciled Home Medications:   Current Discharge Medication List      START taking these medications    Details    atorvastatin (LIPITOR) 40 MG tablet 1 tablet (40 mg total) by Per G Tube route once daily.      clopidogrel (PLAVIX) 75 mg tablet 1 tablet (75 mg total) by Per G Tube route once daily.      mupirocin (BACTROBAN) 2 % ointment Apply topically once daily. Apply to left upper back wound, cover with telfa island         CONTINUE these medications which have NOT CHANGED    Details   amlodipine (NORVASC) 5 MG tablet TAKE 1 TABLET BY MOUTH DAILY  Qty: 30 tablet, Refills: 3    Associated Diagnoses: Essential hypertension, malignant      aspirin (ECOTRIN) 325 MG EC tablet Take 325 mg by mouth once daily.      carvedilol (COREG) 25 MG tablet TAKE 1 TABLET BY MOUTH TWICE DAILY  Qty: 60 tablet, Refills: 3      fish oil-omega-3 fatty acids 300-1,000 mg capsule Take 1 g by mouth once daily.      omeprazole (PRILOSEC) 20 MG capsule Take 20 mg by mouth once daily.      venlafaxine (EFFEXOR-XR) 75 MG 24 hr capsule TAKE 1 CAPSULE BY MOUTH EVERY MORNING WITH FOOD  Qty: 30 capsule, Refills: 3    Associated Diagnoses: Depression, unspecified depression type      cholecalciferol, vitamin D3, (VITAMIN D3) 1,000 unit capsule Take 1,000 Units by mouth once daily.      diclofenac sodium (VOLTAREN) 1 % Gel Apply 4 g topically 4 (four) times daily as needed.  Qty: 500 g, Refills: 2         STOP taking these medications       enalapril (VASOTEC) 20 MG tablet Comments:   Reason for Stopping:         glucosamine sulfate (GLUCOSAMINE) 500 mg Tab Comments:   Reason for Stopping:         PNV w/o calcium-iron fum-FA (M-VIT) 27-1 mg Tab Comments:   Reason for Stopping:         simvastatin (ZOCOR) 20 MG tablet Comments:   Reason for Stopping:         glucosamine-chondroitin 500-400 mg tablet Comments:   Reason for Stopping:               Day Valdez MD  Comprehensive Stroke Center  Department of Vascular Neurology   [facility]

## 2017-06-25 NOTE — PT/OT/SLP DISCHARGE
Physical Therapy Discharge Summary    Elizabeth Navarrete  MRN: [id number]   Embolic stroke involving right carotid artery   Patient Discharged from acute Physical Therapy on 17.  Please refer to prior PT noted date on 17 for functional status.     Assessment:   Goals partially met.  GOALS:    Physical Therapy Goals        Problem: Physical Therapy Goal    Goal Priority Disciplines Outcome Goal Variances Interventions   Physical Therapy Goal     PT/OT, PT Ongoing (interventions implemented as appropriate)     Description:  Goals to be met by: 17     Patient will increase functional independence with mobility by performin. Supine to sit with Moderate Assistance -  Met   2. Sit to supine with Moderate Assistance - not met  3. Sit to stand transfer with Moderate Assistance- met   4. Bed to chair transfer with Moderate Assistance using Rolling Walker or appropriate AD. - not met  5. Gait  x 10 feet with Maximum Assistance using Rolling Walker. -  Met  with hemiwalker  6. Lower extremity exercise program x10 reps with supervision for strengthening and endurance with functional mobility. -  Met   7. Pt will perform static standing x 2 minutes using Rolling Walker and demonstrated upright/midline trunk orientation with Minimal Assistance for balance. - not met  8. Pt will tolerated sitting x 8 minutes with Minimal Assistance to maintain midline orientation & balance while performing dynamic activities. - not met  9. Supine to sit with SBA-not met  10. Sit to/from stand with min assist. -not met  11. Pt receive gait training ~ 30 ft with AD and moderate assist- not met                       Reasons for Discontinuation of Therapy Services  Transfer to alternate level of care.      Plan:  Patient Discharged to: Skilled Nursing Facility.

## 2017-06-26 ENCOUNTER — PATIENT OUTREACH (OUTPATIENT)
Dept: ADMINISTRATIVE | Facility: CLINIC | Age: 82
End: 2017-06-26

## 2018-02-12 ENCOUNTER — HOSPITAL ENCOUNTER (EMERGENCY)
Facility: HOSPITAL | Age: 83
Discharge: HOME | End: 2018-02-13
Attending: EMERGENCY MEDICINE
Payer: MEDICARE

## 2018-02-12 DIAGNOSIS — K94.23 MALFUNCTION OF PERCUTANEOUS ENDOSCOPIC GASTROSTOMY (PEG) TUBE: Primary | ICD-10-CM

## 2018-02-12 PROCEDURE — 43760 *HC REPLACEMENT GASTROS TUBE: CPT

## 2018-02-12 PROCEDURE — 99285 EMERGENCY DEPT VISIT HI MDM: CPT | Mod: 25

## 2018-02-13 VITALS
TEMPERATURE: 98 F | HEIGHT: 65 IN | OXYGEN SATURATION: 98 % | BODY MASS INDEX: 21.16 KG/M2 | RESPIRATION RATE: 18 BRPM | HEART RATE: 97 BPM | SYSTOLIC BLOOD PRESSURE: 154 MMHG | WEIGHT: 127 LBS | DIASTOLIC BLOOD PRESSURE: 68 MMHG

## 2018-02-13 NOTE — DISCHARGE INSTRUCTIONS
You had a clogged PEG tube.  This occurs when PEG tube feedings or medications are not sufficiently flushed with water.    Please ensure medications are sufficiently crushed, and flushed with at least 20 CC of free water. Check patency of the G-tube after medication administration or feeds. If partially obstructed, instill 10-30cc of Coca-Cola and let sit for 5 minutes and attempt to clear obstruction.

## 2018-02-13 NOTE — ED PROVIDER NOTES
Encounter Date: 2/12/2018       History     Chief Complaint   Patient presents with    device complication     sent from [facility] for clogged peg tube     HPI   This is a 89 y.o. female who has a past medical history of CAD (coronary artery disease) (10/10/2012); CRF (chronic renal failure) (10/10/2012); Family history of breast cancer (10/10/2012); History of cardiac arrhythmia (10/10/2012); History of CHF (congestive heart failure) (10/10/2012); History of depression (10/10/2012); History of echocardiogram (10/10/2012); HTN (hypertension) (10/10/2012); Internal carotid artery stenosis (10/10/2012); and Personal history of gallstones (10/10/2012).   The patient presents to the Emergency Department with clogged PEG tube, sent from NH.   Pt has no other complaints at this time.   Denies any abdominal pain, weakness, lethargy.  Patient had similar occurrence a week or so ago and was sent to  ED.  Pt has a past surgical history that includes Eye surgery and Hysterectomy.      Review of patient's allergies indicates:   Allergen Reactions    Pcn [penicillins] Rash     Past Medical History:   Diagnosis Date    CAD (coronary artery disease) 10/10/2012    CRF (chronic renal failure) 10/10/2012    Family history of breast cancer 10/10/2012    History of cardiac arrhythmia 10/10/2012    History of CHF (congestive heart failure) 10/10/2012    History of depression 10/10/2012    History of echocardiogram 10/10/2012    HTN (hypertension) 10/10/2012    Internal carotid artery stenosis 10/10/2012    Personal history of gallstones 10/10/2012     Past Surgical History:   Procedure Laterality Date    EYE SURGERY      HYSTERECTOMY       Family History   Problem Relation Age of Onset    Cancer Mother      breast    Heart disease Neg Hx      Social History   Substance Use Topics    Smoking status: Never Smoker    Smokeless tobacco: Not on file    Alcohol use No     Review of Systems   Constitutional:  Negative for activity change, appetite change and fatigue.   Gastrointestinal: Negative for abdominal pain, nausea and vomiting.   Skin: Negative for color change and wound.   Neurological: Negative for weakness.       Physical Exam     Initial Vitals [02/12/18 2211]   BP Pulse Resp Temp SpO2   138/64 102 (!) 22 98.9 °F (37.2 °C) 95 %      MAP       88.67         Physical Exam    Nursing note and vitals reviewed.  Constitutional: No distress.   Pt appears bedbound   Eyes: Conjunctivae are normal. Pupils are equal, round, and reactive to light.   Cardiovascular: Normal rate and regular rhythm.   Pulmonary/Chest: Breath sounds normal.   Abdominal: Soft. Bowel sounds are normal. She exhibits no distension. There is no tenderness.   PEG tube in place.  No surrounding erythema or discharge or bleeding   Neurological: She is alert and oriented to person, place, and time.   Skin: Skin is warm and dry. Capillary refill takes less than 2 seconds. No erythema.   Psychiatric: She has a normal mood and affect.         ED Course   Procedures  Labs Reviewed - No data to display          Medical Decision Making:   Initial Assessment:   This is an 89-year-old female presents with clogged peg tube.  We attempted to flush the PEG tube with free water followed by Coca-Cola without excess.  The PEG to was removed by me manually.  I placed a 20 Cymro Blandon easily without complication.  Balloon was filled with 20 cc normal saline.  I flushed with free water without any resistance.    No further workup or treatment is needed at this time.  Patient is stable for discharge.  Instructions given to caretakers about how to manage PEG tube feedings and how to flush PEG tube after medications.                   ED Course      Clinical Impression:   The encounter diagnosis was Malfunction of percutaneous endoscopic gastrostomy (PEG) tube.                           Callum Mccann MD  02/13/18 0126

## 2018-02-13 NOTE — ED NOTES
Patient identifiers for Elizabeth Navarrete checked and correct.  LOC: The patient is awake, alert and aware of environment with an appropriate affect, the patient is oriented x 3 and speaking appropriately.  APPEARANCE: Elderly. Patient resting comfortably and in no acute distress, patient is clean and well groomed, patient's clothing are properly fastened.  SKIN: The skin is warm and dry.  Peg tube to abdomen.  RESPIRATORY: Airway is open and patent, respirations are spontaneous, patient has a normal effort and rate.

## 2018-02-13 NOTE — ED NOTES
Attempted to flush peg tube with coke.  Two nurses attempted unsuccessfully.  Dr. Mccann notified.

## 2018-06-11 ENCOUNTER — OFFICE VISIT (OUTPATIENT)
Dept: GASTROENTEROLOGY | Facility: CLINIC | Age: 83
End: 2018-06-11
Payer: MEDICARE

## 2018-06-11 DIAGNOSIS — K94.23 PEG TUBE MALFUNCTION: Primary | ICD-10-CM

## 2018-06-11 PROCEDURE — 99999 PR PBB SHADOW E&M-EST. PATIENT-LVL II: CPT | Mod: PBBFAC,,, | Performed by: INTERNAL MEDICINE

## 2018-06-11 PROCEDURE — 99212 OFFICE O/P EST SF 10 MIN: CPT | Mod: PBBFAC,PO | Performed by: INTERNAL MEDICINE

## 2018-06-11 PROCEDURE — 99205 OFFICE O/P NEW HI 60 MIN: CPT | Mod: S$PBB,,, | Performed by: INTERNAL MEDICINE

## 2018-06-11 NOTE — PROGRESS NOTES
Subjective:       Patient ID: Elizabeth Navarrete is a 89 y.o. female.    Chief Complaint: GI Problem (Eval PEG tube)    This is a 89-year-old female with a history of CVA with subsequent neurogenic dysphagia here for evaluation of a clogged PEG tube.  The PEG was exchanged for months ago in the emergency room with a 20 Latvian Blandon catheter being placed.  His been difficult to flush in the nursing is been unable to give some of her medications without significant difficulty. No vomiting or weight loss. She cannot give additional history.  Family is present to give additional history.  No surrounding abdominal pain or drainage.  No other exacerbating or relieving factors or other associated symptoms. Records reviewed.     The following portions of the patient's history were reviewed and updated as appropriate: allergies, current medications, past family history, past medical history, past social history, past surgical history and problem list.    (Portions of this note were dictated using voice recognition software and may contain dictation related errors in spelling/grammar/syntax not found on text review)    HPI  Review of Systems   Unable to perform ROS: Age       Objective:      Physical Exam   Constitutional: She is oriented to person, place, and time. She appears well-developed and well-nourished. No distress.   HENT:   Head: Normocephalic and atraumatic.   Eyes: Conjunctivae are normal. No scleral icterus.   Neck: Normal range of motion. Neck supple. No tracheal deviation present. No thyromegaly present.   Cardiovascular: Normal rate and regular rhythm.  Exam reveals no gallop and no friction rub.    Pulmonary/Chest: Effort normal. No respiratory distress. She has no wheezes.   Abdominal: Soft. Bowel sounds are normal. She exhibits no distension. There is no tenderness.   Gastrostomy in LUQ   Musculoskeletal: She exhibits edema and deformity.        Right wrist: She exhibits normal range of motion and no tenderness.         Left wrist: She exhibits normal range of motion and no tenderness.   Lymphadenopathy:        Head (right side): No submental and no submandibular adenopathy present.        Head (left side): No submental and no submandibular adenopathy present.   Neurological: She is oriented to person, place, and time. A cranial nerve deficit is present. Coordination abnormal.   Skin: Skin is warm and dry. No rash noted. She is not diaphoretic.   Psychiatric: She has a normal mood and affect. Her behavior is normal.   Nursing note and vitals reviewed.      Labs; reviewed  Assessment:       1. PEG tube malfunction        Plan:   1. Balloon port clogged, will plan endoscopic replacement   - risk factors: age, cad, anticoagulation

## 2018-06-11 NOTE — PATIENT INSTRUCTIONS
Patient scheduled for  ENDOSCOPY with Dr. Kolb on 06/12/2018. NPO instructions discussed and given to patient.  Lab will call two days prior to procedure date with an arrival time.  May not drive for 24 hours post procedure. Patient verbalized understanding. Ok to remain on Plavix

## 2018-06-12 ENCOUNTER — ANESTHESIA EVENT (OUTPATIENT)
Dept: ENDOSCOPY | Facility: HOSPITAL | Age: 83
End: 2018-06-12
Payer: MEDICARE

## 2018-06-12 ENCOUNTER — HOSPITAL ENCOUNTER (OUTPATIENT)
Facility: HOSPITAL | Age: 83
Discharge: NURSING, REHAB OR LONG-TERM CARE | End: 2018-06-14
Attending: INTERNAL MEDICINE | Admitting: HOSPITALIST
Payer: MEDICARE

## 2018-06-12 ENCOUNTER — SURGERY (OUTPATIENT)
Age: 83
End: 2018-06-12

## 2018-06-12 ENCOUNTER — ANESTHESIA (OUTPATIENT)
Dept: ENDOSCOPY | Facility: HOSPITAL | Age: 83
End: 2018-06-12
Payer: MEDICARE

## 2018-06-12 DIAGNOSIS — Z43.1 PEG (PERCUTANEOUS ENDOSCOPIC GASTROSTOMY) ADJUSTMENT/REPLACEMENT/REMOVAL: Primary | ICD-10-CM

## 2018-06-12 PROBLEM — Z86.73 HISTORY OF EMBOLIC STROKE: Chronic | Status: ACTIVE | Noted: 2017-06-15

## 2018-06-12 PROBLEM — R13.12 OROPHARYNGEAL DYSPHAGIA: Chronic | Status: ACTIVE | Noted: 2017-06-16

## 2018-06-12 PROBLEM — Z93.1 S/P PERCUTANEOUS ENDOSCOPIC GASTROSTOMY (PEG) TUBE PLACEMENT: Chronic | Status: ACTIVE | Noted: 2017-06-21

## 2018-06-12 PROBLEM — I69.354 HEMIPARESIS AFFECTING LEFT SIDE AS LATE EFFECT OF STROKE: Chronic | Status: ACTIVE | Noted: 2017-06-15

## 2018-06-12 PROCEDURE — 63600175 PHARM REV CODE 636 W HCPCS: Performed by: HOSPITALIST

## 2018-06-12 PROCEDURE — 43235 EGD DIAGNOSTIC BRUSH WASH: CPT | Mod: ,,, | Performed by: INTERNAL MEDICINE

## 2018-06-12 PROCEDURE — 43235 EGD DIAGNOSTIC BRUSH WASH: CPT | Performed by: INTERNAL MEDICINE

## 2018-06-12 PROCEDURE — 25000003 PHARM REV CODE 250: Performed by: INTERNAL MEDICINE

## 2018-06-12 PROCEDURE — 37000008 HC ANESTHESIA 1ST 15 MINUTES: Performed by: INTERNAL MEDICINE

## 2018-06-12 PROCEDURE — 25000003 PHARM REV CODE 250: Performed by: NURSE ANESTHETIST, CERTIFIED REGISTERED

## 2018-06-12 PROCEDURE — 63600175 PHARM REV CODE 636 W HCPCS: Performed by: NURSE ANESTHETIST, CERTIFIED REGISTERED

## 2018-06-12 PROCEDURE — G0378 HOSPITAL OBSERVATION PER HR: HCPCS

## 2018-06-12 PROCEDURE — 37000009 HC ANESTHESIA EA ADD 15 MINS: Performed by: INTERNAL MEDICINE

## 2018-06-12 RX ORDER — PROPOFOL 10 MG/ML
VIAL (ML) INTRAVENOUS
Status: DISCONTINUED | OUTPATIENT
Start: 2018-06-12 | End: 2018-06-12

## 2018-06-12 RX ORDER — SODIUM CHLORIDE 9 MG/ML
INJECTION, SOLUTION INTRAVENOUS CONTINUOUS
Status: DISCONTINUED | OUTPATIENT
Start: 2018-06-12 | End: 2018-06-14 | Stop reason: HOSPADM

## 2018-06-12 RX ORDER — HYDRALAZINE HYDROCHLORIDE 20 MG/ML
10 INJECTION INTRAMUSCULAR; INTRAVENOUS EVERY 8 HOURS PRN
Status: DISCONTINUED | OUTPATIENT
Start: 2018-06-12 | End: 2018-06-14 | Stop reason: HOSPADM

## 2018-06-12 RX ORDER — HEPARIN SODIUM 5000 [USP'U]/ML
5000 INJECTION, SOLUTION INTRAVENOUS; SUBCUTANEOUS EVERY 8 HOURS
Status: DISCONTINUED | OUTPATIENT
Start: 2018-06-12 | End: 2018-06-14 | Stop reason: HOSPADM

## 2018-06-12 RX ORDER — LIDOCAINE HCL/PF 100 MG/5ML
SYRINGE (ML) INTRAVENOUS
Status: DISCONTINUED | OUTPATIENT
Start: 2018-06-12 | End: 2018-06-12

## 2018-06-12 RX ORDER — VENLAFAXINE 75 MG/1
75 TABLET ORAL DAILY
Status: ON HOLD | COMMUNITY
End: 2020-01-01 | Stop reason: HOSPADM

## 2018-06-12 RX ORDER — ONDANSETRON 2 MG/ML
4 INJECTION INTRAMUSCULAR; INTRAVENOUS EVERY 8 HOURS PRN
Status: DISCONTINUED | OUTPATIENT
Start: 2018-06-12 | End: 2018-06-14 | Stop reason: HOSPADM

## 2018-06-12 RX ORDER — CHOLECALCIFEROL (VITAMIN D3) 50 MCG
20 CAPSULE ORAL DAILY
COMMUNITY
End: 2019-02-25

## 2018-06-12 RX ORDER — IBUPROFEN 200 MG
24 TABLET ORAL
Status: DISCONTINUED | OUTPATIENT
Start: 2018-06-12 | End: 2018-06-14 | Stop reason: HOSPADM

## 2018-06-12 RX ORDER — IBUPROFEN 200 MG
16 TABLET ORAL
Status: DISCONTINUED | OUTPATIENT
Start: 2018-06-12 | End: 2018-06-14 | Stop reason: HOSPADM

## 2018-06-12 RX ORDER — GLYCOPYRROLATE 0.2 MG/ML
INJECTION INTRAMUSCULAR; INTRAVENOUS
Status: DISCONTINUED | OUTPATIENT
Start: 2018-06-12 | End: 2018-06-12

## 2018-06-12 RX ORDER — SODIUM CHLORIDE 0.9 % (FLUSH) 0.9 %
5 SYRINGE (ML) INJECTION
Status: DISCONTINUED | OUTPATIENT
Start: 2018-06-12 | End: 2018-06-14 | Stop reason: HOSPADM

## 2018-06-12 RX ORDER — AMLODIPINE BESYLATE 5 MG/1
5 TABLET ORAL DAILY
Status: ON HOLD | COMMUNITY
End: 2019-01-01 | Stop reason: SDUPTHER

## 2018-06-12 RX ORDER — GLUCAGON 1 MG
1 KIT INJECTION
Status: DISCONTINUED | OUTPATIENT
Start: 2018-06-12 | End: 2018-06-14 | Stop reason: HOSPADM

## 2018-06-12 RX ORDER — ASPIRIN 325 MG
325 TABLET ORAL DAILY
Status: ON HOLD | COMMUNITY
End: 2020-01-01

## 2018-06-12 RX ORDER — CARVEDILOL 25 MG/1
25 TABLET ORAL 2 TIMES DAILY
Status: ON HOLD | COMMUNITY
End: 2019-01-01 | Stop reason: SDUPTHER

## 2018-06-12 RX ORDER — ACETAMINOPHEN 650 MG/1
650 SUPPOSITORY RECTAL EVERY 6 HOURS PRN
Status: DISCONTINUED | OUTPATIENT
Start: 2018-06-12 | End: 2018-06-14 | Stop reason: HOSPADM

## 2018-06-12 RX ADMIN — PROPOFOL 30 MG: 10 INJECTION, EMULSION INTRAVENOUS at 12:06

## 2018-06-12 RX ADMIN — LIDOCAINE HYDROCHLORIDE 20 MG: 20 INJECTION, SOLUTION INTRAVENOUS at 12:06

## 2018-06-12 RX ADMIN — HEPARIN SODIUM 5000 UNITS: 5000 INJECTION, SOLUTION INTRAVENOUS; SUBCUTANEOUS at 09:06

## 2018-06-12 RX ADMIN — SODIUM CHLORIDE: 0.9 INJECTION, SOLUTION INTRAVENOUS at 11:06

## 2018-06-12 RX ADMIN — GLYCOPYRROLATE 0.2 MG: 0.2 INJECTION, SOLUTION INTRAMUSCULAR; INTRAVENOUS at 12:06

## 2018-06-12 NOTE — SUBJECTIVE & OBJECTIVE
Past Medical History:   Diagnosis Date    CAD (coronary artery disease) 10/10/2012    CRF (chronic renal failure) 10/10/2012    Embolic stroke involving right carotid artery 6/15/2017    Family history of breast cancer 10/10/2012    History of cardiac arrhythmia 10/10/2012    History of CHF (congestive heart failure) 10/10/2012    History of depression 10/10/2012    History of echocardiogram 10/10/2012    HTN (hypertension) 10/10/2012    Internal carotid artery stenosis 10/10/2012    Personal history of gallstones 10/10/2012       Past Surgical History:   Procedure Laterality Date    BREAST LUMPECTOMY      EYE SURGERY      HYSTERECTOMY         Review of patient's allergies indicates:   Allergen Reactions    Pcn [penicillins] Rash       No current facility-administered medications on file prior to encounter.      Current Outpatient Prescriptions on File Prior to Encounter   Medication Sig    amlodipine (NORVASC) 5 MG tablet TAKE 1 TABLET BY MOUTH DAILY    aspirin (ECOTRIN) 325 MG EC tablet Take 325 mg by mouth once daily.    atorvastatin (LIPITOR) 40 MG tablet 1 tablet (40 mg total) by Per G Tube route once daily.    carvedilol (COREG) 25 MG tablet TAKE 1 TABLET BY MOUTH TWICE DAILY    cholecalciferol, vitamin D3, (VITAMIN D3) 1,000 unit capsule Take 1,000 Units by mouth once daily.    clopidogrel (PLAVIX) 75 mg tablet 1 tablet (75 mg total) by Per G Tube route once daily.    diclofenac sodium (VOLTAREN) 1 % Gel Apply 4 g topically 4 (four) times daily as needed.    fish oil-omega-3 fatty acids 300-1,000 mg capsule Take 1 g by mouth once daily.    mupirocin (BACTROBAN) 2 % ointment Apply topically once daily. Apply to left upper back wound, cover with telfa island    omeprazole (PRILOSEC) 20 MG capsule Take 20 mg by mouth once daily.    venlafaxine (EFFEXOR-XR) 75 MG 24 hr capsule TAKE 1 CAPSULE BY MOUTH EVERY MORNING WITH FOOD     Family History     Problem Relation (Age of Onset)    Cancer  Mother        Social History Main Topics    Smoking status: Never Smoker    Smokeless tobacco: Never Used    Alcohol use No    Drug use: No    Sexual activity: No     Review of Systems   Constitutional: Negative for chills and fever.   HENT: Positive for trouble swallowing. Negative for voice change.    Eyes: Negative for pain and redness.   Respiratory: Negative for cough and shortness of breath.    Cardiovascular: Negative for chest pain and leg swelling.   Gastrointestinal: Negative for abdominal pain, nausea and vomiting.   Genitourinary: Negative for difficulty urinating and dysuria.   Musculoskeletal: Negative for neck pain and neck stiffness.   Skin: Negative for color change and rash.   Neurological: Negative for syncope and light-headedness.     Objective:     Vital Signs (Most Recent):  Temp: 98.1 °F (36.7 °C) (06/12/18 1651)  Pulse: 81 (06/12/18 1651)  Resp: 18 (06/12/18 1651)  BP: 139/67 (06/12/18 1651)  SpO2: 95 % (06/12/18 1554) Vital Signs (24h Range):  Temp:  [97.5 °F (36.4 °C)-98.1 °F (36.7 °C)] 98.1 °F (36.7 °C)  Pulse:  [] 81  Resp:  [16-20] 18  SpO2:  [91 %-100 %] 95 %  BP: ()/(53-77) 139/67     Weight: 53 kg (116 lb 13.5 oz)  Body mass index is 18.86 kg/m².    Physical Exam   Constitutional: She appears well-developed. She appears cachectic. She is cooperative.   HENT:   Head: Normocephalic and atraumatic.   edentulous   Eyes: Conjunctivae are normal. No scleral icterus.   Neck: Neck supple. No tracheal deviation present.   Cardiovascular: Normal rate and normal heart sounds.    Pulmonary/Chest: Effort normal and breath sounds normal. No respiratory distress.   Abdominal: Soft. She exhibits no distension. There is no tenderness. There is no guarding.   Gastrostomy site dressed   Musculoskeletal: She exhibits no edema or tenderness.   Offloading boots in place   Neurological: She is alert.   Left hemiparesis   Skin: Skin is warm and dry.   Psychiatric: She has a normal mood and  affect.   Nursing note and vitals reviewed.          Significant Labs: All pertinent labs within the past 24 hours have been reviewed.    Significant Imaging: I have reviewed all pertinent imaging results/findings within the past 24 hours.   CT Abdomen Without Contrast 6/12/18: FINDINGS:  Evaluation is limited due to patient positioning.  Chronic changes are noted at the lung bases no formal scarring.  The heart appears enlarged.  There are coronary calcifications.  There is no pericardial or pleural fluid.  Evaluation of solid organs is limited due to lack of IV contrast.  The liver measures 13.5 cm, within normal limits.  No evidence for liver masses or biliary dilation.  The gallbladder is distended with multiple calcified stones, with a which is at the level of the gallbladder neck.  There are also calcifications along the gallbladder wall.  Right adrenal gland not clearly visualized.  There appears to be thickening of the left adrenal gland of uncertain etiology.  This could relate to sequela from prior hemorrhage versus hyperplasia.  Splenic calcifications noted.  Pancreas appears unremarkable.  The kidneys are small and demonstrate evidence for cortical thinning.  Fluid attenuations lesions are noted bilaterally, likely cysts.  No hydronephrosis.  Possible extrarenal pelvis noted on the left.  Proximal ureters are unremarkable.  Moderate size hiatal hernia is identified.  A gastrostomy tube tract is identified.  No gastrostomy tube is evident.  Stomach appears decompressed.  The visualized small bowel appears unremarkable.  There is a moderate amount of stool throughout the colon as well as diverticuli.  No bowel obstruction or inflammation.  There is no ascites or free air.  Aorta is normal in caliber.  There is severe atherosclerosis.  IVC is unremarkable.  There is no lymph node enlargement.  No hernia.  Subcutaneous soft tissues appear intact.  There is evidence for scoliosis.  There is no acute fracture  or osseous destructive process.  Impression:  1. Gastrostomy tube is not visualized.  There is a tract extending from the stomach body to the skin, presumably from prior gastrostomy tube placement.  2. Cholelithiasis and porcelain gallbladder as described above.  3. See body report for details and other incidental findings.

## 2018-06-12 NOTE — HPI
Elizabeth Navarrete is a 89 y.o. 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  She has resided at [facility] in [place], [place] since 6/23/17.  She is bed bound.  Her primary care physician is Dr. CHADWICK Montoya.     She had her PEG changed at [facility] on 2/12/18 with a 20 Bahamian Blandon catheter.  She saw gastroenterologist Dr. Anila Kolb on 6/11/18 for evaluation of difficulty flushing.  He noted the balloon port to be clogged, and scheduled endoscopic replacement.     Dr. 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  The balloon was unable to be visualized.  While waking up from anesthesia, she pulled the balloon out.  She was admitted to [facility] for Dr. Kolb to plan further management.  CT of the abdomen showed no remaining hardware.  She had no complaints.

## 2018-06-12 NOTE — OR NURSING
Dr Kolb @ [facility]. On assessment, tip of drain noted on the abdomen.Balloon not intact. Area cleanse with normal saline with gauze. Dsg CDI. Still awaiting orders for CT scan. Will continue to monitor.

## 2018-06-12 NOTE — OR NURSING
DEISY hose placed on patient. Patient refused to turn at this time. Education provided about pressure ulcer to patient and family. Will continue to monitor.

## 2018-06-12 NOTE — ASSESSMENT & PLAN NOTE
Chronic diastolic congestive heart failure  Takes amlodipine 5 mg daily, carvedilol 25 mg twice daily.  Hold until PEG placed.  Give hydralazine IV PRN.

## 2018-06-12 NOTE — ANESTHESIA PREPROCEDURE EVALUATION
06/12/2018  Elizabeth Navarrete is a 89 y.o., female with PEG for EGD.       Past Medical History:   Diagnosis Date    CAD (coronary artery disease) 10/10/2012    CRF (chronic renal failure) 10/10/2012    Family history of breast cancer 10/10/2012    History of cardiac arrhythmia 10/10/2012    History of CHF (congestive heart failure) 10/10/2012    History of depression 10/10/2012    History of echocardiogram 10/10/2012    HTN (hypertension) 10/10/2012    Internal carotid artery stenosis 10/10/2012    Personal history of gallstones 10/10/2012       Past Surgical History:   Procedure Laterality Date    BREAST LUMPECTOMY      EYE SURGERY      HYSTERECTOMY         2D Echo:  6/16/17:  CONCLUSIONS     1 - Low normal to mildly depressed left ventricular systolic function (EF 50-55%).     2 - Impaired LV relaxation, elevated LAP (grade 2 diastolic dysfunction).     3 - The estimated PA systolic pressure is 32 mmHg.     4 - Mild mitral regurgitation.     5 - Trivial tricuspid regurgitation.     6 - Moderate left atrial enlargement.     Pre-op Assessment    I have reviewed the Patient Summary Reports.      I have reviewed the Medications.     Review of Systems  Anesthesia Hx:  No problems with previous Anesthesia    Social:  Non-Smoker, No Alcohol Use    Cardiovascular:   Exercise tolerance: poor Hypertension CAD   CHF PVD    Renal/:   Chronic Renal Disease    Musculoskeletal:   Arthritis     Neurological:   CVA        Physical Exam  General:  Cachexia    Airway/Jaw/Neck:  Airway Findings: Mouth Opening: Normal Tongue: Normal  Mallampati: I  Jaw/Neck Findings:  Neck ROM: Normal ROM      Dental:  Dental Findings: Edentulous   Chest/Lungs:  Chest/Lungs Findings: Clear to auscultation, Normal Respiratory Rate     Heart/Vascular:  Heart Findings: Rate: Normal  Rhythm: Irregularly Irregular             Anesthesia  Plan  Type of Anesthesia, risks & benefits discussed:  Anesthesia Type:  MAC  Patient's Preference:   Intra-op Monitoring Plan: standard ASA monitors  Intra-op Monitoring Plan Comments:   Post Op Pain Control Plan:   Post Op Pain Control Plan Comments:   Induction:   IV  Beta Blocker:  Patient is on a Beta-Blocker and has received one dose within the past 24 hours (No further documentation required).       Informed Consent: Patient representative understands risks and agrees with Anesthesia plan.  Questions answered. Anesthesia consent signed with patient representative.  ASA Score: 4     Day of Surgery Review of History & Physical:    H&P update referred to the surgeon.         Ready For Surgery From Anesthesia Perspective.

## 2018-06-12 NOTE — H&P (VIEW-ONLY)
Subjective:       Patient ID: Elizabeth Navarrete is a 89 y.o. female.    Chief Complaint: GI Problem (Eval PEG tube)    This is a 89-year-old female with a history of CVA with subsequent neurogenic dysphagia here for evaluation of a clogged PEG tube.  The PEG was exchanged for months ago in the emergency room with a 20 Sami Blandon catheter being placed.  His been difficult to flush in the nursing is been unable to give some of her medications without significant difficulty. No vomiting or weight loss. She cannot give additional history.  Family is present to give additional history.  No surrounding abdominal pain or drainage.  No other exacerbating or relieving factors or other associated symptoms. Records reviewed.     The following portions of the patient's history were reviewed and updated as appropriate: allergies, current medications, past family history, past medical history, past social history, past surgical history and problem list.    (Portions of this note were dictated using voice recognition software and may contain dictation related errors in spelling/grammar/syntax not found on text review)    HPI  Review of Systems   Unable to perform ROS: Age       Objective:      Physical Exam   Constitutional: She is oriented to person, place, and time. She appears well-developed and well-nourished. No distress.   HENT:   Head: Normocephalic and atraumatic.   Eyes: Conjunctivae are normal. No scleral icterus.   Neck: Normal range of motion. Neck supple. No tracheal deviation present. No thyromegaly present.   Cardiovascular: Normal rate and regular rhythm.  Exam reveals no gallop and no friction rub.    Pulmonary/Chest: Effort normal. No respiratory distress. She has no wheezes.   Abdominal: Soft. Bowel sounds are normal. She exhibits no distension. There is no tenderness.   Gastrostomy in LUQ   Musculoskeletal: She exhibits edema and deformity.        Right wrist: She exhibits normal range of motion and no tenderness.         Left wrist: She exhibits normal range of motion and no tenderness.   Lymphadenopathy:        Head (right side): No submental and no submandibular adenopathy present.        Head (left side): No submental and no submandibular adenopathy present.   Neurological: She is oriented to person, place, and time. A cranial nerve deficit is present. Coordination abnormal.   Skin: Skin is warm and dry. No rash noted. She is not diaphoretic.   Psychiatric: She has a normal mood and affect. Her behavior is normal.   Nursing note and vitals reviewed.      Labs; reviewed  Assessment:       1. PEG tube malfunction        Plan:   1. Balloon port clogged, will plan endoscopic replacement   - risk factors: age, cad, anticoagulation

## 2018-06-12 NOTE — ASSESSMENT & PLAN NOTE
Oropharyngeal dysphagia  Gastrostomy tube dependent, 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  Give IV fluids until PEG placed.

## 2018-06-12 NOTE — ASSESSMENT & PLAN NOTE
Hemiparesis affecting left side as late effect of stroke  Turn patient every 2 hours.  Takes aspirin 325 mg daily, clopidrogel 75 mg daily, atorvastatin 40 mg daily.  Hold until PEG placed.

## 2018-06-12 NOTE — ASSESSMENT & PLAN NOTE
Enlarged LA (left atrium)  Takes aspirin 325 mg daily, carvedilol 25 mg BID.  Give heparin TID for now.

## 2018-06-12 NOTE — ANESTHESIA POSTPROCEDURE EVALUATION
"Anesthesia Post Evaluation    Patient: Elizabeth Navarrete    Procedure(s) Performed: Procedure(s) (LRB):  ESOPHAGOGASTRODUODENOSCOPY (EGD) (N/A)    Final Anesthesia Type: MAC  Patient location during evaluation: GI PACU  Patient participation: Yes- Able to Participate  Level of consciousness: awake and alert  Post-procedure vital signs: reviewed and stable  Pain management: adequate  Airway patency: patent  PONV status at discharge: No PONV  Anesthetic complications: no      Cardiovascular status: blood pressure returned to baseline, hemodynamically stable and stable  Respiratory status: unassisted, spontaneous ventilation and room air  Hydration status: euvolemic  Follow-up not needed.        Visit Vitals  BP (!) 143/73 (BP Location: Left arm, Patient Position: Lying)   Pulse 96   Temp 36.4 °C (97.6 °F) (Oral)   Resp 18   Ht 5' 6" (1.676 m)   Wt 57.6 kg (127 lb)   LMP  (LMP Unknown)   SpO2 100%   Breastfeeding? No   BMI 20.50 kg/m²       Pain/Toño Score: Pain Assessment Performed: Yes (6/12/2018 11:15 AM)  Presence of Pain: denies (6/12/2018 11:15 AM)      "

## 2018-06-12 NOTE — [employer] PATIENT INSTRUCTIONS
Discharge Summary/Instructions after an Endoscopic Procedure  Patient Name: Elizabeth Navarrete  Patient MRN: [id number]  Patient YOB: 1928 Tuesday, June 12, 2018  Anila Kolb MD  RESTRICTIONS:  During your procedure today, you received medications for sedation.  These   medications may affect your judgment, balance and coordination.  Therefore,   for 24 hours, you have the following restrictions:   - DO NOT drive a car, operate machinery, make legal/financial decisions,   sign important papers or drink alcohol.    ACTIVITY:  Today: no heavy lifting, straining or running due to procedural   sedation/anesthesia.  The following day: return to full activity including work.  DIET:  Eat and drink normally unless instructed otherwise.     TREATMENT FOR COMMON SIDE EFFECTS:  - Mild abdominal pain, nausea, belching, bloating or excessive gas:  rest,   eat lightly and use a heating pad.  - Sore Throat: treat with throat lozenges and/or gargle with warm salt   water.  - Because air was used during the procedure, expelling large amounts of air   from your rectum or belching is normal.  - If a bowel prep was taken, you may not have a bowel movement for 1-3 days.    This is normal.  SYMPTOMS TO WATCH FOR AND REPORT TO YOUR PHYSICIAN:  1. Abdominal pain or bloating, other than gas cramps.  2. Chest pain.  3. Back pain.  4. Signs of infection such as: chills or fever occurring within 24 hours   after the procedure.  5. Rectal bleeding, which would show as bright red, maroon, or black stools.   (A tablespoon of blood from the rectum is not serious, especially if   hemorrhoids are present.)  6. Vomiting.  7. Weakness or dizziness.  GO DIRECTLY TO THE NEAREST EMERGENCY ROOM IF YOU HAVE ANY OF THE FOLLOWING:      Difficulty breathing              Chills and/or fever over 101 F   Persistent vomiting and/or vomiting blood   Severe abdominal pain   Severe chest pain   Black, tarry stools   Bleeding- more than one tablespoon   Any  other symptom or condition that you feel may need urgent attention  Your doctor recommends these additional instructions:  If any biopsies were taken, your doctors clinic will contact you in 1 to 2   weeks with any results.  - [facility].   - NPO.   - Continue present medications.   - CT scan  - Hold Plavix  - D/w daughter  For questions, problems or results please call your physician - Anila Kolb MD at Work:  [phone].  EMERGENCY PHONE NUMBER: [phone],  LAB RESULTS: [phone]  IF A COMPLICATION OR EMERGENCY SITUATION ARISES AND YOU ARE UNABLE TO REACH   YOUR PHYSICIAN - GO DIRECTLY TO THE EMERGENCY ROOM.  Anila Kolb MD  6/12/2018 2:07:43 PM  This report has been verified and signed electronically.  [employer]

## 2018-06-12 NOTE — TRANSFER OF CARE
"Anesthesia Transfer of Care Note    Patient: Elizabeth Navarrete    Procedure(s) Performed: Procedure(s) (LRB):  ESOPHAGOGASTRODUODENOSCOPY (EGD) (N/A)    Patient location: GI    Anesthesia Type: MAC    Transport from OR: Transported from OR on room air with adequate spontaneous ventilation    Post pain: adequate analgesia    Post assessment: no apparent anesthetic complications and tolerated procedure well    Post vital signs: stable    Level of consciousness: awake and alert    Nausea/Vomiting: no nausea/vomiting    Complications: none    Transfer of care protocol was followed      Last vitals:   Visit Vitals  BP (!) 143/73 (BP Location: Left arm, Patient Position: Lying)   Pulse 96   Temp 36.4 °C (97.6 °F) (Oral)   Resp 18   Ht 5' 6" (1.676 m)   Wt 57.6 kg (127 lb)   LMP  (LMP Unknown)   SpO2 100%   Breastfeeding? No   BMI 20.50 kg/m²     "

## 2018-06-12 NOTE — NURSING
Received report from L[place]ette in Endo. Pt arrived to room via WC awake, alert and oriened x4. Pt denies pain at this time. No distress noted.Discussed POC with pt and family. All questions answered. Verbalized understanding. Oriented to surroundings. Bed in lowest position. Side rails up x2. Instructed to call with any needs/concerns. Verbalized understanding.Will continue to monitor.

## 2018-06-12 NOTE — H&P
[facility]  History & Physical    Patient Name: Elizabeth Navarrete  MRN: [id number]  Admission Date: 6/12/2018  Attending Physician: Germain Ca MD   Primary Care Provider: DANNIE Montoya MD         Patient information was obtained from patient and past medical records.     Subjective:     Principal Problem:PEG (percutaneous endoscopic gastrostomy) adjustment/replacement/removal    Chief Complaint: PEG dislodgement     HPI: Elizabeth Navarrete is a 89 y.o. 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  She has resided at [facility] in [place], [place] since 6/23/17.  She is bed bound.  Her primary care physician is Dr. CHADWICK Montoya.     She had her PEG changed at [facility] on 2/12/18 with a 20 [place] Blandon catheter.  She saw gastroenterologist Dr. Anila Kolb on 6/11/18 for evaluation of difficulty flushing.  He noted the balloon port to be clogged, and scheduled endoscopic replacement.     Dr. 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  The balloon was unable to be visualized.  While waking up from anesthesia, she pulled the balloon out.  She was admitted to [facility] for Dr. Kolb to plan further management.  CT of the abdomen showed no remaining hardware.  She had no complaints.    Past Medical History:   Diagnosis Date    CAD (coronary artery disease) 10/10/2012    CRF (chronic renal failure) 10/10/2012    Embolic stroke involving right carotid artery 6/15/2017    Family history of breast cancer 10/10/2012    History of cardiac arrhythmia  10/10/2012    History of CHF (congestive heart failure) 10/10/2012    History of depression 10/10/2012    History of echocardiogram 10/10/2012    HTN (hypertension) 10/10/2012    Internal carotid artery stenosis 10/10/2012    Personal history of gallstones 10/10/2012       Past Surgical History:   Procedure Laterality Date    BREAST LUMPECTOMY      EYE SURGERY      HYSTERECTOMY         Review of patient's allergies indicates:   Allergen Reactions    Pcn [penicillins] Rash       No current facility-administered medications on file prior to encounter.      Current Outpatient Prescriptions on File Prior to Encounter   Medication Sig    amlodipine (NORVASC) 5 MG tablet TAKE 1 TABLET BY MOUTH DAILY    aspirin (ECOTRIN) 325 MG EC tablet Take 325 mg by mouth once daily.    atorvastatin (LIPITOR) 40 MG tablet 1 tablet (40 mg total) by Per G Tube route once daily.    carvedilol (COREG) 25 MG tablet TAKE 1 TABLET BY MOUTH TWICE DAILY    cholecalciferol, vitamin D3, (VITAMIN D3) 1,000 unit capsule Take 1,000 Units by mouth once daily.    clopidogrel (PLAVIX) 75 mg tablet 1 tablet (75 mg total) by Per G Tube route once daily.    diclofenac sodium (VOLTAREN) 1 % Gel Apply 4 g topically 4 (four) times daily as needed.    fish oil-omega-3 fatty acids 300-1,000 mg capsule Take 1 g by mouth once daily.    mupirocin (BACTROBAN) 2 % ointment Apply topically once daily. Apply to left upper back wound, cover with telfa island    omeprazole (PRILOSEC) 20 MG capsule Take 20 mg by mouth once daily.    venlafaxine (EFFEXOR-XR) 75 MG 24 hr capsule TAKE 1 CAPSULE BY MOUTH EVERY MORNING WITH FOOD     Family History     Problem Relation (Age of Onset)    Cancer Mother        Social History Main Topics    Smoking status: Never Smoker    Smokeless tobacco: Never Used    Alcohol use No    Drug use: No    Sexual activity: No     Review of Systems   Constitutional: Negative for chills and fever.   HENT: Positive for trouble  swallowing. Negative for voice change.    Eyes: Negative for pain and redness.   Respiratory: Negative for cough and shortness of breath.    Cardiovascular: Negative for chest pain and leg swelling.   Gastrointestinal: Negative for abdominal pain, nausea and vomiting.   Genitourinary: Negative for difficulty urinating and dysuria.   Musculoskeletal: Negative for neck pain and neck stiffness.   Skin: Negative for color change and rash.   Neurological: Negative for syncope and light-headedness.     Objective:     Vital Signs (Most Recent):  Temp: 98.1 °F (36.7 °C) (06/12/18 1651)  Pulse: 81 (06/12/18 1651)  Resp: 18 (06/12/18 1651)  BP: 139/67 (06/12/18 1651)  SpO2: 95 % (06/12/18 1554) Vital Signs (24h Range):  Temp:  [97.5 °F (36.4 °C)-98.1 °F (36.7 °C)] 98.1 °F (36.7 °C)  Pulse:  [] 81  Resp:  [16-20] 18  SpO2:  [91 %-100 %] 95 %  BP: ()/(53-77) 139/67     Weight: 53 kg (116 lb 13.5 oz)  Body mass index is 18.86 kg/m².    Physical Exam   Constitutional: She appears well-developed. She appears cachectic. She is cooperative.   HENT:   Head: Normocephalic and atraumatic.   edentulous   Eyes: Conjunctivae are normal. No scleral icterus.   Neck: Neck supple. No tracheal deviation present.   Cardiovascular: Normal rate and normal heart sounds.    Pulmonary/Chest: Effort normal and breath sounds normal. No respiratory distress.   Abdominal: Soft. She exhibits no distension. There is no tenderness. There is no guarding.   Gastrostomy site dressed   Musculoskeletal: She exhibits no edema or tenderness.   Offloading boots in place   Neurological: She is alert.   Left hemiparesis   Skin: Skin is warm and dry.   Psychiatric: She has a normal mood and affect.   Nursing note and vitals reviewed.          Significant Labs: All pertinent labs within the past 24 hours have been reviewed.    Significant Imaging: I have reviewed all pertinent imaging results/findings within the past 24 hours.   CT Abdomen Without Contrast  6/12/18: FINDINGS:  Evaluation is limited due to patient positioning.  Chronic changes are noted at the lung bases no formal scarring.  The heart appears enlarged.  There are coronary calcifications.  There is no pericardial or pleural fluid.  Evaluation of solid organs is limited due to lack of IV contrast.  The liver measures 13.5 cm, within normal limits.  No evidence for liver masses or biliary dilation.  The gallbladder is distended with multiple calcified stones, with a which is at the level of the gallbladder neck.  There are also calcifications along the gallbladder wall.  Right adrenal gland not clearly visualized.  There appears to be thickening of the left adrenal gland of uncertain etiology.  This could relate to sequela from prior hemorrhage versus hyperplasia.  Splenic calcifications noted.  Pancreas appears unremarkable.  The kidneys are small and demonstrate evidence for cortical thinning.  Fluid attenuations lesions are noted bilaterally, likely cysts.  No hydronephrosis.  Possible extrarenal pelvis noted on the left.  Proximal ureters are unremarkable.  Moderate size hiatal hernia is identified.  A gastrostomy tube tract is identified.  No gastrostomy tube is evident.  Stomach appears decompressed.  The visualized small bowel appears unremarkable.  There is a moderate amount of stool throughout the colon as well as diverticuli.  No bowel obstruction or inflammation.  There is no ascites or free air.  Aorta is normal in caliber.  There is severe atherosclerosis.  IVC is unremarkable.  There is no lymph node enlargement.  No hernia.  Subcutaneous soft tissues appear intact.  There is evidence for scoliosis.  There is no acute fracture or osseous destructive process.  Impression:  1. Gastrostomy tube is not visualized.  There is a tract extending from the stomach body to the skin, presumably from prior gastrostomy tube placement.  2. Cholelithiasis and porcelain gallbladder as described above.  3. See  body report for details and other incidental findings.    Assessment/Plan:     * PEG (percutaneous endoscopic gastrostomy) adjustment/replacement/removal    Oropharyngeal dysphagia  Gastrostomy tube dependent, 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  Give IV fluids until PEG placed.          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    Hemiparesis affecting left side as late effect of stroke  Turn patient every 2 hours.  Takes aspirin 325 mg daily, clopidrogel 75 mg daily, atorvastatin 40 mg daily.  Hold until PEG placed.          Chronic atrial fibrillation    Enlarged LA (left atrium)  Takes aspirin 325 mg daily, carvedilol 25 mg BID.  Give heparin TID for now.          Essential hypertension    Chronic diastolic congestive heart failure  Takes amlodipine 5 mg daily, carvedilol 25 mg twice daily.  Hold until PEG placed.  Give hydralazine IV PRN.            VTE Risk Mitigation         Ordered     heparin (porcine) injection 5,000 Units  Every 8 hours      06/12/18 1759     IP VTE HIGH RISK PATIENT  Once      06/12/18 1343     Place DEISY hose  Until discontinued      06/12/18 1343     Place sequential compression device  Until discontinued      06/12/18 1343             Germain Ca MD  Department of Hospital Medicine   [facility]

## 2018-06-13 ENCOUNTER — ANESTHESIA EVENT (OUTPATIENT)
Dept: ENDOSCOPY | Facility: HOSPITAL | Age: 83
End: 2018-06-13
Payer: MEDICARE

## 2018-06-13 LAB
ANION GAP SERPL CALC-SCNC: 10 MMOL/L
BUN SERPL-MCNC: 42 MG/DL
CALCIUM SERPL-MCNC: 9.2 MG/DL
CHLORIDE SERPL-SCNC: 108 MMOL/L
CO2 SERPL-SCNC: 22 MMOL/L
CREAT SERPL-MCNC: 0.8 MG/DL
EST. GFR  (AFRICAN AMERICAN): >60 ML/MIN/1.73 M^2
EST. GFR  (NON AFRICAN AMERICAN): >60 ML/MIN/1.73 M^2
GLUCOSE SERPL-MCNC: 89 MG/DL
MAGNESIUM SERPL-MCNC: 2 MG/DL
PHOSPHATE SERPL-MCNC: 3.1 MG/DL
POTASSIUM SERPL-SCNC: 4.6 MMOL/L
SODIUM SERPL-SCNC: 140 MMOL/L

## 2018-06-13 PROCEDURE — 63600175 PHARM REV CODE 636 W HCPCS: Performed by: HOSPITALIST

## 2018-06-13 PROCEDURE — G0378 HOSPITAL OBSERVATION PER HR: HCPCS

## 2018-06-13 PROCEDURE — 80048 BASIC METABOLIC PNL TOTAL CA: CPT

## 2018-06-13 PROCEDURE — 83735 ASSAY OF MAGNESIUM: CPT

## 2018-06-13 PROCEDURE — 94761 N-INVAS EAR/PLS OXIMETRY MLT: CPT

## 2018-06-13 PROCEDURE — 25000003 PHARM REV CODE 250: Performed by: HOSPITALIST

## 2018-06-13 PROCEDURE — 84100 ASSAY OF PHOSPHORUS: CPT

## 2018-06-13 PROCEDURE — 99214 OFFICE O/P EST MOD 30 MIN: CPT | Mod: ,,, | Performed by: INTERNAL MEDICINE

## 2018-06-13 PROCEDURE — 36415 COLL VENOUS BLD VENIPUNCTURE: CPT

## 2018-06-13 RX ADMIN — HEPARIN SODIUM 5000 UNITS: 5000 INJECTION, SOLUTION INTRAVENOUS; SUBCUTANEOUS at 06:06

## 2018-06-13 RX ADMIN — GENTAMICIN SULFATE 265.2 MG: 40 INJECTION, SOLUTION INTRAMUSCULAR; INTRAVENOUS at 06:06

## 2018-06-13 RX ADMIN — HEPARIN SODIUM 5000 UNITS: 5000 INJECTION, SOLUTION INTRAVENOUS; SUBCUTANEOUS at 10:06

## 2018-06-13 NOTE — ANESTHESIA PREPROCEDURE EVALUATION
06/13/2018  Elizabeth Navarrete is a 89 y.o., female with PEG for EGD. Tolerated EGD on 6/12 without complication.       Past Medical History:   Diagnosis Date    CAD (coronary artery disease) 10/10/2012    CRF (chronic renal failure) 10/10/2012    Embolic stroke involving right carotid artery 6/15/2017    Family history of breast cancer 10/10/2012    History of cardiac arrhythmia 10/10/2012    History of CHF (congestive heart failure) 10/10/2012    History of depression 10/10/2012    History of echocardiogram 10/10/2012    HTN (hypertension) 10/10/2012    Internal carotid artery stenosis 10/10/2012    Personal history of gallstones 10/10/2012       Past Surgical History:   Procedure Laterality Date    BREAST LUMPECTOMY      ESOPHAGOGASTRODUODENOSCOPY N/A 6/12/2018    Procedure: ESOPHAGOGASTRODUODENOSCOPY (EGD);  Surgeon: Anila Kolb MD;  Location: [facility];  Service: Endoscopy;  Laterality: N/A;    EYE SURGERY      HYSTERECTOMY         2D Echo:  6/16/17:  CONCLUSIONS     1 - Low normal to mildly depressed left ventricular systolic function (EF 50-55%).     2 - Impaired LV relaxation, elevated LAP (grade 2 diastolic dysfunction).     3 - The estimated PA systolic pressure is 32 mmHg.     4 - Mild mitral regurgitation.     5 - Trivial tricuspid regurgitation.     6 - Moderate left atrial enlargement.     Pre-op Assessment    I have reviewed the Patient Summary Reports.      I have reviewed the Medications.     Review of Systems  Anesthesia Hx:  No problems with previous Anesthesia    Social:  Non-Smoker, No Alcohol Use    Cardiovascular:   Exercise tolerance: poor Hypertension CAD   CHF PVD    Renal/:   Chronic Renal Disease    Musculoskeletal:   Arthritis     Neurological:   CVA        Physical Exam  General:  Cachexia    Airway/Jaw/Neck:  Airway Findings: Mouth Opening: Normal Tongue: Normal   Mallampati: I  Jaw/Neck Findings:  Neck ROM: Normal ROM      Dental:  Dental Findings: Edentulous   Chest/Lungs:  Chest/Lungs Findings: Clear to auscultation, Normal Respiratory Rate     Heart/Vascular:  Heart Findings: Rate: Normal  Rhythm: Irregularly Irregular             Anesthesia Plan  Type of Anesthesia, risks & benefits discussed:  Anesthesia Type:  MAC, general  Patient's Preference:   Intra-op Monitoring Plan: standard ASA monitors  Intra-op Monitoring Plan Comments:   Post Op Pain Control Plan:   Post Op Pain Control Plan Comments:   Induction:   IV  Beta Blocker:  Patient is on a Beta-Blocker and has received one dose within the past 24 hours (No further documentation required).       Informed Consent: Patient representative understands risks and agrees with Anesthesia plan.  Questions answered. Anesthesia consent signed with patient representative.  ASA Score: 4     Day of Surgery Review of History & Physical:    H&P update referred to the surgeon.         Ready For Surgery From Anesthesia Perspective.

## 2018-06-13 NOTE — PROGRESS NOTES
[facility]  Progress Note    Patient Name: Elizabeth Navarrete  MRN: [id number]  Patient Class: OP- Observation   Admission Date: 6/12/2018  Length of Stay: 0 days  Attending Physician: Germain Ca MD  Primary Care Provider: DANNIE Montoya MD        Subjective:     Principal Problem:PEG (percutaneous endoscopic gastrostomy) adjustment/replacement/removal    HPI:  Elizabeth Navarrete is a 89 y.o. 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  She has resided at [facility] in [place], [place] since 6/23/17.  She is bed bound.  Her primary care physician is Dr. CHADWICK Montoya.     She had her PEG changed at [facility] on 2/12/18 with a 20 [place] Blandon catheter.  She saw gastroenterologist Dr. Anila Kolb on 6/11/18 for evaluation of difficulty flushing.  He noted the balloon port to be clogged, and scheduled endoscopic replacement.     Dr. 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  The balloon was unable to be visualized.  While waking up from anesthesia, she pulled the balloon out.  She was admitted to [facility] for Dr. Kolb to plan further management.  CT of the abdomen showed no remaining hardware.  She had no complaints.    Hospital Course:  She was put on IV fluids to avoid dehydration while awaiting PEG replacement.  She had no symptomatic complaints.    Interval History: No complaints.    Review of Systems   Constitutional: Negative for chills and fever.   Respiratory: Negative for cough and shortness of breath.    Gastrointestinal: Negative for  abdominal pain, nausea and vomiting.     Objective:     Vital Signs (Most Recent):  Temp: 97.5 °F (36.4 °C) (06/13/18 0746)  Pulse: 90 (06/13/18 0746)  Resp: 18 (06/13/18 0746)  BP: (!) 140/76 (06/13/18 0746)  SpO2: (!) 92 % (06/13/18 0834) Vital Signs (24h Range):  Temp:  [97.5 °F (36.4 °C)-98.8 °F (37.1 °C)] 97.5 °F (36.4 °C)  Pulse:  [] 90  Resp:  [16-20] 18  SpO2:  [91 %-98 %] 92 %  BP: ()/(53-77) 140/76     Weight: 53 kg (116 lb 13.5 oz)  Body mass index is 18.86 kg/m².    Intake/Output Summary (Last 24 hours) at 06/13/18 1121  Last data filed at 06/13/18 1000   Gross per 24 hour   Intake            967.5 ml   Output             2103 ml   Net          -1135.5 ml      Physical Exam   Constitutional: She appears well-developed. She appears cachectic. No distress.   Abdominal: Soft. There is no tenderness.   PEG site dressed   Neurological: She is alert.   Left upper extremity weak but able to move   Psychiatric: She has a normal mood and affect. Her behavior is normal.   Nursing note and vitals reviewed.      Significant Labs: All pertinent labs within the past 24 hours have been reviewed.    Significant Imaging: I have reviewed all pertinent imaging results/findings within the past 24 hours.    Assessment/Plan:      * PEG (percutaneous endoscopic gastrostomy) adjustment/replacement/removal    Oropharyngeal dysphagia  Gastrostomy tube dependent, 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  Giving IV fluids until PEG placed.          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    Hemiparesis affecting left side as late effect of stroke  Turn patient every 2 hours.  Takes aspirin 325 mg daily, clopidrogel 75 mg daily, atorvastatin 40 mg daily.  Hold until PEG placed.          Chronic atrial fibrillation    Enlarged LA (left atrium)  Takes aspirin 325 mg daily, carvedilol 25 mg BID.  Give heparin TID for now.          Essential hypertension    Chronic diastolic congestive heart failure  Takes amlodipine 5 mg daily, carvedilol 25  mg twice daily.  Hold until PEG placed.  Give hydralazine IV PRN.            VTE Risk Mitigation         Ordered     heparin (porcine) injection 5,000 Units  Every 8 hours      06/12/18 1759     IP VTE HIGH RISK PATIENT  Once      06/12/18 1343     Place DEISY hose  Until discontinued      06/12/18 1343     Place sequential compression device  Until discontinued      06/12/18 1343              Germain Ca MD  Department of Hospital Medicine   [facility]

## 2018-06-13 NOTE — HOSPITAL COURSE
She was put on IV fluids to avoid dehydration while awaiting PEG replacement.  She had no symptomatic complaints but had dark malodorous urine.  She refused to give a urine sample but was given a dose of gentamicin in case of UTI.  Dr. Kolb placed a PEG on 6/14/18 and she was discharged home afterward.    Upper GI endoscopy 6/14/18:  Findings:       One benign-appearing, intrinsic stenosis was found at the gastroesophageal junction. This stenosis was moderately severe and measured 6 cm (in length). The stenosis was traversed.       A medium-sized hiatal hernia was present.       The entire examined stomach was normal. Placement of an endoscopically removable PEG with no T-fasteners was successfully completed. The external bumper was at the 3.0 cm marking on the tube. Estimated blood loss: none. Safe tract needle technique utilized.       The examined duodenum was normal.  Impression:           - Benign-appearing esophageal stenosis.                        - Medium-sized hiatal hernia.                        - Normal stomach.                        - Normal examined duodenum.                        - An endoscopically removable PEG placement was successfully completed.                        - No specimens collected.  Recommendation:       - [facility] for possible discharge same day.                        - Ok to use PEG tube                        - Hold Plavix x 2 days                        - F/u in clinic in 2 days

## 2018-06-13 NOTE — PROGRESS NOTES
[facility]  Gastroenterology  Progress Note    Patient Name: Elizabeth Navarrete  MRN: [id number]  Admission Date: 6/12/2018  Hospital Length of Stay: 0 days  Code Status: Full Code   Attending Provider: Germain Ca MD  Consulting Provider: Anila Kolb MD  Primary Care Physician: DANNIE Montoya MD  Principal Problem: PEG (percutaneous endoscopic gastrostomy) adjustment/replacement/removal      Subjective:     Interval History: No acute issues, no vomiting. AFebrile    Review of Systems   Unable to perform ROS: Age     Objective:     Vital Signs (Most Recent):  Temp: 99.4 °F (37.4 °C) (06/13/18 1154)  Pulse: 94 (06/13/18 1154)  Resp: 16 (06/13/18 1154)  BP: (!) 152/76 (06/13/18 1154)  SpO2: 96 % (06/13/18 1135) Vital Signs (24h Range):  Temp:  [97.5 °F (36.4 °C)-99.4 °F (37.4 °C)] 99.4 °F (37.4 °C)  Pulse:  [] 94  Resp:  [16-20] 16  SpO2:  [91 %-98 %] 96 %  BP: (113-155)/(61-77) 152/76     Weight: 53 kg (116 lb 13.5 oz) (06/12/18 1714)  Body mass index is 18.86 kg/m².      Intake/Output Summary (Last 24 hours) at 06/13/18 1302  Last data filed at 06/13/18 1000   Gross per 24 hour   Intake            767.5 ml   Output             2103 ml   Net          -1335.5 ml       Lines/Drains/Airways     Peripheral Intravenous Line                 Peripheral IV - Single Lumen 06/12/18 1146 Right Antecubital 1 day         Peripheral IV - Single Lumen 06/12/18 2200 Right Forearm less than 1 day                Physical Exam   Constitutional: She appears well-developed. No distress.   Eyes: Conjunctivae are normal. No scleral icterus.   Neck: No thyromegaly present.   Cardiovascular: Normal rate and regular rhythm.  Exam reveals no friction rub.    Pulmonary/Chest: Effort normal and breath sounds normal. No respiratory distress. She has no wheezes.   Abdominal: Soft. Bowel sounds are normal. She exhibits no distension.   Skin: Skin is warm and dry. She is not diaphoretic.   Nursing note and vitals  reviewed.      Significant Labs:  CBC: No results for input(s): WBC, HGB, HCT, PLT in the last 48 hours.      Significant Imaging:  Imaging results within the past 24 hours have been reviewed.    Assessment/Plan:     Oropharyngeal dysphagia    - off plavix  - will plan EGD/PEG placement tomorrow  - IVF  - d/w family            Thank you for your consult. I will follow-up with patient. Please contact us if you have any additional questions.    Anila Kolb MD  Gastroenterology  [facility]

## 2018-06-13 NOTE — PLAN OF CARE
Problem: Patient Care Overview  Goal: Plan of Care Review  Outcome: Ongoing (interventions implemented as appropriate)  Plan of care reviewed with pt at the bedside.  AAOx4.  Assessed pt's pain and needs throughout shift.  Inserted new IV 22G @2200 due to kinked catheter, tolerated well, continuous NS fluid running @75mL/hr.  Heels are protected with supportive device.  Educated on benefits with turning, but pt refused to turn throughout night due to her normal sleep position.  Pt has no pain or distress noted thorough out shift.  Provided moth swab for dry mouth.  Perineal care done for incontinence.  Call light within reach, fall precautions maintained.  Bed in low and locked, bed alarm is on.  Will continue to monitor.

## 2018-06-13 NOTE — SUBJECTIVE & OBJECTIVE
Interval History: No complaints.    Review of Systems   Constitutional: Negative for chills and fever.   Respiratory: Negative for cough and shortness of breath.    Gastrointestinal: Negative for abdominal pain, nausea and vomiting.     Objective:     Vital Signs (Most Recent):  Temp: 97.5 °F (36.4 °C) (06/13/18 0746)  Pulse: 90 (06/13/18 0746)  Resp: 18 (06/13/18 0746)  BP: (!) 140/76 (06/13/18 0746)  SpO2: (!) 92 % (06/13/18 0834) Vital Signs (24h Range):  Temp:  [97.5 °F (36.4 °C)-98.8 °F (37.1 °C)] 97.5 °F (36.4 °C)  Pulse:  [] 90  Resp:  [16-20] 18  SpO2:  [91 %-98 %] 92 %  BP: ()/(53-77) 140/76     Weight: 53 kg (116 lb 13.5 oz)  Body mass index is 18.86 kg/m².    Intake/Output Summary (Last 24 hours) at 06/13/18 1121  Last data filed at 06/13/18 1000   Gross per 24 hour   Intake            967.5 ml   Output             2103 ml   Net          -1135.5 ml      Physical Exam   Constitutional: She appears well-developed. She appears cachectic. No distress.   Abdominal: Soft. There is no tenderness.   PEG site dressed   Neurological: She is alert.   Left upper extremity weak but able to move   Psychiatric: She has a normal mood and affect. Her behavior is normal.   Nursing note and vitals reviewed.      Significant Labs: All pertinent labs within the past 24 hours have been reviewed.    Significant Imaging: I have reviewed all pertinent imaging results/findings within the past 24 hours.

## 2018-06-13 NOTE — PLAN OF CARE
TN went to meet with patient, daughter at bedside.  Patient is from [facility]. Daughter reports patient does not walk. Patient usually transports via wheelchair. TN faxed updated clinicals via [facility] to [facility]. TN will continue to follow.     Patient will have PEG placed tomorrow 6/14.    Patient's wheelchair is at bedside.     06/13/18 1423   Discharge Assessment   Assessment Type Discharge Planning Assessment   Confirmed/corrected address and phone number on facesheet? Yes   Assessment information obtained from? Patient   Prior to hospitilization cognitive status: Unable to Assess   Prior to hospitalization functional status: Wheelchair Bound   Current Functional Status: Wheelchair Bound   Facility Arrived From: Nursing Home   Lives With facility resident   Able to Return to Prior Arrangements yes   Who are your caregiver(s) and their phone number(s)? Laya Sheridan Daughter [phone]    Patient's perception of discharge disposition home or selfcare   Readmission Within The Last 30 Days no previous admission in last 30 days   Equipment Currently Used at Home wheelchair;other (see comments)  (facility equipment)   Discharge Plan A Return to nursing home   Discharge Plan B Skilled Nursing Facility   Patient/Family In Agreement With Plan yes     Jennifer Kulkarni RN  Transition Navigator  [phone]

## 2018-06-13 NOTE — ASSESSMENT & PLAN NOTE
Oropharyngeal dysphagia  Gastrostomy tube dependent, 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  Giving IV fluids until PEG placed.

## 2018-06-13 NOTE — SUBJECTIVE & OBJECTIVE
Subjective:     Interval History: No acute issues, no vomiting. AFebrile    Review of Systems   Unable to perform ROS: Age     Objective:     Vital Signs (Most Recent):  Temp: 99.4 °F (37.4 °C) (06/13/18 1154)  Pulse: 94 (06/13/18 1154)  Resp: 16 (06/13/18 1154)  BP: (!) 152/76 (06/13/18 1154)  SpO2: 96 % (06/13/18 1135) Vital Signs (24h Range):  Temp:  [97.5 °F (36.4 °C)-99.4 °F (37.4 °C)] 99.4 °F (37.4 °C)  Pulse:  [] 94  Resp:  [16-20] 16  SpO2:  [91 %-98 %] 96 %  BP: (113-155)/(61-77) 152/76     Weight: 53 kg (116 lb 13.5 oz) (06/12/18 1714)  Body mass index is 18.86 kg/m².      Intake/Output Summary (Last 24 hours) at 06/13/18 1302  Last data filed at 06/13/18 1000   Gross per 24 hour   Intake            767.5 ml   Output             2103 ml   Net          -1335.5 ml       Lines/Drains/Airways     Peripheral Intravenous Line                 Peripheral IV - Single Lumen 06/12/18 1146 Right Antecubital 1 day         Peripheral IV - Single Lumen 06/12/18 2200 Right Forearm less than 1 day                Physical Exam   Constitutional: She appears well-developed. No distress.   Eyes: Conjunctivae are normal. No scleral icterus.   Neck: No thyromegaly present.   Cardiovascular: Normal rate and regular rhythm.  Exam reveals no friction rub.    Pulmonary/Chest: Effort normal and breath sounds normal. No respiratory distress. She has no wheezes.   Abdominal: Soft. Bowel sounds are normal. She exhibits no distension.   Skin: Skin is warm and dry. She is not diaphoretic.   Nursing note and vitals reviewed.      Significant Labs:  CBC: No results for input(s): WBC, HGB, HCT, PLT in the last 48 hours.      Significant Imaging:  Imaging results within the past 24 hours have been reviewed.

## 2018-06-13 NOTE — NURSING
POC discussed with pt and daughter. Pt is awake and alert,oriented X4. No distress noted. Denies any pain. Bed in lowest position. Safety/Fall precautions maintained. Call bell in reach. All questions answered. Verbalized understanding. Will continue to monitor.

## 2018-06-14 ENCOUNTER — SURGERY (OUTPATIENT)
Age: 83
End: 2018-06-14

## 2018-06-14 ENCOUNTER — ANESTHESIA (OUTPATIENT)
Dept: ENDOSCOPY | Facility: HOSPITAL | Age: 83
End: 2018-06-14
Payer: MEDICARE

## 2018-06-14 VITALS
HEIGHT: 66 IN | BODY MASS INDEX: 18.99 KG/M2 | SYSTOLIC BLOOD PRESSURE: 131 MMHG | TEMPERATURE: 98 F | DIASTOLIC BLOOD PRESSURE: 70 MMHG | HEART RATE: 102 BPM | RESPIRATION RATE: 18 BRPM | OXYGEN SATURATION: 97 % | WEIGHT: 118.19 LBS

## 2018-06-14 PROCEDURE — 25000003 PHARM REV CODE 250: Performed by: NURSE ANESTHETIST, CERTIFIED REGISTERED

## 2018-06-14 PROCEDURE — 43246 EGD PLACE GASTROSTOMY TUBE: CPT | Mod: ,,, | Performed by: INTERNAL MEDICINE

## 2018-06-14 PROCEDURE — 63600175 PHARM REV CODE 636 W HCPCS: Performed by: HOSPITALIST

## 2018-06-14 PROCEDURE — 25000003 PHARM REV CODE 250: Performed by: HOSPITALIST

## 2018-06-14 PROCEDURE — G0378 HOSPITAL OBSERVATION PER HR: HCPCS

## 2018-06-14 PROCEDURE — 43246 EGD PLACE GASTROSTOMY TUBE: CPT | Performed by: INTERNAL MEDICINE

## 2018-06-14 PROCEDURE — 37000009 HC ANESTHESIA EA ADD 15 MINS: Performed by: INTERNAL MEDICINE

## 2018-06-14 PROCEDURE — 37000008 HC ANESTHESIA 1ST 15 MINUTES: Performed by: INTERNAL MEDICINE

## 2018-06-14 PROCEDURE — 94761 N-INVAS EAR/PLS OXIMETRY MLT: CPT

## 2018-06-14 PROCEDURE — 63600175 PHARM REV CODE 636 W HCPCS: Performed by: NURSE ANESTHETIST, CERTIFIED REGISTERED

## 2018-06-14 PROCEDURE — 27201018 HC KIT, PEG (ANY): Performed by: INTERNAL MEDICINE

## 2018-06-14 RX ORDER — LIDOCAINE HCL/PF 100 MG/5ML
SYRINGE (ML) INTRAVENOUS
Status: DISCONTINUED | OUTPATIENT
Start: 2018-06-14 | End: 2018-06-14

## 2018-06-14 RX ORDER — ETOMIDATE 2 MG/ML
INJECTION INTRAVENOUS
Status: DISCONTINUED | OUTPATIENT
Start: 2018-06-14 | End: 2018-06-14

## 2018-06-14 RX ADMIN — DEXTROSE 1 G: 50 INJECTION, SOLUTION INTRAVENOUS at 10:06

## 2018-06-14 RX ADMIN — HEPARIN SODIUM 5000 UNITS: 5000 INJECTION, SOLUTION INTRAVENOUS; SUBCUTANEOUS at 06:06

## 2018-06-14 RX ADMIN — SODIUM CHLORIDE: 0.9 INJECTION, SOLUTION INTRAVENOUS at 05:06

## 2018-06-14 RX ADMIN — LIDOCAINE HYDROCHLORIDE 50 MG: 20 INJECTION, SOLUTION INTRAVENOUS at 10:06

## 2018-06-14 RX ADMIN — ETOMIDATE 6 MG: 2 INJECTION, SOLUTION INTRAVENOUS at 10:06

## 2018-06-14 RX ADMIN — ETOMIDATE 4 MG: 2 INJECTION, SOLUTION INTRAVENOUS at 10:06

## 2018-06-14 NOTE — NURSING
Gave report to Andreas sheffield [facility] at 1329. Acknowledged understanding. Pt d/c instructions given to pt and family at bedside. Education given on Peg Tube Care. IV D/Cd, tip in tact. Awaiting transportation.

## 2018-06-14 NOTE — ANESTHESIA POSTPROCEDURE EVALUATION
"Anesthesia Post Evaluation    Patient: Elizabeth Navarrete    Procedure(s) Performed: Procedure(s) (LRB):  ESOPHAGOGASTRODUODENOSCOPY (EGD) (N/A)    Final Anesthesia Type: MAC  Patient location during evaluation: GI PACU  Patient participation: Yes- Able to Participate  Level of consciousness: awake and alert and oriented  Post-procedure vital signs: reviewed and stable  Pain management: adequate  Airway patency: patent  PONV status at discharge: No PONV  Anesthetic complications: no      Cardiovascular status: blood pressure returned to baseline  Respiratory status: unassisted, spontaneous ventilation and room air  Hydration status: euvolemic  Follow-up not needed.        Visit Vitals  BP (!) 151/79   Pulse 96   Temp 36.7 °C (98.1 °F)   Resp 16   Ht 5' 6" (1.676 m)   Wt 53.6 kg (118 lb 2.7 oz)   LMP  (LMP Unknown)   SpO2 96%   Breastfeeding? No   BMI 19.07 kg/m²       Pain/Toño Score: Pain Assessment Performed: Yes (6/14/2018  9:00 AM)  Presence of Pain: denies (6/14/2018  9:00 AM)      "

## 2018-06-14 NOTE — PLAN OF CARE
Problem: Patient Care Overview  Goal: Plan of Care Review  Outcome: Ongoing (interventions implemented as appropriate)  Plan of care reviewed with pt.  AAOx4.  No complaint of pain or distress noted.  Continuous IV NS fluid running @ 100 mL/hr.   Attempted to turn pt, but pt refused to turning throughout shift.  Reinforced education about the benefit of turning.  Perineal care done for incontinence and applied barrier cream at sacrum area for redness.  Provided mouth swab for dry mouth.  Call light within reach, bed alarm is on, bed in lowest position.  Instructed to call for any needs.  Pt will have PEG placement in this morning.  Will continue to monitor and maintain plan of care.

## 2018-06-14 NOTE — SUBJECTIVE & OBJECTIVE
Interval History: No complaints.    Review of Systems   Constitutional: Negative for chills and fever.   Respiratory: Negative for cough and shortness of breath.    Gastrointestinal: Negative for abdominal pain, nausea and vomiting.     Objective:     Vital Signs (Most Recent):  Temp: 97.7 °F (36.5 °C) (06/14/18 0741)  Pulse: 89 (06/14/18 0741)  Resp: 18 (06/14/18 0741)  BP: 130/86 (06/14/18 0741)  SpO2: 98 % (06/14/18 0831) Vital Signs (24h Range):  Temp:  [97.7 °F (36.5 °C)-99.4 °F (37.4 °C)] 97.7 °F (36.5 °C)  Pulse:  [] 89  Resp:  [16-18] 18  SpO2:  [96 %-98 %] 98 %  BP: (130-179)/(61-94) 130/86     Weight: 53.6 kg (118 lb 2.7 oz)  Body mass index is 19.07 kg/m².    Intake/Output Summary (Last 24 hours) at 06/14/18 0854  Last data filed at 06/14/18 0000   Gross per 24 hour   Intake          1179.17 ml   Output             1203 ml   Net           -23.83 ml      Physical Exam   Constitutional: She appears well-developed. She appears cachectic. No distress.   Abdominal: Soft. There is no tenderness.   PEG site dressed   Neurological: She is alert.   Left upper extremity weak but able to move   Psychiatric: She has a normal mood and affect. Her behavior is normal.   Nursing note and vitals reviewed.      Significant Labs: All pertinent labs within the past 24 hours have been reviewed.    Significant Imaging: I have reviewed all pertinent imaging results/findings within the past 24 hours.

## 2018-06-14 NOTE — PLAN OF CARE
[facility]  Leonard Benson MD, [facility]     MD Radha Coronado PA-C Renee Melancon, PA-C Rosanne Zeringue, NP  [address] [place], [place] [id number]  Office: [phone]  Fax: [phone]       NURSING HOME ORDERS    Patient Name: Elizabeth Navarrete  YOB: 1928 06/14/2018    Admit to Nursing Home:  Regular Bed     [facility] ([address] [place] [id number])     Diagnoses:  Active Hospital Problems    Diagnosis  POA    *PEG (percutaneous endoscopic gastrostomy) adjustment/replacement/removal [Z43.1]  Not Applicable    Nursing home resident [Z59.3]  Not Applicable     Chronic     [place] [facility] ([address] [place] [id number])       Gastrostomy tube dependent, 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 [Z93.1]  Not Applicable     Chronic    Oropharyngeal dysphagia [R13.12]  Yes     Chronic    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       Allergies:  Review of patient's allergies indicates:   Allergen Reactions    Pcn [penicillins] Rash         Discharge Procedure Orders  AIR MATTRESS if Ciro Scale less than 15     Vital signs per facility protocol     Intake and output per facility protocol     Skin assessment every shift      Notify Physician   Order Specific Question Answer Comments   Temperature (F) greater than 101    Systolic Blood Pressure SBP greater than or equal to 160    Systolic Blood Pressure SBP less than or  equal to 90    Diastolic Blood Pressure DBP greater than or equal to 110    Diastolic Blood Pressure DBP less than or equal to 60    Pulse greater than or equal to 120    Pulse less than or equal to 50    Respirations Rate RR greater than or equal to 30    Respirations Rate RR less than or equal to 6    Urine output less than 60 over 2 hours   SPO2% less than 90      Activity as tolerated     Turn patient every 2 hours to prevent pressure ulcer formation     Feeding tube care for PEG     Full code     Tube Feedings/Formulas   Order Comments: Resume prior tube feed orders   Order Specific Question Answer Comments   Select Adult Formula: Isosource 1.5 carlos    Route: Gastrostomy      Pulse Oximetry         LABS:  Per facility protocol    Nursing Precautions:    - Aspiration precautions:             -  Suction at bedside          - Fall precautions per nursing home protocol   - Decubitus precautions:        -  for positioning   - Pressure reducing foam mattress   - Turn patient every two hours. Use wedge pillows to anchor patient    MISCELLANEOUS CARE:     PEG Care:  Clean site every 24 hours   Okay to use after 3 pm today 6/14/18     Routine Skin for Bedridden Patients:  Apply moisture barrier cream to all    skin folds and wet areas in perineal area daily and after baths and                           all bowel movements.      Medications: No medication changes.  Resume prior orders.   Elizabeth Navarrete   Home Medication Instructions SHANNAN:[id number]    Printed on:06/14/18 2619   Medication Information                      amLODIPine (NORVASC) 5 MG tablet  5 mg by Per G Tube route once daily.             aspirin 325 MG tablet  325 mg by Per G Tube route once daily.             atorvastatin (LIPITOR) 40 MG tablet  1 tablet (40 mg total) by Per G Tube route once daily.             carvedilol (COREG) 25 MG tablet  25 mg by Per G Tube route 2 (two) times daily.             cholecalciferol, vitamin D3, (VITAMIN D3) 1,000  unit capsule  1,000 Units by Per G Tube route once daily.              clopidogrel (PLAVIX) 75 mg tablet  1 tablet (75 mg total) by Per G Tube route once daily.             diclofenac sodium (VOLTAREN) 1 % Gel  Apply 4 g topically 4 (four) times daily as needed.             fish oil-omega-3 fatty acids 300-1,000 mg capsule  1 g by Per G Tube route once daily.              mupirocin (BACTROBAN) 2 % ointment  Apply topically once daily. Apply to left upper back wound, cover with telfa island             omeprazole magnesium 20 mg CpDR  20 mg by Per G Tube route once daily.             venlafaxine (EFFEXOR) 75 MG tablet  75 mg by Per G Tube route once daily.                       _________________________________  Germain Ca MD  06/14/2018

## 2018-06-14 NOTE — NURSING
Pt left for peg tube placement with transport   Anesthesia Volume In Cc: 0.7 Biopsy Type: H and E Wound Care: Aquaphor Bill For Surgical Tray: no X Size Of Lesion In Cm: 1 Electrodesiccation And Curettage Text: The wound bed was treated with electrodesiccation and curettage after the biopsy was performed. Biopsy Method: Dermablade Consent: Written consent was obtained and risks were reviewed including but not limited to scarring, infection, bleeding, scabbing, incomplete removal, nerve damage and allergy to anesthesia. Additional Anesthesia Volume In Cc (Will Not Render If 0): 0 Hemostasis: Electrocautery Notification Instructions: Patient will be notified of biopsy results. However, patient instructed to call the office if not contacted within 2 weeks. Curettage Text: The wound bed was treated with curettage after the biopsy was performed. Silver Nitrate Text: The wound bed was treated with silver nitrate after the biopsy was performed. X Size Of Lesion In Cm: 0.8 Anesthesia Type: 1% lidocaine without epinephrine Dressing: bandage Detail Level: Simple Electrodesiccation Text: The wound bed was treated with electrodesiccation after the biopsy was performed. Cryotherapy Text: The wound bed was treated with cryotherapy after the biopsy was performed. Type Of Destruction Used: Electrodesiccation Post-Care Instructions: I reviewed with the patient in detail post-care instructions. Patient is to keep the biopsy site dry overnight, and then apply bacitracin twice daily until healed. Patient may apply hydrogen peroxide soaks to remove any crusting. Billing Type: Third-Party Bill

## 2018-06-14 NOTE — [employer] PATIENT INSTRUCTIONS
Discharge Summary/Instructions after an Endoscopic Procedure  Patient Name: Elizabeth Navarrete  Patient MRN: [id number]  Patient YOB: 1928 Thursday, June 14, 2018  Anila Klob MD  RESTRICTIONS:  During your procedure today, you received medications for sedation.  These   medications may affect your judgment, balance and coordination.  Therefore,   for 24 hours, you have the following restrictions:   - DO NOT drive a car, operate machinery, make legal/financial decisions,   sign important papers or drink alcohol.    ACTIVITY:  Today: no heavy lifting, straining or running due to procedural   sedation/anesthesia.  The following day: return to full activity including work.  DIET:  Eat and drink normally unless instructed otherwise.     TREATMENT FOR COMMON SIDE EFFECTS:  - Mild abdominal pain, nausea, belching, bloating or excessive gas:  rest,   eat lightly and use a heating pad.  - Sore Throat: treat with throat lozenges and/or gargle with warm salt   water.  - Because air was used during the procedure, expelling large amounts of air   from your rectum or belching is normal.  - If a bowel prep was taken, you may not have a bowel movement for 1-3 days.    This is normal.  SYMPTOMS TO WATCH FOR AND REPORT TO YOUR PHYSICIAN:  1. Abdominal pain or bloating, other than gas cramps.  2. Chest pain.  3. Back pain.  4. Signs of infection such as: chills or fever occurring within 24 hours   after the procedure.  5. Rectal bleeding, which would show as bright red, maroon, or black stools.   (A tablespoon of blood from the rectum is not serious, especially if   hemorrhoids are present.)  6. Vomiting.  7. Weakness or dizziness.  GO DIRECTLY TO THE NEAREST EMERGENCY ROOM IF YOU HAVE ANY OF THE FOLLOWING:      Difficulty breathing              Chills and/or fever over 101 F   Persistent vomiting and/or vomiting blood   Severe abdominal pain   Severe chest pain   Black, tarry stools   Bleeding- more than one tablespoon   Any  other symptom or condition that you feel may need urgent attention  Your doctor recommends these additional instructions:  If any biopsies were taken, your doctors clinic will contact you in 1 to 2   weeks with any results.  - Return patient to [facility] for possible discharge same day.   - Ok to use PEG tube  - Hold Plavix x 2 days  - F/u in clinic in 2 days  For questions, problems or results please call your physician - Anila Kolb MD at Work:  [phone].  EMERGENCY PHONE NUMBER: [phone],  LAB RESULTS: [phone]  IF A COMPLICATION OR EMERGENCY SITUATION ARISES AND YOU ARE UNABLE TO REACH   YOUR PHYSICIAN - GO DIRECTLY TO THE EMERGENCY ROOM.  Anila Kolb MD  6/14/2018 10:56:55 AM  This report has been verified and signed electronically.  [employer]

## 2018-06-14 NOTE — DISCHARGE SUMMARY
[facility] Hospital Medicine  Discharge Summary      Patient Name: Elizabeth Navarrete  MRN: [id number]  Admission Date: 6/12/2018  Hospital Length of Stay: 0 days  Discharge Date and Time: 6/14/2018  2:27 PM  Attending Physician: Germain Ca MD  Discharging Provider: Germain Ca MD  Primary Care Provider: [facility]      HPI:   Elizabeth Navarrete is a 89 y.o. 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  She has resided at [facility] in [place], [place] since 6/23/17.  She is bed bound.  Her primary care physician is Dr. CHADWICK Montoya.     She had her PEG changed at [facility] on 2/12/18 with a 20 Polish Blandon catheter.  She saw gastroenterologist Dr. Anila Kolb on 6/11/18 for evaluation of difficulty flushing.  He noted the balloon port to be clogged, and scheduled endoscopic replacement.     Dr. 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  The balloon was unable to be visualized.  While waking up from anesthesia, she pulled the balloon out.  She was admitted to [facility] for Dr. Kolb to plan further management.  CT of the abdomen showed no remaining hardware.  She had no complaints.    Procedure(s) (LRB):  ESOPHAGOGASTRODUODENOSCOPY (EGD) (N/A)      Hospital Course:   She was put on IV fluids to avoid dehydration while awaiting PEG replacement.  She had no symptomatic complaints but had dark malodorous urine.  She refused to give a urine sample but was given a dose of gentamicin in case of UTI.  Dr. Kolb placed a PEG on 6/14/18 and she was  discharged home afterward.    Upper GI endoscopy 6/14/18:  Findings:       One benign-appearing, intrinsic stenosis was found at the gastroesophageal junction. This stenosis was moderately severe and measured 6 cm (in length). The stenosis was traversed.       A medium-sized hiatal hernia was present.       The entire examined stomach was normal. Placement of an endoscopically removable PEG with no T-fasteners was successfully completed. The external bumper was at the 3.0 cm marking on the tube. Estimated blood loss: none. Safe tract needle technique utilized.       The examined duodenum was normal.  Impression:           - Benign-appearing esophageal stenosis.                        - Medium-sized hiatal hernia.                        - Normal stomach.                        - Normal examined duodenum.                        - An endoscopically removable PEG placement was successfully completed.                        - No specimens collected.  Recommendation:       - [facility] to [facility] for possible discharge same day.                        - Ok to use PEG tube                        - Hold Plavix x 2 days                        - F/u in clinic in 2 days     Consults: Gastroenterology    Final Active Diagnoses:    Diagnosis Date Noted POA    PRINCIPAL PROBLEM:  PEG (percutaneous endoscopic gastrostomy) adjustment/replacement/removal [Z43.1] 06/12/2018 Not Applicable    Nursing home resident [Z59.3] 06/23/2017 Not Applicable     Chronic    Gastrostomy tube dependent, 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 [Z93.1] 06/21/2017 Not Applicable     Chronic    Oropharyngeal dysphagia [R13.12] 06/16/2017 Yes     Chronic    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 [Z86.73] 06/15/2017 Not Applicable     Chronic    Hemiparesis affecting left side as late effect of stroke [I69.354] 06/15/2017 Not Applicable     Chronic    Chronic atrial fibrillation [I48.2] 09/06/2016 Yes     Chronic    Enlarged LA (left atrium) [I51.7]  09/06/2016 Yes     Chronic    Essential hypertension [I10] 10/10/2012 Yes     Chronic    Chronic diastolic congestive heart failure [I50.32] 10/10/2012 Yes     Chronic      Problems Resolved During this Admission:    Diagnosis Date Noted Date Resolved POA       Discharged Condition: good    Disposition: Long Term Care    Follow Up:  Follow-up Information     Go to [facility].    Specialties:  Nursing Home Agency, SNF Agency  Why:  Nursing Home  Contact information:  [address] [place] [id number] [phone]                 Patient Instructions:     AIR MATTRESS if Ciro Scale less than 15     Vital signs per facility protocol     Intake and output per facility protocol     Skin assessment every shift      Notify Physician   Order Specific Question Answer Comments   Temperature (F) greater than 101    Systolic Blood Pressure SBP greater than or equal to 160    Systolic Blood Pressure SBP less than or equal to 90    Diastolic Blood Pressure DBP greater than or equal to 110    Diastolic Blood Pressure DBP less than or equal to 60    Pulse greater than or equal to 120    Pulse less than or equal to 50    Respirations Rate RR greater than or equal to 30    Respirations Rate RR less than or equal to 6    Urine output less than 60 over 2 hours   SPO2% less than 90      Activity as tolerated     Turn patient every 2 hours to prevent pressure ulcer formation     Feeding tube care for PEG     Full code     Tube Feedings/Formulas   Order Comments: Resume prior tube feed orders   Order Specific Question Answer Comments   Select Adult Formula: Isosource 1.5 carlos    Route: Gastrostomy      Pulse Oximetry         Significant Diagnostic Studies: None     Medications:  Reconciled Home Medications:      Medication List      CONTINUE taking these medications    amLODIPine 5 MG tablet  Commonly known as:  NORVASC  5 mg by Per G Tube route once daily.     aspirin 325 MG tablet  325 mg by Per G Tube route once daily.      atorvastatin 40 MG tablet  Commonly known as:  LIPITOR  1 tablet (40 mg total) by Per G Tube route once daily.     carvedilol 25 MG tablet  Commonly known as:  COREG  25 mg by Per G Tube route 2 (two) times daily.     clopidogrel 75 mg tablet  Commonly known as:  PLAVIX  1 tablet (75 mg total) by Per G Tube route once daily.     diclofenac sodium 1 % Gel  Commonly known as:  VOLTAREN  Apply 4 g topically 4 (four) times daily as needed.     fish oil-omega-3 fatty acids 300-1,000 mg capsule  1 g by Per G Tube route once daily.     mupirocin 2 % ointment  Commonly known as:  BACTROBAN  Apply topically once daily. Apply to left upper back wound, cover with telfa island     omeprazole magnesium 20 mg Cpdr  20 mg by Per G Tube route once daily.     venlafaxine 75 MG tablet  Commonly known as:  EFFEXOR  75 mg by Per G Tube route once daily.     VITAMIN D3 1,000 unit capsule  Generic drug:  cholecalciferol (vitamin D3)  1,000 Units by Per G Tube route once daily.            Indwelling Lines/Drains at time of discharge:   Lines/Drains/Airways     Drain                 Gastrostomy/Enterostomy 06/14/18 1044 Percutaneous endoscopic gastrostomy (PEG) LUQ feeding less than 1 day                Time spent on the discharge of patient: 35 minutes  Patient was seen and examined on the date of discharge and determined to be suitable for discharge.         Germain Ca MD  Department of Hospital Medicine  [facility]

## 2018-06-14 NOTE — PLAN OF CARE
Follow-up With  Details  Why  Contact Info   [facility]  Go to  Nursing Home  [address]  [place] [address]  [phone]     Jennifer Kulkarni RN  Transition Navigator  [phone]

## 2018-06-14 NOTE — PLAN OF CARE
Patient has orders for discharge. [place] called and spoke with nurse Narcisa. She stated patient does not have her PEG tube placed yet. Patient needs PEG tube placed prior to discharge. Will await PEG placement.    1130--TN faxed discharged order to [facility]. TN spoke with Dr. Ca. He informed TN she does not need a GI follow-up. He stated spoke with Dr. Kolb and patient can discharge back directly to the nursing home after procedure.     1138--Patient is back from PEG tube placement. I informed Ekaterina from [facility].    1218--TN updated patient and daughter.    1318--Ekaterina was informed sent updated orders with ok to use peg time. She stated ok for nurse to call report. They will send their transport for 2:00 pm.    [place] [address] Room 716 B  TN also notified daughter at bedside.    Follow-up With  Details  Why  Contact Info   [facility]  Go to  Nursing Home  [address] [place] [address]  [place] [id number]  [phone]         06/14/18 1012   Final Note   Assessment Type Final Discharge Note   Discharge Disposition [facility]   What phone number can be called within the next 1-3 days to see how you are doing after discharge? [phone]   Hospital Follow Up  Appt(s) scheduled? Yes   Right Care Referral Info   Post Acute Recommendation Other   Referral Type Nursing Home   Facility Name Return to [facility]     Jennifer Kulkarni RN  Transition Navigator  [phone]

## 2018-06-14 NOTE — ASSESSMENT & PLAN NOTE
Oropharyngeal dysphagia  Gastrostomy tube dependent, 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  Giving IV fluids.  PEG will be placed this morning then she will be discharged back to her nursing home.

## 2018-06-14 NOTE — PROGRESS NOTES
[facility] Medicine  Progress Note    Patient Name: Elizabeth Navarrete  MRN: [id number]  Patient Class: OP- Observation   Admission Date: 6/12/2018  Length of Stay: 0 days  Attending Physician: Germain Ca MD  Primary Care Provider: [facility]        Subjective:     Principal Problem:PEG (percutaneous endoscopic gastrostomy) adjustment/replacement/removal    HPI:  Elizabeth Navarrete is a 89 y.o. 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  She has resided at [facility] in [place], [place] since 6/23/17.  She is bed bound.  Her primary care physician is Dr. CHADWICK Montoya.     She had her PEG changed at [facility] on 2/12/18 with a 20 [place] Blandon catheter.  She saw gastroenterologist Dr. Anila Kolb on 6/11/18 for evaluation of difficulty flushing.  He noted the balloon port to be clogged, and scheduled endoscopic replacement.     Dr. 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  The balloon was unable to be visualized.  While waking up from anesthesia, she pulled the balloon out.  She was admitted to [facility] for Dr. Kolb to plan further management.  CT of the abdomen showed no remaining hardware.  She had no complaints.    Hospital Course:  She was put on IV fluids to avoid dehydration while awaiting PEG replacement.  She had no symptomatic complaints but had dark malodorous urine.  She refused to give a urine sample but was given a dose of gentamicin in case of UTI.    Interval History: No complaints.    Review of Systems   Constitutional:  Negative for chills and fever.   Respiratory: Negative for cough and shortness of breath.    Gastrointestinal: Negative for abdominal pain, nausea and vomiting.     Objective:     Vital Signs (Most Recent):  Temp: 97.7 °F (36.5 °C) (06/14/18 0741)  Pulse: 89 (06/14/18 0741)  Resp: 18 (06/14/18 0741)  BP: 130/86 (06/14/18 0741)  SpO2: 98 % (06/14/18 0831) Vital Signs (24h Range):  Temp:  [97.7 °F (36.5 °C)-99.4 °F (37.4 °C)] 97.7 °F (36.5 °C)  Pulse:  [] 89  Resp:  [16-18] 18  SpO2:  [96 %-98 %] 98 %  BP: (130-179)/(61-94) 130/86     Weight: 53.6 kg (118 lb 2.7 oz)  Body mass index is 19.07 kg/m².    Intake/Output Summary (Last 24 hours) at 06/14/18 0854  Last data filed at 06/14/18 0000   Gross per 24 hour   Intake          1179.17 ml   Output             1203 ml   Net           -23.83 ml      Physical Exam   Constitutional: She appears well-developed. She appears cachectic. No distress.   Abdominal: Soft. There is no tenderness.   PEG site dressed   Neurological: She is alert.   Left upper extremity weak but able to move   Psychiatric: She has a normal mood and affect. Her behavior is normal.   Nursing note and vitals reviewed.      Significant Labs: All pertinent labs within the past 24 hours have been reviewed.    Significant Imaging: I have reviewed all pertinent imaging results/findings within the past 24 hours.    Assessment/Plan:      * PEG (percutaneous endoscopic gastrostomy) adjustment/replacement/removal    Oropharyngeal dysphagia  Gastrostomy tube dependent, 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  Giving IV fluids.  PEG will be placed this morning then she will be discharged back to her nursing home.          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    Hemiparesis affecting left side as late effect of stroke  Turn patient every 2 hours.  Takes aspirin 325 mg daily, clopidrogel 75 mg daily, atorvastatin 40 mg daily.  Hold until PEG placed.          Chronic atrial fibrillation    Enlarged LA (left atrium)  Takes  aspirin 325 mg daily, carvedilol 25 mg BID.  Give heparin TID for now.          Essential hypertension    Chronic diastolic congestive heart failure  Takes amlodipine 5 mg daily, carvedilol 25 mg twice daily.  Hold until PEG placed.  Give hydralazine IV PRN.            VTE Risk Mitigation         Ordered     heparin (porcine) injection 5,000 Units  Every 8 hours      06/12/18 1759     IP VTE HIGH RISK PATIENT  Once      06/12/18 1343     Place DEISY hose  Until discontinued      06/12/18 1343     Place sequential compression device  Until discontinued      06/12/18 1343              Germain Ca MD  Department of Hospital Medicine   [facility]

## 2018-06-14 NOTE — TRANSFER OF CARE
"Anesthesia Transfer of Care Note    Patient: Elizabeth Navarrete    Procedure(s) Performed: Procedure(s) (LRB):  ESOPHAGOGASTRODUODENOSCOPY (EGD) (N/A)    Patient location: GI    Anesthesia Type: MAC    Transport from OR: Transported from OR on room air with adequate spontaneous ventilation    Post pain: adequate analgesia    Post assessment: no apparent anesthetic complications    Post vital signs: stable    Level of consciousness: awake, alert and oriented    Nausea/Vomiting: no nausea/vomiting    Complications: none    Transfer of care protocol was followed      Last vitals:   Visit Vitals  BP (!) 151/79   Pulse 96   Temp 36.7 °C (98.1 °F)   Resp 16   Ht 5' 6" (1.676 m)   Wt 53.6 kg (118 lb 2.7 oz)   LMP  (LMP Unknown)   SpO2 96%   Breastfeeding? No   BMI 19.07 kg/m²     "

## 2018-06-14 NOTE — NURSING
Pt returned from surgery. Peg tube to [facility]IANO abdomen. Abdominal binder in place. Some sero-sanguenous drainage to binder. VSS. NAD.

## 2018-06-15 ENCOUNTER — TELEPHONE (OUTPATIENT)
Dept: GASTROENTEROLOGY | Facility: CLINIC | Age: 83
End: 2018-06-15

## 2018-06-17 ENCOUNTER — HOSPITAL ENCOUNTER (EMERGENCY)
Facility: HOSPITAL | Age: 83
Discharge: HOME | End: 2018-06-17
Attending: EMERGENCY MEDICINE
Payer: MEDICARE

## 2018-06-17 VITALS
OXYGEN SATURATION: 98 % | SYSTOLIC BLOOD PRESSURE: 132 MMHG | HEIGHT: 66 IN | BODY MASS INDEX: 18.96 KG/M2 | RESPIRATION RATE: 18 BRPM | HEART RATE: 89 BPM | WEIGHT: 118 LBS | TEMPERATURE: 98 F | DIASTOLIC BLOOD PRESSURE: 74 MMHG

## 2018-06-17 DIAGNOSIS — N30.91 HEMORRHAGIC CYSTITIS: Primary | ICD-10-CM

## 2018-06-17 DIAGNOSIS — R10.9 ABDOMINAL PAIN: ICD-10-CM

## 2018-06-17 DIAGNOSIS — R53.1 WEAKNESS: ICD-10-CM

## 2018-06-17 LAB
ALBUMIN SERPL BCP-MCNC: 2.8 G/DL
ALP SERPL-CCNC: 142 U/L
ALT SERPL W/O P-5'-P-CCNC: 25 U/L
ANION GAP SERPL CALC-SCNC: 8 MMOL/L
ANISOCYTOSIS BLD QL SMEAR: SLIGHT
AST SERPL-CCNC: 28 U/L
BACTERIA #/AREA URNS HPF: ABNORMAL /HPF
BASOPHILS # BLD AUTO: ABNORMAL K/UL
BASOPHILS NFR BLD: 0 %
BILIRUB SERPL-MCNC: 0.7 MG/DL
BILIRUB UR QL STRIP: NEGATIVE
BUN SERPL-MCNC: 32 MG/DL
CALCIUM SERPL-MCNC: 9.1 MG/DL
CHLORIDE SERPL-SCNC: 108 MMOL/L
CLARITY UR: CLEAR
CO2 SERPL-SCNC: 23 MMOL/L
COLOR UR: ABNORMAL
CREAT SERPL-MCNC: 0.7 MG/DL
DIFFERENTIAL METHOD: ABNORMAL
EOSINOPHIL # BLD AUTO: ABNORMAL K/UL
EOSINOPHIL NFR BLD: 4 %
ERYTHROCYTE [DISTWIDTH] IN BLOOD BY AUTOMATED COUNT: 16.2 %
EST. GFR  (AFRICAN AMERICAN): >60 ML/MIN/1.73 M^2
EST. GFR  (NON AFRICAN AMERICAN): >60 ML/MIN/1.73 M^2
GLUCOSE SERPL-MCNC: 116 MG/DL
GLUCOSE UR QL STRIP: NEGATIVE
HCT VFR BLD AUTO: 34.9 %
HGB BLD-MCNC: 10.6 G/DL
HGB UR QL STRIP: ABNORMAL
HYALINE CASTS #/AREA URNS LPF: 0 /LPF
HYPOCHROMIA BLD QL SMEAR: ABNORMAL
KETONES UR QL STRIP: NEGATIVE
LACTATE SERPL-SCNC: 1.3 MMOL/L
LEUKOCYTE ESTERASE UR QL STRIP: ABNORMAL
LYMPHOCYTES # BLD AUTO: ABNORMAL K/UL
LYMPHOCYTES NFR BLD: 6 %
MCH RBC QN AUTO: 25.9 PG
MCHC RBC AUTO-ENTMCNC: 30.4 G/DL
MCV RBC AUTO: 85 FL
MICROSCOPIC COMMENT: ABNORMAL
MONOCYTES # BLD AUTO: ABNORMAL K/UL
MONOCYTES NFR BLD: 7 %
NEUTROPHILS # BLD AUTO: ABNORMAL K/UL
NEUTROPHILS NFR BLD: 81 %
NEUTS BAND NFR BLD MANUAL: 2 %
NITRITE UR QL STRIP: NEGATIVE
PH UR STRIP: 8 [PH] (ref 5–8)
PLATELET # BLD AUTO: 219 K/UL
PLATELET BLD QL SMEAR: ABNORMAL
PMV BLD AUTO: 10.2 FL
POTASSIUM SERPL-SCNC: 4.4 MMOL/L
PROT SERPL-MCNC: 7 G/DL
PROT UR QL STRIP: ABNORMAL
RBC # BLD AUTO: 4.1 M/UL
RBC #/AREA URNS HPF: >100 /HPF (ref 0–4)
SODIUM SERPL-SCNC: 139 MMOL/L
SP GR UR STRIP: 1.02 (ref 1–1.03)
SQUAMOUS #/AREA URNS HPF: 1 /HPF
URN SPEC COLLECT METH UR: ABNORMAL
UROBILINOGEN UR STRIP-ACNC: 1 EU/DL
WBC # BLD AUTO: 10.13 K/UL
WBC #/AREA URNS HPF: 15 /HPF (ref 0–5)

## 2018-06-17 PROCEDURE — 93005 ELECTROCARDIOGRAM TRACING: CPT

## 2018-06-17 PROCEDURE — 87086 URINE CULTURE/COLONY COUNT: CPT

## 2018-06-17 PROCEDURE — 81000 URINALYSIS NONAUTO W/SCOPE: CPT

## 2018-06-17 PROCEDURE — 80053 COMPREHEN METABOLIC PANEL: CPT

## 2018-06-17 PROCEDURE — 87077 CULTURE AEROBIC IDENTIFY: CPT | Mod: 59

## 2018-06-17 PROCEDURE — 85027 COMPLETE CBC AUTOMATED: CPT

## 2018-06-17 PROCEDURE — 87186 SC STD MICRODIL/AGAR DIL: CPT

## 2018-06-17 PROCEDURE — 85007 BL SMEAR W/DIFF WBC COUNT: CPT

## 2018-06-17 PROCEDURE — 96361 HYDRATE IV INFUSION ADD-ON: CPT

## 2018-06-17 PROCEDURE — 25000003 PHARM REV CODE 250: Performed by: EMERGENCY MEDICINE

## 2018-06-17 PROCEDURE — 83605 ASSAY OF LACTIC ACID: CPT

## 2018-06-17 PROCEDURE — 99284 EMERGENCY DEPT VISIT MOD MDM: CPT | Mod: 25

## 2018-06-17 PROCEDURE — 87040 BLOOD CULTURE FOR BACTERIA: CPT | Mod: 59

## 2018-06-17 PROCEDURE — 96360 HYDRATION IV INFUSION INIT: CPT

## 2018-06-17 RX ORDER — SULFAMETHOXAZOLE AND TRIMETHOPRIM 800; 160 MG/1; MG/1
1 TABLET ORAL
Status: COMPLETED | OUTPATIENT
Start: 2018-06-17 | End: 2018-06-17

## 2018-06-17 RX ORDER — SULFAMETHOXAZOLE AND TRIMETHOPRIM 200; 40 MG/5ML; MG/5ML
20 SUSPENSION ORAL EVERY 12 HOURS
Qty: 280 ML | Refills: 0 | Status: SHIPPED | OUTPATIENT
Start: 2018-06-17 | End: 2018-06-22

## 2018-06-17 RX ORDER — SULFAMETHOXAZOLE AND TRIMETHOPRIM 800; 160 MG/1; MG/1
1 TABLET ORAL 2 TIMES DAILY
Qty: 14 TABLET | Refills: 0 | Status: SHIPPED | OUTPATIENT
Start: 2018-06-17 | End: 2018-06-24

## 2018-06-17 RX ADMIN — SULFAMETHOXAZOLE AND TRIMETHOPRIM 1 TABLET: 800; 160 TABLET ORAL at 07:06

## 2018-06-17 RX ADMIN — SODIUM CHLORIDE 1605 ML: 0.9 INJECTION, SOLUTION INTRAVENOUS at 04:06

## 2018-06-17 NOTE — ED TRIAGE NOTES
Pt presents to the ED via [facility] for vaginal bleeding. Pt found to have a steady drip of blood from urinary tract. Pt catheterized for urine. Dark red blood returned. Pt denies pain. Daughter states patient was diagnosed with a UTI recently and received IV ATB. Pt also had a recent PEG tube placement, this past week. Pt is alert and oriented. No acute distress noted.

## 2018-06-17 NOTE — ED PROVIDER NOTES
Encounter Date: 6/17/2018    SCRIBE #1 NOTE: I, Deanna Owens, am scribing for, and in the presence of,  Dr. Jones. I have scribed the entire note.       History     Chief Complaint   Patient presents with    Vaginal Bleeding     [facility]; daughter reported to [facility] staff the her mother felt like she had a fever then tonight, daughter reported tonight that pt had an episode of red vaginal bleeding and sent for eval; diaper with blood in diaper and pt incont stool on arrival and changed on arrival; no blood noted in stool and loose    Fever     Eliazbeth Navarrete is a 89 y.o. female who  has a past medical history of CAD (coronary artery disease) (10/10/2012); CRF (chronic renal failure) (10/10/2012); Embolic stroke involving right carotid artery (6/15/2017); Family history of breast cancer (10/10/2012); History of cardiac arrhythmia (10/10/2012); History of CHF (congestive heart failure) (10/10/2012); History of depression (10/10/2012); History of echocardiogram (10/10/2012); HTN (hypertension) (10/10/2012); Internal carotid artery stenosis (10/10/2012); and Personal history of gallstones (10/10/2012).    The patient presents to the ED due to fever. As per EMS the patient's symptoms began just prior to arrival in the ED. EMS report the patient is from [facility]. The staff at the nursing home state the patient was found to have a fever. The patient was also found to have blood in the diaper. The nursing home and family suspect the patient is having vaginal bleeding.       The history is provided by the [facility]. The history is limited by the absence of a caregiver.     Review of patient's allergies indicates:   Allergen Reactions    Pcn [penicillins] Rash     Past Medical History:   Diagnosis Date    CAD (coronary artery disease) 10/10/2012    CRF (chronic renal failure) 10/10/2012    Embolic stroke involving right carotid artery 6/15/2017    Family history of breast cancer  10/10/2012    History of cardiac arrhythmia 10/10/2012    History of CHF (congestive heart failure) 10/10/2012    History of depression 10/10/2012    History of echocardiogram 10/10/2012    HTN (hypertension) 10/10/2012    Internal carotid artery stenosis 10/10/2012    Personal history of gallstones 10/10/2012     Past Surgical History:   Procedure Laterality Date    BREAST LUMPECTOMY      ESOPHAGOGASTRODUODENOSCOPY N/A 6/12/2018    Procedure: ESOPHAGOGASTRODUODENOSCOPY (EGD);  Surgeon: Anila Kolb MD;  Location: [facility] ENDO;  Service: Endoscopy;  Laterality: N/A;    ESOPHAGOGASTRODUODENOSCOPY N/A 6/14/2018    Procedure: ESOPHAGOGASTRODUODENOSCOPY (EGD);  Surgeon: Anila Kolb MD;  Location: [facility] ENDO;  Service: Endoscopy;  Laterality: N/A;    EYE SURGERY      HYSTERECTOMY       Family History   Problem Relation Age of Onset    Cancer Mother         breast    Heart disease Neg Hx      Social History   Substance Use Topics    Smoking status: Never Smoker    Smokeless tobacco: Never Used    Alcohol use No     Review of Systems   Unable to perform ROS: Dementia   Constitutional: Positive for fever.       Physical Exam     Initial Vitals [06/17/18 0317]   BP Pulse Resp Temp SpO2   (!) 146/70 (!) 125 (!) 26 100.2 °F (37.9 °C) 99 %      MAP       --         Physical Exam    Nursing note and vitals reviewed.  Constitutional: She appears well-developed and well-nourished. She is not diaphoretic. No distress.   Non-toxic appearing.    HENT:   Head: Normocephalic and atraumatic.   Eyes: Conjunctivae are normal.   Neck: Normal range of motion. Neck supple.   Cardiovascular: Normal rate, regular rhythm and normal heart sounds. Exam reveals no gallop and no friction rub.    No murmur heard.  Pulmonary/Chest: Tachypnea noted. She has no wheezes. She has no rhonchi. She has no rales.   Abdominal: Soft. There is no tenderness. There is no rebound.   PEG Tube in place   Musculoskeletal: Normal range of  motion. She exhibits no edema or tenderness.   Lymphadenopathy:     She has no cervical adenopathy.   Neurological: She is alert. No cranial nerve deficit.   Skin: Skin is warm and dry. No rash noted.         ED Course   Procedures  Labs Reviewed   CBC W/ AUTO DIFFERENTIAL - Abnormal; Notable for the following:        Result Value    Hemoglobin 10.6 (*)     Hematocrit 34.9 (*)     MCH 25.9 (*)     MCHC 30.4 (*)     RDW 16.2 (*)     Gran% 81.0 (*)     Lymph% 6.0 (*)     All other components within normal limits   COMPREHENSIVE METABOLIC PANEL - Abnormal; Notable for the following:     Glucose 116 (*)     BUN, Bld 32 (*)     Albumin 2.8 (*)     Alkaline Phosphatase 142 (*)     All other components within normal limits   URINALYSIS - Abnormal; Notable for the following:     Color, UA Red (*)     Protein, UA 3+ (*)     Occult Blood UA 3+ (*)     Leukocytes, UA 2+ (*)     All other components within normal limits   URINALYSIS MICROSCOPIC - Abnormal; Notable for the following:     RBC, UA >100 (*)     WBC, UA 15 (*)     Bacteria, UA Few (*)     All other components within normal limits   CULTURE, BLOOD    Narrative:     Aerobic and anaerobic   CULTURE, BLOOD    Narrative:     Aerobic and anaerobic   CULTURE, URINE   LACTIC ACID, PLASMA          X-Ray Abdomen Flat And Erect   Final Result      1.  Gastrostomy tube projects over the gastric lumen.  Large hiatal hernia.      2.  Nonobstructive appearing bowel gas pattern.      3.  Cholelithiasis with porcelain gallbladder.         Electronically signed by: Jose Minor MD   Date:    06/17/2018   Time:    04:29      X-Ray Chest AP Portable   Final Result      Mild prominence of the central pulmonary vasculature with increased interstitial markings bilaterally which could relate to mild interstitial edema.         Electronically signed by: Jose Minor MD   Date:    06/17/2018   Time:    04:24        Imaging Results          X-Ray Chest AP Portable (Final result)  Result  time 06/17/18 04:24:12    Final result by Jose Minor MD (06/17/18 04:24:12)                 Impression:      Mild prominence of the central pulmonary vasculature with increased interstitial markings bilaterally which could relate to mild interstitial edema.      Electronically signed by: Jose Minor MD  Date:    06/17/2018  Time:    04:24             Narrative:    EXAMINATION:  XR CHEST AP PORTABLE    CLINICAL HISTORY:  Sepsis;    TECHNIQUE:  Single frontal view of the chest was performed.    COMPARISON:  06/15/2017    FINDINGS:  Cardiomediastinal silhouette is mildly prominent, similar to prior examination.  There is significant atherosclerotic calcification of the thoracic aorta.  The lungs appear symmetrically expanded.  There are increased interstitial markings bilaterally with prominence of the central pulmonary vasculature.  No large confluent consolidation is appreciated.  No evidence of significant pleural effusion or pneumothorax.  The osseous structures demonstrate stable degenerative changes.                               X-Ray Abdomen Flat And Erect (Final result)  Result time 06/17/18 04:29:08    Final result by Jose Minor MD (06/17/18 04:29:08)                 Impression:      1.  Gastrostomy tube projects over the gastric lumen.  Large hiatal hernia.    2.  Nonobstructive appearing bowel gas pattern.    3.  Cholelithiasis with porcelain gallbladder.      Electronically signed by: Jose Minor MD  Date:    06/17/2018  Time:    04:29             Narrative:    EXAMINATION:  XR ABDOMEN FLAT AND ERECT    CLINICAL HISTORY:  Unspecified abdominal pain    TECHNIQUE:  Flat and erect AP views of the abdomen were performed.    COMPARISON:  CT 06/12/2018    FINDINGS:  Gastrostomy tube projects over the gastric lumen.  Scattered air is seen in relatively nondilated loops of small and large bowel. No central small bowel air fluid levels are appreciated.  There is a large hiatal hernia.  There  is cholelithiasis with curvilinear calcification suggestive of porcelain gallbladder, better seen on prior CT examination.  Scattered stool is noted throughout the colon which may relate to component of constipation.  No definite large volume of free intraperitoneal air is appreciated.  Osseous structures demonstrate degenerative changes with scoliotic curvature of lumbar spine.  The visualized lung bases appear clear.                                 Medical Decision Making:   Clinical Tests:   Lab Tests: Ordered and Reviewed  Radiological Study: Ordered and Reviewed  Medical Tests: Ordered and Reviewed  ED Management:  Pt was not admitted due to normal vs and labs and tolerating po. She was treated for hemorrhagic cystitis with bactrim and dc'd back to [facility]                      Clinical Impression:     1. Hemorrhagic cystitis    2. Weakness    3. Abdominal pain                           Michele Jones MD  06/18/18 [id number]

## 2018-06-17 NOTE — ED NOTES
Report taken from Rey BLEVINS. This RN to take over care of pt. Pt resting in stretcher. NAD noted. Respirations even and unlabored. Will continue to monitor pt.

## 2018-06-18 ENCOUNTER — OFFICE VISIT (OUTPATIENT)
Dept: GASTROENTEROLOGY | Facility: CLINIC | Age: 83
End: 2018-06-18
Payer: MEDICARE

## 2018-06-18 DIAGNOSIS — Z43.1 ATTENTION TO GASTROSTOMY TUBE: ICD-10-CM

## 2018-06-18 DIAGNOSIS — R13.12 OROPHARYNGEAL DYSPHAGIA: Primary | Chronic | ICD-10-CM

## 2018-06-18 LAB — BACTERIA UR CULT: NO GROWTH

## 2018-06-18 PROCEDURE — 99214 OFFICE O/P EST MOD 30 MIN: CPT | Mod: S$PBB,,, | Performed by: INTERNAL MEDICINE

## 2018-06-18 NOTE — PROVIDER PROGRESS NOTES - EMERGENCY DEPT.
Encounter Date: 6/17/2018    ED Physician Progress Notes        Physician Note:   Pt's blood culture returned positive for strep. NH was contacted and encouraged to send patient back to hospital or contact the NH physician for further instructions

## 2018-06-18 NOTE — ED NOTES
[facility] CALLED AND REPORTED POSITIVE BLOOD CULTURE RESULTS FROM 6-17--- AEROBIC AND ANAROBIC POSITIVE FOR GRAM POS COCCI IN CLUSTERS/CHAINS AND RESEMBLES STREPT; DR ROWE INFORMED OF THE RESULTS; [facility] CALLED AND REPORTED TO PTS NURSE LYNSEY; LYNSEY REPORTS WILL CALL PTS MD DR ADAM AND REPORTS PT IS DOING OKAY AND ON ABX AS ORDERED HERE

## 2018-06-18 NOTE — PROGRESS NOTES
Subjective:       Patient ID: Elizabeth aNvarrete is a 89 y.o. female.    Chief Complaint: Dysphagia    This is a 89yo female who presents for a f/u visit. She has a h/o CVA with subsequent oropharyngeal dysphagia and had a clogged/dislodged PEG tube which was recently changed last Friday. Her daughter is present and notes it to be working well. Dressing is in place with a small amount of dried blood. No pain at the site. No melena has been noted.     The following portions of the patient's history were reviewed and updated as appropriate: allergies, current medications, past family history, past medical history, past social history, past surgical history and problem list.      HPI  Review of Systems   Constitutional: Negative for activity change, appetite change and unexpected weight change.   Gastrointestinal: Negative for abdominal distention and abdominal pain.       Objective:      Physical Exam   Constitutional: She appears well-developed and well-nourished. No distress.   Eyes: Conjunctivae are normal. No scleral icterus.   Pulmonary/Chest: Effort normal. No respiratory distress.   Abdominal: Soft. Bowel sounds are normal. She exhibits no distension.   - tube flushes well; site has bandage with mild amount of dried blood (changed yesterday)   Neurological: She is alert. Coordination abnormal.   Skin: Skin is warm. No pallor.   Nursing note and vitals reviewed.      Assessment:       1. Oropharyngeal dysphagia    2. Attention to gastrostomy tube        Plan:   1. Hold Plavix x 3 days  2. F/u on Thursday  3. Continue routine peg tube care  4. D/w family  5. Tube loosened 0.5cm

## 2018-06-21 ENCOUNTER — OFFICE VISIT (OUTPATIENT)
Dept: GASTROENTEROLOGY | Facility: CLINIC | Age: 83
End: 2018-06-21
Payer: MEDICARE

## 2018-06-21 DIAGNOSIS — R13.12 OROPHARYNGEAL DYSPHAGIA: Primary | Chronic | ICD-10-CM

## 2018-06-21 DIAGNOSIS — Z43.1 ATTENTION TO GASTROSTOMY TUBE: ICD-10-CM

## 2018-06-21 LAB
BACTERIA BLD CULT: NORMAL

## 2018-06-21 PROCEDURE — 99211 OFF/OP EST MAY X REQ PHY/QHP: CPT | Mod: PBBFAC,PO | Performed by: INTERNAL MEDICINE

## 2018-06-21 PROCEDURE — 99999 PR PBB SHADOW E&M-EST. PATIENT-LVL I: CPT | Mod: PBBFAC,,, | Performed by: INTERNAL MEDICINE

## 2018-06-21 PROCEDURE — 99213 OFFICE O/P EST LOW 20 MIN: CPT | Mod: S$PBB,,, | Performed by: INTERNAL MEDICINE

## 2018-06-21 NOTE — PROGRESS NOTES
Subjective:       Patient ID: Elizabeth Navarrete is a 89 y.o. female.    Chief Complaint: Follow-up    This is a 89yo female who presents for a f/u visit. She has a h/o CVA with subsequent oropharyngeal dysphagia and had a clogged/dislodged PEG tube which was recently changed, mild bleeding noted last visit from the site. Still flushing and working well.  No pain/melena. She is in good spirits. Family present to give additional history.      The following portions of the patient's history were reviewed and updated as appropriate: allergies, current medications, past family history, past medical history, past social history, past surgical history and problem list.     (Portions of this note were dictated using voice recognition software and may contain dictation related errors in spelling/grammar/syntax not found on text review)     HPI  Review of Systems   Constitutional: Negative for activity change, appetite change and unexpected weight change.   Gastrointestinal: Negative for abdominal distention and abdominal pain.       Objective:      Physical Exam   Constitutional: She appears well-developed and well-nourished. No distress.   HENT:   Head: Normocephalic and atraumatic.   Eyes: Conjunctivae are normal. No scleral icterus.   Pulmonary/Chest: Effort normal. No respiratory distress.   Abdominal: Soft. Bowel sounds are normal. She exhibits no distension.   - tube flushes well; no bleeding on bandage; ext bumper moved to 5cm   Neurological: She is alert. Coordination abnormal.   Skin: Skin is warm. No pallor.   Nursing note and vitals reviewed.      Assessment:       1. Oropharyngeal dysphagia    2. Attention to gastrostomy tube        Plan:   1. Ok to resume plavix  2. Routine peg tube care  3. Keep external bumper at 5cm, discussed with family  4. Flush after each use

## 2018-06-22 LAB — BACTERIA BLD CULT: NORMAL

## 2018-11-24 ENCOUNTER — HOSPITAL ENCOUNTER (EMERGENCY)
Facility: HOSPITAL | Age: 83
Discharge: HOME | End: 2018-11-24
Attending: EMERGENCY MEDICINE
Payer: MEDICARE

## 2018-11-24 VITALS
TEMPERATURE: 99 F | SYSTOLIC BLOOD PRESSURE: 97 MMHG | DIASTOLIC BLOOD PRESSURE: 55 MMHG | RESPIRATION RATE: 35 BRPM | HEART RATE: 74 BPM | OXYGEN SATURATION: 97 % | BODY MASS INDEX: 19.29 KG/M2 | HEIGHT: 66 IN | WEIGHT: 120 LBS

## 2018-11-24 DIAGNOSIS — R31.0 GROSS HEMATURIA: Primary | ICD-10-CM

## 2018-11-24 LAB
ALBUMIN SERPL BCP-MCNC: 2.5 G/DL
ALP SERPL-CCNC: 134 U/L
ALT SERPL W/O P-5'-P-CCNC: 22 U/L
ANION GAP SERPL CALC-SCNC: 4 MMOL/L
AST SERPL-CCNC: 26 U/L
BACTERIA #/AREA URNS HPF: ABNORMAL /HPF
BASOPHILS # BLD AUTO: 0.02 K/UL
BASOPHILS NFR BLD: 0.2 %
BILIRUB SERPL-MCNC: 0.9 MG/DL
BILIRUB UR QL STRIP: ABNORMAL
BUN SERPL-MCNC: 41 MG/DL
CALCIUM SERPL-MCNC: 9.1 MG/DL
CHLORIDE SERPL-SCNC: 100 MMOL/L
CLARITY UR: ABNORMAL
CO2 SERPL-SCNC: 27 MMOL/L
COLOR UR: ABNORMAL
CREAT SERPL-MCNC: 0.8 MG/DL
DIFFERENTIAL METHOD: ABNORMAL
EOSINOPHIL # BLD AUTO: 0.2 K/UL
EOSINOPHIL NFR BLD: 1.8 %
ERYTHROCYTE [DISTWIDTH] IN BLOOD BY AUTOMATED COUNT: 17.9 %
EST. GFR  (AFRICAN AMERICAN): >60 ML/MIN/1.73 M^2
EST. GFR  (NON AFRICAN AMERICAN): >60 ML/MIN/1.73 M^2
GLUCOSE SERPL-MCNC: 155 MG/DL
GLUCOSE UR QL STRIP: NEGATIVE
HCT VFR BLD AUTO: 34.2 %
HGB BLD-MCNC: 10.6 G/DL
HGB UR QL STRIP: ABNORMAL
HYALINE CASTS #/AREA URNS LPF: 0 /LPF
KETONES UR QL STRIP: NEGATIVE
LEUKOCYTE ESTERASE UR QL STRIP: ABNORMAL
LYMPHOCYTES # BLD AUTO: 0.6 K/UL
LYMPHOCYTES NFR BLD: 7 %
MCH RBC QN AUTO: 26 PG
MCHC RBC AUTO-ENTMCNC: 31 G/DL
MCV RBC AUTO: 84 FL
MICROSCOPIC COMMENT: ABNORMAL
MONOCYTES # BLD AUTO: 1.2 K/UL
MONOCYTES NFR BLD: 14.1 %
NEUTROPHILS # BLD AUTO: 6.4 K/UL
NEUTROPHILS NFR BLD: 76.9 %
NITRITE UR QL STRIP: NEGATIVE
PH UR STRIP: 8 [PH] (ref 5–8)
PLATELET # BLD AUTO: 183 K/UL
PMV BLD AUTO: 11.5 FL
POTASSIUM SERPL-SCNC: 4.6 MMOL/L
PROT SERPL-MCNC: 7 G/DL
PROT UR QL STRIP: ABNORMAL
RBC # BLD AUTO: 4.08 M/UL
RBC #/AREA URNS HPF: >100 /HPF (ref 0–4)
SODIUM SERPL-SCNC: 131 MMOL/L
SP GR UR STRIP: 1.02 (ref 1–1.03)
URN SPEC COLLECT METH UR: ABNORMAL
UROBILINOGEN UR STRIP-ACNC: NEGATIVE EU/DL
WBC # BLD AUTO: 8.29 K/UL
WBC #/AREA URNS HPF: 10 /HPF (ref 0–5)

## 2018-11-24 PROCEDURE — 81000 URINALYSIS NONAUTO W/SCOPE: CPT

## 2018-11-24 PROCEDURE — 99284 EMERGENCY DEPT VISIT MOD MDM: CPT

## 2018-11-24 PROCEDURE — 25000003 PHARM REV CODE 250: Performed by: EMERGENCY MEDICINE

## 2018-11-24 PROCEDURE — 80053 COMPREHEN METABOLIC PANEL: CPT

## 2018-11-24 PROCEDURE — 85025 COMPLETE CBC W/AUTO DIFF WBC: CPT

## 2018-11-24 RX ORDER — CIPROFLOXACIN 250 MG/1
250 TABLET, FILM COATED ORAL 2 TIMES DAILY
Qty: 14 TABLET | Refills: 0 | Status: SHIPPED | OUTPATIENT
Start: 2018-11-24 | End: 2018-12-01

## 2018-11-24 RX ORDER — CIPROFLOXACIN 250 MG/1
250 TABLET, FILM COATED ORAL
Status: COMPLETED | OUTPATIENT
Start: 2018-11-24 | End: 2018-11-24

## 2018-11-24 RX ADMIN — CIPROFLOXACIN HYDROCHLORIDE 250 MG: 250 TABLET, FILM COATED ORAL at 10:11

## 2018-11-25 NOTE — ED PROVIDER NOTES
Encounter Date: 11/24/2018    SCRIBE #1 NOTE: I, Deanna Owens, am scribing for, and in the presence of,  Dr. Redding I have scribed the entire note.       History     Chief Complaint   Patient presents with    Vaginal Bleeding     pt.sent from [facility] for eval.of blood in diaper x2 today with hx. of vaginal bleeding. pt. is a/o x3. denies abdominal pain      This is a 89 y.o. female who has a past medical history of CAD (coronary artery disease) (10/10/2012), CRF (chronic renal failure) (10/10/2012), Embolic stroke involving right carotid artery (6/15/2017), Family history of breast cancer (10/10/2012), History of cardiac arrhythmia (10/10/2012), History of CHF (congestive heart failure) (10/10/2012), History of depression (10/10/2012), History of echocardiogram (10/10/2012), HTN (hypertension) (10/10/2012), Internal carotid artery stenosis (10/10/2012), and Personal history of gallstones (10/10/2012).     The patient presents to the Emergency Department with vaginal bleeding.   As per family [facility] called today stating the patient was noted to have blood in her diaper.  She states the facility had concern the patient may have an UTI.  However, the patient is uncertain what the results of the last test.   Per family the patient does have an issue with scratching.   She notes the patient sometimes scratches herself in the vaginal area.  Pt denies abdominal pain, nausea, vomiting, diarrhea, urinary symptoms, back pain, fever or chills.   There is no change in symptoms with movement or rest.  Patient has prior history of similar symptoms.   The patient has a history of hysterectomy as per daughter        The history is provided by the patient and a relative.     Review of patient's allergies indicates:   Allergen Reactions    Pcn [penicillins] Rash     Past Medical History:   Diagnosis Date    CAD (coronary artery disease) 10/10/2012    CRF (chronic renal failure) 10/10/2012     Embolic stroke involving right carotid artery 6/15/2017    Family history of breast cancer 10/10/2012    History of cardiac arrhythmia 10/10/2012    History of CHF (congestive heart failure) 10/10/2012    History of depression 10/10/2012    History of echocardiogram 10/10/2012    HTN (hypertension) 10/10/2012    Internal carotid artery stenosis 10/10/2012    Personal history of gallstones 10/10/2012     Past Surgical History:   Procedure Laterality Date    BREAST LUMPECTOMY      ESOPHAGOGASTRODUODENOSCOPY N/A 6/12/2018    Procedure: ESOPHAGOGASTRODUODENOSCOPY (EGD);  Surgeon: Anila Kolb MD;  Location: [facility];  Service: Endoscopy;  Laterality: N/A;    ESOPHAGOGASTRODUODENOSCOPY N/A 6/14/2018    Procedure: ESOPHAGOGASTRODUODENOSCOPY (EGD);  Surgeon: Anila Kolb MD;  Location: [facility];  Service: Endoscopy;  Laterality: N/A;    ESOPHAGOGASTRODUODENOSCOPY (EGD) N/A 6/14/2018    Performed by [facility]b [facility] at [facility]    ESOPHAGOGASTRODUODENOSCOPY (EGD) N/A 6/12/2018    [facility] by [facility] at [facility]    EYE SURGERY      HYSTERECTOMY      PLACEMENT-TUBE-PEG N/A 6/21/2017    Performed by Joshua Goldberg, MD at [facility] OR [address] [place] [address]     Family History   Problem Relation Age of Onset    Cancer Mother         breast    Heart disease Neg Hx      Social History     Tobacco Use    Smoking status: Never Smoker    Smokeless tobacco: Never Used   Substance Use Topics    Alcohol use: No    Drug use: No     Review of Systems   Constitutional: Negative for chills and fever.   Gastrointestinal: Negative for abdominal pain, diarrhea, nausea and vomiting.   Genitourinary: Positive for vaginal bleeding. Negative for dysuria.   Neurological: Negative for dizziness and light-headedness.   All other systems reviewed and are negative.      Physical Exam     Initial Vitals [11/24/18 1940]   BP Pulse Resp Temp SpO2   119/64 85 13 98.9 °F (37.2 °C) 96 %      MAP       --         Physical  Exam    Nursing note and vitals reviewed.  Constitutional: She appears well-developed and well-nourished. She is not diaphoretic. No distress.   HENT:   Head: Normocephalic and atraumatic.   Mouth/Throat: Oropharynx is clear and moist.   Eyes: Conjunctivae and EOM are normal. Pupils are equal, round, and reactive to light.   Neck: Normal range of motion. Neck supple.   Cardiovascular: Normal rate, regular rhythm and normal heart sounds. Exam reveals no gallop and no friction rub.    No murmur heard.  Pulmonary/Chest: Breath sounds normal. She has no wheezes. She has no rhonchi. She has no rales.   Abdominal: Soft. Bowel sounds are normal. There is no tenderness. There is no rebound and no guarding.   PEG tube in place, site is C/D/I. Lower abdominal scar   Musculoskeletal: Normal range of motion. She exhibits no edema or tenderness.   Lymphadenopathy:     She has no cervical adenopathy.   Neurological: She is alert and oriented to person, place, and time. She has normal strength.   Skin: Skin is warm and dry. Capillary refill takes less than 2 seconds. No rash noted.         ED Course   Procedures  Labs Reviewed   CBC W/ AUTO DIFFERENTIAL - Abnormal; Notable for the following components:       Result Value    Hemoglobin 10.6 (*)     Hematocrit 34.2 (*)     MCH 26.0 (*)     MCHC 31.0 (*)     RDW 17.9 (*)     Lymph # 0.6 (*)     Mono # 1.2 (*)     Gran% 76.9 (*)     Lymph% 7.0 (*)     All other components within normal limits   COMPREHENSIVE METABOLIC PANEL - Abnormal; Notable for the following components:    Sodium 131 (*)     Glucose 155 (*)     BUN, Bld 41 (*)     Albumin 2.5 (*)     Anion Gap 4 (*)     All other components within normal limits   URINALYSIS, REFLEX TO URINE CULTURE - Abnormal; Notable for the following components:    Color, UA Red (*)     Appearance, UA Cloudy (*)     Protein, UA 3+ (*)     Bilirubin (UA) 1+ (*)     Occult Blood UA 3+ (*)     Leukocytes, UA 1+ (*)     All other components within  normal limits    Narrative:     Preferred Collection Type->Urine, Clean Catch   URINALYSIS MICROSCOPIC - Abnormal; Notable for the following components:    RBC, UA >100 (*)     WBC, UA 10 (*)     All other components within normal limits    Narrative:     Preferred Collection Type->Urine, Clean Catch          Imaging Results    None          Medical Decision Making:   Initial Assessment:   This is an emergent evaluation of a 89 y.o.female patient with presentation of urogenital bleeding.   Initial differentials include but are not limited to: UTI, genital laceration, bladder polyp or mass, Gyn mass.   Plan: pelvic exam, straight Cath UA.     Clinical Tests:   Lab Tests: Ordered and Reviewed              Attending Attestation:             Attending ED Notes:   Patient had a grossly positive straight catheterization her bladder.  She was managed by me with medial bladder irrigation and aspiration of clots.  Patient received a total of 300 cc normal saline irrigation until the urine was clear.  Patient will be placed on antibiotics for presumed UTI.  Referral given to Urology.  Results, impression and plan discussed with patient's daughter.  Patient will be discharged back to nursing home.             Clinical Impression:     1. Gross hematuria           I, Dr. Callum Mccann, personally performed the services described in this documentation. All medical record entries made by the scribe were at my direction and in my presence. I have reviewed the chart and agree that the record is accurate and complete. Callum Mccann MD.                     Callum Mccann MD  11/25/18 [id number]

## 2018-11-25 NOTE — ED NOTES
Report called to Hali PATRICK @ [facility]. Pt d/c transported by [facility] ems back to [facility]

## 2019-01-01 ENCOUNTER — TELEPHONE (OUTPATIENT)
Dept: GASTROENTEROLOGY | Facility: CLINIC | Age: 84
End: 2019-01-01

## 2019-01-01 ENCOUNTER — HOSPITAL ENCOUNTER (INPATIENT)
Facility: HOSPITAL | Age: 84
LOS: 1 days | Discharge: HOME | DRG: 291 | End: 2019-11-20
Attending: EMERGENCY MEDICINE | Admitting: FAMILY MEDICINE
Payer: MEDICARE

## 2019-01-01 ENCOUNTER — HOSPITAL ENCOUNTER (EMERGENCY)
Facility: HOSPITAL | Age: 84
Discharge: NURSING, REHAB OR LONG-TERM CARE | End: 2019-05-08
Attending: EMERGENCY MEDICINE
Payer: MEDICARE

## 2019-01-01 ENCOUNTER — HOSPITAL ENCOUNTER (INPATIENT)
Facility: HOSPITAL | Age: 84
LOS: 1 days | Discharge: HOME | DRG: 393 | End: 2019-09-11
Attending: EMERGENCY MEDICINE | Admitting: INTERNAL MEDICINE
Payer: MEDICARE

## 2019-01-01 ENCOUNTER — HOSPITAL ENCOUNTER (INPATIENT)
Facility: HOSPITAL | Age: 84
LOS: 4 days | Discharge: NURSING, REHAB OR LONG-TERM CARE | DRG: 194 | End: 2019-10-28
Attending: EMERGENCY MEDICINE | Admitting: FAMILY MEDICINE
Payer: MEDICARE

## 2019-01-01 ENCOUNTER — OFFICE VISIT (OUTPATIENT)
Dept: GASTROENTEROLOGY | Facility: CLINIC | Age: 84
End: 2019-01-01
Payer: MEDICARE

## 2019-01-01 VITALS
TEMPERATURE: 99 F | HEART RATE: 78 BPM | WEIGHT: 124 LBS | SYSTOLIC BLOOD PRESSURE: 165 MMHG | OXYGEN SATURATION: 93 % | BODY MASS INDEX: 19.93 KG/M2 | HEIGHT: 66 IN | DIASTOLIC BLOOD PRESSURE: 69 MMHG | RESPIRATION RATE: 20 BRPM

## 2019-01-01 VITALS
DIASTOLIC BLOOD PRESSURE: 51 MMHG | BODY MASS INDEX: 23.98 KG/M2 | HEART RATE: 66 BPM | TEMPERATURE: 98 F | HEIGHT: 61 IN | WEIGHT: 127 LBS | RESPIRATION RATE: 20 BRPM | OXYGEN SATURATION: 94 % | SYSTOLIC BLOOD PRESSURE: 106 MMHG

## 2019-01-01 VITALS
TEMPERATURE: 99 F | DIASTOLIC BLOOD PRESSURE: 67 MMHG | RESPIRATION RATE: 20 BRPM | HEART RATE: 94 BPM | OXYGEN SATURATION: 95 % | SYSTOLIC BLOOD PRESSURE: 132 MMHG | HEIGHT: 64 IN | BODY MASS INDEX: 23.07 KG/M2 | WEIGHT: 135.13 LBS

## 2019-01-01 VITALS
DIASTOLIC BLOOD PRESSURE: 60 MMHG | OXYGEN SATURATION: 95 % | HEIGHT: 66 IN | BODY MASS INDEX: 21.4 KG/M2 | RESPIRATION RATE: 19 BRPM | SYSTOLIC BLOOD PRESSURE: 126 MMHG | WEIGHT: 133.19 LBS | TEMPERATURE: 96 F | HEART RATE: 83 BPM

## 2019-01-01 VITALS — DIASTOLIC BLOOD PRESSURE: 83 MMHG | HEART RATE: 97 BPM | SYSTOLIC BLOOD PRESSURE: 125 MMHG

## 2019-01-01 DIAGNOSIS — R06.02 SOB (SHORTNESS OF BREATH): ICD-10-CM

## 2019-01-01 DIAGNOSIS — R09.02 HYPOXIA: ICD-10-CM

## 2019-01-01 DIAGNOSIS — R06.00 DYSPNEA: ICD-10-CM

## 2019-01-01 DIAGNOSIS — Z43.1 PEG (PERCUTANEOUS ENDOSCOPIC GASTROSTOMY) ADJUSTMENT/REPLACEMENT/REMOVAL: ICD-10-CM

## 2019-01-01 DIAGNOSIS — R13.12 OROPHARYNGEAL DYSPHAGIA: Primary | ICD-10-CM

## 2019-01-01 DIAGNOSIS — K94.20 COMPLICATION OF GASTROSTOMY TUBE: Primary | ICD-10-CM

## 2019-01-01 DIAGNOSIS — J18.9 PNEUMONIA: ICD-10-CM

## 2019-01-01 DIAGNOSIS — R07.9 CHEST PAIN: ICD-10-CM

## 2019-01-01 DIAGNOSIS — Z43.1 ATTENTION TO GASTROSTOMY TUBE: ICD-10-CM

## 2019-01-01 DIAGNOSIS — J81.0 ACUTE PULMONARY EDEMA: ICD-10-CM

## 2019-01-01 DIAGNOSIS — I50.21 ACUTE SYSTOLIC CONGESTIVE HEART FAILURE: ICD-10-CM

## 2019-01-01 DIAGNOSIS — J98.01 BRONCHOSPASM: ICD-10-CM

## 2019-01-01 DIAGNOSIS — R06.02 SHORTNESS OF BREATH: ICD-10-CM

## 2019-01-01 DIAGNOSIS — I48.19 PERSISTENT ATRIAL FIBRILLATION: Primary | ICD-10-CM

## 2019-01-01 DIAGNOSIS — I48.91 ATRIAL FIBRILLATION, UNSPECIFIED TYPE: Primary | ICD-10-CM

## 2019-01-01 DIAGNOSIS — K94.23 PEG TUBE MALFUNCTION: Primary | ICD-10-CM

## 2019-01-01 DIAGNOSIS — R09.02 HYPOXEMIA: ICD-10-CM

## 2019-01-01 DIAGNOSIS — R13.12 OROPHARYNGEAL DYSPHAGIA: ICD-10-CM

## 2019-01-01 DIAGNOSIS — J96.01 ACUTE RESPIRATORY FAILURE WITH HYPOXIA: Primary | ICD-10-CM

## 2019-01-01 DIAGNOSIS — J18.9 HCAP (HEALTHCARE-ASSOCIATED PNEUMONIA): Primary | ICD-10-CM

## 2019-01-01 LAB
ALBUMIN SERPL BCP-MCNC: 2.2 G/DL (ref 3.5–5.2)
ALBUMIN SERPL BCP-MCNC: 2.8 G/DL (ref 3.5–5.2)
ALBUMIN SERPL BCP-MCNC: 3 G/DL (ref 3.5–5.2)
ALBUMIN SERPL BCP-MCNC: 3.1 G/DL (ref 3.5–5.2)
ALBUMIN SERPL BCP-MCNC: 3.3 G/DL (ref 3.5–5.2)
ALLENS TEST: ABNORMAL
ALLENS TEST: ABNORMAL
ALP SERPL-CCNC: 100 U/L (ref 55–135)
ALP SERPL-CCNC: 120 U/L (ref 55–135)
ALP SERPL-CCNC: 144 U/L (ref 55–135)
ALP SERPL-CCNC: 84 U/L (ref 55–135)
ALP SERPL-CCNC: 93 U/L (ref 55–135)
ALT SERPL W/O P-5'-P-CCNC: 17 U/L (ref 10–44)
ALT SERPL W/O P-5'-P-CCNC: 17 U/L (ref 10–44)
ALT SERPL W/O P-5'-P-CCNC: 18 U/L (ref 10–44)
ALT SERPL W/O P-5'-P-CCNC: 24 U/L (ref 10–44)
ALT SERPL W/O P-5'-P-CCNC: 29 U/L (ref 10–44)
ANION GAP SERPL CALC-SCNC: 10 MMOL/L (ref 8–16)
ANION GAP SERPL CALC-SCNC: 11 MMOL/L (ref 8–16)
ANION GAP SERPL CALC-SCNC: 6 MMOL/L (ref 8–16)
ANION GAP SERPL CALC-SCNC: 6 MMOL/L (ref 8–16)
ANION GAP SERPL CALC-SCNC: 7 MMOL/L (ref 8–16)
ANION GAP SERPL CALC-SCNC: 8 MMOL/L (ref 8–16)
ANION GAP SERPL CALC-SCNC: 8 MMOL/L (ref 8–16)
AST SERPL-CCNC: 26 U/L (ref 10–40)
AST SERPL-CCNC: 28 U/L (ref 10–40)
AST SERPL-CCNC: 29 U/L (ref 10–40)
AST SERPL-CCNC: 35 U/L (ref 10–40)
AST SERPL-CCNC: 36 U/L (ref 10–40)
BACTERIA #/AREA URNS HPF: ABNORMAL /HPF
BACTERIA BLD CULT: NORMAL
BACTERIA BLD CULT: NORMAL
BACTERIA UR CULT: ABNORMAL
BACTERIA UR CULT: NO GROWTH
BACTERIA UR CULT: NORMAL
BACTERIA UR CULT: NORMAL
BASOPHILS # BLD AUTO: 0.01 K/UL (ref 0–0.2)
BASOPHILS # BLD AUTO: 0.01 K/UL (ref 0–0.2)
BASOPHILS # BLD AUTO: 0.02 K/UL (ref 0–0.2)
BASOPHILS # BLD AUTO: ABNORMAL K/UL (ref 0–0.2)
BASOPHILS NFR BLD: 0 % (ref 0–1.9)
BASOPHILS NFR BLD: 0.1 % (ref 0–1.9)
BASOPHILS NFR BLD: 0.2 % (ref 0–1.9)
BASOPHILS NFR BLD: 0.3 % (ref 0–1.9)
BASOPHILS NFR BLD: 0.3 % (ref 0–1.9)
BILIRUB SERPL-MCNC: 0.6 MG/DL (ref 0.1–1)
BILIRUB SERPL-MCNC: 1 MG/DL (ref 0.1–1)
BILIRUB SERPL-MCNC: 1.1 MG/DL (ref 0.1–1)
BILIRUB UR QL STRIP: NEGATIVE
BNP SERPL-MCNC: 355 PG/ML (ref 0–99)
BNP SERPL-MCNC: 361 PG/ML (ref 0–99)
BNP SERPL-MCNC: 712 PG/ML (ref 0–99)
BUN SERPL-MCNC: 25 MG/DL (ref 10–30)
BUN SERPL-MCNC: 25 MG/DL (ref 10–30)
BUN SERPL-MCNC: 26 MG/DL (ref 10–30)
BUN SERPL-MCNC: 26 MG/DL (ref 10–30)
BUN SERPL-MCNC: 32 MG/DL (ref 10–30)
BUN SERPL-MCNC: 37 MG/DL (ref 10–30)
BUN SERPL-MCNC: 39 MG/DL (ref 10–30)
BUN SERPL-MCNC: 53 MG/DL (ref 10–30)
BUN SERPL-MCNC: 54 MG/DL (ref 10–30)
BUN SERPL-MCNC: 55 MG/DL (ref 10–30)
BUN SERPL-MCNC: 56 MG/DL (ref 10–30)
BURR CELLS BLD QL SMEAR: ABNORMAL
CALCIUM SERPL-MCNC: 8 MG/DL (ref 8.7–10.5)
CALCIUM SERPL-MCNC: 8.3 MG/DL (ref 8.7–10.5)
CALCIUM SERPL-MCNC: 8.4 MG/DL (ref 8.7–10.5)
CALCIUM SERPL-MCNC: 8.5 MG/DL (ref 8.7–10.5)
CALCIUM SERPL-MCNC: 8.7 MG/DL (ref 8.7–10.5)
CALCIUM SERPL-MCNC: 8.8 MG/DL (ref 8.7–10.5)
CALCIUM SERPL-MCNC: 8.9 MG/DL (ref 8.7–10.5)
CALCIUM SERPL-MCNC: 9 MG/DL (ref 8.7–10.5)
CALCIUM SERPL-MCNC: 9.2 MG/DL (ref 8.7–10.5)
CALCIUM SERPL-MCNC: 9.4 MG/DL (ref 8.7–10.5)
CALCIUM SERPL-MCNC: 9.8 MG/DL (ref 8.7–10.5)
CHLORIDE SERPL-SCNC: 100 MMOL/L (ref 95–110)
CHLORIDE SERPL-SCNC: 102 MMOL/L (ref 95–110)
CHLORIDE SERPL-SCNC: 103 MMOL/L (ref 95–110)
CHLORIDE SERPL-SCNC: 104 MMOL/L (ref 95–110)
CHLORIDE SERPL-SCNC: 104 MMOL/L (ref 95–110)
CHLORIDE SERPL-SCNC: 107 MMOL/L (ref 95–110)
CHLORIDE SERPL-SCNC: 108 MMOL/L (ref 95–110)
CHLORIDE SERPL-SCNC: 111 MMOL/L (ref 95–110)
CHLORIDE SERPL-SCNC: 98 MMOL/L (ref 95–110)
CLARITY UR: CLEAR
CO2 SERPL-SCNC: 23 MMOL/L (ref 23–29)
CO2 SERPL-SCNC: 24 MMOL/L (ref 23–29)
CO2 SERPL-SCNC: 25 MMOL/L (ref 23–29)
CO2 SERPL-SCNC: 25 MMOL/L (ref 23–29)
CO2 SERPL-SCNC: 26 MMOL/L (ref 23–29)
CO2 SERPL-SCNC: 26 MMOL/L (ref 23–29)
CO2 SERPL-SCNC: 29 MMOL/L (ref 23–29)
CO2 SERPL-SCNC: 29 MMOL/L (ref 23–29)
CO2 SERPL-SCNC: 30 MMOL/L (ref 23–29)
CO2 SERPL-SCNC: 32 MMOL/L (ref 23–29)
CO2 SERPL-SCNC: 33 MMOL/L (ref 23–29)
COLOR UR: YELLOW
CREAT SERPL-MCNC: 0.7 MG/DL (ref 0.5–1.4)
CREAT SERPL-MCNC: 0.8 MG/DL (ref 0.5–1.4)
CREAT SERPL-MCNC: 0.9 MG/DL (ref 0.5–1.4)
CREAT SERPL-MCNC: 1 MG/DL (ref 0.5–1.4)
D DIMER PPP IA.FEU-MCNC: 2.16 MG/L FEU
DELSYS: ABNORMAL
DELSYS: ABNORMAL
DIFFERENTIAL METHOD: ABNORMAL
EOSINOPHIL # BLD AUTO: 0.2 K/UL (ref 0–0.5)
EOSINOPHIL # BLD AUTO: 0.2 K/UL (ref 0–0.5)
EOSINOPHIL # BLD AUTO: 0.3 K/UL (ref 0–0.5)
EOSINOPHIL # BLD AUTO: 0.7 K/UL (ref 0–0.5)
EOSINOPHIL # BLD AUTO: 0.8 K/UL (ref 0–0.5)
EOSINOPHIL # BLD AUTO: ABNORMAL K/UL (ref 0–0.5)
EOSINOPHIL NFR BLD: 11.1 % (ref 0–8)
EOSINOPHIL NFR BLD: 2 % (ref 0–8)
EOSINOPHIL NFR BLD: 2.6 % (ref 0–8)
EOSINOPHIL NFR BLD: 2.8 % (ref 0–8)
EOSINOPHIL NFR BLD: 3 % (ref 0–8)
EOSINOPHIL NFR BLD: 8.1 % (ref 0–8)
EOSINOPHIL NFR BLD: 8.4 % (ref 0–8)
EOSINOPHIL NFR BLD: 8.6 % (ref 0–8)
EOSINOPHIL NFR BLD: 8.7 % (ref 0–8)
EOSINOPHIL NFR BLD: 9.4 % (ref 0–8)
ERYTHROCYTE [DISTWIDTH] IN BLOOD BY AUTOMATED COUNT: 14.9 % (ref 11.5–14.5)
ERYTHROCYTE [DISTWIDTH] IN BLOOD BY AUTOMATED COUNT: 15.1 % (ref 11.5–14.5)
ERYTHROCYTE [DISTWIDTH] IN BLOOD BY AUTOMATED COUNT: 15.3 % (ref 11.5–14.5)
ERYTHROCYTE [DISTWIDTH] IN BLOOD BY AUTOMATED COUNT: 15.7 % (ref 11.5–14.5)
ERYTHROCYTE [DISTWIDTH] IN BLOOD BY AUTOMATED COUNT: 15.7 % (ref 11.5–14.5)
ERYTHROCYTE [DISTWIDTH] IN BLOOD BY AUTOMATED COUNT: 16 % (ref 11.5–14.5)
ERYTHROCYTE [DISTWIDTH] IN BLOOD BY AUTOMATED COUNT: 16 % (ref 11.5–14.5)
ERYTHROCYTE [DISTWIDTH] IN BLOOD BY AUTOMATED COUNT: 16.1 % (ref 11.5–14.5)
ERYTHROCYTE [DISTWIDTH] IN BLOOD BY AUTOMATED COUNT: 16.5 % (ref 11.5–14.5)
ERYTHROCYTE [DISTWIDTH] IN BLOOD BY AUTOMATED COUNT: 16.6 % (ref 11.5–14.5)
ERYTHROCYTE [DISTWIDTH] IN BLOOD BY AUTOMATED COUNT: 16.7 % (ref 11.5–14.5)
EST. GFR  (AFRICAN AMERICAN): 57 ML/MIN/1.73 M^2
EST. GFR  (AFRICAN AMERICAN): >60 ML/MIN/1.73 M^2
EST. GFR  (NON AFRICAN AMERICAN): 49 ML/MIN/1.73 M^2
EST. GFR  (NON AFRICAN AMERICAN): 56 ML/MIN/1.73 M^2
EST. GFR  (NON AFRICAN AMERICAN): >60 ML/MIN/1.73 M^2
ESTIMATED AVG GLUCOSE: 131 MG/DL (ref 68–131)
GLUCOSE SERPL-MCNC: 100 MG/DL (ref 70–110)
GLUCOSE SERPL-MCNC: 101 MG/DL (ref 70–110)
GLUCOSE SERPL-MCNC: 113 MG/DL (ref 70–110)
GLUCOSE SERPL-MCNC: 116 MG/DL (ref 70–110)
GLUCOSE SERPL-MCNC: 127 MG/DL (ref 70–110)
GLUCOSE SERPL-MCNC: 129 MG/DL (ref 70–110)
GLUCOSE SERPL-MCNC: 139 MG/DL (ref 70–110)
GLUCOSE SERPL-MCNC: 151 MG/DL (ref 70–110)
GLUCOSE SERPL-MCNC: 154 MG/DL (ref 70–110)
GLUCOSE SERPL-MCNC: 257 MG/DL (ref 70–110)
GLUCOSE SERPL-MCNC: 267 MG/DL (ref 70–110)
GLUCOSE UR QL STRIP: NEGATIVE
HBA1C MFR BLD HPLC: 6.2 % (ref 4–5.6)
HCO3 UR-SCNC: 23.6 MMOL/L (ref 24–28)
HCO3 UR-SCNC: 24.8 MMOL/L (ref 24–28)
HCT VFR BLD AUTO: 31.3 % (ref 37–48.5)
HCT VFR BLD AUTO: 31.4 % (ref 37–48.5)
HCT VFR BLD AUTO: 32.2 % (ref 37–48.5)
HCT VFR BLD AUTO: 33.4 % (ref 37–48.5)
HCT VFR BLD AUTO: 33.5 % (ref 37–48.5)
HCT VFR BLD AUTO: 35.3 % (ref 37–48.5)
HCT VFR BLD AUTO: 36.9 % (ref 37–48.5)
HCT VFR BLD AUTO: 37.3 % (ref 37–48.5)
HCT VFR BLD AUTO: 37.6 % (ref 37–48.5)
HCT VFR BLD AUTO: 39.4 % (ref 37–48.5)
HCT VFR BLD AUTO: 41.4 % (ref 37–48.5)
HGB BLD-MCNC: 10.3 G/DL (ref 12–16)
HGB BLD-MCNC: 10.4 G/DL (ref 12–16)
HGB BLD-MCNC: 10.6 G/DL (ref 12–16)
HGB BLD-MCNC: 11.4 G/DL (ref 12–16)
HGB BLD-MCNC: 11.4 G/DL (ref 12–16)
HGB BLD-MCNC: 11.8 G/DL (ref 12–16)
HGB BLD-MCNC: 12.4 G/DL (ref 12–16)
HGB BLD-MCNC: 13 G/DL (ref 12–16)
HGB BLD-MCNC: 9.5 G/DL (ref 12–16)
HGB BLD-MCNC: 9.7 G/DL (ref 12–16)
HGB BLD-MCNC: 9.8 G/DL (ref 12–16)
HGB UR QL STRIP: ABNORMAL
HYALINE CASTS #/AREA URNS LPF: 0 /LPF
INFLUENZA A, MOLECULAR: NEGATIVE
INFLUENZA A, MOLECULAR: NEGATIVE
INFLUENZA B, MOLECULAR: NEGATIVE
INFLUENZA B, MOLECULAR: NEGATIVE
INR PPP: 1.1 (ref 0.8–1.2)
INR PPP: 1.2 (ref 0.8–1.2)
KETONES UR QL STRIP: ABNORMAL
KETONES UR QL STRIP: NEGATIVE
KETONES UR QL STRIP: NEGATIVE
LACTATE SERPL-SCNC: 0.9 MMOL/L (ref 0.5–2.2)
LACTATE SERPL-SCNC: 1.1 MMOL/L (ref 0.5–2.2)
LEUKOCYTE ESTERASE UR QL STRIP: ABNORMAL
LIPASE SERPL-CCNC: 23 U/L (ref 4–60)
LYMPHOCYTES # BLD AUTO: 0.5 K/UL (ref 1–4.8)
LYMPHOCYTES # BLD AUTO: 0.5 K/UL (ref 1–4.8)
LYMPHOCYTES # BLD AUTO: 0.6 K/UL (ref 1–4.8)
LYMPHOCYTES # BLD AUTO: 0.7 K/UL (ref 1–4.8)
LYMPHOCYTES # BLD AUTO: 1 K/UL (ref 1–4.8)
LYMPHOCYTES # BLD AUTO: 1 K/UL (ref 1–4.8)
LYMPHOCYTES # BLD AUTO: 1.1 K/UL (ref 1–4.8)
LYMPHOCYTES # BLD AUTO: ABNORMAL K/UL (ref 1–4.8)
LYMPHOCYTES NFR BLD: 1 % (ref 18–48)
LYMPHOCYTES NFR BLD: 12.1 % (ref 18–48)
LYMPHOCYTES NFR BLD: 12.6 % (ref 18–48)
LYMPHOCYTES NFR BLD: 13.4 % (ref 18–48)
LYMPHOCYTES NFR BLD: 5 % (ref 18–48)
LYMPHOCYTES NFR BLD: 6.3 % (ref 18–48)
LYMPHOCYTES NFR BLD: 6.7 % (ref 18–48)
LYMPHOCYTES NFR BLD: 7.2 % (ref 18–48)
LYMPHOCYTES NFR BLD: 9.3 % (ref 18–48)
LYMPHOCYTES NFR BLD: 9.5 % (ref 18–48)
MAGNESIUM SERPL-MCNC: 1.3 MG/DL (ref 1.6–2.6)
MAGNESIUM SERPL-MCNC: 1.6 MG/DL (ref 1.6–2.6)
MAGNESIUM SERPL-MCNC: 1.6 MG/DL (ref 1.6–2.6)
MAGNESIUM SERPL-MCNC: 1.8 MG/DL (ref 1.6–2.6)
MAGNESIUM SERPL-MCNC: 2.4 MG/DL (ref 1.6–2.6)
MCH RBC QN AUTO: 26.6 PG (ref 27–31)
MCH RBC QN AUTO: 28.5 PG (ref 27–31)
MCH RBC QN AUTO: 28.7 PG (ref 27–31)
MCH RBC QN AUTO: 28.8 PG (ref 27–31)
MCH RBC QN AUTO: 28.8 PG (ref 27–31)
MCH RBC QN AUTO: 28.9 PG (ref 27–31)
MCH RBC QN AUTO: 28.9 PG (ref 27–31)
MCH RBC QN AUTO: 29 PG (ref 27–31)
MCH RBC QN AUTO: 29.1 PG (ref 27–31)
MCH RBC QN AUTO: 29.2 PG (ref 27–31)
MCH RBC QN AUTO: 29.3 PG (ref 27–31)
MCHC RBC AUTO-ENTMCNC: 30 G/DL (ref 32–36)
MCHC RBC AUTO-ENTMCNC: 30.4 G/DL (ref 32–36)
MCHC RBC AUTO-ENTMCNC: 30.4 G/DL (ref 32–36)
MCHC RBC AUTO-ENTMCNC: 30.6 G/DL (ref 32–36)
MCHC RBC AUTO-ENTMCNC: 30.8 G/DL (ref 32–36)
MCHC RBC AUTO-ENTMCNC: 30.9 G/DL (ref 32–36)
MCHC RBC AUTO-ENTMCNC: 30.9 G/DL (ref 32–36)
MCHC RBC AUTO-ENTMCNC: 31 G/DL (ref 32–36)
MCHC RBC AUTO-ENTMCNC: 31.4 G/DL (ref 32–36)
MCHC RBC AUTO-ENTMCNC: 31.4 G/DL (ref 32–36)
MCHC RBC AUTO-ENTMCNC: 31.5 G/DL (ref 32–36)
MCV RBC AUTO: 89 FL (ref 82–98)
MCV RBC AUTO: 92 FL (ref 82–98)
MCV RBC AUTO: 93 FL (ref 82–98)
MCV RBC AUTO: 94 FL (ref 82–98)
MCV RBC AUTO: 94 FL (ref 82–98)
MCV RBC AUTO: 95 FL (ref 82–98)
MCV RBC AUTO: 95 FL (ref 82–98)
METAMYELOCYTES NFR BLD MANUAL: 1 %
MICROSCOPIC COMMENT: ABNORMAL
MONOCYTES # BLD AUTO: 0.3 K/UL (ref 0.3–1)
MONOCYTES # BLD AUTO: 0.4 K/UL (ref 0.3–1)
MONOCYTES # BLD AUTO: 0.5 K/UL (ref 0.3–1)
MONOCYTES # BLD AUTO: 0.6 K/UL (ref 0.3–1)
MONOCYTES # BLD AUTO: 0.6 K/UL (ref 0.3–1)
MONOCYTES # BLD AUTO: 1 K/UL (ref 0.3–1)
MONOCYTES # BLD AUTO: 1.2 K/UL (ref 0.3–1)
MONOCYTES # BLD AUTO: 1.2 K/UL (ref 0.3–1)
MONOCYTES # BLD AUTO: 1.3 K/UL (ref 0.3–1)
MONOCYTES # BLD AUTO: ABNORMAL K/UL (ref 0.3–1)
MONOCYTES NFR BLD: 11.8 % (ref 4–15)
MONOCYTES NFR BLD: 12.6 % (ref 4–15)
MONOCYTES NFR BLD: 13.7 % (ref 4–15)
MONOCYTES NFR BLD: 15.3 % (ref 4–15)
MONOCYTES NFR BLD: 3.7 % (ref 4–15)
MONOCYTES NFR BLD: 4 % (ref 4–15)
MONOCYTES NFR BLD: 6.5 % (ref 4–15)
MONOCYTES NFR BLD: 6.6 % (ref 4–15)
MONOCYTES NFR BLD: 7.1 % (ref 4–15)
MONOCYTES NFR BLD: 7.5 % (ref 4–15)
MYELOCYTES NFR BLD MANUAL: 5 %
NEUTROPHILS # BLD AUTO: 4.7 K/UL (ref 1.8–7.7)
NEUTROPHILS # BLD AUTO: 5.5 K/UL (ref 1.8–7.7)
NEUTROPHILS # BLD AUTO: 5.8 K/UL (ref 1.8–7.7)
NEUTROPHILS # BLD AUTO: 6 K/UL (ref 1.8–7.7)
NEUTROPHILS # BLD AUTO: 6.1 K/UL (ref 1.8–7.7)
NEUTROPHILS # BLD AUTO: 6.4 K/UL (ref 1.8–7.7)
NEUTROPHILS # BLD AUTO: 6.4 K/UL (ref 1.8–7.7)
NEUTROPHILS # BLD AUTO: 6.5 K/UL (ref 1.8–7.7)
NEUTROPHILS # BLD AUTO: 7.9 K/UL (ref 1.8–7.7)
NEUTROPHILS NFR BLD: 67.9 % (ref 38–73)
NEUTROPHILS NFR BLD: 71.5 % (ref 38–73)
NEUTROPHILS NFR BLD: 72.1 % (ref 38–73)
NEUTROPHILS NFR BLD: 72.5 % (ref 38–73)
NEUTROPHILS NFR BLD: 72.6 % (ref 38–73)
NEUTROPHILS NFR BLD: 75.5 % (ref 38–73)
NEUTROPHILS NFR BLD: 78.1 % (ref 38–73)
NEUTROPHILS NFR BLD: 78.7 % (ref 38–73)
NEUTROPHILS NFR BLD: 80.6 % (ref 38–73)
NEUTROPHILS NFR BLD: 86 % (ref 38–73)
NITRITE UR QL STRIP: NEGATIVE
NITRITE UR QL STRIP: NEGATIVE
NITRITE UR QL STRIP: POSITIVE
PCO2 BLDA: 38.8 MMHG (ref 35–45)
PCO2 BLDA: 39.8 MMHG (ref 35–45)
PH SMN: 7.39 [PH] (ref 7.35–7.45)
PH SMN: 7.4 [PH] (ref 7.35–7.45)
PH UR STRIP: 6 [PH] (ref 5–8)
PH UR STRIP: 7 [PH] (ref 5–8)
PH UR STRIP: 7 [PH] (ref 5–8)
PHOSPHATE SERPL-MCNC: 2.5 MG/DL (ref 2.7–4.5)
PHOSPHATE SERPL-MCNC: 3.2 MG/DL (ref 2.7–4.5)
PHOSPHATE SERPL-MCNC: 3.3 MG/DL (ref 2.7–4.5)
PHOSPHATE SERPL-MCNC: 3.3 MG/DL (ref 2.7–4.5)
PLATELET # BLD AUTO: 121 K/UL (ref 150–350)
PLATELET # BLD AUTO: 124 K/UL (ref 150–350)
PLATELET # BLD AUTO: 135 K/UL (ref 150–350)
PLATELET # BLD AUTO: 140 K/UL (ref 150–350)
PLATELET # BLD AUTO: 140 K/UL (ref 150–350)
PLATELET # BLD AUTO: 141 K/UL (ref 150–350)
PLATELET # BLD AUTO: 159 K/UL (ref 150–350)
PLATELET # BLD AUTO: 163 K/UL (ref 150–350)
PLATELET # BLD AUTO: 175 K/UL (ref 150–350)
PLATELET # BLD AUTO: 179 K/UL (ref 150–350)
PLATELET # BLD AUTO: 188 K/UL (ref 150–350)
PLATELET BLD QL SMEAR: ABNORMAL
PMV BLD AUTO: 10.5 FL (ref 9.2–12.9)
PMV BLD AUTO: 10.6 FL (ref 9.2–12.9)
PMV BLD AUTO: 10.6 FL (ref 9.2–12.9)
PMV BLD AUTO: 10.8 FL (ref 9.2–12.9)
PMV BLD AUTO: 10.9 FL (ref 9.2–12.9)
PMV BLD AUTO: 10.9 FL (ref 9.2–12.9)
PMV BLD AUTO: 11 FL (ref 9.2–12.9)
PMV BLD AUTO: 11.1 FL (ref 9.2–12.9)
PO2 BLDA: 57 MMHG (ref 80–100)
PO2 BLDA: 58 MMHG (ref 80–100)
POC BE: -1 MMOL/L
POC BE: 0 MMOL/L
POC SATURATED O2: 89 % (ref 95–100)
POC SATURATED O2: 90 % (ref 95–100)
POC TCO2: 25 MMOL/L (ref 23–27)
POC TCO2: 26 MMOL/L (ref 23–27)
POCT GLUCOSE: 105 MG/DL (ref 70–110)
POCT GLUCOSE: 112 MG/DL (ref 70–110)
POCT GLUCOSE: 117 MG/DL (ref 70–110)
POCT GLUCOSE: 123 MG/DL (ref 70–110)
POCT GLUCOSE: 135 MG/DL (ref 70–110)
POCT GLUCOSE: 141 MG/DL (ref 70–110)
POCT GLUCOSE: 153 MG/DL (ref 70–110)
POTASSIUM SERPL-SCNC: 3.1 MMOL/L (ref 3.5–5.1)
POTASSIUM SERPL-SCNC: 3.4 MMOL/L (ref 3.5–5.1)
POTASSIUM SERPL-SCNC: 3.5 MMOL/L (ref 3.5–5.1)
POTASSIUM SERPL-SCNC: 3.6 MMOL/L (ref 3.5–5.1)
POTASSIUM SERPL-SCNC: 3.9 MMOL/L (ref 3.5–5.1)
POTASSIUM SERPL-SCNC: 4 MMOL/L (ref 3.5–5.1)
POTASSIUM SERPL-SCNC: 4.1 MMOL/L (ref 3.5–5.1)
POTASSIUM SERPL-SCNC: 4.4 MMOL/L (ref 3.5–5.1)
POTASSIUM SERPL-SCNC: 4.9 MMOL/L (ref 3.5–5.1)
POTASSIUM SERPL-SCNC: 4.9 MMOL/L (ref 3.5–5.1)
POTASSIUM SERPL-SCNC: 5 MMOL/L (ref 3.5–5.1)
PROT SERPL-MCNC: 5.4 G/DL (ref 6–8.4)
PROT SERPL-MCNC: 6.4 G/DL (ref 6–8.4)
PROT SERPL-MCNC: 6.5 G/DL (ref 6–8.4)
PROT SERPL-MCNC: 6.8 G/DL (ref 6–8.4)
PROT SERPL-MCNC: 7.1 G/DL (ref 6–8.4)
PROT UR QL STRIP: ABNORMAL
PROT UR QL STRIP: NEGATIVE
PROT UR QL STRIP: NEGATIVE
PROTHROMBIN TIME: 11.9 SEC (ref 9–12.5)
PROTHROMBIN TIME: 12.2 SEC (ref 9–12.5)
RBC # BLD AUTO: 3.33 M/UL (ref 4–5.4)
RBC # BLD AUTO: 3.37 M/UL (ref 4–5.4)
RBC # BLD AUTO: 3.41 M/UL (ref 4–5.4)
RBC # BLD AUTO: 3.53 M/UL (ref 4–5.4)
RBC # BLD AUTO: 3.6 M/UL (ref 4–5.4)
RBC # BLD AUTO: 3.94 M/UL (ref 4–5.4)
RBC # BLD AUTO: 3.96 M/UL (ref 4–5.4)
RBC # BLD AUTO: 3.99 M/UL (ref 4–5.4)
RBC # BLD AUTO: 4.05 M/UL (ref 4–5.4)
RBC # BLD AUTO: 4.27 M/UL (ref 4–5.4)
RBC # BLD AUTO: 4.44 M/UL (ref 4–5.4)
RBC #/AREA URNS HPF: 10 /HPF (ref 0–4)
RBC #/AREA URNS HPF: 4 /HPF (ref 0–4)
RBC #/AREA URNS HPF: >100 /HPF (ref 0–4)
SAMPLE: ABNORMAL
SAMPLE: ABNORMAL
SITE: ABNORMAL
SITE: ABNORMAL
SODIUM SERPL-SCNC: 133 MMOL/L (ref 136–145)
SODIUM SERPL-SCNC: 134 MMOL/L (ref 136–145)
SODIUM SERPL-SCNC: 135 MMOL/L (ref 136–145)
SODIUM SERPL-SCNC: 138 MMOL/L (ref 136–145)
SODIUM SERPL-SCNC: 140 MMOL/L (ref 136–145)
SODIUM SERPL-SCNC: 142 MMOL/L (ref 136–145)
SODIUM SERPL-SCNC: 143 MMOL/L (ref 136–145)
SODIUM SERPL-SCNC: 144 MMOL/L (ref 136–145)
SP GR UR STRIP: 1.01 (ref 1–1.03)
SPECIMEN SOURCE: NORMAL
SPECIMEN SOURCE: NORMAL
SQUAMOUS #/AREA URNS HPF: 1 /HPF
TROPONIN I SERPL DL<=0.01 NG/ML-MCNC: 0.02 NG/ML (ref 0–0.03)
TROPONIN I SERPL DL<=0.01 NG/ML-MCNC: 0.02 NG/ML (ref 0–0.03)
TROPONIN I SERPL DL<=0.01 NG/ML-MCNC: 0.03 NG/ML (ref 0–0.03)
TROPONIN I SERPL DL<=0.01 NG/ML-MCNC: 0.04 NG/ML (ref 0–0.03)
URN SPEC COLLECT METH UR: ABNORMAL
UROBILINOGEN UR STRIP-ACNC: 1 EU/DL
UROBILINOGEN UR STRIP-ACNC: 1 EU/DL
UROBILINOGEN UR STRIP-ACNC: NEGATIVE EU/DL
WBC # BLD AUTO: 10.09 K/UL (ref 3.9–12.7)
WBC # BLD AUTO: 13.84 K/UL (ref 3.9–12.7)
WBC # BLD AUTO: 6.5 K/UL (ref 3.9–12.7)
WBC # BLD AUTO: 7.37 K/UL (ref 3.9–12.7)
WBC # BLD AUTO: 7.6 K/UL (ref 3.9–12.7)
WBC # BLD AUTO: 7.73 K/UL (ref 3.9–12.7)
WBC # BLD AUTO: 8.06 K/UL (ref 3.9–12.7)
WBC # BLD AUTO: 8.08 K/UL (ref 3.9–12.7)
WBC # BLD AUTO: 8.35 K/UL (ref 3.9–12.7)
WBC # BLD AUTO: 8.99 K/UL (ref 3.9–12.7)
WBC # BLD AUTO: 9.05 K/UL (ref 3.9–12.7)
WBC #/AREA URNS HPF: 15 /HPF (ref 0–5)
WBC #/AREA URNS HPF: 20 /HPF (ref 0–5)
WBC #/AREA URNS HPF: 25 /HPF (ref 0–5)
WBC CLUMPS URNS QL MICRO: ABNORMAL

## 2019-01-01 PROCEDURE — 93010 ELECTROCARDIOGRAM REPORT: CPT | Mod: ,,, | Performed by: STUDENT IN AN ORGANIZED HEALTH CARE EDUCATION/TRAINING PROGRAM

## 2019-01-01 PROCEDURE — 63600175 PHARM REV CODE 636 W HCPCS: Performed by: NURSE PRACTITIONER

## 2019-01-01 PROCEDURE — 94761 N-INVAS EAR/PLS OXIMETRY MLT: CPT

## 2019-01-01 PROCEDURE — 25000003 PHARM REV CODE 250: Performed by: NURSE PRACTITIONER

## 2019-01-01 PROCEDURE — 87086 URINE CULTURE/COLONY COUNT: CPT

## 2019-01-01 PROCEDURE — 85027 COMPLETE CBC AUTOMATED: CPT

## 2019-01-01 PROCEDURE — 81000 URINALYSIS NONAUTO W/SCOPE: CPT

## 2019-01-01 PROCEDURE — 82803 BLOOD GASES ANY COMBINATION: CPT

## 2019-01-01 PROCEDURE — 43762 RPLC GTUBE NO REVJ TRC: CPT | Mod: 52

## 2019-01-01 PROCEDURE — 84484 ASSAY OF TROPONIN QUANT: CPT

## 2019-01-01 PROCEDURE — 99900035 HC TECH TIME PER 15 MIN (STAT)

## 2019-01-01 PROCEDURE — 25000003 PHARM REV CODE 250: Performed by: STUDENT IN AN ORGANIZED HEALTH CARE EDUCATION/TRAINING PROGRAM

## 2019-01-01 PROCEDURE — 63600175 PHARM REV CODE 636 W HCPCS: Performed by: FAMILY MEDICINE

## 2019-01-01 PROCEDURE — 80053 COMPREHEN METABOLIC PANEL: CPT

## 2019-01-01 PROCEDURE — 83605 ASSAY OF LACTIC ACID: CPT

## 2019-01-01 PROCEDURE — 99999 PR PBB SHADOW E&M-EST. PATIENT-LVL III: CPT | Mod: PBBFAC,,, | Performed by: INTERNAL MEDICINE

## 2019-01-01 PROCEDURE — 85025 COMPLETE CBC W/AUTO DIFF WBC: CPT

## 2019-01-01 PROCEDURE — 97802 MEDICAL NUTRITION INDIV IN: CPT

## 2019-01-01 PROCEDURE — 84100 ASSAY OF PHOSPHORUS: CPT

## 2019-01-01 PROCEDURE — 87077 CULTURE AEROBIC IDENTIFY: CPT

## 2019-01-01 PROCEDURE — 80048 BASIC METABOLIC PNL TOTAL CA: CPT

## 2019-01-01 PROCEDURE — 25000003 PHARM REV CODE 250: Performed by: FAMILY MEDICINE

## 2019-01-01 PROCEDURE — 25000242 PHARM REV CODE 250 ALT 637 W/ HCPCS: Performed by: EMERGENCY MEDICINE

## 2019-01-01 PROCEDURE — 99285 EMERGENCY DEPT VISIT HI MDM: CPT | Mod: 25

## 2019-01-01 PROCEDURE — 83735 ASSAY OF MAGNESIUM: CPT

## 2019-01-01 PROCEDURE — 87186 SC STD MICRODIL/AGAR DIL: CPT

## 2019-01-01 PROCEDURE — 83880 ASSAY OF NATRIURETIC PEPTIDE: CPT

## 2019-01-01 PROCEDURE — 94640 AIRWAY INHALATION TREATMENT: CPT

## 2019-01-01 PROCEDURE — 93010 EKG 12-LEAD: ICD-10-PCS | Mod: ,,, | Performed by: INTERNAL MEDICINE

## 2019-01-01 PROCEDURE — G0378 HOSPITAL OBSERVATION PER HR: HCPCS

## 2019-01-01 PROCEDURE — 36415 COLL VENOUS BLD VENIPUNCTURE: CPT

## 2019-01-01 PROCEDURE — 11000001 HC ACUTE MED/SURG PRIVATE ROOM

## 2019-01-01 PROCEDURE — 99999 PR PBB SHADOW E&M-EST. PATIENT-LVL II: CPT | Mod: PBBFAC,,, | Performed by: INTERNAL MEDICINE

## 2019-01-01 PROCEDURE — 27000221 HC OXYGEN, UP TO 24 HOURS

## 2019-01-01 PROCEDURE — 25500020 PHARM REV CODE 255: Performed by: INTERNAL MEDICINE

## 2019-01-01 PROCEDURE — 96375 TX/PRO/DX INJ NEW DRUG ADDON: CPT

## 2019-01-01 PROCEDURE — 99213 OFFICE O/P EST LOW 20 MIN: CPT | Mod: PBBFAC,PO | Performed by: INTERNAL MEDICINE

## 2019-01-01 PROCEDURE — 93005 ELECTROCARDIOGRAM TRACING: CPT

## 2019-01-01 PROCEDURE — 83036 HEMOGLOBIN GLYCOSYLATED A1C: CPT

## 2019-01-01 PROCEDURE — 96361 HYDRATE IV INFUSION ADD-ON: CPT

## 2019-01-01 PROCEDURE — 96365 THER/PROPH/DIAG IV INF INIT: CPT

## 2019-01-01 PROCEDURE — 99284 EMERGENCY DEPT VISIT MOD MDM: CPT | Mod: 25

## 2019-01-01 PROCEDURE — 96374 THER/PROPH/DIAG INJ IV PUSH: CPT

## 2019-01-01 PROCEDURE — 93010 ELECTROCARDIOGRAM REPORT: CPT | Mod: ,,, | Performed by: INTERNAL MEDICINE

## 2019-01-01 PROCEDURE — 99214 OFFICE O/P EST MOD 30 MIN: CPT | Mod: S$PBB,,, | Performed by: INTERNAL MEDICINE

## 2019-01-01 PROCEDURE — 99214 PR OFFICE/OUTPT VISIT, EST, LEVL IV, 30-39 MIN: ICD-10-PCS | Mod: S$PBB,,, | Performed by: INTERNAL MEDICINE

## 2019-01-01 PROCEDURE — 12000002 HC ACUTE/MED SURGE SEMI-PRIVATE ROOM

## 2019-01-01 PROCEDURE — 87502 INFLUENZA DNA AMP PROBE: CPT

## 2019-01-01 PROCEDURE — 96360 HYDRATION IV INFUSION INIT: CPT

## 2019-01-01 PROCEDURE — 83690 ASSAY OF LIPASE: CPT

## 2019-01-01 PROCEDURE — 85379 FIBRIN DEGRADATION QUANT: CPT

## 2019-01-01 PROCEDURE — 85610 PROTHROMBIN TIME: CPT

## 2019-01-01 PROCEDURE — 93010 EKG 12-LEAD: ICD-10-PCS | Mod: ,,, | Performed by: STUDENT IN AN ORGANIZED HEALTH CARE EDUCATION/TRAINING PROGRAM

## 2019-01-01 PROCEDURE — 99999 PR PBB SHADOW E&M-EST. PATIENT-LVL III: ICD-10-PCS | Mod: PBBFAC,,, | Performed by: INTERNAL MEDICINE

## 2019-01-01 PROCEDURE — 87088 URINE BACTERIA CULTURE: CPT

## 2019-01-01 PROCEDURE — 99999 PR PBB SHADOW E&M-EST. PATIENT-LVL II: ICD-10-PCS | Mod: PBBFAC,,, | Performed by: INTERNAL MEDICINE

## 2019-01-01 PROCEDURE — 36600 WITHDRAWAL OF ARTERIAL BLOOD: CPT

## 2019-01-01 PROCEDURE — 87040 BLOOD CULTURE FOR BACTERIA: CPT

## 2019-01-01 PROCEDURE — 25500020 PHARM REV CODE 255: Performed by: EMERGENCY MEDICINE

## 2019-01-01 PROCEDURE — 63600175 PHARM REV CODE 636 W HCPCS: Performed by: EMERGENCY MEDICINE

## 2019-01-01 PROCEDURE — 99212 OFFICE O/P EST SF 10 MIN: CPT | Mod: PBBFAC,PO | Performed by: INTERNAL MEDICINE

## 2019-01-01 RX ORDER — RAMELTEON 8 MG/1
8 TABLET ORAL NIGHTLY PRN
Status: DISCONTINUED | OUTPATIENT
Start: 2019-01-01 | End: 2019-01-01 | Stop reason: HOSPADM

## 2019-01-01 RX ORDER — CLOPIDOGREL BISULFATE 75 MG/1
75 TABLET ORAL DAILY
Status: DISCONTINUED | OUTPATIENT
Start: 2019-01-01 | End: 2019-01-01 | Stop reason: HOSPADM

## 2019-01-01 RX ORDER — VENLAFAXINE 37.5 MG/1
75 TABLET ORAL DAILY
Status: DISCONTINUED | OUTPATIENT
Start: 2019-01-01 | End: 2019-01-01 | Stop reason: HOSPADM

## 2019-01-01 RX ORDER — ASPIRIN 325 MG
325 TABLET ORAL DAILY
Status: DISCONTINUED | OUTPATIENT
Start: 2019-01-01 | End: 2019-01-01 | Stop reason: HOSPADM

## 2019-01-01 RX ORDER — ONDANSETRON 2 MG/ML
4 INJECTION INTRAMUSCULAR; INTRAVENOUS EVERY 6 HOURS PRN
Status: DISCONTINUED | OUTPATIENT
Start: 2019-01-01 | End: 2019-01-01 | Stop reason: HOSPADM

## 2019-01-01 RX ORDER — SODIUM CHLORIDE 0.9 % (FLUSH) 0.9 %
10 SYRINGE (ML) INJECTION
Status: DISCONTINUED | OUTPATIENT
Start: 2019-01-01 | End: 2019-01-01 | Stop reason: HOSPADM

## 2019-01-01 RX ORDER — ATORVASTATIN CALCIUM 40 MG/1
40 TABLET, FILM COATED ORAL DAILY
Status: DISCONTINUED | OUTPATIENT
Start: 2019-01-01 | End: 2019-01-01 | Stop reason: HOSPADM

## 2019-01-01 RX ORDER — CLOBETASOL PROPIONATE 0.5 MG/G
CREAM TOPICAL 2 TIMES DAILY
Status: ON HOLD | COMMUNITY
Start: 2019-01-01 | End: 2020-01-01 | Stop reason: HOSPADM

## 2019-01-01 RX ORDER — DEXTROSE MONOHYDRATE, SODIUM CHLORIDE, AND POTASSIUM CHLORIDE 50; 1.49; 4.5 G/1000ML; G/1000ML; G/1000ML
INJECTION, SOLUTION INTRAVENOUS CONTINUOUS
Status: DISCONTINUED | OUTPATIENT
Start: 2019-01-01 | End: 2019-01-01

## 2019-01-01 RX ORDER — CLOBETASOL PROPIONATE 0.5 MG/G
CREAM TOPICAL 2 TIMES DAILY
Status: DISCONTINUED | OUTPATIENT
Start: 2019-01-01 | End: 2019-01-01 | Stop reason: HOSPADM

## 2019-01-01 RX ORDER — CEFEPIME HYDROCHLORIDE 2 G/50ML
2 INJECTION, SOLUTION INTRAVENOUS
Status: COMPLETED | OUTPATIENT
Start: 2019-01-01 | End: 2019-01-01

## 2019-01-01 RX ORDER — CARVEDILOL 25 MG/1
25 TABLET ORAL 2 TIMES DAILY
Status: DISCONTINUED | OUTPATIENT
Start: 2019-01-01 | End: 2019-01-01

## 2019-01-01 RX ORDER — IPRATROPIUM BROMIDE AND ALBUTEROL SULFATE 2.5; .5 MG/3ML; MG/3ML
3 SOLUTION RESPIRATORY (INHALATION)
Status: COMPLETED | OUTPATIENT
Start: 2019-01-01 | End: 2019-01-01

## 2019-01-01 RX ORDER — POTASSIUM CHLORIDE 20 MEQ/15ML
40 SOLUTION ORAL ONCE
Status: COMPLETED | OUTPATIENT
Start: 2019-01-01 | End: 2019-01-01

## 2019-01-01 RX ORDER — DOCUSATE SODIUM 50 MG/5ML
100 LIQUID ORAL 2 TIMES DAILY
Status: DISCONTINUED | OUTPATIENT
Start: 2019-01-01 | End: 2019-01-01

## 2019-01-01 RX ORDER — CARVEDILOL 25 MG/1
25 TABLET ORAL 2 TIMES DAILY
Status: DISCONTINUED | OUTPATIENT
Start: 2019-01-01 | End: 2019-01-01 | Stop reason: HOSPADM

## 2019-01-01 RX ORDER — ACETAMINOPHEN 325 MG/1
650 TABLET ORAL EVERY 4 HOURS PRN
Status: DISCONTINUED | OUTPATIENT
Start: 2019-01-01 | End: 2019-01-01 | Stop reason: HOSPADM

## 2019-01-01 RX ORDER — MUPIROCIN 20 MG/G
OINTMENT TOPICAL 2 TIMES DAILY
Status: ON HOLD
Start: 2019-01-01 | End: 2020-01-01 | Stop reason: HOSPADM

## 2019-01-01 RX ORDER — MAGNESIUM SULFATE HEPTAHYDRATE 40 MG/ML
2 INJECTION, SOLUTION INTRAVENOUS ONCE
Status: COMPLETED | OUTPATIENT
Start: 2019-01-01 | End: 2019-01-01

## 2019-01-01 RX ORDER — DOCUSATE SODIUM 50 MG/5ML
100 LIQUID ORAL 2 TIMES DAILY
Status: ON HOLD | COMMUNITY
End: 2020-01-01 | Stop reason: HOSPADM

## 2019-01-01 RX ORDER — AMLODIPINE BESYLATE 5 MG/1
5 TABLET ORAL DAILY
Status: DISCONTINUED | OUTPATIENT
Start: 2019-01-01 | End: 2019-01-01 | Stop reason: HOSPADM

## 2019-01-01 RX ORDER — ONDANSETRON 2 MG/ML
4 INJECTION INTRAMUSCULAR; INTRAVENOUS EVERY 8 HOURS PRN
Status: DISCONTINUED | OUTPATIENT
Start: 2019-01-01 | End: 2019-01-01 | Stop reason: HOSPADM

## 2019-01-01 RX ORDER — DOCUSATE SODIUM 50 MG/5ML
100 LIQUID ORAL 2 TIMES DAILY
Status: DISCONTINUED | OUTPATIENT
Start: 2019-01-01 | End: 2019-01-01 | Stop reason: HOSPADM

## 2019-01-01 RX ORDER — CIPROFLOXACIN 750 MG/1
750 TABLET, FILM COATED ORAL EVERY 12 HOURS
Qty: 10 TABLET | Refills: 0
Start: 2019-01-01 | End: 2019-01-01

## 2019-01-01 RX ORDER — CIPROFLOXACIN 500 MG/1
500 TABLET ORAL EVERY 12 HOURS
Qty: 10 TABLET | Refills: 0
Start: 2019-01-01 | End: 2019-01-01 | Stop reason: SDUPTHER

## 2019-01-01 RX ORDER — FUROSEMIDE 20 MG/1
20 TABLET ORAL DAILY
Qty: 30 TABLET | Refills: 0 | Status: ON HOLD | OUTPATIENT
Start: 2019-01-01 | End: 2019-01-01 | Stop reason: HOSPADM

## 2019-01-01 RX ORDER — CIPROFLOXACIN 2 MG/ML
400 INJECTION, SOLUTION INTRAVENOUS
Status: DISCONTINUED | OUTPATIENT
Start: 2019-01-01 | End: 2019-01-01 | Stop reason: HOSPADM

## 2019-01-01 RX ORDER — DICLOFENAC SODIUM 10 MG/G
4 GEL TOPICAL 2 TIMES DAILY
Status: DISCONTINUED | OUTPATIENT
Start: 2019-01-01 | End: 2019-01-01

## 2019-01-01 RX ORDER — FUROSEMIDE 10 MG/ML
40 INJECTION INTRAMUSCULAR; INTRAVENOUS 2 TIMES DAILY
Status: DISCONTINUED | OUTPATIENT
Start: 2019-01-01 | End: 2019-01-01 | Stop reason: HOSPADM

## 2019-01-01 RX ORDER — AMLODIPINE BESYLATE 5 MG/1
2.5 TABLET ORAL DAILY
Qty: 30 TABLET | Status: ON HOLD
Start: 2019-01-01 | End: 2020-01-01 | Stop reason: HOSPADM

## 2019-01-01 RX ORDER — GLUCAGON 1 MG
1 KIT INJECTION
Status: DISCONTINUED | OUTPATIENT
Start: 2019-01-01 | End: 2019-01-01 | Stop reason: HOSPADM

## 2019-01-01 RX ORDER — ACETAMINOPHEN 160 MG/5ML
650 ELIXIR ORAL EVERY 6 HOURS PRN
Status: ON HOLD | COMMUNITY
End: 2020-01-01 | Stop reason: HOSPADM

## 2019-01-01 RX ORDER — CARVEDILOL 25 MG/1
12.5 TABLET ORAL 2 TIMES DAILY
Qty: 30 TABLET | Status: ON HOLD
Start: 2019-01-01 | End: 2020-01-01 | Stop reason: HOSPADM

## 2019-01-01 RX ORDER — FUROSEMIDE 10 MG/ML
20 INJECTION INTRAMUSCULAR; INTRAVENOUS 2 TIMES DAILY
Status: DISCONTINUED | OUTPATIENT
Start: 2019-01-01 | End: 2019-01-01 | Stop reason: HOSPADM

## 2019-01-01 RX ORDER — ALBUTEROL SULFATE 90 UG/1
1-2 AEROSOL, METERED RESPIRATORY (INHALATION) EVERY 4 HOURS PRN
Qty: 1 INHALER | Refills: 0 | Status: ON HOLD | OUTPATIENT
Start: 2019-01-01 | End: 2020-01-01 | Stop reason: HOSPADM

## 2019-01-01 RX ORDER — IPRATROPIUM BROMIDE AND ALBUTEROL SULFATE 2.5; .5 MG/3ML; MG/3ML
3 SOLUTION RESPIRATORY (INHALATION) EVERY 4 HOURS PRN
Status: DISCONTINUED | OUTPATIENT
Start: 2019-01-01 | End: 2019-01-01 | Stop reason: HOSPADM

## 2019-01-01 RX ORDER — DIPHENHYDRAMINE HCL 12.5MG/5ML
25 ELIXIR ORAL EVERY 6 HOURS PRN
Status: ON HOLD | COMMUNITY
End: 2020-01-01 | Stop reason: HOSPADM

## 2019-01-01 RX ORDER — FERROUS SULFATE 325(65) MG
325 TABLET, DELAYED RELEASE (ENTERIC COATED) ORAL 2 TIMES DAILY
Status: DISCONTINUED | OUTPATIENT
Start: 2019-01-01 | End: 2019-01-01

## 2019-01-01 RX ORDER — POLYETHYLENE GLYCOL 3350 17 G/17G
17 POWDER, FOR SOLUTION ORAL NIGHTLY PRN
Status: ON HOLD | COMMUNITY
End: 2020-01-01 | Stop reason: HOSPADM

## 2019-01-01 RX ORDER — MUPIROCIN 20 MG/G
OINTMENT TOPICAL DAILY
Status: DISCONTINUED | OUTPATIENT
Start: 2019-01-01 | End: 2019-01-01 | Stop reason: HOSPADM

## 2019-01-01 RX ORDER — ZINC SULFATE 50(220)MG
220 CAPSULE ORAL DAILY
Status: ON HOLD | COMMUNITY
End: 2020-01-01 | Stop reason: HOSPADM

## 2019-01-01 RX ORDER — ATORVASTATIN CALCIUM 40 MG/1
40 TABLET, FILM COATED ORAL DAILY
Qty: 90 TABLET | Refills: 3 | Status: ON HOLD | OUTPATIENT
Start: 2019-01-01 | End: 2020-01-01 | Stop reason: HOSPADM

## 2019-01-01 RX ORDER — MUPIROCIN 20 MG/G
OINTMENT TOPICAL 2 TIMES DAILY
Status: DISCONTINUED | OUTPATIENT
Start: 2019-01-01 | End: 2019-01-01 | Stop reason: HOSPADM

## 2019-01-01 RX ORDER — ENOXAPARIN SODIUM 100 MG/ML
1 INJECTION SUBCUTANEOUS EVERY 12 HOURS
Status: DISCONTINUED | OUTPATIENT
Start: 2019-01-01 | End: 2019-01-01

## 2019-01-01 RX ORDER — FUROSEMIDE 10 MG/ML
20 INJECTION INTRAMUSCULAR; INTRAVENOUS
Status: COMPLETED | OUTPATIENT
Start: 2019-01-01 | End: 2019-01-01

## 2019-01-01 RX ORDER — CARVEDILOL 12.5 MG/1
12.5 TABLET ORAL 2 TIMES DAILY
Status: DISCONTINUED | OUTPATIENT
Start: 2019-01-01 | End: 2019-01-01 | Stop reason: HOSPADM

## 2019-01-01 RX ORDER — ZINC SULFATE 50(220)MG
220 CAPSULE ORAL DAILY
Status: DISCONTINUED | OUTPATIENT
Start: 2019-01-01 | End: 2019-01-01 | Stop reason: HOSPADM

## 2019-01-01 RX ORDER — ONDANSETRON 8 MG/1
8 TABLET, ORALLY DISINTEGRATING ORAL EVERY 8 HOURS PRN
Status: DISCONTINUED | OUTPATIENT
Start: 2019-01-01 | End: 2019-01-01

## 2019-01-01 RX ORDER — CEFEPIME HYDROCHLORIDE 1 G/50ML
1 INJECTION, SOLUTION INTRAVENOUS
Status: DISCONTINUED | OUTPATIENT
Start: 2019-01-01 | End: 2019-01-01

## 2019-01-01 RX ORDER — FUROSEMIDE 20 MG/1
20 TABLET ORAL DAILY
Qty: 30 TABLET | Refills: 0 | Status: ON HOLD
Start: 2019-01-01 | End: 2020-01-01 | Stop reason: HOSPADM

## 2019-01-01 RX ORDER — FUROSEMIDE 10 MG/ML
60 INJECTION INTRAMUSCULAR; INTRAVENOUS
Status: COMPLETED | OUTPATIENT
Start: 2019-01-01 | End: 2019-01-01

## 2019-01-01 RX ORDER — CLOPIDOGREL BISULFATE 75 MG/1
75 TABLET ORAL DAILY
Qty: 90 TABLET | Refills: 3 | Status: ON HOLD | OUTPATIENT
Start: 2019-01-01 | End: 2020-01-01 | Stop reason: HOSPADM

## 2019-01-01 RX ORDER — ASCORBIC ACID 500 MG
500 TABLET ORAL 2 TIMES DAILY
Status: ON HOLD | COMMUNITY
End: 2020-01-01 | Stop reason: HOSPADM

## 2019-01-01 RX ORDER — KETOCONAZOLE 20 MG/ML
SHAMPOO, SUSPENSION TOPICAL
Qty: 120 ML | Refills: 1 | Status: ON HOLD | OUTPATIENT
Start: 2019-01-01 | End: 2020-01-01 | Stop reason: HOSPADM

## 2019-01-01 RX ORDER — MULTIVITAMIN
1 TABLET ORAL DAILY
Status: ON HOLD | COMMUNITY
End: 2020-01-01 | Stop reason: HOSPADM

## 2019-01-01 RX ORDER — FUROSEMIDE 20 MG/1
20 TABLET ORAL DAILY
Qty: 30 TABLET | Refills: 0
Start: 2019-01-01 | End: 2019-01-01 | Stop reason: SDUPTHER

## 2019-01-01 RX ORDER — ASCORBIC ACID 500 MG
500 TABLET ORAL 2 TIMES DAILY
Status: DISCONTINUED | OUTPATIENT
Start: 2019-01-01 | End: 2019-01-01 | Stop reason: HOSPADM

## 2019-01-01 RX ORDER — ALBUTEROL SULFATE 90 UG/1
2 AEROSOL, METERED RESPIRATORY (INHALATION) EVERY 6 HOURS PRN
Status: DISCONTINUED | OUTPATIENT
Start: 2019-01-01 | End: 2019-01-01 | Stop reason: HOSPADM

## 2019-01-01 RX ORDER — INSULIN ASPART 100 [IU]/ML
0-5 INJECTION, SOLUTION INTRAVENOUS; SUBCUTANEOUS
Status: DISCONTINUED | OUTPATIENT
Start: 2019-01-01 | End: 2019-01-01 | Stop reason: HOSPADM

## 2019-01-01 RX ORDER — FUROSEMIDE 10 MG/ML
20 INJECTION INTRAMUSCULAR; INTRAVENOUS ONCE
Status: COMPLETED | OUTPATIENT
Start: 2019-01-01 | End: 2019-01-01

## 2019-01-01 RX ADMIN — CEFEPIME HYDROCHLORIDE 1 G: 1 INJECTION, SOLUTION INTRAVENOUS at 11:10

## 2019-01-01 RX ADMIN — IOHEXOL 100 ML: 350 INJECTION, SOLUTION INTRAVENOUS at 05:11

## 2019-01-01 RX ADMIN — CIPROFLOXACIN 400 MG: 2 INJECTION, SOLUTION INTRAVENOUS at 02:10

## 2019-01-01 RX ADMIN — ASPIRIN 325 MG ORAL TABLET 325 MG: 325 PILL ORAL at 10:11

## 2019-01-01 RX ADMIN — ATORVASTATIN CALCIUM 40 MG: 40 TABLET, FILM COATED ORAL at 10:11

## 2019-01-01 RX ADMIN — ATORVASTATIN CALCIUM 40 MG: 40 TABLET, FILM COATED ORAL at 09:11

## 2019-01-01 RX ADMIN — ASPIRIN 325 MG ORAL TABLET 325 MG: 325 PILL ORAL at 10:09

## 2019-01-01 RX ADMIN — VENLAFAXINE 75 MG: 37.5 TABLET ORAL at 10:09

## 2019-01-01 RX ADMIN — MUPIROCIN: 20 OINTMENT TOPICAL at 09:10

## 2019-01-01 RX ADMIN — ATORVASTATIN CALCIUM 40 MG: 40 TABLET, FILM COATED ORAL at 10:10

## 2019-01-01 RX ADMIN — Medication 220 MG: at 10:10

## 2019-01-01 RX ADMIN — CLOPIDOGREL BISULFATE 75 MG: 75 TABLET ORAL at 10:09

## 2019-01-01 RX ADMIN — MUPIROCIN: 20 OINTMENT TOPICAL at 09:11

## 2019-01-01 RX ADMIN — CLOPIDOGREL BISULFATE 75 MG: 75 TABLET ORAL at 10:10

## 2019-01-01 RX ADMIN — OXYCODONE HYDROCHLORIDE AND ACETAMINOPHEN 500 MG: 500 TABLET ORAL at 09:11

## 2019-01-01 RX ADMIN — MAGNESIUM SULFATE IN WATER 2 G: 40 INJECTION, SOLUTION INTRAVENOUS at 10:10

## 2019-01-01 RX ADMIN — AMLODIPINE BESYLATE 5 MG: 5 TABLET ORAL at 10:11

## 2019-01-01 RX ADMIN — FUROSEMIDE 20 MG: 10 INJECTION, SOLUTION INTRAMUSCULAR; INTRAVENOUS at 05:05

## 2019-01-01 RX ADMIN — CLOPIDOGREL BISULFATE 75 MG: 75 TABLET ORAL at 09:10

## 2019-01-01 RX ADMIN — MUPIROCIN: 20 OINTMENT TOPICAL at 07:11

## 2019-01-01 RX ADMIN — CARVEDILOL 25 MG: 25 TABLET, FILM COATED ORAL at 09:10

## 2019-01-01 RX ADMIN — VENLAFAXINE 75 MG: 37.5 TABLET ORAL at 10:10

## 2019-01-01 RX ADMIN — POTASSIUM CHLORIDE 40 MEQ: 20 SOLUTION ORAL at 10:10

## 2019-01-01 RX ADMIN — VENLAFAXINE 75 MG: 37.5 TABLET ORAL at 09:10

## 2019-01-01 RX ADMIN — CARVEDILOL 25 MG: 25 TABLET, FILM COATED ORAL at 10:10

## 2019-01-01 RX ADMIN — VENLAFAXINE 75 MG: 37.5 TABLET ORAL at 09:11

## 2019-01-01 RX ADMIN — FUROSEMIDE 40 MG: 10 INJECTION, SOLUTION INTRAVENOUS at 10:10

## 2019-01-01 RX ADMIN — CARVEDILOL 25 MG: 25 TABLET, FILM COATED ORAL at 10:09

## 2019-01-01 RX ADMIN — CARVEDILOL 25 MG: 25 TABLET, FILM COATED ORAL at 10:11

## 2019-01-01 RX ADMIN — ASPIRIN 325 MG ORAL TABLET 325 MG: 325 PILL ORAL at 10:10

## 2019-01-01 RX ADMIN — AMLODIPINE BESYLATE 5 MG: 5 TABLET ORAL at 10:09

## 2019-01-01 RX ADMIN — MUPIROCIN: 20 OINTMENT TOPICAL at 10:10

## 2019-01-01 RX ADMIN — FUROSEMIDE 60 MG: 10 INJECTION, SOLUTION INTRAMUSCULAR; INTRAVENOUS at 11:10

## 2019-01-01 RX ADMIN — AMLODIPINE BESYLATE 5 MG: 5 TABLET ORAL at 09:11

## 2019-01-01 RX ADMIN — AMLODIPINE BESYLATE 5 MG: 5 TABLET ORAL at 10:10

## 2019-01-01 RX ADMIN — MUPIROCIN: 20 OINTMENT TOPICAL at 08:10

## 2019-01-01 RX ADMIN — CEFEPIME HYDROCHLORIDE 2 G: 2 INJECTION, SOLUTION INTRAVENOUS at 11:10

## 2019-01-01 RX ADMIN — IOHEXOL 23 ML: 350 INJECTION, SOLUTION INTRAVENOUS at 05:09

## 2019-01-01 RX ADMIN — DOCUSATE SODIUM 100 MG: 50 LIQUID ORAL at 09:11

## 2019-01-01 RX ADMIN — DEXTROSE, SODIUM CHLORIDE, AND POTASSIUM CHLORIDE: 5; .45; .15 INJECTION INTRAVENOUS at 08:09

## 2019-01-01 RX ADMIN — CLOBETASOL PROPIONATE: 0.5 CREAM TOPICAL at 10:10

## 2019-01-01 RX ADMIN — Medication 220 MG: at 09:10

## 2019-01-01 RX ADMIN — CIPROFLOXACIN 400 MG: 2 INJECTION, SOLUTION INTRAVENOUS at 01:10

## 2019-01-01 RX ADMIN — ATORVASTATIN CALCIUM 40 MG: 40 TABLET, FILM COATED ORAL at 10:09

## 2019-01-01 RX ADMIN — ASPIRIN 325 MG ORAL TABLET 325 MG: 325 PILL ORAL at 09:11

## 2019-01-01 RX ADMIN — CARVEDILOL 25 MG: 25 TABLET, FILM COATED ORAL at 08:10

## 2019-01-01 RX ADMIN — CEFEPIME HYDROCHLORIDE 1 G: 1 INJECTION, SOLUTION INTRAVENOUS at 10:10

## 2019-01-01 RX ADMIN — CLOBETASOL PROPIONATE: 0.5 CREAM TOPICAL at 08:10

## 2019-01-01 RX ADMIN — OXYCODONE HYDROCHLORIDE AND ACETAMINOPHEN 500 MG: 500 TABLET ORAL at 10:11

## 2019-01-01 RX ADMIN — CLOBETASOL PROPIONATE: 0.5 CREAM TOPICAL at 03:10

## 2019-01-01 RX ADMIN — VANCOMYCIN HYDROCHLORIDE 750 MG: 750 INJECTION, POWDER, LYOPHILIZED, FOR SOLUTION INTRAVENOUS at 11:10

## 2019-01-01 RX ADMIN — VENLAFAXINE 75 MG: 37.5 TABLET ORAL at 10:11

## 2019-01-01 RX ADMIN — ASPIRIN 325 MG ORAL TABLET 325 MG: 325 PILL ORAL at 09:10

## 2019-01-01 RX ADMIN — CIPROFLOXACIN 400 MG: 2 INJECTION, SOLUTION INTRAVENOUS at 03:10

## 2019-01-01 RX ADMIN — VANCOMYCIN HYDROCHLORIDE 1750 MG: 100 INJECTION, POWDER, LYOPHILIZED, FOR SOLUTION INTRAVENOUS at 01:10

## 2019-01-01 RX ADMIN — FUROSEMIDE 40 MG: 10 INJECTION, SOLUTION INTRAVENOUS at 05:10

## 2019-01-01 RX ADMIN — IPRATROPIUM BROMIDE AND ALBUTEROL SULFATE 3 ML: .5; 3 SOLUTION RESPIRATORY (INHALATION) at 09:10

## 2019-01-01 RX ADMIN — DEXTROSE, SODIUM CHLORIDE, AND POTASSIUM CHLORIDE: 5; .45; .15 INJECTION INTRAVENOUS at 10:09

## 2019-01-01 RX ADMIN — CLOBETASOL PROPIONATE: 0.5 CREAM TOPICAL at 09:10

## 2019-01-01 RX ADMIN — CARVEDILOL 12.5 MG: 12.5 TABLET, FILM COATED ORAL at 09:11

## 2019-01-01 RX ADMIN — CLOPIDOGREL BISULFATE 75 MG: 75 TABLET ORAL at 10:11

## 2019-01-01 RX ADMIN — DOCUSATE SODIUM 100 MG: 50 LIQUID ORAL at 10:11

## 2019-01-01 RX ADMIN — CLOPIDOGREL BISULFATE 75 MG: 75 TABLET ORAL at 09:11

## 2019-01-01 RX ADMIN — AMLODIPINE BESYLATE 5 MG: 5 TABLET ORAL at 09:10

## 2019-01-01 RX ADMIN — ATORVASTATIN CALCIUM 40 MG: 40 TABLET, FILM COATED ORAL at 09:10

## 2019-01-01 RX ADMIN — FUROSEMIDE 20 MG: 10 INJECTION, SOLUTION INTRAMUSCULAR; INTRAVENOUS at 07:11

## 2019-01-01 RX ADMIN — IPRATROPIUM BROMIDE AND ALBUTEROL SULFATE 3 ML: .5; 3 SOLUTION RESPIRATORY (INHALATION) at 04:05

## 2019-01-01 RX ADMIN — FUROSEMIDE 20 MG: 10 INJECTION, SOLUTION INTRAMUSCULAR; INTRAVENOUS at 09:11

## 2019-01-01 RX ADMIN — FUROSEMIDE 40 MG: 10 INJECTION, SOLUTION INTRAVENOUS at 09:10

## 2019-01-08 NOTE — PT/OT/SLP DISCHARGE
Occupational Therapy Discharge Summary    Elizabeth Navarrete  MRN: [id number]   Principal Problem: History of embolic stroke      Patient Discharged from acute Occupational Therapy on 6/23/2017.  Please refer to prior OT note dated 6/23/2017 for functional status.    Assessment:      Patient was discharged unexpectedly.  Information required to complete an accurate discharge summary is unknown.  Refer to therapy initial evaluation and last progress note for initial and most recent functional status and goal achievement.  Recommendations made may be found in medical record.    Objective:     GOALS:   Multidisciplinary Problems     Occupational Therapy Goals        Problem: Occupational Therapy Goal    Goal Priority Disciplines Outcome Interventions   Occupational Therapy Goal     OT, PT/OT Ongoing (interventions implemented as appropriate)    Description:  Goals to be met by: 6/30     Patient will increase functional independence with ADLs by performing:    UE Dressing while seated with Set-up Assistance and Minimal Assistance.  LE Dressing with Set-up Assistance and Moderate Assistance.  Grooming while standing with Set-up Assistance and Minimal Assistance.  Sitting at edge of bed x10 minutes for dynamic functional task with Contact Guard Assistance.  Stand pivot transfers with Minimal Assistance.  Toilet transfer to bedside commode with Minimal Assistance.  Pt/CG verbalized understanding for s/s of stroke.  CG demo understanding for positioning of L UE.   CG demo understanding for L UE exercises.                       Reasons for Discontinuation of Therapy Services  Transfer to alternate level of care.      Plan:     Patient Discharged to: Skilled Nursing Facility    LAVONNE Dill  1/8/2019

## 2019-02-15 ENCOUNTER — HOSPITAL ENCOUNTER (EMERGENCY)
Facility: HOSPITAL | Age: 84
Discharge: HOME | End: 2019-02-15
Attending: EMERGENCY MEDICINE
Payer: MEDICARE

## 2019-02-15 VITALS
DIASTOLIC BLOOD PRESSURE: 64 MMHG | SYSTOLIC BLOOD PRESSURE: 112 MMHG | TEMPERATURE: 98 F | RESPIRATION RATE: 20 BRPM | HEART RATE: 80 BPM | OXYGEN SATURATION: 98 %

## 2019-02-15 DIAGNOSIS — T85.598A FEEDING TUBE BLOCKED, INITIAL ENCOUNTER: Primary | ICD-10-CM

## 2019-02-15 PROCEDURE — 99283 EMERGENCY DEPT VISIT LOW MDM: CPT

## 2019-02-15 NOTE — ED NOTES
Pt here with clogged peg tube from [facility]. No other complaints. I unclogged tube with 120cc of coke. Then was able to flush manually with 120cc if water and then flushed 120 cc of water to gravity. Insertion site WDL. resp are reg and unlabored. Pt denies any c/o.

## 2019-02-15 NOTE — ED PROVIDER NOTES
Encounter Date: 2/15/2019    SCRIBE #1 NOTE: I, Barbara Kulkarni, am scribing for, and in the presence of,  Dr. Bosch. I have scribed the entire note.       History     Chief Complaint   Patient presents with    PEG tube clogged     Nursing home reports unable to flush PEG tube.     Time seen by provider: 5:38 PM    This is a 89 y.o. female who presents to the ED due to her PEG tube being clogged. Pt comes from a nursing home. Relative reports that this is not the first occasion that the patient's PEG tube has been clogged. She suspects that they are not flushing the tube properly at the nursing home. Pt denies abdominal pain, chest pain, SOB, nausea, or vomiting. The PEG tube was placed in the Summer of 2018 .      The history is provided by the patient and a relative.     Review of patient's allergies indicates:   Allergen Reactions    Pcn [penicillins] Rash     Past Medical History:   Diagnosis Date    CAD (coronary artery disease) 10/10/2012    CRF (chronic renal failure) 10/10/2012    Embolic stroke involving right carotid artery 6/15/2017    Family history of breast cancer 10/10/2012    History of cardiac arrhythmia 10/10/2012    History of CHF (congestive heart failure) 10/10/2012    History of depression 10/10/2012    History of echocardiogram 10/10/2012    HTN (hypertension) 10/10/2012    Internal carotid artery stenosis 10/10/2012    Personal history of gallstones 10/10/2012     Past Surgical History:   Procedure Laterality Date    BREAST LUMPECTOMY      ESOPHAGOGASTRODUODENOSCOPY (EGD) N/A 6/14/2018    Performed by [facility]b [facility] at [facility]O    ESOPHAGOGASTRODUODENOSCOPY (EGD) N/A 6/12/2018    [facility] at [facility]    EYE SURGERY      HYSTERECTOMY      PLACEMENT-TUBE-PEG N/A 6/21/2017    [facility]formed by Joshua Goldberg, MD at [facility] OR [address] [place] [address]     Family History   Problem Relation Age of Onset    Cancer Mother         breast    Heart disease Neg Hx      Social  History     Tobacco Use    Smoking status: Never Smoker    Smokeless tobacco: Never Used   Substance Use Topics    Alcohol use: No    Drug use: No     Review of Systems   Constitutional: Negative for chills and fever.   HENT: Negative for facial swelling and trouble swallowing.    Eyes: Negative for redness.   Respiratory: Negative for shortness of breath.    Cardiovascular: Negative for chest pain.   Gastrointestinal: Negative for abdominal pain, diarrhea and vomiting.   Genitourinary: Negative for dysuria and hematuria.   Musculoskeletal: Negative for gait problem.   Skin: Negative for rash.   Neurological: Negative for facial asymmetry and speech difficulty.   All other systems reviewed and are negative.      Physical Exam     Initial Vitals   BP Pulse Resp Temp SpO2   02/15/19 1626 02/15/19 1626 02/15/19 1839 02/15/19 1626 02/15/19 1626   130/69 87 20 98.3 °F (36.8 °C) 98 %      MAP       --                Physical Exam    Nursing note and vitals reviewed.  Constitutional: She appears well-developed and well-nourished. She is not diaphoretic. No distress.   HENT:   Head: Normocephalic and atraumatic.   Eyes: Conjunctivae and EOM are normal.   Neck: Normal range of motion. Neck supple.   Cardiovascular: Normal rate, regular rhythm and normal heart sounds.   Pulmonary/Chest: Breath sounds normal. No respiratory distress.   Abdominal: Soft. There is no tenderness.   PEG tube to the left upper abdomen with no erythema or swelling around the area   Musculoskeletal: Normal range of motion. She exhibits no edema or tenderness.   Neurological: She is alert and oriented to person, place, and time. She has normal strength.   Skin: Skin is warm and dry. Capillary refill takes less than 2 seconds.         ED Course   Procedures  Labs Reviewed - No data to display            Medical Decision Making:   ED Management:  PEG TUBE WAS ON STOPPED BY THE NURSE.  THE PATIENT WILL BE DISCHARGED AT THIS TIME.                       Clinical Impression:     1. Feeding tube blocked, initial encounter            Disposition:   Disposition: Discharged  Condition: Stable         I, Sanaz Bosch, personally performed the services described in this documentation. All medical record entries made by the scribe were at my direction and in my presence.  I have reviewed the chart and agree that the record reflects my personal performance and is accurate and complete. Sanaz Bosch M.D. 6:52 PM02/15/2019               Sanaz Bosch MD  02/15/19 2806

## 2019-02-16 NOTE — ED NOTES
Report called to [facility],including must keep flushing peg tube with free water regularly-at least 6 times a day. Spoke with eden--she states they flush 3 times a day at this time.

## 2019-02-16 NOTE — ED NOTES
Report received. Care assumed. Pt family at BS, awaiting transport home. Will continue to monitor.

## 2019-02-18 ENCOUNTER — HOSPITAL ENCOUNTER (EMERGENCY)
Facility: HOSPITAL | Age: 84
Discharge: HOME | End: 2019-02-18
Attending: EMERGENCY MEDICINE
Payer: MEDICARE

## 2019-02-18 ENCOUNTER — TELEPHONE (OUTPATIENT)
Dept: GASTROENTEROLOGY | Facility: CLINIC | Age: 84
End: 2019-02-18

## 2019-02-18 VITALS
DIASTOLIC BLOOD PRESSURE: 60 MMHG | RESPIRATION RATE: 23 BRPM | HEIGHT: 66 IN | WEIGHT: 120 LBS | TEMPERATURE: 98 F | SYSTOLIC BLOOD PRESSURE: 123 MMHG | HEART RATE: 90 BPM | BODY MASS INDEX: 19.29 KG/M2 | OXYGEN SATURATION: 97 %

## 2019-02-18 DIAGNOSIS — N39.0 URINARY TRACT INFECTION WITH HEMATURIA, SITE UNSPECIFIED: ICD-10-CM

## 2019-02-18 DIAGNOSIS — R31.9 HEMATURIA, UNSPECIFIED TYPE: Primary | ICD-10-CM

## 2019-02-18 DIAGNOSIS — K94.23 PEG TUBE MALFUNCTION: ICD-10-CM

## 2019-02-18 DIAGNOSIS — N32.89 BLADDER MASS: ICD-10-CM

## 2019-02-18 DIAGNOSIS — R00.0 TACHYCARDIA: ICD-10-CM

## 2019-02-18 DIAGNOSIS — R31.9 URINARY TRACT INFECTION WITH HEMATURIA, SITE UNSPECIFIED: ICD-10-CM

## 2019-02-18 LAB
ALBUMIN SERPL BCP-MCNC: 2.5 G/DL
ALP SERPL-CCNC: 115 U/L
ALT SERPL W/O P-5'-P-CCNC: 17 U/L
ANION GAP SERPL CALC-SCNC: 8 MMOL/L
APTT BLDCRRT: 29.4 SEC
AST SERPL-CCNC: 32 U/L
BACTERIA #/AREA URNS HPF: ABNORMAL /HPF
BASOPHILS # BLD AUTO: 0.02 K/UL
BASOPHILS NFR BLD: 0.2 %
BILIRUB SERPL-MCNC: 0.7 MG/DL
BILIRUB UR QL STRIP: ABNORMAL
BUN SERPL-MCNC: 52 MG/DL
CALCIUM SERPL-MCNC: 8.8 MG/DL
CHLORIDE SERPL-SCNC: 107 MMOL/L
CLARITY UR: ABNORMAL
CO2 SERPL-SCNC: 21 MMOL/L
COLOR UR: ABNORMAL
CREAT SERPL-MCNC: 1 MG/DL
DIFFERENTIAL METHOD: ABNORMAL
EOSINOPHIL # BLD AUTO: 0.5 K/UL
EOSINOPHIL NFR BLD: 5.6 %
ERYTHROCYTE [DISTWIDTH] IN BLOOD BY AUTOMATED COUNT: 15.9 %
EST. GFR  (AFRICAN AMERICAN): 57 ML/MIN/1.73 M^2
EST. GFR  (NON AFRICAN AMERICAN): 50 ML/MIN/1.73 M^2
GLUCOSE SERPL-MCNC: 147 MG/DL
GLUCOSE UR QL STRIP: ABNORMAL
HCT VFR BLD AUTO: 32.4 %
HGB BLD-MCNC: 9.8 G/DL
HGB UR QL STRIP: ABNORMAL
HYALINE CASTS #/AREA URNS LPF: 0 /LPF
INR PPP: 1.2
KETONES UR QL STRIP: ABNORMAL
LEUKOCYTE ESTERASE UR QL STRIP: ABNORMAL
LYMPHOCYTES # BLD AUTO: 1.2 K/UL
LYMPHOCYTES NFR BLD: 13.1 %
MAGNESIUM SERPL-MCNC: 2 MG/DL
MCH RBC QN AUTO: 26.2 PG
MCHC RBC AUTO-ENTMCNC: 30.2 G/DL
MCV RBC AUTO: 87 FL
MICROSCOPIC COMMENT: ABNORMAL
MONOCYTES # BLD AUTO: 0.7 K/UL
MONOCYTES NFR BLD: 6.9 %
NEUTROPHILS # BLD AUTO: 7 K/UL
NEUTROPHILS NFR BLD: 74 %
NITRITE UR QL STRIP: POSITIVE
PH UR STRIP: 8 [PH] (ref 5–8)
PLATELET # BLD AUTO: 195 K/UL
PMV BLD AUTO: 11.1 FL
POTASSIUM SERPL-SCNC: 4.8 MMOL/L
PROT SERPL-MCNC: 6.9 G/DL
PROT UR QL STRIP: ABNORMAL
PROTHROMBIN TIME: 12.1 SEC
RBC # BLD AUTO: 3.74 M/UL
RBC #/AREA URNS HPF: >100 /HPF (ref 0–4)
SODIUM SERPL-SCNC: 136 MMOL/L
SP GR UR STRIP: <=1.005 (ref 1–1.03)
SQUAMOUS #/AREA URNS HPF: 0 /HPF
TSH SERPL DL<=0.005 MIU/L-ACNC: 1.29 UIU/ML
URN SPEC COLLECT METH UR: ABNORMAL
UROBILINOGEN UR STRIP-ACNC: >=8 EU/DL
WBC # BLD AUTO: 9.45 K/UL
WBC #/AREA URNS HPF: 7 /HPF (ref 0–5)
WBC CLUMPS URNS QL MICRO: ABNORMAL
YEAST URNS QL MICRO: ABNORMAL

## 2019-02-18 PROCEDURE — 83735 ASSAY OF MAGNESIUM: CPT

## 2019-02-18 PROCEDURE — 80053 COMPREHEN METABOLIC PANEL: CPT

## 2019-02-18 PROCEDURE — 84443 ASSAY THYROID STIM HORMONE: CPT

## 2019-02-18 PROCEDURE — 85610 PROTHROMBIN TIME: CPT

## 2019-02-18 PROCEDURE — 99285 EMERGENCY DEPT VISIT HI MDM: CPT

## 2019-02-18 PROCEDURE — 85730 THROMBOPLASTIN TIME PARTIAL: CPT

## 2019-02-18 PROCEDURE — 85025 COMPLETE CBC W/AUTO DIFF WBC: CPT

## 2019-02-18 PROCEDURE — 93005 ELECTROCARDIOGRAM TRACING: CPT

## 2019-02-18 PROCEDURE — 25000003 PHARM REV CODE 250: Performed by: EMERGENCY MEDICINE

## 2019-02-18 PROCEDURE — 81000 URINALYSIS NONAUTO W/SCOPE: CPT

## 2019-02-18 RX ORDER — SULFAMETHOXAZOLE AND TRIMETHOPRIM 200; 40 MG/5ML; MG/5ML
20 SUSPENSION ORAL
Status: COMPLETED | OUTPATIENT
Start: 2019-02-18 | End: 2019-02-18

## 2019-02-18 RX ORDER — SULFAMETHOXAZOLE AND TRIMETHOPRIM 200; 40 MG/5ML; MG/5ML
20 SUSPENSION ORAL EVERY 12 HOURS
Qty: 200 ML | Refills: 0 | Status: SHIPPED | OUTPATIENT
Start: 2019-02-18 | End: 2019-02-23

## 2019-02-18 RX ADMIN — SULFAMETHOXAZOLE AND TRIMETHOPRIM 20 ML: 200; 40 SUSPENSION ORAL at 05:02

## 2019-02-18 NOTE — ED NOTES
Pt here c/o pegtube clogged and having urinary bleeding. pegtube has been unclogged using coke and then 240 cc water flushed to gravity daughter state spt has been forgeful for last few weeks. No fevers. Pt is bed bound. Follows commands,clear speech. No wounds observed exceot to dressing to left lower ext-WDL

## 2019-02-18 NOTE — ED NOTES
Bed: EXAM 10  Expected date:   Expected time:   Means of arrival:   Comments:  EMS [place] vag bleed peg tube afib 's heart rate

## 2019-02-18 NOTE — ED PROVIDER NOTES
Encounter Date: 2/18/2019    SCRIBE #1 NOTE: I, Deanna Owens, am scribing for, and in the presence of,  Dr. Velazquez. I have scribed the entire note.       History     Chief Complaint   Patient presents with    Atrial Fibrillation     pt present to ED today via EMS from [facility] reports clogged PEG tube, vaginal bleeding, and ИВАН with heart rate range from 's. pt AAO x 3 denies chest pain or discomfort      89 y/o female presents with clogged feeding tube. Her feeding tube was placed following a stroke in June or 2018. The last time her feeding tube worked properly was last night. [facility] reports that she has vaginal bleeding as well. Her daughter states that she is prone to frequent bladder infections where they have had instances of bleeding during the infection. The family and nursing home staff are uncertain from where is the bleeding. She does not have a catheter in place.  She denies having any CP , dysuria , abdominal pain ,fever ,and N/V/D.  Per nurses note she has AFIB with heart rate range of 's.             The history is provided by the nursing home, a relative and the patient.     Review of patient's allergies indicates:   Allergen Reactions    Pcn [penicillins] Rash     Past Medical History:   Diagnosis Date    CAD (coronary artery disease) 10/10/2012    CRF (chronic renal failure) 10/10/2012    Embolic stroke involving right carotid artery 6/15/2017    Family history of breast cancer 10/10/2012    History of cardiac arrhythmia 10/10/2012    History of CHF (congestive heart failure) 10/10/2012    History of depression 10/10/2012    History of echocardiogram 10/10/2012    HTN (hypertension) 10/10/2012    Internal carotid artery stenosis 10/10/2012    Personal history of gallstones 10/10/2012     Past Surgical History:   Procedure Laterality Date    BREAST LUMPECTOMY      ESOPHAGOGASTRODUODENOSCOPY (EGD) N/A 6/14/2018    Performed by Anila CANO  MD [place] at [facility]    ESOPHAGOGASTRODUODENOSCOPY (EGD) N/A 6/12/2018    Performed by [facility]b [facility] at [facility] END[facility]    EYE SURGERY      HYSTERECTOMY      PLACEMENT-TUBE-PEG N/A 6/21/2017    Performed by Joshua Goldberg[facility] at [facility] OR [address] [place] [address]     Family History   Problem Relation Age of Onset    Cancer Mother         breast    Heart disease Neg Hx      Social History     Tobacco Use    Smoking status: Never Smoker    Smokeless tobacco: Never Used   Substance Use Topics    Alcohol use: No    Drug use: No     Review of Systems   Constitutional: Negative for chills and fever.   HENT: Negative for congestion, rhinorrhea and sore throat.    Eyes: Negative for redness and visual disturbance.   Respiratory: Negative for cough, shortness of breath and wheezing.    Cardiovascular: Negative for chest pain and palpitations.   Gastrointestinal: Negative for abdominal pain, diarrhea, nausea and vomiting.        Positive for feeding tube being clogged   Genitourinary: Positive for vaginal bleeding. Negative for dysuria and hematuria.   Musculoskeletal: Negative for back pain, myalgias and neck pain.   Skin: Negative for rash.   Neurological: Negative for dizziness, weakness and light-headedness.   Psychiatric/Behavioral: Negative for confusion.       Physical Exam     Initial Vitals   BP Pulse Resp Temp SpO2   02/18/19 1451 02/18/19 1500 02/18/19 1451 02/18/19 1451 02/18/19 1451   139/77 103 20 98.4 °F (36.9 °C) 98 %      MAP       --                Physical Exam    Nursing note and vitals reviewed.  Constitutional: She appears well-developed and well-nourished. She is not diaphoretic. No distress.   HENT:   Head: Normocephalic and atraumatic.   Right Ear: External ear normal.   Left Ear: External ear normal.   Nose: Nose normal.   Eyes: Conjunctivae and EOM are normal. Pupils are equal, round, and reactive to light.   Neck: Normal range of motion. Neck supple.   Cardiovascular: Normal rate and normal heart  sounds. An irregularly irregular rhythm present.  Exam reveals no gallop and no friction rub.    No murmur heard.  Pulmonary/Chest: Breath sounds normal. No respiratory distress. She has no wheezes. She has no rhonchi. She has no rales.   Abdominal: Soft. Bowel sounds are normal. She exhibits no distension. There is no tenderness. There is no rebound and no guarding.   PEG tube in left upper abdomen   Genitourinary:   Genitourinary Comments: Normal external genitalia   Grossly bloody urine   Musculoskeletal: Normal range of motion. She exhibits no edema or tenderness.   No peripheral edema.   Neurological: She is alert and oriented to person, place, and time. No cranial nerve deficit. GCS score is 15. GCS eye subscore is 4. GCS verbal subscore is 5. GCS motor subscore is 6.   Skin: Skin is warm and dry. Capillary refill takes less than 2 seconds. No rash noted.         ED Course   Procedures  Labs Reviewed   CBC W/ AUTO DIFFERENTIAL - Abnormal; Notable for the following components:       Result Value    RBC 3.74 (*)     Hemoglobin 9.8 (*)     Hematocrit 32.4 (*)     MCH 26.2 (*)     MCHC 30.2 (*)     RDW 15.9 (*)     Gran% 74.0 (*)     Lymph% 13.1 (*)     All other components within normal limits   COMPREHENSIVE METABOLIC PANEL - Abnormal; Notable for the following components:    CO2 21 (*)     Glucose 147 (*)     BUN, Bld 52 (*)     Albumin 2.5 (*)     eGFR if  57 (*)     eGFR if non  50 (*)     All other components within normal limits   URINALYSIS, REFLEX TO URINE CULTURE - Abnormal; Notable for the following components:    Color, UA Red (*)     Appearance, UA Cloudy (*)     Specific Gravity, UA <=1.005 (*)     Protein, UA 3+ (*)     Glucose, UA 1+ (*)     Ketones, UA 2+ (*)     Bilirubin (UA) 3+ (*)     Occult Blood UA 3+ (*)     Nitrite, UA Positive (*)     Urobilinogen, UA >=8.0 (*)     Leukocytes, UA 3+ (*)     All other components within normal limits    Narrative:      Preferred Collection Type->Urine, Clean Catch   URINALYSIS MICROSCOPIC - Abnormal; Notable for the following components:    RBC, UA >100 (*)     WBC, UA 7 (*)     WBC Clumps, UA Few (*)     Bacteria, UA Few (*)     All other components within normal limits    Narrative:     Preferred Collection Type->Urine, Clean Catch   MAGNESIUM   PROTIME-INR   APTT   TSH        ECG Results          EKG 12-lead (In process)  Result time 02/18/19 16:31:35    In process by Interface, Lab In [facility] (02/18/19 16:31:35)                 Narrative:    Test Reason : R00.0,    Vent. Rate : 093 BPM     Atrial Rate : 100 BPM     P-R Int : 000 ms          QRS Dur : 114 ms      QT Int : 356 ms       P-R-T Axes : 000 -45 051 degrees     QTc Int : 442 ms    Atrial fibrillation with premature ventricular or aberrantly conducted  complexes  Left axis deviation  Septal infarct (cited on or before 18-FEB-2019)  Abnormal ECG  When compared with ECG of 17-JUN-2018 03:50,  Questionable change in initial forces of Septal leads    Referred By: [email or web address]   SELF           Confirmed By:                             Imaging Results          CT Renal Stone Study ABD Pelvis WO (Final result)     Abnormal  Result time 02/18/19 17:00:57    Final result by Yang Barth MD (02/18/19 17:00:57)                 Impression:      1. Irregular area of high attenuation within the left posterolateral aspect of the bladder most concerning for fungating mass with resulting obstruction of the left renal collecting system noting moderate hydronephrosis/hydroureter.  While sequela of layering blood products is possible, this is felt to be less likely.  Note is made of mild surrounding inflammatory changes which may relate to superimposed infection.  Specialty consultation and correlation with direct visualization recommended.  2. Bladder diverticulum extending superiorly from the anterior aspect of the dome.  3. 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  4. Colonic diverticulosis without associated inflammatory changes.  5. Bilateral renal cysts.  6. Moderate hiatal hernia. PEG tube.  7. Extensive aortic atherosclerosis.  This report was flagged in Epic as abnormal.      Electronically signed by: Yang Barth MD  Date:    02/18/2019  Time:    17:00             Narrative:    EXAMINATION:  CT RENAL STONE STUDY ABD PELVIS WO    CLINICAL HISTORY:  Hematuria;    TECHNIQUE:  5 mm axial images were obtained through the abdomen and pelvis without IV contrast.  Coronal and sagittal reformats were performed.    COMPARISON:  CT 06/12/2018.    FINDINGS:  Visualized heart demonstrates mild calcification of the mitral apparatus.  No pericardial effusion.    Clustered micro nodules noted within the posterior right lower lobe, presumably related to small airways infection or inflammation.  No pleural effusions.    The liver is normal in size.  Hepatic parenchyma demonstrates homogeneous attenuation without focal lesions.  No intrahepatic bile duct dilatation.    Calcified stones are identified within the gallbladder.  There is associated mural calcification.  No surrounding inflammatory changes.  Common bile duct is normal in caliber.    There is a large hiatal hernia.  There is a PEG tube in place within the stomach.    Duodenum, pancreas and adrenal glands are normal.    Kidneys are small.  There are bilateral low-attenuation cortical lesions, incompletely evaluated on this exam but presumably cysts.  There is moderate left hydronephrosis/hydroureter with transition at the level of the bladder at the site of an irregular, high attenuation area of thickening involving the left posterolateral wall.  There is minimal dilatation of the right ureter.  There is a moderate anterior bladder diverticulum.    The small bowel is normal in caliber.  There are numerous sigmoid diverticuli.  The appendix is not definitely seen.  No obstruction or inflammatory  changes.    The abdominal aorta tapers normally with extensive atherosclerotic calcification.    There is a bowel containing hernia extending through the inferior aspect of the left rectus abdominus muscle.    There is pronounced dextroscoliosis of the lumbar spine with prominent superimposed degenerative changes.  No acute fractures or osseous destruction.                               CT Head Without Contrast (Final result)  Result time 02/18/19 16:44:46    Final result by Socorro Giles MD (02/18/19 16:44:46)                 Impression:      Involutional changes.    Age-indeterminate infarct right thalamus.    Remote infarction right corona radiata and remote infarction of a small area of the right cerebellar hemisphere and more sizable area of the inferior left cerebellar hemisphere.      Electronically signed by: Socorro Giles MD  Date:    02/18/2019  Time:    16:44             Narrative:    EXAMINATION:  CT HEAD WITHOUT CONTRAST    CLINICAL HISTORY:  Confusion/delirium, altered LOC, unexplained;    TECHNIQUE:  Low dose axial images were obtained through the head.  Coronal and sagittal reformations were also performed. Contrast was not administered.    COMPARISON:  06/15/2017    FINDINGS:  Prominent sulci and ventricle indicative of involutional changes.  Confluent areas of hypoattenuation in the periventricular white matter of the bilateral cerebral hemisphere consistent with small vessel chronic ischemic changes, advanced for this person age.  Remote right corona radiata lacunar infarction.  Hypoattenuation right thalamus could represent an age-indeterminate infarct.  Prior bilateral cerebellar areas of infarction.  No intracranial mass seen.  No subdural fluid collection.  The skull is intact.                                 Medical Decision Making:   Initial Assessment:   89 y/o female presents with clogged feeding tube.  Differential Diagnosis:   PEG malfunction, UTI, kidney stone,  malignancy  Independently Interpreted Test(s):   I have ordered and independently interpreted X-rays - see prior notes.  I have ordered and independently interpreted EKG Reading(s) - see prior notes  Clinical Tests:   Lab Tests: Reviewed  ED Management:  Patient with gross hematuria.  Urine is leukocyte and nitrite + so will treat with bactrim since she has tolerated that antibiotic in the past.  She has been having hematuria for months and not seen urology yet.  Will get CT abd/pelvis to further evaluation for stone vs malignancy.      0500-  Patient handed off to Dr. Romero for follow up imaging results and final disposition                   ED Course as of Feb 19 0613   Mon Feb 18, 2019   1454 BP: 139/77 [LD]   1454 Temp: 98.4 °F (36.9 °C) [LD]   1454 Resp: 20 [LD]   1454 SpO2: 98 % [LD]   1455 EKG with atrial fibrillation, rate of 93 bpm.  Occasional PVCs.  No STEMI  [LD]   1541 RN able to unclog PEG tube.    [LD]      ED Course User Index  [LD] Angelita Velazquez MD     Clinical Impression:     1. Hematuria, unspecified type    2. Tachycardia    3. PEG tube malfunction    4. Bladder mass    5. Urinary tract infection with hematuria, site unspecified               I, Angelita Velazquez,  personally performed the services described in this documentation. All medical record entries made by the scribe were at my direction and in my presence.  I have reviewed the chart and agree that the record reflects my personal performance and is accurate and complete. Angelita Velazuqez M.D. 4:44 PM02/19/2019                      Angelita Velazquez MD  02/19/19 0614

## 2019-02-19 ENCOUNTER — TELEPHONE (OUTPATIENT)
Dept: GASTROENTEROLOGY | Facility: CLINIC | Age: 84
End: 2019-02-19

## 2019-02-19 NOTE — DISCHARGE INSTRUCTIONS
Please contact Dr. Greer (urology) tomorrow in order to set up a follow up appointment.    Take medications as prescribed. Pt will need to get her antibiotic regimen started no later than 2/19/2019.    Return to the emergency department for any new or worsening of symptoms.

## 2019-02-19 NOTE — ED NOTES
Introduced self to patient and family, they are aware patient is getting d/c. Patient is awake alert, no apparent distress noted.

## 2019-02-19 NOTE — ED NOTES
Report given to Levon at [facility], given instruction for patient to have bactrim prescription filled as soon as possible, and also informed that patient has had bactrim dose today. Also informed [facility] pt need to f/u w/ urology in 1 day and also f/u w/ Dr. Kolb within 2-3 days for PEG tube. F/u instructions are circled in d/c paper work will be sent back to [facility]. Levon @ [facility] verbalized understanding.

## 2019-02-19 NOTE — TELEPHONE ENCOUNTER
----- Message from Isabel Chavez sent at 2/19/2019  4:18 PM CST -----  Contact: [facility] [phone]  Patient would like to be seen sooner than the next available appointment. Please advise.

## 2019-02-19 NOTE — PLAN OF CARE
Care of patient assumed from Dr. Velazquez as of shift change.     UA nitrite +, >100 RBCs  CT renal/stone protocol demonstrates an rregular area of high attenuation within the left posterolateral aspect of the bladder most concerning for fungating mass with resulting obstruction of the left renal collecting system noting moderate hydronephrosis/hydroureter.  While sequela of layering blood products is possible, this is felt to be less likely.  Note is made of mild surrounding inflammatory changes which may relate to superimposed infection.  Specialty consultation and correlation with direct visualization recommended.    Discussed findings, workup with on call urologist, Dr. Greer, who will have pt follow up in his clinic as OP    Will discharge pt back to [facility] with rx for PO abx for aforementioned UTI    On discharge, VSS, NAD.    Travis Romero MD

## 2019-02-20 ENCOUNTER — OFFICE VISIT (OUTPATIENT)
Dept: GASTROENTEROLOGY | Facility: CLINIC | Age: 84
End: 2019-02-20
Payer: MEDICARE

## 2019-02-20 DIAGNOSIS — Z93.1 GASTROSTOMY TUBE DEPENDENT: Chronic | ICD-10-CM

## 2019-02-20 DIAGNOSIS — R13.12 OROPHARYNGEAL DYSPHAGIA: Chronic | ICD-10-CM

## 2019-02-20 DIAGNOSIS — Z43.1 PEG (PERCUTANEOUS ENDOSCOPIC GASTROSTOMY) ADJUSTMENT/REPLACEMENT/REMOVAL: Primary | ICD-10-CM

## 2019-02-20 PROCEDURE — 99999 PR PBB SHADOW E&M-EST. PATIENT-LVL III: ICD-10-PCS | Mod: PBBFAC,,, | Performed by: INTERNAL MEDICINE

## 2019-02-20 PROCEDURE — 99999 PR PBB SHADOW E&M-EST. PATIENT-LVL III: CPT | Mod: PBBFAC,,, | Performed by: INTERNAL MEDICINE

## 2019-02-20 PROCEDURE — 99214 OFFICE O/P EST MOD 30 MIN: CPT | Mod: S$PBB,,, | Performed by: INTERNAL MEDICINE

## 2019-02-20 PROCEDURE — 99214 PR OFFICE/OUTPT VISIT, EST, LEVL IV, 30-39 MIN: ICD-10-PCS | Mod: S$PBB,,, | Performed by: INTERNAL MEDICINE

## 2019-02-20 PROCEDURE — 99213 OFFICE O/P EST LOW 20 MIN: CPT | Mod: PBBFAC,PO | Performed by: INTERNAL MEDICINE

## 2019-02-20 NOTE — PROGRESS NOTES
Subjective:       Patient ID: Elizabeth Navarrete is a 89 y.o. female.    Chief Complaint: Consult    This is a 89yo female who presents for a f/u visit. She has a h/o CVA with subsequent oropharyngeal dysphagia and is reliant on a PEG tube. Several times it has become clogged/dislodged PEG tube and her family is here to provide additional history.  Currently it is clogged, often times with pills prompting multiple ER visits. No unintentional weight loss.  No other exacerbating or relieving factors or other associated symptoms.   No pain/melena. She is in good spirits. Family present to give additional history.      The following portions of the patient's history were reviewed and updated as appropriate: allergies, current medications, past family history, past medical history, past social history, past surgical history and problem list.      (Portions of this note were dictated using voice recognition software and may contain dictation related errors in spelling/grammar/syntax not found on text review)    HPI  Review of Systems   Constitutional: Negative for appetite change and unexpected weight change.   Eyes: Negative for photophobia and visual disturbance.   Gastrointestinal: Negative for abdominal distention and abdominal pain.       Objective:      Physical Exam   Constitutional: She appears well-developed and well-nourished. No distress.   HENT:   Head: Normocephalic and atraumatic.   Eyes: Conjunctivae are normal. No scleral icterus.   Cardiovascular: Normal rate. Exam reveals no friction rub.   Pulmonary/Chest: Effort normal and breath sounds normal. No stridor. No respiratory distress.   Abdominal: Soft. Bowel sounds are normal. She exhibits no distension. There is no tenderness.   Gastrostomy tube in place in LUQ, tube clean and intact   Musculoskeletal: Normal range of motion. She exhibits no edema.   Neurological: She is alert.   Skin: Skin is warm and dry. No erythema.   Psychiatric: She has a normal mood and  affect. Her behavior is normal.   Nursing note and vitals reviewed.      Assessment:       1. PEG (percutaneous endoscopic gastrostomy) adjustment/replacement/removal    2. Oropharyngeal dysphagia    3. Gastrostomy tube dependent, 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        Plan:   1. Tube cleaned and adjusted; unclogged with biliary brush; end cut one inch shorter and re-positioned  2. Educated pt/family on routine peg tube care  3. Needs to be flushed after usage and an additional 3-4x daily with 20cc water

## 2019-02-25 ENCOUNTER — OFFICE VISIT (OUTPATIENT)
Dept: UROLOGY | Facility: CLINIC | Age: 84
End: 2019-02-25
Payer: MEDICARE

## 2019-02-25 VITALS
HEART RATE: 122 BPM | DIASTOLIC BLOOD PRESSURE: 86 MMHG | WEIGHT: 124 LBS | HEIGHT: 66 IN | RESPIRATION RATE: 16 BRPM | BODY MASS INDEX: 19.93 KG/M2 | SYSTOLIC BLOOD PRESSURE: 135 MMHG

## 2019-02-25 DIAGNOSIS — R31.0 GROSS HEMATURIA: Primary | ICD-10-CM

## 2019-02-25 DIAGNOSIS — N32.89 BLADDER MASS: ICD-10-CM

## 2019-02-25 PROCEDURE — 99999 PR PBB SHADOW E&M-EST. PATIENT-LVL III: ICD-10-PCS | Mod: PBBFAC,,, | Performed by: UROLOGY

## 2019-02-25 PROCEDURE — 99999 PR PBB SHADOW E&M-EST. PATIENT-LVL III: CPT | Mod: PBBFAC,,, | Performed by: UROLOGY

## 2019-02-25 PROCEDURE — 99205 OFFICE O/P NEW HI 60 MIN: CPT | Mod: S$PBB,,, | Performed by: UROLOGY

## 2019-02-25 PROCEDURE — 99205 PR OFFICE/OUTPT VISIT, NEW, LEVL V, 60-74 MIN: ICD-10-PCS | Mod: S$PBB,,, | Performed by: UROLOGY

## 2019-02-25 PROCEDURE — 99213 OFFICE O/P EST LOW 20 MIN: CPT | Mod: PBBFAC | Performed by: UROLOGY

## 2019-02-25 RX ORDER — ATORVASTATIN CALCIUM 40 MG/1
TABLET, FILM COATED ORAL
Status: ON HOLD | COMMUNITY
Start: 2019-01-18 | End: 2019-01-01 | Stop reason: SDUPTHER

## 2019-02-25 RX ORDER — CLOPIDOGREL BISULFATE 75 MG/1
TABLET ORAL
Status: ON HOLD | COMMUNITY
Start: 2019-01-29 | End: 2019-01-01 | Stop reason: SDUPTHER

## 2019-02-25 RX ORDER — VENLAFAXINE HYDROCHLORIDE 75 MG/1
CAPSULE, EXTENDED RELEASE ORAL
COMMUNITY
Start: 2019-01-29 | End: 2019-02-25 | Stop reason: SDUPTHER

## 2019-02-25 RX ORDER — FERROUS SULFATE 325(65) MG
325 TABLET, DELAYED RELEASE (ENTERIC COATED) ORAL
Status: ON HOLD | COMMUNITY
End: 2020-01-01 | Stop reason: SDUPTHER

## 2019-02-25 RX ORDER — KETOCONAZOLE 20 MG/ML
SHAMPOO, SUSPENSION TOPICAL
Status: ON HOLD | COMMUNITY
Start: 2019-02-20 | End: 2019-01-01 | Stop reason: SDUPTHER

## 2019-02-25 RX ORDER — GLUCOSAMINE/CHONDRO SU A 500-400 MG
1 TABLET ORAL DAILY
Status: ON HOLD | COMMUNITY
End: 2020-01-01 | Stop reason: HOSPADM

## 2019-02-25 NOTE — LETTER
February 26, 2019      Travis Romero MD  [address] [place] [address]           [facility]  [address]  [place] [address]  Phone: [phone]          Patient: Elizabeth Navarrete   MR Number: [id number]   YOB: 1928   Date of Visit: 2/25/2019       Dear Dr. Travis Romero:    Thank you for referring Elizabeth Navarrete to me for evaluation. Attached you will find relevant portions of my assessment and plan of care.    If you have questions, please do not hesitate to call me. I look forward to following Elizabeth Navarrete along with you.    Sincerely,    Abel Greer MD    Enclosure  CC:  No Recipients    If you would like to receive this communication electronically, please contact [email or web address] or [phone] to request more information on [facility] access.    For providers and/or their staff who would like to refer a patient to [facility], please contact us through our one-stop-shop provider referral line, [facility], at [phone].    If you feel you have received this communication in error or would no longer like to receive these types of communications, please e-mail [email or web address]

## 2019-02-25 NOTE — PROGRESS NOTES
Subjective:       Patient ID: Elizabeth Navarrete is a 89 y.o. female.    Chief Complaint: bladder mass      HPI: Elizabeth Navarrete is a 89 y.o. White female who presents today for evaluation and management of gross hematuria and a bladder tumor with left hydronephrosis.    The patient was recently seen in the emergency room for gross hematuria. A CT scan demonstrates a soft tissue mass in the bladder with left hydronephrosis.    The patient has missed several urologic appointments for evaluation due to the fact that she is very old, in poor health, and has limited ability to go to appointments since she lives in a nursing home.    She presents today with her daughter to discuss how best to proceed with this finding.    The patient is able to speak, however it is unclear how much she understands.     Review of patient's allergies indicates:   Allergen Reactions    Pcn [penicillins] Rash       Current Outpatient Medications   Medication Sig Dispense Refill    amLODIPine (NORVASC) 5 MG tablet 5 mg by Per G Tube route once daily.      aspirin 325 MG tablet 325 mg by Per G Tube route once daily.      atorvastatin (LIPITOR) 40 MG tablet       carvedilol (COREG) 25 MG tablet 25 mg by Per G Tube route 2 (two) times daily.      cholecalciferol, vitamin D3, (VITAMIN D3) 1,000 unit capsule 1,000 Units by Per G Tube route once daily.       clopidogrel (PLAVIX) 75 mg tablet       ferrous sulfate 325 (65 FE) MG EC tablet Take 325 mg by mouth 3 (three) times daily with meals.      fish oil-omega-3 fatty acids 300-1,000 mg capsule 1 g by Per G Tube route once daily.       glucosamine-chondroitin 500-400 mg tablet Take 1 tablet by mouth 3 (three) times daily.      ketoconazole (NIZORAL) 2 % shampoo       mupirocin (BACTROBAN) 2 % ointment Apply topically once daily. Apply to left upper back wound, cover with telfa island      venlafaxine (EFFEXOR) 75 MG tablet 75 mg by Per G Tube route once daily.      diclofenac sodium  (VOLTAREN) 1 % Gel Apply 4 g topically 4 (four) times daily as needed. 500 g 2     No current facility-administered medications for this visit.        Past Medical History:   Diagnosis Date    Bladder mass     CAD (coronary artery disease) 10/10/2012    CRF (chronic renal failure) 10/10/2012    Embolic stroke involving right carotid artery 6/15/2017    Family history of breast cancer 10/10/2012    Hematuria, gross     History of cardiac arrhythmia 10/10/2012    History of CHF (congestive heart failure) 10/10/2012    History of depression 10/10/2012    History of echocardiogram 10/10/2012    HTN (hypertension) 10/10/2012    Internal carotid artery stenosis 10/10/2012    Personal history of gallstones 10/10/2012    Urinary tract infection        Past Surgical History:   Procedure Laterality Date    BREAST LUMPECTOMY      ESOPHAGOGASTRODUODENOSCOPY (EGD) N/A 6/14/2018    [facility] at [facility]    ESOPHAGOGASTRODUODENOSCOPY (EGD) N/A 6/12/2018    [facility] at [facility]    EYE SURGERY      HYSTERECTOMY      PLACEMENT-TUBE-PEG N/A 6/21/2017    Performed by Joshua Goldberg, MD at [facility] OR [address] [place] [address]       Family History   Problem Relation Age of Onset    Cancer Mother         breast    Heart disease Neg Hx        Review of Systems    Review of Systems   Constitutional: Negative for fever, chills, activity change, appetite change and unexpected weight change.   HENT: Negative for congestion, nosebleeds, sneezing, sore throat and trouble swallowing.    Eyes: Negative for pain, discharge, redness and visual disturbance.   Respiratory: Negative for cough, choking, chest tightness and shortness of breath.    Cardiovascular: Negative for chest pain, palpitations and leg swelling.   Gastrointestinal: Negative for nausea, vomiting, abdominal pain, diarrhea, blood in stool, abdominal distention and anal bleeding.  Genitourinary: As documented per HPI   Endocrine:  Negative for cold intolerance, heat intolerance, polydipsia, polyphagia and polyuria.   Musculoskeletal: Negative for myalgias, gait problem, neck pain and neck stiffness.   Skin: Negative for color change, pallor, rash and wound.   Allergic/Immunologic: Negative for immunocompromised state.   Neurological: Negative for seizures, facial asymmetry, speech difficulty, weakness and light-headedness.   Hematological: Negative for adenopathy. Does not bruise/bleed easily.   Psychiatric/Behavioral: Negative for hallucinations, behavioral problems, self-injury and agitation. The patient is not hyperactive.      All other systems were reviewed and were negative.    Objective:     Vitals:    02/25/19 1004   BP: 135/86   Pulse: (!) 122   Resp: 16     Physical Exam   Vitals reviewed.  Constitutional: She is oriented to person, place, and time. No distress. In wheelchair.  HENT:   Head: Normocephalic and atraumatic.   Right Ear: External ear normal.   Left Ear: External ear normal.   Nose: Nose normal.   Eyes: EOM are normal. Pupils are equal, round, and reactive to light. Right eye exhibits no discharge. Left eye exhibits no discharge.   Neck: Normal range of motion. Neck supple. No tracheal deviation present. No thyroid enlargement or tenderness.  Cardiovascular: Normal heart sounds and intact distal pulses. No signs of peripheral edema.   Pulmonary/Chest: Effort normal and breath sounds normal. No respiratory distress. She has no wheezes.   Abdominal: Soft. Bowel sounds are normal. She exhibits no distension. There is no tenderness. There is no rebound. No hepatic or splenic enlargement or tenderness. Voiding spontaneously.  Musculoskeletal: Normal range of motion. She exhibits no edema.   Neurological: She is alert and oriented to person, place, and time. Coordination normal.   Skin: Skin is warm and dry. She is not diaphoretic.   Lymphatic: No palpable lymph nodes.  Psychiatric: She has a normal mood and affect. Her  behavior is normal. Judgment and thought content normal.     Lab Results   Component Value Date    CREATININE 1.0 02/18/2019     Lab Results   Component Value Date    EGFRNONAA 50 (A) 02/18/2019     Lab Results   Component Value Date    ESTGFRAFRICA 57 (A) 02/18/2019     I have personally reviewed all the patient's relevant  imaging.    Assessment:       1. Gross hematuria    2. Bladder mass    3. Nursing home resident        Plan:     Elizabeth was seen today for bladder mass.    Diagnoses and all orders for this visit:    Gross hematuria    Bladder mass    Nursing home resident    I had a long discussion with the patient, her daughter, and her son in-law. Specifically, I had a lengthy discussion regarding the implications of a possible diagnosis of urothelial carcinoma of the bladder, i.e, bladder cancer.    I explained that individuals with non-muscle invasive bladder cancer account for 75-80% of those with bladder cancer.  Nearly all of such patients exhibit urothelial carcinoma histology (previously known as TCC).  Management of patients with this condition focuses on prevention of disease progression, since effectiveness of salvage therapies for patients with systemic urothelial carcinoma is currently extremely limited.  Indeed, we discussed that approximately 15-25% of all-comers with non-muscle invasive disease will progress to muscle-invasive pathology.  I explained that muscle-invasion is considered the gateway to metastatic progression; however, a small but real percentage of patients can progress to systemic disease without ever demonstrating evidence of muscle-invasive pathology.       Furthermore we discussed that the majority of patients with urothelial carcinoma will exhibit tumor recurrence (up to 75%).  All surfaces of the patients  tract (collecting system of kidney, ureters, bladder, portion of urethera) are susceptible to recurrences and must be monitored closely for the patients life-time.       We discussed that tumor grade and tumor stage in the setting of a properly-performed transurethral resection are the variables that largely guide prognostication and treatment.  Some of the other risk factors include presence of CIS, multifocality, tumor size >3 cm, lymphovascular invasion, and rapid tumor recurrence.   I emphasized that endoscopic management along with upper tract surveillance are the mainstay treatments of non-muscle invasive bladder cancer.  In addition, as necessary, intravesical chemo/immunotherapy is utilized.  For instance, following endoscopic resection, Level 1 evidence exists to show that a single, prophylactic instillation of intravesical Mitomycin C results in a significant reduction (up to 39% in a recent metaanalysis of 7 randomized trials) of tumor recurrence.  For patients with high risk disease, induction and at times maintenance intravesical therapy with Baccillus Calmette-Kasandra (BCG) is indicated.  Furthermore, we briefly discussed the concept of early cystectomy for patients with high risk non-muscle invasive bladder cancer.    Ultimately, I spoke with the patient and her family about whether we should even proceed with evaluation. The patient's functional status is very poor, and she has significant anesthetic risks. Furthermore, I am not even sure if we will act on any biopsy result. Of note, the patient was adamant about not having any procedure. Nevertheless, the patient and her family stated that we would talk about the situation more and then let me know if they wished to proceed with a biopsy.    I answered all their questions.     I encouraged him or any of his family members to call or email me with questions and/or concerns.    I spent 60 minutes with the patient of which more than half was spent in coordinating the patient's care as well as in direct consultation with the patient in regards to our treatment and plan.

## 2019-02-25 NOTE — PATIENT INSTRUCTIONS
Understanding Bladder Cancer    The urinary system includes the kidneys, ureters, bladder, and urethra. It makes, stores, and gets rid of liquid waste called urine. The two kidneys filter blood to collect waste and make urine. The ureters are small tubes that carry urine from each kidney to the bladder. The bladder is where urine is stored before it leaves the body. The urethra is the tube urine goes through to leave the body.   Bladder cancer means that certain cells in the bladder have changed in ways that aren't normal.  When bladder cancer forms  Cancer is a disease that causes cells to change and multiply out of control. The multiplying cells may form a lump of tissue (tumor). With time, the cancer cells destroy healthy tissue. They may spread to other parts of the body. Why some cells become cancerous is not always clear. But bladder cancer is strongly linked to cigarette smoking. The longer a person smokes and the more a person smokes, the greater that persons chances of developing bladder cancer.  Types of cancer that may form  Bladder cancer may grow in different ways:  · Papillary tumors stick out from the bladder lining on a stalk. They tend to grow into the bladder cavity, away from the bladder wall, instead of deeper into the layers of the bladder wall.  · Flat tumors do not stick out from the bladder lining. These tumors are much more likely than papillary tumors to grow deeper into the layers of the bladder wall.  · Carcinoma in situ (CIS) is a cancerous patch of cells that is only in the inner layer of the bladder lining and has not spread to deeper tissue. The patch may look almost normal or may look inflamed.    Each type of tumor can be present in one or more areas of the bladder, and more than one type can be present at the same time.  Date Last Reviewed: 2/17/2016  © 4600-9548 [employer] [address] [place] [address], [place] [address]. All rights reserved. This information is not  intended as a substitute for professional medical care. Always follow your healthcare professional's instructions.

## 2019-03-18 ENCOUNTER — OFFICE VISIT (OUTPATIENT)
Dept: GASTROENTEROLOGY | Facility: CLINIC | Age: 84
End: 2019-03-18
Payer: MEDICARE

## 2019-03-18 VITALS — SYSTOLIC BLOOD PRESSURE: 129 MMHG | DIASTOLIC BLOOD PRESSURE: 72 MMHG

## 2019-03-18 DIAGNOSIS — Z43.1 PEG (PERCUTANEOUS ENDOSCOPIC GASTROSTOMY) ADJUSTMENT/REPLACEMENT/REMOVAL: ICD-10-CM

## 2019-03-18 DIAGNOSIS — R13.12 OROPHARYNGEAL DYSPHAGIA: Primary | Chronic | ICD-10-CM

## 2019-03-18 PROCEDURE — 99999 PR PBB SHADOW E&M-EST. PATIENT-LVL III: CPT | Mod: PBBFAC,,, | Performed by: INTERNAL MEDICINE

## 2019-03-18 PROCEDURE — 99214 OFFICE O/P EST MOD 30 MIN: CPT | Mod: S$PBB,,, | Performed by: INTERNAL MEDICINE

## 2019-03-18 PROCEDURE — 99999 PR PBB SHADOW E&M-EST. PATIENT-LVL III: ICD-10-PCS | Mod: PBBFAC,,, | Performed by: INTERNAL MEDICINE

## 2019-03-18 PROCEDURE — 99214 PR OFFICE/OUTPT VISIT, EST, LEVL IV, 30-39 MIN: ICD-10-PCS | Mod: S$PBB,,, | Performed by: INTERNAL MEDICINE

## 2019-03-18 PROCEDURE — 99213 OFFICE O/P EST LOW 20 MIN: CPT | Mod: PBBFAC,PO | Performed by: INTERNAL MEDICINE

## 2019-03-18 NOTE — PROGRESS NOTES
Subjective:       Patient ID: Elizabeth Navarrete is a 89 y.o. female.    Chief Complaint: Other (Peg tube)    This is a 89yo female who presents for a f/u visit. She has a h/o CVA with subsequent oropharyngeal dysphagia s/p PEG tube placement. Intermittently there are issues with utilizing it. Her family is here to provide additional history.  They have been flushing it more regularly  No other exacerbating or relieving factors or other associated symptoms.   No pain/melena. Family present to give additional history. No pain at site. Occasional vomiting.      The following portions of the patient's history were reviewed and updated as appropriate: allergies, current medications, past family history, past medical history, past social history, past surgical history and problem list.      (Portions of this note were dictated using voice recognition software and may contain dictation related errors in spelling/grammar/syntax not found on text review)    HPI  Review of Systems   Constitutional: Negative for appetite change and unexpected weight change.   Eyes: Negative for photophobia and visual disturbance.   Gastrointestinal: Negative for abdominal distention and abdominal pain.       Objective:      Physical Exam   Constitutional: She appears well-developed and well-nourished. No distress.   HENT:   Head: Normocephalic and atraumatic.   Eyes: Conjunctivae are normal. No scleral icterus.   Cardiovascular: Normal rate. Exam reveals no friction rub.   Pulmonary/Chest: Effort normal and breath sounds normal. No stridor. No respiratory distress.   Abdominal: Soft. Bowel sounds are normal. She exhibits no distension. There is no tenderness.   Gastrostomy tube in place in LUQ, tube clean and intact   Musculoskeletal: Normal range of motion. She exhibits no edema.   Neurological: She is alert.   Skin: Skin is warm and dry. No erythema.   Psychiatric: She has a normal mood and affect. Her behavior is normal.   Nursing note and  vitals reviewed.      Assessment:       1. Oropharyngeal dysphagia    2. PEG (percutaneous endoscopic gastrostomy) adjustment/replacement/removal        Plan:   1. Unclogged PEG tube, flushed thoroughly  2. Monitor symptoms  3. Wrote peg instructions, may help if there are liquid varieties of some of her medications  4. F/u as needed

## 2019-04-01 ENCOUNTER — HOSPITAL ENCOUNTER (EMERGENCY)
Facility: HOSPITAL | Age: 84
Discharge: HOME | End: 2019-04-01
Attending: EMERGENCY MEDICINE
Payer: MEDICARE

## 2019-04-01 VITALS
OXYGEN SATURATION: 98 % | SYSTOLIC BLOOD PRESSURE: 141 MMHG | DIASTOLIC BLOOD PRESSURE: 64 MMHG | HEIGHT: 66 IN | WEIGHT: 124 LBS | RESPIRATION RATE: 16 BRPM | TEMPERATURE: 98 F | HEART RATE: 84 BPM | BODY MASS INDEX: 19.93 KG/M2

## 2019-04-01 DIAGNOSIS — T85.598A FEEDING TUBE DYSFUNCTION, INITIAL ENCOUNTER: Primary | ICD-10-CM

## 2019-04-01 PROCEDURE — 99282 EMERGENCY DEPT VISIT SF MDM: CPT

## 2019-04-01 NOTE — ED NOTES
Care assumed.  Pt moved via stretcher to await transportation back to [facility].  Pt non-ambulatory and unable to hold self upright in w/c per daughter at bedside.  Resp =/unlabored.  Skin pink/warm/dry.  Speech clear, awake, alert and oriented.  Peg tub in place, no drainage or redness noted to site.  Offgoing nurse, Ryan, reports that she will contact [facility] and give report.  Awaiting transportation.  NAD

## 2019-04-01 NOTE — ED NOTES
[facility] at bedside to transport patient back to [facility] [place].  Pt calm, cooperative with =/unlabored respirations.  Skin pink/warm/dry. Daughter at bedside, will meet patient at [facility]

## 2019-04-01 NOTE — ED NOTES
APPEARANCE: Alert, oriented and in no acute distress.  CARDIAC: Normal rate and rhythm, no murmur heard.   PERIPHERAL VASCULAR: peripheral pulses present. Normal cap refill. No edema. Warm to touch.    RESPIRATORY:Normal rate and effort, breath sounds clear bilaterally throughout chest. Respirations are equal and unlabored no obvious signs of distress.  GASTRO: soft, bowel sounds normal, no tenderness, no abdominal distention. +Peg tube. Peg tube is deformed at mid point.   MUSC: Limited ROM due to weakness and aging process. No bony tenderness or soft tissue tenderness. No obvious deformity.  SKIN: Skin is warm and dry, normal skin turgor, mucous membranes moist.  NEURO: 5/5 strength major flexors/extensors bilaterally. Sensory intact to light touch bilaterally. Fulton coma scale: eyes open spontaneously-4, oriented & converses-5, obeys commands-6. No neurological abnormalities.   MENTAL STATUS: awake, alert and aware of environment.  EYE: PERRL, both eyes: pupils brisk and reactive to light. Normal size.  ENT: EARS: no obvious drainage. NOSE: no active bleeding.   Pt sent from nursing home after peg tube burst when nurse was flushing it.

## 2019-04-01 NOTE — ED PROVIDER NOTES
Encounter Date: 4/1/2019    SCRIBE #1 NOTE: I, Bruna Belen, am scribing for, and in the presence of,  Dr. Menjivar. I have scribed the entire note.       History     Chief Complaint   Patient presents with    Peg Tube Problem     [facility] nursing staff report pt's Peg tube ruptured today. Pt denies complaints     Time seen by provider: 10:53 AM  This is a 89 y.o. female with a PMHx of HTN, CAD, CHF, and stroke who presents to the Emergency Department with her daughter from [facility] with a cc of Peg tube problem. Per daughter, the [facility] home staff had tried to give the pt her medication last night, but they found her peg tube had split. She does not take anything per os, and was not able to take her medication last night or this morning. The pt denies abdominal pain, vomiting, or any other complaints. She reports a prior history of similar problem. Dr. Kolb checked her PEG tube 2 weeks ago. Daughter expressed concern for bladder cancer, but she states a biopsy has not been obtained yet.        The history is provided by the patient.     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/2017    Family history of breast cancer 10/10/2012    Hematuria, gross     History of cardiac arrhythmia 10/10/2012    History of CHF (congestive heart failure) 10/10/2012    History of depression 10/10/2012    History of echocardiogram 10/10/2012    HTN (hypertension) 10/10/2012    Internal carotid artery stenosis 10/10/2012    Personal history of gallstones 10/10/2012    Urinary tract infection      Past Surgical History:   Procedure Laterality Date    BREAST LUMPECTOMY      ESOPHAGOGASTRODUODENOSCOPY (EGD) N/A 6/14/2018    [facility]  ENDO    ESOPHAGOGASTRODUODENOSCOPY (EGD) N/A 6/12/2018    [facility] at [facility] ENDO    EYE SURGERY      HYSTERECTOMY      PLACEMENT-TUBE-PEG N/A 6/21/2017    Performed by Joshua Goldberg, MD at [facility] OR [address] [place] [address]     Family History   Problem Relation Age of Onset    Cancer Mother         breast    Heart disease Neg Hx      Social History     Tobacco Use    Smoking status: Never Smoker    Smokeless tobacco: Never Used   Substance Use Topics    Alcohol use: No    Drug use: No     Review of Systems   Constitutional: Negative for chills, fatigue and fever.   HENT: Negative for facial swelling, trouble swallowing and voice change.    Eyes: Negative for photophobia, pain and redness.   Respiratory: Negative for cough, choking and shortness of breath.    Cardiovascular: Negative for chest pain, palpitations and leg swelling.   Gastrointestinal: Negative for abdominal pain, diarrhea, nausea and vomiting.        Positive for ruptured PEG tube.   Genitourinary: Negative for dysuria, frequency and urgency.   Musculoskeletal: Negative for back pain, neck pain and neck stiffness.   Neurological: Negative for seizures, speech difficulty, light-headedness, numbness and headaches.   All other systems reviewed and are negative.      Physical Exam     Initial Vitals [04/01/19 1201]   BP Pulse Resp Temp SpO2   (!) 123/54 88 18 98.6 °F (37 °C) 99 %      MAP       --         Physical Exam    Nursing note and vitals reviewed.  Constitutional: She appears well-developed. No distress.   HENT:   Head: Normocephalic and atraumatic.   Eyes: Conjunctivae are normal. Pupils are equal, round, and reactive to light.   Neck: Normal range of motion. Neck supple.   Cardiovascular: Normal rate and normal heart sounds.   s1 s2 with ectopy.   Pulmonary/Chest: Breath sounds normal. She has no wheezes. She has no rhonchi. She has no rales.   Lungs clear.   Abdominal: Soft. There is no tenderness.   Feeding tube in place with no  surrounding erythema.   Musculoskeletal: Normal range of motion. She exhibits no edema.   No LE edema.   Neurological: She is alert and oriented to person, place, and time.   Skin: Skin is warm and dry.   Psychiatric: She has a normal mood and affect. Her behavior is normal.         ED Course   Procedures  Labs Reviewed - No data to display       Imaging Results    None          Medical Decision Making:   ED Management:  89-year-old female from her nursing home with a feeding tube that was clogged then became torn open.  I have discussed this with Dr. Kolb, who suggest cutting the tube at this point, and if successful de-clogging, to put the stopcock back on.  This was successful, with good flushing of the tube.  She will be discharged in stable condition back to her nursing home.                      Clinical Impression:     1. Feeding tube dysfunction, initial encounter            Disposition:   Disposition: Discharged  Condition: Stable    I, Dr. Kvng Menjivar, personally performed the services described in this documentation. All medical record entries made by the scribe were at my direction and in my presence. I have reviewed the chart and agree that the record reflects my personal performance and is accurate and complete. Kvng Menjivar MD.  1:38 PM 04/01/2019                     Kvng Menjivar MD  04/01/19 [id number]

## 2019-04-01 NOTE — ED NOTES
This nurse received verbal order from Dr Menjivar to attempt to flush PEG tube with coca cola to remove old tube feeding which is clogging the PEG tube. Dr. Menjivar cut the tube closer to pt abdomen and past the deformed site. The stop cock was removed and cleaned per this nurse and replaced into the tube.

## 2019-04-17 PROCEDURE — 99283 EMERGENCY DEPT VISIT LOW MDM: CPT | Mod: 25

## 2019-04-17 PROCEDURE — 17250 CHEM CAUT OF GRANLTJ TISSUE: CPT

## 2019-04-18 ENCOUNTER — HOSPITAL ENCOUNTER (EMERGENCY)
Facility: HOSPITAL | Age: 84
Discharge: HOME | End: 2019-04-18
Attending: EMERGENCY MEDICINE
Payer: MEDICARE

## 2019-04-18 VITALS
HEART RATE: 66 BPM | TEMPERATURE: 99 F | DIASTOLIC BLOOD PRESSURE: 57 MMHG | RESPIRATION RATE: 12 BRPM | OXYGEN SATURATION: 97 % | SYSTOLIC BLOOD PRESSURE: 111 MMHG

## 2019-04-18 DIAGNOSIS — K94.29 IRRITATION AROUND PERCUTANEOUS ENDOSCOPIC GASTROSTOMY (PEG) TUBE SITE: Primary | ICD-10-CM

## 2019-04-18 PROCEDURE — 25000003 PHARM REV CODE 250: Performed by: EMERGENCY MEDICINE

## 2019-04-18 RX ORDER — SILVER NITRATE 38.21; 12.74 MG/1; MG/1
1 STICK TOPICAL
Status: COMPLETED | OUTPATIENT
Start: 2019-04-18 | End: 2019-04-18

## 2019-04-18 RX ORDER — SILVER NITRATE 38.21; 12.74 MG/1; MG/1
3 STICK TOPICAL
Status: COMPLETED | OUTPATIENT
Start: 2019-04-18 | End: 2019-04-18

## 2019-04-18 RX ADMIN — SILVER NITRATE APPLICATORS 3 APPLICATOR: 25; 75 STICK TOPICAL at 12:04

## 2019-04-18 RX ADMIN — SILVER NITRATE APPLICATORS 1 APPLICATOR: 25; 75 STICK TOPICAL at 12:04

## 2019-04-18 NOTE — ED TRIAGE NOTES
Patient comes into the ED from [facility] with bleeding at peg tube site that started within the hour.

## 2019-04-18 NOTE — ED PROVIDER NOTES
Encounter Date: 4/17/2019       History     Chief Complaint   Patient presents with    PEG tube problem     reports bleed around PEG tube site. Pt from [facility].      89-year-old female with PMH of CAD, CKD, CVA with residual deficits presents the ED via EMS from her living facility with complaints of bleeding around her PEG tube that was noticed this evening.  Patient's daughter is also with her who states she is the 1 that noticed the bleeding.  Patient has been receiving meds and feedings normally today with no mention of bleeding.  She denies any symptoms including abdominal pain, nausea, vomiting, diarrhea, weakness, dizziness.    The history is provided by the patient and a relative. No  was used.     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/2017    Family history of breast cancer 10/10/2012    Hematuria, gross     History of cardiac arrhythmia 10/10/2012    History of CHF (congestive heart failure) 10/10/2012    History of depression 10/10/2012    History of echocardiogram 10/10/2012    HTN (hypertension) 10/10/2012    Internal carotid artery stenosis 10/10/2012    Personal history of gallstones 10/10/2012    Urinary tract infection      Past Surgical History:   Procedure Laterality Date    BREAST LUMPECTOMY      ESOPHAGOGASTRODUODENOSCOPY (EGD) N/A 6/14/2018    Performed by Anila Kolb MD at [facility]O    ESOPHAGOGASTRODUODENOSCOPY (EGD) N/A 6/12/2018    Performed by [facility] at [facility] END[facility]    EYE SURGERY      HYSTERECTOMY      PLACEMENT-TUBE-PEG N/A 6/21/2017    Performed by Joshua Goldberg, [facility] at [facility] OR [address] [place] [address]     Family History   Problem Relation Age of Onset    Cancer Mother         breast     Heart disease Neg Hx      Social History     Tobacco Use    Smoking status: Never Smoker    Smokeless tobacco: Never Used   Substance Use Topics    Alcohol use: No    Drug use: No     Review of Systems   Gastrointestinal: Negative for abdominal distention, abdominal pain, diarrhea, nausea and vomiting.        Bleeding around PEG tube   Neurological: Negative for dizziness and weakness.   All other systems reviewed and are negative.      Physical Exam     Initial Vitals [04/18/19 0007]   BP Pulse Resp Temp SpO2   (!) 104/55 73 12 98.7 °F (37.1 °C) 95 %      MAP       --         Physical Exam    Nursing note and vitals reviewed.  Constitutional: Vital signs are normal. She appears well-developed and well-nourished. No distress.   HENT:   Head: Normocephalic and atraumatic.   Nose: Nose normal.   Eyes: Conjunctivae and lids are normal.   Neck: Normal range of motion and phonation normal.   Cardiovascular: Normal rate, regular rhythm and normal heart sounds.   Pulmonary/Chest: Effort normal and breath sounds normal.   Abdominal: Soft. Normal appearance and bowel sounds are normal. There is no tenderness.       Granulation tissue surround PEG tube is erythematous and oozing blood, blood clots as present on initial exam.   Musculoskeletal: Normal range of motion.   Neurological: She is alert and oriented to person, place, and time.   Skin: Skin is warm and dry.   Psychiatric: She has a normal mood and affect. Her speech is normal and behavior is normal. Judgment and thought content normal. Cognition and memory are normal.         ED Course   Procedures  Labs Reviewed - No data to display       Imaging Results    None          Medical Decision Making:   Initial Assessment:   89 yo female with bleeding around her PEG x tonight. Granulation tissue surround tissue is erythematous and oozing blood.  Differential Diagnosis:   PEG tube complication  ED Management:  Dr Selby applied silver nitrate to the granulation  tissue and bleeding ceased. Dressing applied. Patient will be discharged home to her nursing facility with instructions to her daughter to follow up with Dr Kolb. Return precautions given. Patient and daughter state understanding and agreement with treatment plan.                       Clinical Impression:       ICD-10-CM ICD-9-CM   1. Irritation around percutaneous endoscopic gastrostomy (PEG) tube site K94.29 536.49                                Mya Fernandez PA-C  04/18/19 0102

## 2019-04-30 ENCOUNTER — TELEPHONE (OUTPATIENT)
Dept: UROLOGY | Facility: CLINIC | Age: 84
End: 2019-04-30

## 2019-04-30 NOTE — TELEPHONE ENCOUNTER
----- Message from Abel Greer MD sent at 4/30/2019  7:46 AM CDT -----  Contact: Madonna ([facility]): [phone]  The patient lives near [place].  The urologists at [place] can provide care for the patient since it is closer to where the patient lives.  Thanks    Alek  ----- Message -----  From: Alma Crystal RN  Sent: 4/29/2019   1:43 PM  To: Abel Greer MD        ----- Message -----  From: Dickson Lopes  Sent: 4/29/2019  12:00 PM  To: Percy DAILEY Staff    Needs Advice    Reason for call: Madonna states a bladder mass was found in the pt's Ct scan, states the family would rather not treat the mass due to the pt's age but would like to have another Ct scan done to track the mass progress         Communication Preference:  Madonna ([facility]): [phone]    Additional Information: Laya (daughter): [phone]

## 2019-04-30 NOTE — TELEPHONE ENCOUNTER
Called [facility] and told them that Dr Greer said that the pt should see the urologist in [place] since it is closer to the Center and since she is opting to not receive cancer treatment.  Verbalized understanding to all. All questions answered.

## 2019-05-08 NOTE — ED PROVIDER NOTES
Encounter Date: 5/8/2019       History     Chief Complaint   Patient presents with    Shortness of Breath     Patient presents to the ED from [facility] with reports of having shortness of breath that started tonight.      Elizabeth Navarrete is a 89 y.o. female who  has a past medical history of Bladder mass, CAD (coronary artery disease) (10/10/2012), CRF (chronic renal failure) (10/10/2012), Embolic stroke involving right carotid artery (6/15/2017), Family history of breast cancer (10/10/2012), Hematuria, gross, History of cardiac arrhythmia (10/10/2012), History of CHF (congestive heart failure) (10/10/2012), History of depression (10/10/2012), History of echocardiogram (10/10/2012), HTN (hypertension) (10/10/2012), Internal carotid artery stenosis (10/10/2012), Personal history of gallstones (10/10/2012), and Urinary tract infection.    The patient presents to the ED due to SOB. Pt arrived via EMS from [facility]. Pt denies generalized pain when asked. Pt denies chest pain, SOB, fever, chills. Pt is not on oxygen at the nursing home  Pt's daughter reports she believed the pt was SOB pta.      The history is provided by a relative and the patient.     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/2017    Family history of breast cancer 10/10/2012    Hematuria, gross     History of cardiac arrhythmia 10/10/2012    History of CHF (congestive heart failure) 10/10/2012    History of depression 10/10/2012    History of echocardiogram 10/10/2012    HTN (hypertension) 10/10/2012    Internal carotid artery stenosis 10/10/2012    Personal history of gallstones 10/10/2012    Urinary tract infection      Past Surgical History:   Procedure  Laterality Date    BREAST LUMPECTOMY      ESOPHAGOGASTRODUODENOSCOPY (EGD) N/A 6/14/2018    Performed by [facility] at [facility] ENDO    ESOPHAGOGASTRODUODENOSCOPY (EGD) N/A 6/12/2018    Performed by [facility]b [facility] at [facility] ENDO    EYE SURGERY      HYSTERECTOMY      PLACEMENT-TUBE-PEG N/A 6/21/2017    Performed by Joshua Goldberg, [facility] at [facility] OR [address] [place] [address]     Family History   Problem Relation Age of Onset    Cancer Mother         breast    Heart disease Neg Hx      Social History     Tobacco Use    Smoking status: Never Smoker    Smokeless tobacco: Never Used   Substance Use Topics    Alcohol use: No    Drug use: No     Review of Systems   Constitutional: Negative for chills and fever.   HENT: Negative for congestion, facial swelling, rhinorrhea, sore throat and trouble swallowing.    Eyes: Negative for redness and visual disturbance.   Respiratory: Positive for shortness of breath. Negative for cough and wheezing.    Cardiovascular: Negative for chest pain and palpitations.   Gastrointestinal: Negative for abdominal pain, diarrhea, nausea and vomiting.   Genitourinary: Negative for dysuria and hematuria.   Musculoskeletal: Negative for back pain, gait problem, myalgias and neck pain.   Skin: Negative for rash.   Neurological: Negative for dizziness, facial asymmetry, speech difficulty, weakness and light-headedness.   Psychiatric/Behavioral: Negative for confusion.       Physical Exam     Initial Vitals [05/08/19 0334]   BP Pulse Resp Temp SpO2   (!) 157/93 82 (!) 24 98.4 °F (36.9 °C) 98 %      MAP       --         Physical Exam    Nursing note and vitals reviewed.  Constitutional: She appears well-developed.   HENT:   Head: Normocephalic and atraumatic.   Mouth/Throat: Oropharynx is clear and moist.   Eyes: Conjunctivae are normal.   Neck: Neck supple.   Cardiovascular: Normal rate, normal heart sounds and intact distal pulses. An irregularly irregular rhythm present.  Exam reveals no gallop and  no friction rub.    No murmur heard.  Pulmonary/Chest: She has decreased breath sounds. She has no wheezes. She has rhonchi. She has no rales.   Increased work of breathing.   Abdominal: Soft. She exhibits no distension. There is no tenderness.   Musculoskeletal: Normal range of motion.   Neurological: She is alert and oriented to person, place, and time.   Skin: No rash noted. No erythema.   Psychiatric: She has a normal mood and affect.         ED Course   Procedures  Labs Reviewed   B-TYPE NATRIURETIC PEPTIDE - Abnormal; Notable for the following components:       Result Value     (*)     All other components within normal limits   CBC W/ AUTO DIFFERENTIAL - Abnormal; Notable for the following components:    RBC 3.99 (*)     Hemoglobin 10.6 (*)     Hematocrit 35.3 (*)     Mean Corpuscular Hemoglobin 26.6 (*)     Mean Corpuscular Hemoglobin Conc 30.0 (*)     RDW 16.5 (*)     Platelets 140 (*)     Lymph # 0.5 (*)     Gran% 78.7 (*)     Lymph% 6.7 (*)     All other components within normal limits   COMPREHENSIVE METABOLIC PANEL - Abnormal; Notable for the following components:    Sodium 135 (*)     Glucose 139 (*)     BUN, Bld 53 (*)     Albumin 3.0 (*)     Alkaline Phosphatase 144 (*)     eGFR if non  56 (*)     All other components within normal limits   PROTIME-INR   TROPONIN I     EKG Readings: (Independently Interpreted)   A-Fib  Left axis deviation  83 bpm  QRS interval 120ms  No STEMI       Imaging Results          X-Ray Chest 1 View (Final result)  Result time 05/08/19 04:56:07    Final result by Lorena Minor MD (05/08/19 04:56:07)                 Impression:      Findings suggestive of pulmonary edema/CHF with left pleural fluid.      Electronically signed by: Lorena Minor MD  Date:    05/08/2019  Time:    04:56             Narrative:    EXAMINATION:  XR CHEST 1 VIEW    CLINICAL HISTORY:  Dyspnea, unspecified    TECHNIQUE:  Single frontal view of the chest was  performed.    COMPARISON:  06/17/2018    FINDINGS:  Cardiac monitoring leads overlie the chest.  The cardiomediastinal silhouette is prominent, similar to prior examination.  There is tortuosity and significant atherosclerosis of the thoracic aorta.  The lungs are symmetrically expanded with prominence of central pulmonary vasculature and increased interstitial prominence and basilar parenchymal attenuation which can be seen with pulmonary edema/CHF.  There is a small volume of left pleural fluid.  Visualized osseous structures demonstrate degenerative change.                                Medical decision making:  Patient in chronic AFib has mild pulmonary vascular congestion and possible left-sided small effusion her breathing subjectively was never a concern post albuterol treatment her sats are 92-96% on room air she is in no respiratory distress there is no evidence of pneumonia acute coronary syndrome pneumothoraces.  The patient will be discharged home on Lasix 20 mg daily for 1 week and an albuterol inhaler prescription to follow up with her primary doctor at [facility]                       ED Course as of May 08 0522   Wed May 08, 2019   0516 Pt stating 92-97% on room air, denies SOB. Stable for discharge    [KM]      ED Course User Index  [KM] Daniel Eddy     Clinical Impression:       ICD-10-CM ICD-9-CM   1. Persistent atrial fibrillation I48.1 427.31   2. Dyspnea R06.00 786.09   3. Acute systolic congestive heart failure I50.21 428.21     428.0   4. Bronchospasm J98.01 519.11                 I, Dr. Michele Sawyer, personally performed the services described in this documentation.   All medical record entries made by the scribe were at my direction and in my presence.   I have reviewed the chart and agree that the record is accurate and complete.   Michele Sawyer MD.  5:24 AM 05/08/2019                Michele Sawyer MD  05/08/19 0524       Michele Sawyer MD  05/08/19 0524

## 2019-05-16 NOTE — TELEPHONE ENCOUNTER
----- Message from Maci Fine sent at 5/16/2019 11:37 AM CDT -----  Contact: [phone] 16 Nanda Pike with [facility]  Nanda Pike with [facility] would like to speak with you regarding schedule a peg tube procedure. Please advise.

## 2019-07-03 NOTE — PROGRESS NOTES
Subjective:       Patient ID: Elizabeth Navarrete is a 89 y.o. female.    Chief Complaint: Follow-up (peg tube )    This is a 89yo female who presents for a f/u visit regarding her gastrostomy tube. She has a h/o CVA with subsequent oropharyngeal dysphagia s/p PEG tube placement last done 2018. Intermittently there are issues with utilizing it becoming clogged. Her weight appears improved.   They have been flushing it more regularly, but still notes it being clogged, described as unable to flush, lasting minutes or longer intermittently. No associated GI symptoms, pain at the site, fever or drainage.  No other exacerbating or relieving factors or other associated symptoms.   No pain/melena. Family present to give additional history. Outside paperwork reviewed.      The following portions of the patient's history were reviewed and updated as appropriate: allergies, current medications, past family history, past medical history, past social history, past surgical history and problem list.      (Portions of this note were dictated using voice recognition software and may contain dictation related errors in spelling/grammar/syntax not found on text review)  HPI  Review of Systems   Constitutional: Negative for appetite change and unexpected weight change.   Eyes: Negative for photophobia and visual disturbance.   Gastrointestinal: Negative for abdominal distention and abdominal pain.       Objective:      Physical Exam   Constitutional: She appears well-developed and well-nourished. No distress.   HENT:   Head: Normocephalic and atraumatic.   Eyes: Conjunctivae are normal. No scleral icterus.   Cardiovascular: Normal rate. Exam reveals no friction rub.   Pulmonary/Chest: Effort normal and breath sounds normal. No stridor. No respiratory distress.   Abdominal: Soft. Bowel sounds are normal. She exhibits no distension. There is no tenderness.   Gastrostomy tube in place in LUQ, tube clean and intact   Musculoskeletal: She  exhibits no edema or tenderness.   Neurological: She is alert. A cranial nerve deficit is present.   Skin: Skin is warm and dry. No erythema.   Psychiatric: She has a normal mood and affect. Her behavior is normal.   Nursing note and vitals reviewed.      Labs; reviewed  Assessment:       1. Oropharyngeal dysphagia    2. PEG (percutaneous endoscopic gastrostomy) adjustment/replacement/removal    3. Attention to gastrostomy tube        Plan:   1. PEG tube cleaned and adjusted; biliary brush utilized, flushes well now  2. Reviewed peg tube care with pt/family  3. F/u 4 months or earlier as needed  4. Continued peg tube usage/care

## 2019-09-09 PROBLEM — K94.20 COMPLICATION OF GASTROSTOMY TUBE: Status: ACTIVE | Noted: 2019-01-01

## 2019-09-09 NOTE — ED PROVIDER NOTES
Encounter Date: 9/9/2019    SCRIBE #1 NOTE: I, Kendra Andres, am scribing for, and in the presence of,  Delmar Bee Jr MD. I have scribed the entire note.       History     Chief Complaint   Patient presents with    peg tube problem     89 year old female presents to ed via [facility] ems cc of peg tube problem per staff at [facility] peg tube clogged      Elizabeth Navarrete is a 89 y.o. female who  has a past medical history of Bladder mass, CAD (coronary artery disease) (10/10/2012), CRF (chronic renal failure) (10/10/2012), Embolic stroke involving right carotid artery (6/15/2017), Family history of breast cancer (10/10/2012), Hematuria, gross, History of cardiac arrhythmia (10/10/2012), History of CHF (congestive heart failure) (10/10/2012), History of depression (10/10/2012), History of echocardiogram (10/10/2012), HTN (hypertension) (10/10/2012), Internal carotid artery stenosis (10/10/2012), Personal history of gallstones (10/10/2012), and Urinary tract infection.    The patient presents to the ED due to clogged PEG tube.  Patient has history of stroke, dysphagia, and is currently PEG-tube dependent for feeds and medications. She is a current resident of [facility]. Staff reports they were unable to use her PEG today.     PEG tube was first placed 06/21/17, and it was most recently replaced on 02/20/19 by Dr. Kolb.  Patient denies any pain, fever, N/V. She has no other medical complaints or concerns at this time.     The history is provided by the patient.     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/2017    Family history of breast cancer 10/10/2012    Hematuria, gross     History of cardiac arrhythmia 10/10/2012    History of CHF (congestive heart failure)  10/10/2012    History of depression 10/10/2012    History of echocardiogram 10/10/2012    HTN (hypertension) 10/10/2012    Internal carotid artery stenosis 10/10/2012    Personal history of gallstones 10/10/2012    Urinary tract infection      Past Surgical History:   Procedure Laterality Date    BREAST LUMPECTOMY      ESOPHAGOGASTRODUODENOSCOPY (EGD) N/A 6/14/2018    Performed by [facility] at [facility] ENDO    ESOPHAGOGASTRODUODENOSCOPY (EGD) N/A 6/12/2018    Performed by [facility] at [facility] ENDO    EYE SURGERY      HYSTERECTOMY      PLACEMENT-TUBE-PEG N/A 6/21/2017    Performed by Joshua Goldberg, MD at [facility] OR [address] [place] [address]     Family History   Problem Relation Age of Onset    Cancer Mother         breast    Heart disease Neg Hx      Social History     Tobacco Use    Smoking status: Never Smoker    Smokeless tobacco: Never Used   Substance Use Topics    Alcohol use: No    Drug use: No     Review of Systems   Constitutional: Negative for chills and fever.   HENT: Negative for sore throat.    Respiratory: Negative for cough and shortness of breath.    Cardiovascular: Negative for chest pain.   Gastrointestinal: Negative for nausea and vomiting.   Genitourinary: Negative for dysuria, frequency and urgency.   Musculoskeletal: Negative for back pain.   Skin: Negative for rash and wound.   Neurological: Negative for syncope and weakness.   Hematological: Does not bruise/bleed easily.   Psychiatric/Behavioral: Negative for agitation, behavioral problems and confusion.       Physical Exam     Initial Vitals [09/09/19 1301]   BP Pulse Resp Temp SpO2   (!) 143/66 85 20 97.8 °F (36.6 °C) 96 %      MAP       --         Physical Exam    Nursing note and vitals reviewed.  Constitutional: She appears well-developed and well-nourished. She is not diaphoretic. No distress.   HENT:   Head: Normocephalic and atraumatic.   Mouth/Throat: Oropharynx is clear and moist.   Eyes: EOM are normal. Pupils are equal,  round, and reactive to light.   Neck: No tracheal deviation present.   Cardiovascular: Normal rate, regular rhythm, normal heart sounds and intact distal pulses.   Pulmonary/Chest: Breath sounds normal. No stridor. No respiratory distress. She has no wheezes.   Abdominal: Soft. Bowel sounds are normal. She exhibits no distension and no mass. There is no tenderness. There is no rigidity, no rebound, no guarding, no CVA tenderness, no tenderness at McBurney's point and negative Woody's sign.   PEG tube to abdomen.   Musculoskeletal: Normal range of motion. She exhibits no edema.   Neurological: She is alert and oriented to person, place, and time. She has normal strength. No cranial nerve deficit or sensory deficit.   Skin: Skin is warm and dry. Capillary refill takes less than 2 seconds. No pallor.   Psychiatric: She has a normal mood and affect. Her behavior is normal. Thought content normal.         ED Course   Feeding Tube  Date/Time: 9/9/2019 2:25 PM  Location procedure was performed: [facility] EMERGENCY DEPARTMENT  Performed by: Delmar Bee MD  Authorized by: Delmar Bee MD   Consent: The procedure was performed in an emergent situation.  Indications: tube blocked  Tube type: gastrostomy  Procedure type: replacement  Tube size: 20 Fr.  Comments: Replacement at bedside unsuccessful, PEG tube began to break at level of skin.        Labs Reviewed   CBC WITHOUT DIFFERENTIAL - Abnormal; Notable for the following components:       Result Value    Mean Corpuscular Hemoglobin Conc 31.4 (*)     RDW 16.7 (*)     Platelets 140 (*)     All other components within normal limits   COMPREHENSIVE METABOLIC PANEL - Abnormal; Notable for the following components:    Glucose 129 (*)     BUN, Bld 39 (*)     Albumin 3.3 (*)     All other components within normal limits   URINALYSIS, REFLEX TO URINE CULTURE - Abnormal; Notable for the following components:    Ketones, UA Trace (*)     Occult Blood UA 3+ (*)     Leukocytes, UA 1+  (*)     All other components within normal limits    Narrative:     Preferred Collection Type->Urine, Clean Catch   URINALYSIS MICROSCOPIC - Abnormal; Notable for the following components:    RBC, UA >100 (*)     WBC, UA 15 (*)     Bacteria Few (*)     All other components within normal limits    Narrative:     Preferred Collection Type->Urine, Clean Catch   CULTURE, URINE               Medical Decision Making:   History:   Old Medical Records: I decided to obtain old medical records.  Old Records Summarized: records from clinic visits and other records.       <> Summary of Records: Patient's PEG tube was first placed on 06/21/17.   It was replaced on 02/20/19 by Dr. Kolb.  ED Management:  Informed by IR that team unable to replace PEG today, but will be able to replace tomorrow.  Will place patient in observation, as she will be unable to obtain feeds or medications without functional PEG tube.    Upon re-evaluation, the patient's status has remained stable.  At this time, I believe the patient should be admitted to the hospital for further evaluation and management of PEG tube complication.  [facility] HM service was contacted and the case was discussed.   The consulting physician/team agrees with plan and will admit under their service.   The patient and family were updated with test results, overall impression, and further plan of care. All questions were answered. The patient expressed understanding and agrees with the current plan.                     ED Course as of Sep 09 1730   Mon Sep 09, 2019   1320 89-year-old female with history of CVA, dysphagia status post PEG tube placement presents to ED due to clogged PEG tube.  Patient denies any other complaints.  Vitals unremarkable, exam with old-appearing, cracked, clogged PEG tube to abdomen.  No abdominal distension, tenderness, or active drainage.  Will attempt to replace at bedside and reassess.    [SS]   1458 Attempted to remove existing PEG tube, however, to  being thin/friable and began to shear at the surface of the skin.  Due to inability to replace at bedside, IR consulted for replacement in IR suite.    [SS]      ED Course User Index  [SS] Delmar Bee MD     Clinical Impression:       ICD-10-CM ICD-9-CM   1. Complication of gastrostomy tube K94.20 536.40         Disposition:   Disposition: Placed in Observation  Condition: Stable         I, Dr. Delmar Bee, personally performed the services described in this documentation. All medical record entries made by the scribe were at my direction and in my presence.  I have reviewed the chart and agree that the record reflects my personal performance and is accurate and complete.     Delmar Bee MD.               Delmar Bee MD  09/11/19 7648

## 2019-09-10 NOTE — PLAN OF CARE
Problem: Adult Inpatient Plan of Care  Goal: Plan of Care Review  Plan of care reviewed with pt and daughter.Verbalizes understanding. Bed in lowest position, side rails up times 2, wheels locked, nonslip socks on, and bed alarm on. Call bell w/in reach. Instructed to call for needs/assist. Pt NPO. Pt provided swabs to swab mouth. Pt with left sided hemiparesis from prior cva. Attempted to turn pt q 2. Pt refusing to be turned despite education on pressure ulcer prevention. Peg tube in place and clamped. Pt to go to IR today for peg tube replacement. Iv fluids continued per md order. Incontinent of bowel and bladder. Pt and daughter requesting that pt has two diapers on at all times. Pt on telemetry. Afib. Rate controlled. Frequent pvc's on monitor. Asympotmatic. Dr Hahn notified of frequent pvc's. No true red alarms/ectopy noted t/o shift. Will continue to monitor.

## 2019-09-10 NOTE — NURSING
Archana Pimentel RN called report to Lesli BLEVINS , and pt will be able to have use of her gastrostomy tube upon arrival to floor.

## 2019-09-10 NOTE — H&P
[facility] Medicine H&P Note     Admitting Team: [facility] Hospitalist Team A  Attending Physician: Mervat Alexander MD  Resident: Dr. Chisholm  Intern: Dr. Billy    Date of Admit: 9/9/2019    Chief Complaint     Clogged PEG tube x 1 day    Subjective:      History of Present Illness:  Elizabeth Navarrete is a 89 y.o. 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    Patient was in her usual state of health, which includes staying at [facility], completely bed bound with bowel incontinence, with all feeds and meds through PEG tube, until today when patient was noted to have a clogged PEG tube. Unable to flush at nursing home. Last normal yesterday. No abdominal pain, purulent discharge, nausea, vomiting, diarrhea, constipation, chest pain, or shortness of breath.     Past Medical History:  Past Medical History:   Diagnosis Date    Bladder mass     CAD (coronary artery disease) 10/10/2012    CRF (chronic renal failure) 10/10/2012    Embolic stroke involving right carotid artery 6/15/2017    Family history of breast cancer 10/10/2012    Hematuria, gross     History of cardiac arrhythmia 10/10/2012    History of CHF (congestive heart failure) 10/10/2012    History of depression 10/10/2012    History of echocardiogram 10/10/2012    HTN (hypertension) 10/10/2012    Internal carotid artery stenosis 10/10/2012    Personal history of gallstones 10/10/2012    Urinary tract infection        Past Surgical History:  Past Surgical History:   Procedure Laterality Date    BREAST LUMPECTOMY      ESOPHAGOGASTRODUODENOSCOPY (EGD) N/A 6/14/2018    Performed by [facility]ila [facility]b [facility] at [facility] ENDO    ESOPHAGOGASTRODUODENOSCOPY (EGD) N/A 6/12/2018    Performed by [facility] at [facility] ENDO    EYE SURGERY      HYSTERECTOMY      PLACEMENT-TUBE-PEG N/A 6/21/2017    Performed by Joshua Goldberg, MD at [facility] OR  2ND FLR     Allergies:  Review of patient's allergies indicates:   Allergen Reactions    Pcn [penicillins] Rash     Home Medications:  Prior to Admission medications    Medication Sig Start Date End Date Taking? Authorizing Provider   albuterol (PROVENTIL/VENTOLIN HFA) 90 mcg/actuation inhaler Inhale 1-2 puffs into the lungs every 4 (four) hours as needed for Wheezing. Rescue 5/8/19   Michele Sawyer MD   amLODIPine (NORVASC) 5 MG tablet 5 mg by Per G Tube route once daily.    Historical Provider, MD   aspirin 325 MG tablet 325 mg by Per G Tube route once daily.    Historical Provider, MD   atorvastatin (LIPITOR) 40 MG tablet  1/18/19   Historical Provider, MD   carvedilol (COREG) 25 MG tablet 25 mg by Per G Tube route 2 (two) times daily.    Historical Provider, MD   cholecalciferol, vitamin D3, (VITAMIN D3) 1,000 unit capsule 1,000 Units by Per G Tube route once daily.     Historical Provider, MD   clopidogrel (PLAVIX) 75 mg tablet  1/29/19   Historical Provider, MD   diclofenac sodium (VOLTAREN) 1 % Gel Apply 4 g topically 4 (four) times daily as needed. 4/7/16   DANNIE Montoya MD        Historical Provider, MD   fish oil-omega-3 fatty acids 300-1,000 mg capsule 1 g by Per G Tube route once daily.     Historical Provider, MD   furosemide (LASIX) 20 MG tablet Take 1 tablet (20 mg total) by mouth once daily. for 5 days 5/8/19 5/13/19  Michele Sawyer MD   glucosamine-chondroitin 500-400 mg tablet Take 1 tablet by mouth 3 (three) times daily.    Historical Provider, MD   ketoconazole (NIZORAL) 2 % shampoo  2/20/19   Historical Provider, MD   mupirocin (BACTROBAN) 2 % ointment Apply topically once daily. Apply to left upper back wound, cover with telfa island 6/23/17   Day Rodriguez MD   venlafaxine (EFFEXOR) 75 MG tablet 75 mg by Per G Tube route once daily.    Historical Provider, MD       Family History:  Family History   Problem Relation Age of Onset    Cancer Mother         breast    Heart disease Neg Hx  "       Social History:  Social History     Tobacco Use    Smoking status: Never Smoker    Smokeless tobacco: Never Used   Substance Use Topics    Alcohol use: No    Drug use: No       Review of Systems:  All other systems are reviewed and are negative.    Health Maintaince :   Primary Care Physician:     Immunizations:   TDap Unknown by daughter or patient  Flu Unknown by daughter or patient   Pna Unknown by daughter or patient    Cancer Screening:  No longer indicated in this patient     Objective:   Last 24 Hour Vital Signs:  BP  Min: 143/66  Max: 143/66  Temp  Av.8 °F (36.6 °C)  Min: 97.8 °F (36.6 °C)  Max: 97.8 °F (36.6 °C)  Pulse  Av  Min: 85  Max: 85  Resp  Av  Min: 20  Max: 20  SpO2  Av %  Min: 96 %  Max: 96 %  Height  Av' 6" (167.6 cm)  Min: 5' 6" (167.6 cm)  Max: 5' 6" (167.6 cm)  Weight  Av.2 kg (124 lb)  Min: 56.2 kg (124 lb)  Max: 56.2 kg (124 lb)  Body mass index is 20.01 kg/m².  No intake/output data recorded.    Physical Examination:  GEN- AOx3, NAD, laying comfortably in bed  Eyes: PERRL, EOMI  Oral mucosa: Clear, no lesions, moist  Face: Scattered superficial lesions, no surrounding erythema   NECK- No thyromegaly or other masses  CARDIAC- RRR, no murmurs or rubs  RESP- CTAB, normal work of breathing, no wheezes, crackles, or rhonchi  ABD- Soft, NT, ND, +BS; no masses or HSM. PEG tube in place, appeared clogged. Surrounding erythema at base with no discharge.   EXT- Nno clubbing, cyanosis, or edema; 2+ DP/PT pulses  NEURO- CN II-XII grossly intact; moves all four extremities equally  SKIN- Stage 3 ulcers noted on left posterior thigh and leg, right anterior leg, bilateral hips, chest, back, and face.   Laboratory:  Most Recent Data:  CBC:   Lab Results   Component Value Date    WBC 7.37 2019    HGB 13.0 2019    HCT 41.4 2019     (L) 2019    MCV 93 2019    RDW 16.7 (H) 2019     BMP:   Lab Results   Component Value Date    NA " 143 09/09/2019    K 3.9 09/09/2019     09/09/2019    CO2 25 09/09/2019    BUN 39 (H) 09/09/2019    CREATININE 0.8 09/09/2019     (H) 09/09/2019    CALCIUM 9.8 09/09/2019    MG 2.0 02/18/2019    PHOS 3.1 06/13/2018     LFTs:   Lab Results   Component Value Date    PROT 7.1 09/09/2019    ALBUMIN 3.3 (L) 09/09/2019    BILITOT 1.0 09/09/2019    AST 29 09/09/2019    ALKPHOS 93 09/09/2019    ALT 17 09/09/2019     Coags:   Lab Results   Component Value Date    INR 1.1 05/08/2019     FLP:   Lab Results   Component Value Date    CHOL 118 (L) 06/15/2017    HDL 41 06/15/2017    LDLCALC 67.4 06/15/2017    TRIG 48 06/15/2017    CHOLHDL 34.7 06/15/2017     Thyroid:   Lab Results   Component Value Date    TSH 1.289 02/18/2019     Urinalysis:   Lab Results   Component Value Date    LABURIN No growth 06/17/2018    COLORU Yellow 09/09/2019    SPECGRAV 1.015 09/09/2019    NITRITE Negative 09/09/2019    KETONESU Trace (A) 09/09/2019    UROBILINOGEN 1.0 09/09/2019    WBCUA 15 (H) 09/09/2019     Microbiology Data:  Urine culture pending    Other Results:  EKG (my interpretation): Atrial fibrillation, Rate 78, LBBB, no signs of acute ischemia by [place] criteria.     Assessment:     Patient is a 89 yr old female with hx of CVA in 2017 s/p PEG tube placement and urinary incontinence, CAD, HFpEF, HTN. HLD, A fib, and bladder mass that is presenting with clogged PEG tube x 1 day.       Plan:     Clogged PEG tube  - Clogged PEG tube x 1 day. ED unable to clear it out. Last seen by Dr. Kolb for cleaning on 7/3/2019  - IR consulted and will replace tomorrow  - Will hold Lovenox in setting of procedure tomorrow  - Meds held since patient cannot tolerate PO and does not have active PEG tube.     Hypovolemia  - BUN 39 and Cr 0.8  - Secondary to lack of PEG tube feeds  - Will start maintenance infusion of d51/2NS with KCl at 75 cc/hr  - Repeat CMP in AM\    Diffuse stage 3 skin ulceration  - Stage 3 ulcers noted on left posterior  thigh and leg, right anterior leg, bilateral hips, chest, back, and face.   - Receives wound care at [facility]  - Wound care consulted    Hx of CVA sp left sided weakness, dysphagia, and urinary incontinence  - Patient at [facility]. Dependent on all ADLs. Refuses to work with PT and OT at nursing home.  - No acute changes in baseline  - Home meds of , Plavix 75 mg,  and Atorvastatin 40 mg    Bladder mass with history of hematuria and left hydronephrosis   - Patient with hx of hematuria, bladder mass, and left hydronephrosis  - Has followed up with Urology and had lengthy discussions on the likely diagnosis of urothelial carcinoma. Given her poor functional status and high anesthetic risk, further work-up was not pursued     Persistent atrial fibrillation  - Home regimen of rate control with Coreg 25 BID  - No anticoagulation on home med list. May be due to fall risk and hx of hematuria  - Hold anticoagulation in setting of IR procedure and hematuria  - Hold coreg as patient does not have PEG tube access    HFPEF  - TTE 6/2017 with EF 50-55%, grade II diastolic dysfunction, mild aortic regurgitation  - On home Lasix 20 mg   - Follows with outpatient Cardiology  - No acute issues on this admission  - Holding Lasix at this time as patient does not appear volume overload.  Hypertension  - Home medications of Coreg 25 and Norvasc 5 mg  - Hold home meds as patient does not have PEG tube access    Hyperlipidemia   - Home medicine of Atorvastatin      Depression  - On home Vnelafaxine 75 mg daily  - Held as patient does not have PEG tube access at this time    Code: Full  Ppx: Held in setting of IR procedure  Diet: NPO  Dispo: Pending PEG tube placement and improvement of hypovolemia    Gerald Chisholm MD  [facility] Internal Medicine -II

## 2019-09-10 NOTE — PLAN OF CARE
Problem: Adult Inpatient Plan of Care  Goal: Plan of Care Review  Room air SpO2   95 %. Pt with no apparent distess noted. Will continue to monitor.

## 2019-09-10 NOTE — PLAN OF CARE
Patient resident of [facility], sent updated clinicals       09/10/19 2960   Post-Acute Status   Post-Acute Authorization Placement   Post-Acute Placement Status Referrals Sent     Gladys De La Cruz, RN, [facility], CMSRN  RN Transition Navigator  [phone]

## 2019-09-10 NOTE — ED NOTES
Laya Sheridan [phone], Patients daughter. Please call with any room updates. Awaiting bed placement.

## 2019-09-10 NOTE — PROGRESS NOTES
"[facility] Hospital Medicine Progress Note    Primary Team: [facility] Hospitalist Team A  Attending Physician: Mervat Alexander MD  Resident: Dr. Chisholm  Intern: Dr. Billy    Subjective:      Patient without complaints this morning. Denying any abdominal pain at the PEG tube site, no drainage, no fever or chills. Slept without issues.      Objective:     Last 24 Hour Vital Signs:  BP  Min: 141/65  Max: 167/79  Temp  Av.8 °F (36.6 °C)  Min: 97.5 °F (36.4 °C)  Max: 98 °F (36.7 °C)  Pulse  Av.8  Min: 70  Max: 112  Resp  Av.6  Min: 16  Max: 20  SpO2  Av %  Min: 93 %  Max: 96 %  Height  Av' 6" (167.6 cm)  Min: 5' 6" (167.6 cm)  Max: 5' 6" (167.6 cm)  Weight  Av.8 kg (129 lb 11.8 oz)  Min: 56.2 kg (124 lb)  Max: 60.6 kg (133 lb 9.6 oz)  I/O last 3 completed shifts:  In: 632.5 [I.V.:632.5]  Out: 344 [Urine:344]    Physical Examination:  GEN- AOx3, NAD, laying comfortably in bed  Eyes: PERRL, EOMI  Oral mucosa: Clear, no lesions, moist mucous membranes   Face: Scattered superficial lesions, no surrounding erythema   CARDIAC- RRR, no murmurs or rubs  RESP- CTAB, normal work of breathing, no wheezes, crackles, or rhonchi  ABD- Soft, NT, ND, +BS; no masses or HSM. PEG tube in place, appeared clogged. Surrounding erythema at base with no discharge. No tenderness  EXT- No clubbing, cyanosis, or edema; 2+ DP/PT pulses  NEURO- CN II-XII grossly intact; moves all four extremities equally  Skin:- Diffuse sacbs and ulcers over chest, back, and legs, grade 3 ulcers on posterior left leg and anterior right leg    Laboratory:  Trended Lab Data:   Recent Labs   Lab 19  1900   WBC 7.37   HGB 13.0   HCT 41.4   *   MCV 93   RDW 16.7*      K 3.9      CO2 25   BUN 39*   CREATININE 0.8   *   CALCIUM 9.8   PROT 7.1   ALBUMIN 3.3*   AST 29   ALT 17   ALKPHOS 93   BILITOT 1.0      Current Medications:     Infusions:   dextrose 5 % and 0.45 % NaCl with KCl 20 mEq 75 mL/hr at 19          " PRN:  influenza, sodium chloride 0.9%    Assessment:     Elizabeth Navarrete is a 89 y.o.female with  Patient Active Problem List    Diagnosis Date Noted    Complication of gastrostomy tube 09/09/2019    PEG (percutaneous endoscopic gastrostomy) adjustment/replacement/removal 06/12/2018    Nursing home resident 06/23/2017    Gastrostomy tube dependent, 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 06/21/2017    Oropharyngeal dysphagia 06/16/2017    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 06/15/2017    Hemiparesis affecting left side as late effect of stroke 06/15/2017    Chronic atrial fibrillation 09/06/2016    Non-rheumatic mitral regurgitation 09/06/2016    Enlarged LA (left atrium) 09/06/2016    Pulmonary HTN 09/06/2016    Primary osteoarthritis of right knee 11/25/2015    Essential hypertension 10/10/2012    CAD (coronary artery disease) 10/10/2012    Chronic diastolic congestive heart failure 10/10/2012    History of depression 10/10/2012    Personal history of gallstones 10/10/2012    Internal carotid artery stenosis 10/10/2012    Family history of breast cancer 10/10/2012        Plan:     Clogged PEG tube  - Clogged PEG tube x 1 day. ED unable to clear it out. Last seen by Dr. Kolb for cleaning on 7/3/2019  - IR consulted and will replace today  - Will hold Lovenox in setting of procedure today  - Meds held since patient cannot tolerate PO and does not have active PEG tube.      Hypovolemia  - BUN 39 and Cr 0.8  - Secondary to lack of PEG tube feeds  - Will start maintenance infusion of d51/2NS with KCl at 75 cc/hr  - Repeat CMP pending     Diffuse stage 3 skin ulceration  - Stage 3 ulcers noted on left posterior thigh and leg, right anterior leg, bilateral hips, chest, back, and face.   - Receives wound care at [facility]  - Wound care consulted     Hx of CVA sp left sided weakness, dysphagia, and urinary incontinence  - Patient at [facility]. Dependent on all ADLs. Refuses to work with PT and OT at nursing  home.  - No acute changes in baseline  - Home meds of , Plavix 75 mg,  and Atorvastatin 40 mg     Bladder mass with history of hematuria and left hydronephrosis   - Patient with hx of hematuria, bladder mass, and left hydronephrosis  - Has followed up with Urology and had lengthy discussions on the likely diagnosis of urothelial carcinoma. Given her poor functional status and high anesthetic risk, further work-up was not pursued      Persistent atrial fibrillation  - Home regimen of rate control with Coreg 25 BID  - No anticoagulation on home med list. May be due to fall risk and hx of hematuria  - Hold anticoagulation in setting of IR procedure and hematuria  - Hold coreg as patient does not have PEG tube access     HFrEF  - TTE 6/2017 with EF 50-55%, grade II diastolic dysfunction, mild aortic regurgitation  - On home Coreg 25 and Lasix 20 mg  - Follows with outpatient Cardiology  - No acute issues on this admission  - Holding Lasix at this time as patient does not appear volume overload.    Hypertension  - Home medications of Coreg 25 and Norvasc 5 mg  - Hold home meds as patient does not have PEG tube access     Hyperlipidemia   - Home medicine of Atorvastatin       Depression  - On home Vnelafaxine 75 mg daily  - Held as patient does not have PEG tube access at this time     Code: Full  Ppx: Held in setting of IR procedure  Diet: NPO  Dispo: Pending PEG tube placement and improvement of hypovolemia    Gerlad Chisholm MD  [facility] Internal Medicine HO-II    [facility] Medicine Hospitalist Pager numbers:   [facility] Hospitalist Medicine Team A (Catherine/Yessica): [phone]  [facility] Hospitalist Medicine Team B (Farhat/Leila):  [phone]

## 2019-09-10 NOTE — CONSULTS
Interventional Radiology Post-Procedure Note    Pre Op Diagnosis: G-tube dysfxn  Post Op Diagnosis: Same    Procedure: G-tube exchange    Procedure performed by: Amber    Written Informed Consent Obtained: Yes  Specimen Sent: No: NA  Estimated Blood Loss: Minimal    Findings:   Indwelling tube in good position and partially torn near skin entry site. Removed intact and exchanged over wire for a new 22-Fr Cook Entuit balloon.    No immediate complications. Patient tolerated procedure well. Please see full dictated procedure report for additional details and recommendations.    RECOMMENDATIONS:  1. May use tube immediately.  2. Flush with 30 mL sterile water before and after crushed medications, feeds, and after checking residuals.  3. Notify physician for any signs of redness, induration, drainage from the G-Tube site or if the G-tube becomes dislodged.    Above d/w with daughter immediately following the case.      Dread Clark MD  [facility]  Pager [phone]

## 2019-09-10 NOTE — CONSULTS
Interventional Radiology Consult/Pre-Procedure Note      Chief Complaint/Reason for Consult: G-tube leak    History of Present Illness:  Elizabeth Navarrete is a 89 y.o. female with the PMH listed below who presents with leaking G-tube. Placed endoscopically 6/2018 with Dr. Kolb.    Admission H&P reviewed.    Past Medical History:   Diagnosis Date    Bladder mass     CAD (coronary artery disease) 10/10/2012    CRF (chronic renal failure) 10/10/2012    Embolic stroke involving right carotid artery 6/15/2017    Family history of breast cancer 10/10/2012    Hematuria, gross     History of cardiac arrhythmia 10/10/2012    History of CHF (congestive heart failure) 10/10/2012    History of depression 10/10/2012    History of echocardiogram 10/10/2012    HTN (hypertension) 10/10/2012    Internal carotid artery stenosis 10/10/2012    Personal history of gallstones 10/10/2012    Urinary tract infection      Past Surgical History:   Procedure Laterality Date    BREAST LUMPECTOMY      ESOPHAGOGASTRODUODENOSCOPY (EGD) N/A 6/14/2018    Performed by [facility]ila [facility]b MD at [facility] ENDO    ESOPHAGOGASTRODUODENOSCOPY (EGD) N/A 6/12/2018    [facility]formed by [facility] at [facility] ENDO    EYE SURGERY      HYSTERECTOMY      PLACEMENT-TUBE-PEG N/A 6/21/2017    Performed by Joshua Goldberg, MD at [facility] OR [address] [place] [address]       Allergies:   Review of patient's allergies indicates:   Allergen Reactions    Pcn [penicillins] Rash       Scheduled Meds:   Continuous Infusions:    dextrose 5 % and 0.45 % NaCl with KCl 20 mEq 75 mL/hr at 09/10/19 1037     PRN Meds:albuterol, influenza, sodium chloride 0.9%    Anticoagulation/Antiplatelet Meds: no anticoagulation    Review of Systems:   As documented in admission H&P.    Physical Exam:  Temp: 98.4 °F (36.9 °C) (09/10/19 1156)  Pulse: 83 (09/10/19 1600)  Resp: 17 (09/10/19 1156)  BP: (!) 144/82 (09/10/19 1156)  SpO2: 96 % (09/10/19 1100)     General: NAD  HEENT: Normocephalic, sclera  anicteric, oropharynx clear  Heart: RRR  Lungs: Symmetric excursions, breathing unlabored  Abd: NTND, soft, discolored and worn, granulation tissue around cath entry  Neuro: Gross nonfocal    Labs:  No results for input(s): INR in the last 168 hours.    Invalid input(s):  PT,  PTT    Recent Labs   Lab 09/10/19  1021   WBC 7.60   HGB 12.4   HCT 39.4   MCV 92   *      Recent Labs   Lab 09/10/19  1021   *      K 4.0   *   CO2 26   BUN 26   CREATININE 0.7   CALCIUM 9.4   ALT 17   AST 26   ALBUMIN 3.1*   BILITOT 1.1*     Assessment/Plan:   Indwelling tube dysfxn. Will exchange for new tube today.    Sedation: None    Risks (including, but not limited to, pain, bleeding, infection, damage to nearby structures, failure, and the need for additional procedures), benefits, and alternatives were discussed with the patient. All questions were answered to the best of my abilities. The patient wishes to proceed with G-tube exchange. Written informed consent was obtained.      Andrew Marsala MD [facility]   Pager [phone]

## 2019-09-10 NOTE — PLAN OF CARE
Patient AAOx3  Bed bound  care home resident of [facility] for last 2 years  Daughter at bedside    Per daughter- patient cannot transport in wheelchair- needs ambulance for discharge back to NH    Patient going for PEG tube placement today    This  put name on white board and explained blue discharge folder to patient. Discharge planning brochure and/or business card given to patient.  Patient verbalized understanding.       09/10/19 1014   Discharge Assessment   Assessment Type Discharge Planning Assessment   Confirmed/corrected address and phone number on facesheet? Yes   Assessment information obtained from? Patient;Caregiver   Prior to hospitilization cognitive status: Alert/Oriented   Prior to hospitalization functional status: Completely Dependent   Current cognitive status: Alert/Oriented   Current Functional Status: Wheelchair Bound   Facility Arrived From: [facility]   Lives With facility resident   Able to Return to Prior Arrangements yes   Is patient able to care for self after discharge? No   Patient's perception of discharge disposition nursing home   Readmission Within the Last 30 Days no previous admission in last 30 days   Patient currently being followed by outpatient case management? No   Patient currently receives any other outside agency services? No   Equipment Currently Used at Home hospital bed   Do you have any problems affording any of your prescribed medications? No   Is the patient taking medications as prescribed? yes   Does the patient have transportation home? Yes   Transportation Anticipated health plan transportation   Does the patient receive services at the [facility]? No   Discharge Plan A Return to nursing home   DME Needed Upon Discharge  none   Patient/Family in Agreement with Plan yes     Gladys De La Cruz RN, CCM, CMSRN  RN Transition Navigator  [phone]

## 2019-09-11 PROBLEM — K94.20 COMPLICATION OF GASTROSTOMY TUBE: Status: RESOLVED | Noted: 2019-01-01 | Resolved: 2019-01-01

## 2019-09-11 NOTE — CONSULTS
"  [facility]  Adult Nutrition  Consult Note    SUMMARY     Recommendations    1. Once medically able initiate TF:   -Isosource 1.5 initiated @ 10 ml/hr and advanced 10 ml q 4 hrs to goal rate of 40 ml/hr.   -Fluid flushes 175 mL QID. TF to provide: 1440 kcal, 65 g pro, 746 ml free water.   -Hold TF for n/v/abd discomfort, residuals >500 ml.   2. RD to monitor progress.    Goals: Pt to recieve nutrition by next RD visit.   Nutrition Goal Status: new  Communication of RD Recs: (plan of care)    Reason for Assessment    Reason For Assessment: consult(malnutrition)  Diagnosis: (PEG clogged)  Relevant Medical History: G tube dependent since 2017, oropharyngeal dysphagia, stroke, pulmonary HTN, CAD, PU x 5  General Information Comments: Pt in good spirits today, feeling good and ready to transfer back to NH. Possible d/c today. NFPE 9/11- Pt with signs of mild moderate fat and muscles loss.  Nutrition Discharge Planning: pending medical course.    Nutrition Risk Screen    Nutrition Risk Screen: no indicators present    Nutrition/Diet History    Spiritual, Cultural Beliefs, Jew Practices, Values that Affect Care: no  Factors Affecting Nutritional Intake: NPO    Anthropometrics    Temp: 96.1 °F (35.6 °C)  Height: 5' 5.98" (167.6 cm)  Height (inches): 65.98 in  Weight Method: Bed Scale  Weight: 60.4 kg (133 lb 2.5 oz)  Weight (lb): 133.16 lb  Ideal Body Weight (IBW), Female: 129.9 lb  % Ideal Body Weight, Female (lb): 102.51 lb  BMI (Calculated): 21.5  BMI Grade: 18.5-24.9 - normal       Lab/Procedures/Meds    Pertinent Labs Reviewed: reviewed  Pertinent Labs Comments: Glu 151H, Alb 3.1L  Pertinent Medications Reviewed: reviewed  Pertinent Medications Comments: aspirin, statin    Estimated/Assessed Needs    Weight Used For Calorie Calculations: 60.4 kg (133 lb 2.5 oz)  Energy Calorie Requirements (kcal): 7470-2582 kcal daily  Energy Need Method: Kcal/kg  Protein Requirements: 60-73 gm daily  Weight " Used For Protein Calculations: 60.7 kg (133 lb 14.5 oz)  Fluid Requirements (mL): 1 mL/kcal or per MD  Estimated Fluid Requirement Method: RDA Method  RDA Method (mL): 1510  CHO Requirement: 189 gm daily    Nutrition Prescription Ordered    Current Diet Order: NPO    Evaluation of Received Nutrient/Fluid Intake    Energy Calories Required: not meeting needs  Protein Required: not meeting needs  Fluid Required: not meeting needs  Comments: LBM 9/11  % Intake of Estimated Energy Needs: 0 - 25 %  % Meal Intake: NPO    Nutrition Risk    Level of Risk/Frequency of Follow-up: (f/u 2x/weekly)     Assessment and Plan    Nutrition Problem  Inadequate oral intake    Related to (etiology):   Oropharyngeal dysphagia    Signs and Symptoms (as evidenced by):   NPO status      Interventions:  Collaboration with other providers    Nutrition Diagnosis Status:   New    Monitor and Evaluation    Food and Nutrient Intake: energy intake  Food and Nutrient Adminstration: diet order  Knowledge/Beliefs/Attitudes: food and nutrition knowledge/skill  Physical Activity and Function: nutrition-related ADLs and IADLs  Anthropometric Measurements: weight, weight change  Biochemical Data, Medical Tests and Procedures: lipid profile, electrolyte and renal panel, gastrointestinal profile, glucose/endocrine profile, inflammatory profile  Nutrition-Focused Physical Findings: overall appearance     Malnutrition Assessment    Malnutrition Type: chronic illness  Orbital Region (Subcutaneous Fat Loss): moderate depletion  Upper Arm Region (Subcutaneous Fat Loss): moderate depletion  Thoracic and Lumbar Region: moderate depletion   [place] Region (Muscle Loss): moderate depletion  Patellar Region (Muscle Loss): moderate depletion  Anterior Thigh Region (Muscle Loss): moderate depletion  Posterior Calf Region (Muscle Loss): moderate depletion   Subcutaneous Fat Loss (Final Summary): moderate protein-calorie malnutrition  Muscle Loss Evaluation (Final  Summary): moderate protein-calorie malnutrition       Nutrition Follow-Up    RD Follow-up?: Yes

## 2019-09-11 NOTE — PROCEDURES
Interventional Radiology Post-Procedure Note     Pre Op Diagnosis: G-tube dysfxn  Post Op Diagnosis: Same     Procedure: G-tube exchange     Procedure performed by: Amber     Written Informed Consent Obtained: Yes  Specimen Sent: No: NA  Estimated Blood Loss: Minimal     Findings:   Indwelling tube in good position and partially torn near skin entry site. Removed intact and exchanged over wire for a new 22-Fr Cook Entuit balloon.     No immediate complications. Patient tolerated procedure well. Please see full dictated procedure report for additional details and recommendations.     RECOMMENDATIONS:  1. May use tube immediately.  2. Flush with 30 mL sterile water before and after crushed medications, feeds, and after checking residuals.  3. Notify physician for any signs of redness, induration, drainage from the G-Tube site or if the G-tube becomes dislodged.     Above d/w with daughter immediately following the case.        Dread Clark MD  [facility]  Pager [phone]

## 2019-09-11 NOTE — PLAN OF CARE
Recommendations    1. Once medically able initiate TF:   -Isosource 1.5 initiated @ 10 ml/hr and advanced 10 ml q 4 hrs to goal rate of 40 ml/hr.   -Fluid flushes 175 mL QID. TF to provide: 1440 kcal, 65 g pro, 746 ml free water.   -Hold TF for n/v/abd discomfort, residuals >500 ml.   2. RD to monitor progress.    Goals: Pt to recieve nutrition by next RD visit.   Nutrition Goal Status: new

## 2019-09-11 NOTE — MEDICAL/APP STUDENT
[facility] Medicine Progress Note    Primary Team: [facility] Hospitalist Team A  Attending Physician: Mervat Alexander MD  Resident: Gerald Chisholm  Intern: Ricardo Billy    Subjective:      Pt says she feels great. Denies fever, chills, nausea or vomiting. Ready to return home to [facility].      Objective:     Last 24 Hour Vital Signs:  BP  Min: 126/72  Max: 164/81  Temp  Av.4 °F (36.9 °C)  Min: 98 °F (36.7 °C)  Max: 98.9 °F (37.2 °C)  Pulse  Av.4  Min: 60  Max: 106  Resp  Av.2  Min: 17  Max: 20  SpO2  Av.5 %  Min: 94 %  Max: 96 %  Weight  Av.4 kg (133 lb 2.5 oz)  Min: 60.4 kg (133 lb 2.5 oz)  Max: 60.4 kg (133 lb 2.5 oz)  I/O last 3 completed shifts:  In: 632.5 [I.V.:632.5]  Out: 577 [Urine:577]    Physical Examination:  General: AAOx3  HEENT: Normocephalic, atraumatic. No cervical lymphadenopathy or thyromegaly  CV: RRR, no murmurs, gallops or rubs  Pulm: Clear breath sounds bilaterally, no wheezes, rhonchi or rales  Abdomen: PEG Tube noted on L Abdomen, covered with large bandage. Looks very clean, no erythema, discharge or tenderness to palpation  Extremities: Peripheral pulses 2+ symmetric bilaterally. No clubbing or swelling   Skin: Diffuse scabbing and ulcers from before admission on chest, back and legs. Most prominent on posterior LLE and anterior RLE  Neuro: AAOx3. CN2-12 grossly intact. Motor function intact in bilateral UE+LE     Laboratory:  Laboratory Data Reviewed: yes  Pertinent Findings:  Hematology:  Recent Labs   Lab 09/09/19  1900 09/10/19  1021   WBC 7.37 7.60   HGB 13.0 12.4   * 141*   MCV 93 92   RDW 16.7* 16.6*       Chemistry:  Recent Labs   Lab 09/09/19  1900 09/10/19  1021    144   K 3.9 4.0    111*   CO2 25 26   BUN 39* 26   CREATININE 0.8 0.7   * 151*   CALCIUM 9.8 9.4   PROT 7.1 6.5   ALBUMIN 3.3* 3.1*   ALT 17 17   AST 29 26   ALKPHOS 93 84         Current Medications:     Infusions:       Scheduled:   amLODIPine  5 mg Per G Tube  Daily    aspirin  325 mg Per G Tube Daily    atorvastatin  40 mg Per G Tube Daily    carvedilol  25 mg Per G Tube BID    clopidogrel  75 mg Per G Tube Daily    venlafaxine  75 mg Per G Tube Daily        PRN:  albuterol, influenza, sodium chloride 0.9%    Lines and Day Number of Therapy:  PEG tube (replaced yesterday)    Assessment:     Elizabeth Navarrete is a 89 y.o.female with  Patient Active Problem List    Diagnosis Date Noted    Complication of gastrostomy tube 09/09/2019    PEG (percutaneous endoscopic gastrostomy) adjustment/replacement/removal 06/12/2018    Nursing home resident 06/23/2017    Gastrostomy tube dependent, 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 06/21/2017    Oropharyngeal dysphagia 06/16/2017    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 06/15/2017    Hemiparesis affecting left side as late effect of stroke 06/15/2017    Chronic atrial fibrillation 09/06/2016    Non-rheumatic mitral regurgitation 09/06/2016    Enlarged LA (left atrium) 09/06/2016    Pulmonary HTN 09/06/2016    Primary osteoarthritis of right knee 11/25/2015    Essential hypertension 10/10/2012    CAD (coronary artery disease) 10/10/2012    Chronic diastolic congestive heart failure 10/10/2012    History of depression 10/10/2012    Personal history of gallstones 10/10/2012    Internal carotid artery stenosis 10/10/2012    Family history of breast cancer 10/10/2012        Plan:     Clogged PEG Tube  - Pt presented with clogged PEG tube that could not be cleaned in ED  - Replaced by IR around 7:00pm yesterday  - All home meds resumed through PEG   Plan:   - d/c today     Hypovolemia  - BUN 39 and Cr 0.8  - Secondary to lack of PEG tube feeds  Plan:   - Will start maintenance infusion of d51/2NS with KCl at 75 cc/hr  - Repeat CMP pending     Diffuse stage 3 skin ulceration  - Stage 3 ulcers noted on left posterior thigh and leg, right anterior leg, bilateral hips, chest, back, and face.   - Receives wound care at [facility]  facility  - Wound care consulted     Hx of CVA sp left sided weakness, dysphagia, and urinary incontinence  - Patient at [facility]. Dependent on all ADLs. Refuses to work with PT and OT at nursing home.  - No acute changes in baseline  - Home meds of , Plavix 75 mg,  and Atorvastatin 40 mg     Bladder mass with history of hematuria and left hydronephrosis   - Patient with hx of hematuria, bladder mass, and left hydronephrosis  - Has followed up with Urology and had lengthy discussions on the likely diagnosis of urothelial carcinoma. Given her poor functional status and high anesthetic risk, further work-up was not pursued      Persistent atrial fibrillation  - Home regimen of rate control with Coreg 25 BID  - No anticoagulation on home med list. May be due to fall risk and hx of hematuria  - Hold anticoagulation in setting of IR procedure and hematuria  - Hold coreg as patient does not have PEG tube access     HFrEF  - TTE 6/2017 with EF 50-55%, grade II diastolic dysfunction, mild aortic regurgitation  - On home Coreg 25 and Lasix 20 mg  - Follows with outpatient Cardiology  - No acute issues on this admission  - Holding Lasix at this time as patient does not appear volume overload.     Hypertension  - Home medications of Coreg 25 and Norvasc 5 mg  - Hold home meds as patient does not have PEG tube access     Hyperlipidemia   - Home medicine of Atorvastatin       Depression  - On home Venlafaxine 75 mg daily  - Held as patient does not have PEG tube access at this time      Arturo Oliveira  [facility] Internal Medicine MS4    [facility] Medicine Hospitalist Pager numbers:   [facility] Hospitalist Medicine Team A (Catherine/Yessica): [phone]  [facility] Hospitalist Medicine Team B (Farhat/Leila):  [phone]

## 2019-09-11 NOTE — PLAN OF CARE
Future Appointments   Date Time Provider Department Center   11/4/2019 11:15 AM Anila Kolb MD [facility] GASTRO Jennifer Adam     Spoke to Jena at [facility] and she is setting up ambulance transportation back to [facility].  Gave packet to nurse to call report.    Discharge rounds on patient. Discussed followup appointments, blue discharge folder, discharge nurse will go over home medications and reasons for medications and final discharge instructions. All patient/caregiver questions answered. Patient verbalized understanding.         09/11/19 6185   Final Note   Assessment Type Final Discharge Note   Anticipated Discharge Disposition [facility] Follow Up  Appt(s) scheduled? No   Discharge plans and expectations educations in teach back method with documentation complete? Yes   Right Care Referral Info   Post Acute Recommendation Other   Referral Type [facility]    Facility Name [facility]     Gladys De La Cruz, RN, CCM, CMSRN  RN Transition Navigator  [phone]

## 2019-09-11 NOTE — PLAN OF CARE
Problem: Adult Inpatient Plan of Care  Goal: Plan of Care Review  Outcome: Ongoing (interventions implemented as appropriate)     09/10/19 2032   Plan of Care Review   Plan of Care Reviewed With patient;daughter     Pt AAO x 4.  VSS.  Pt remained afebrile throughout this shift.   Pt remained free of falls this shift.   Pt c/o pain this shift.  PRN analgesics administered as ordered.   Plan of care reviewed. Patient daughter verbalizes understanding.   Turn pt every 2 hours.  Bed low, side rails up x 3, wheels locked, call light in reach.   Pt family member remained at bedside most of shift.   Bed alarm maintained for safety.   Patient instructed to call for assistance.   Hourly rounding completed.   Will continue to monitor.

## 2019-09-11 NOTE — PLAN OF CARE
[facility]    FACILITY TRANSFER ORDERS      Patient Name: Elizabeth Navarrete  YOB: 1928    PCP: [facility]   PCP Address: [address] [place] [address] RD / [address] [id number]  PCP Phone Number: [phone]  PCP Fax: [phone]    Encounter Date: 09/11/2019    Admit to: 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 [Z93.1]  Not Applicable     Chronic    PEG (percutaneous endoscopic gastrostomy) adjustment/replacement/removal [Z43.1]  Not Applicable    Hemiparesis affecting left side as late effect of stroke [I69.354]  Not Applicable     Chronic    Chronic atrial fibrillation [I48.2]  Yes     Chronic    Essential hypertension [I10]  Yes     Chronic    Chronic diastolic congestive heart failure [I50.32]  Yes     Chronic     Echo 6/98:  LVEF 15-20%      CAD (coronary artery disease) [I25.10]  Yes    History of depression [Z86.59]  Not Applicable      Resolved Hospital Problems    Diagnosis Date Resolved POA    Complication of gastrostomy tube [K94.20] 09/11/2019 Yes     Allergies:  Review of patient's allergies indicates:   Allergen Reactions    Pcn [penicillins] Rash     Diet:    -Isosource 1.5 initiated @ 10 ml/hr and advanced 10 ml q 4 hrs to goal rate of 40 ml/hr.   -Fluid flushes 175 mL QID. TF to provide: 1440 kcal, 65 g pro, 746 ml free water.   -Hold TF for n/v/abd discomfort, residuals >500 ml.     Activities: Up with assistance    Nursing: Needs assistance with all ADLs    Labs:   - CBC [id number]  - BMP [id number]   - Mg [id number]    CONSULTS:    None    MISCELLANEOUS CARE:  PEG Care: Clean site every 24 hours.   - Flush PEG Tube with 120 mLs of Water q4H   - PEG Tube stoma cleanse with NS, pat dry, apply Iodosorb, cover with T-Drain secure with Tape QD & PRN until resolved    WOUND CARE ORDERS  Yes: Diffuse skin ulcers to legs, abdomen, chest, back, and hips   - Left Calf - cleanse  with NS. Apply Santyl & Adaptic ABD Pads, Kerlix secure w/ tape QD and PRN until resolved  - Right Shin - Cleanse w/ Wound Cleanser, apply Xeroform abd pad, Kerlix secure w/ Tape QOD and PRN until resolved  - Right Hip - Cleanse w/ NS. Pat dry, apply Clobetasol ointment BID (AM per TX Nurse, PM per night Nurse) DCD secure w/ Tape BID and PRN until resolved     Medications: Review discharge medications with patient and family and provide education.      Current Discharge Medication List      CONTINUE these medications which have CHANGED    Details   atorvastatin (LIPITOR) 40 MG tablet 1 tablet (40 mg total) by Per G Tube route once daily.  Qty: 90 tablet, Refills: 3      clopidogrel (PLAVIX) 75 mg tablet 1 tablet (75 mg total) by Per G Tube route once daily.  Qty: 90 tablet, Refills: 3      ketoconazole (NIZORAL) 2 % shampoo Apply topically twice a week. Apply twice weekly with showers until seborrheic dermatitis has resolved  Qty: 120 mL, Refills: 1         CONTINUE these medications which have NOT CHANGED    Details   albuterol (PROVENTIL/VENTOLIN HFA) 90 mcg/actuation inhaler Inhale 1-2 puffs into the lungs every 4 (four) hours as needed for Wheezing. Rescue  Qty: 1 Inhaler, Refills: 0      amLODIPine (NORVASC) 5 MG tablet 5 mg by Per G Tube route once daily.      aspirin 325 MG tablet 325 mg by Per G Tube route once daily.      carvedilol (COREG) 25 MG tablet 25 mg by Per G Tube route 2 (two) times daily.      cholecalciferol, vitamin D3, (VITAMIN D3) 1,000 unit capsule 1,000 Units by Per G Tube route once daily.       diclofenac sodium (VOLTAREN) 1 % Gel Apply 4 g topically 4 (four) times daily as needed.  Qty: 500 g, Refills: 2      ferrous sulfate 325 (65 FE) MG EC tablet Take 325 mg by mouth 3 (three) times daily with meals.      fish oil-omega-3 fatty acids 300-1,000 mg capsule 1 g by Per G Tube route once daily.       furosemide (LASIX) 20 MG tablet Take 1 tablet (20 mg total) by mouth once daily. for 5  days  Qty: 30 tablet, Refills: 0      glucosamine-chondroitin 500-400 mg tablet Take 1 tablet by mouth 3 (three) times daily.      mupirocin (BACTROBAN) 2 % ointment Apply topically once daily. Apply to left upper back wound, cover with telfa island      venlafaxine (EFFEXOR) 75 MG tablet 75 mg by Per G Tube route once daily.                  _________________________________  Gerald Chisholm MD  09/11/2019

## 2019-09-11 NOTE — CONSULTS
Wound consult for multiple wounds.    Pt with multiple shallow open ulcers on both legs,  both hips and arms. 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     Wounds cleaned and covered with dry clean dressing.     Recommended to pt and pt daughter to make appointment with wound care center if needed in the future.

## 2019-09-11 NOTE — PLAN OF CARE
Report given to CELINA Galindo at [facility].  Nurse verbalized understanding.  PIV removed.  Tip intact.  Pt tolerated well.  Telemetry removed and returned to monitor tech.  [facility] transport here to  pt.

## 2019-09-11 NOTE — PLAN OF CARE
VN rounds: VN cued into pt's room with pt's permission.daughter at bedside. Pt being seen by wound care nurse.  Fall risk protocol discussed with pt. VN instructed pt to call for assistance. Pt aware and agreeable. NAD noted. Allowed time for questions. Patient discharging back to [facility]. Informed daughter and patient that CM will facilitate arrangements. Daughter stated that pt is to receive bolus tube feed prior to leaving to see how she tolerates feed.  Will cont to be available and intervene as needed.     09/11/19 0001   Type of Frequent Check   Type Patient Rounds;Other (see comments)  (vn rounds)   Safety/Activity   Patient Rounds visualized patient;call light in patient/parent reach   Safety Promotion/Fall Prevention instructed to call staff for mobility;Fall Risk reviewed with patient/family   Positioning   Body Position side-lying, left   Positioning/Transfer Devices pillows;in use   Pain/Comfort/Sleep   Preferred Pain Scale word (verbal rating pain scale)   Pain Rating (0-10): Rest 0   Sleep/Rest/Relaxation no problem identified   Assessments (Pre/Post)   Level of Consciousness (AVPU) alert

## 2019-09-11 NOTE — DISCHARGE SUMMARY
[facility] Medicine Discharge Summary    Primary Team: [facility] Hospitalist Team A  Attending Physician: Mervat Alexander MD  Resident: Gerald Chisholm  Intern: Ricardo Billy    Date of Admit: 9/9/2019  Date of Discharge: 9/11/2019    Discharge to: [facility]  Condition: Stable (Improved)     Discharge Diagnoses     Patient Active Problem List   Diagnosis    Essential hypertension    CAD (coronary artery disease)    Chronic diastolic congestive heart failure    History of depression    Personal history of gallstones    Internal carotid artery stenosis    Family history of breast cancer    Primary osteoarthritis of right knee    Chronic atrial fibrillation    Non-rheumatic mitral regurgitation    Enlarged LA (left atrium)    Pulmonary HTN    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    Oropharyngeal dysphagia    Hemiparesis affecting left side as late effect of stroke    Gastrostomy tube dependent, 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     Patient was in her usual state of health, which includes staying at [facility], completely bed bound with bowel incontinence, with all feeds and meds through PEG tube, until today when patient was noted to have a clogged PEG tube. Unable to flush at nursing home. Last normal yesterday. No abdominal pain, purulent discharge, nausea, vomiting, diarrhea, constipation, chest pain, or shortness of breath.     For the full HPI please refer to the History & Physical from this  admission.    Hospital Course By Problem with Pertinent Findings     Pt has been afebrile with stable vital signs and no complaints during hospital stay. She has denied fever, chills, nausea, vomiting or pain anywhere in body. She presented to hospital with scabs and ulcers already present over her chest, back and legs -- most prominent on the posterior left leg and anterior right leg. Interventional Radiology was consulted who removed and replaced her old clogged PEG last night (9/10/19). Her new PEG is covered with a clean bandage with no discharge, erythema or tenderness. Pt's home meds have been resumed through PEG Tube. Pt has no complaints and says she's ready to return home to [facility].     Specific problem list addressed    Clogged PEG tube- resolved  - Clogged PEG tube x 1 day. ED unable to clear it out. Last seen by Dr. Kolb for cleaning on 7/3/2019  - IR replaced yesterday  - Home meds and feeds resumed     Hypovolemia- resolved  - BUN 39 and Cr 0.8  - Secondary to lack of PEG tube feeds  - Improved with IVF. Tube feeds to be resumed to day.      Diffuse stage 3 skin ulceration  - Stage 3 ulcers noted on left posterior thigh and leg, right anterior leg, bilateral hips, chest, back, and face.   - Receives wound care at [facility]  - Wound care consulted  - Patient to resume wound care at [facility]      Hx of CVA sp left sided weakness, dysphagia, and urinary incontinence  - Patient at [facility]. Dependent on all ADLs. Refuses to work with PT and OT at nursing home.  - No acute changes in baseline  - Home meds of , Plavix 75 mg,  and Atorvastatin 40 mg  - Resumed meds following PEG tube replacement.     Bladder mass with history of hematuria and left hydronephrosis   - Patient with hx of hematuria, bladder mass, and left hydronephrosis  - Has followed up with Urology and had lengthy discussions on the likely diagnosis of urothelial carcinoma. Given her poor functional status  and high anesthetic risk, further work-up was not pursued  - No acute issues      Persistent atrial fibrillation  - Home regimen of rate control with Coreg 25 BID  - No anticoagulation on home med list. May be due to fall risk and hx of hematuria  - Resumed meds following PEG tube replacement.     HFrEF  - TTE 6/2017 with EF 50-55%, grade II diastolic dysfunction, mild aortic regurgitation  - On home Coreg 25 and Lasix 20 mg  - Follows with outpatient Cardiology  - Meds resumed following PEG tube replacement.     Hypertension  - Home medications of Coreg 25 and Norvasc 5 mg     Hyperlipidemia   - Continue home medicine of Atorvastatin     Depression  - On home Venlafaxine 75 mg daily      Discharge Medications      Elizabeth Navarrete   Home Medication Instructions SHANNAN:[id number]    Printed on:09/11/19 1234   Medication Information                      albuterol (PROVENTIL/VENTOLIN HFA) 90 mcg/actuation inhaler  Inhale 1-2 puffs into the lungs every 4 (four) hours as needed for Wheezing. Rescue             amLODIPine (NORVASC) 5 MG tablet  5 mg by Per G Tube route once daily.             aspirin 325 MG tablet  325 mg by Per G Tube route once daily.             atorvastatin (LIPITOR) 40 MG tablet               carvedilol (COREG) 25 MG tablet  25 mg by Per G Tube route 2 (two) times daily.             cholecalciferol, vitamin D3, (VITAMIN D3) 1,000 unit capsule  1,000 Units by Per G Tube route once daily.              clopidogrel (PLAVIX) 75 mg tablet               diclofenac sodium (VOLTAREN) 1 % Gel  Apply 4 g topically 4 (four) times daily as needed.             ferrous sulfate 325 (65 FE) MG EC tablet  Take 325 mg by mouth 3 (three) times daily with meals.             fish oil-omega-3 fatty acids 300-1,000 mg capsule  1 g by Per G Tube route once daily.              furosemide (LASIX) 20 MG tablet  Take 1 tablet (20 mg total) by mouth once daily. for 5 days             glucosamine-chondroitin 500-400 mg tablet  Take 1  tablet by mouth 3 (three) times daily.             ketoconazole (NIZORAL) 2 % shampoo               mupirocin (BACTROBAN) 2 % ointment  Apply topically once daily. Apply to left upper back wound, cover with telfa island             venlafaxine (EFFEXOR) 75 MG tablet  75 mg by Per G Tube route once daily.                 Discharge Information:   Diet:  PEG Tube feeds   - Isosource 1.5 Cheng Bolus - Give 1 Carton (250 mL) 5x daily to provide 1200CC TFV, 1800 kCal, 82g Protein  - Prostat AWC 30CC per PEG BID for Wound Healing   - Antoinette-Chair with Lap Top Table for positioning & trunk control    Physical Activity:  Normal, as tolerated             Instructions:  1. Take all medications as prescribed  2. Keep all follow-up appointments  3. Return to the hospital or call your primary care physicians if any worsening symptoms such as fever, chest pain, shortness of breath, return of symptoms, or any other concerns.    Follow-Up Appointments:  None    Arturo Oliveira MS4  and  Gerald Chisholm MD  [facility], [facility]

## 2019-09-11 NOTE — PLAN OF CARE
Sent discharge orders to [facility]. Left message for Jena at [facility] for OK to return and transportation setup       09/11/19 1402   Post-Acute Status   Post-Acute Authorization Placement   Post-Acute Placement Status Pending Post-Acute Clinical Review     Gladys De La Cruz, RN, CCM, CMSRN  RN Transition Navigator  [phone]

## 2019-09-11 NOTE — PROGRESS NOTES
[facility] Medicine Progress Note    Primary Team: [facility] Hospitalist Team A  Attending Physician: Mervat Alexander MD  Resident: Dr. Chisholm  Intern: Dr. Billy    Subjective:      Patient doing well this morning. No pain. Toleratied procedure yesterday without issues. New PEG tube is being used without issues. No nausea, vomiting, abdominal pain, fevers, or chills. Requesting mouth swabs.      Objective:     Last 24 Hour Vital Signs:  BP  Min: 126/72  Max: 164/81  Temp  Av.4 °F (36.9 °C)  Min: 98 °F (36.7 °C)  Max: 98.9 °F (37.2 °C)  Pulse  Av.4  Min: 60  Max: 106  Resp  Av.2  Min: 17  Max: 20  SpO2  Av.5 %  Min: 94 %  Max: 96 %  Weight  Av.6 kg (133 lb 9.6 oz)  Min: 60.6 kg (133 lb 9.6 oz)  Max: 60.6 kg (133 lb 9.6 oz)  I/O last 3 completed shifts:  In: 632.5 [I.V.:632.5]  Out: 577 [Urine:577]    Physical Examination:  GEN- AOx3, NAD, laying comfortably in bed  Eyes: PERRL, EOMI  Oral mucosa: Clear, no lesions, moist mucous membranes   Face: Scattered superficial lesions, no surrounding erythema   CARDIAC- RRR, no murmurs or rubs  RESP- CTAB, normal work of breathing, no wheezes, crackles, or rhonchi  ABD- Soft, NT, ND, +BS; no masses or HSM. PEG tube in place, appeared clogged. Surrounding erythema at base with no discharge. No tenderness  EXT- No clubbing, cyanosis, or edema; 2+ DP/PT pulses  NEURO- CN II-XII grossly intact; moves all four extremities equally  Skin:- Diffuse sacbs and ulcers over chest, back, and legs, grade 3 ulcers on posterior left leg and anterior right leg    Laboratory:  Trended Lab Data:   Recent Labs   Lab 19  1900 09/10/19  1021   WBC 7.37 7.60   HGB 13.0 12.4   HCT 41.4 39.4   * 141*   MCV 93 92   RDW 16.7* 16.6*    144   K 3.9 4.0    111*   CO2 25 26   BUN 39* 26   CREATININE 0.8 0.7   * 151*   CALCIUM 9.8 9.4   PROT 7.1 6.5   ALBUMIN 3.3* 3.1*   AST 29 26   ALT 17 17   ALKPHOS 93 84   BILITOT 1.0 1.1*      Current  Medications:     amLODIPine  5 mg Per G Tube Daily    aspirin  325 mg Per G Tube Daily    atorvastatin  40 mg Per G Tube Daily    carvedilol  25 mg Per G Tube BID    clopidogrel  75 mg Per G Tube Daily    venlafaxine  75 mg Per G Tube Daily        PRN:  albuterol, influenza, sodium chloride 0.9%    Assessment:     Elizabeth Navarrete is a 89 y.o.female with  Patient Active Problem List    Diagnosis Date Noted    Complication of gastrostomy tube 09/09/2019    PEG (percutaneous endoscopic gastrostomy) adjustment/replacement/removal 06/12/2018    Nursing home resident 06/23/2017    Gastrostomy tube dependent, 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 06/21/2017    Oropharyngeal dysphagia 06/16/2017    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 06/15/2017    Hemiparesis affecting left side as late effect of stroke 06/15/2017    Chronic atrial fibrillation 09/06/2016    Non-rheumatic mitral regurgitation 09/06/2016    Enlarged LA (left atrium) 09/06/2016    Pulmonary HTN 09/06/2016    Primary osteoarthritis of right knee 11/25/2015    Essential hypertension 10/10/2012    CAD (coronary artery disease) 10/10/2012    Chronic diastolic congestive heart failure 10/10/2012    History of depression 10/10/2012    Personal history of gallstones 10/10/2012    Internal carotid artery stenosis 10/10/2012    Family history of breast cancer 10/10/2012        Plan:     Clogged PEG tube- resolved  - Clogged PEG tube x 1 day. ED unable to clear it out. Last seen by Dr. Kolb for cleaning on 7/3/2019  - IR replaced yesterday  - Home meds and feeds resumed     Hypovolemia- resolved  - BUN 39 and Cr 0.8  - Secondary to lack of PEG tube feeds  - Improved with IVF. Tube feeds to be resumed to day.      Diffuse stage 3 skin ulceration  - Stage 3 ulcers noted on left posterior thigh and leg, right anterior leg, bilateral hips, chest, back, and face.   - Receives wound care at [facility]  - Wound care consulted  - Patient to resume  wound care at [facility]      Hx of CVA sp left sided weakness, dysphagia, and urinary incontinence  - Patient at [facility]. Dependent on all ADLs. Refuses to work with PT and OT at nursing home.  - No acute changes in baseline  - Home meds of , Plavix 75 mg,  and Atorvastatin 40 mg  - Resumed meds following PEG tube replacement.     Bladder mass with history of hematuria and left hydronephrosis   - Patient with hx of hematuria, bladder mass, and left hydronephrosis  - Has followed up with Urology and had lengthy discussions on the likely diagnosis of urothelial carcinoma. Given her poor functional status and high anesthetic risk, further work-up was not pursued  - No acute issues      Persistent atrial fibrillation  - Home regimen of rate control with Coreg 25 BID  - No anticoagulation on home med list. May be due to fall risk and hx of hematuria  - Resumed meds following PEG tube replacement.     HFrEF  - TTE 6/2017 with EF 50-55%, grade II diastolic dysfunction, mild aortic regurgitation  - On home Coreg 25 and Lasix 20 mg  - Follows with outpatient Cardiology  - Meds resumed following PEG tube replacement.    Hypertension  - Home medications of Coreg 25 and Norvasc 5 mg  - Resumed following PEG tube replacement. BP well controlled      Hyperlipidemia   - Continue home medicine of Atorvastatin     Depression  - On home Vnelafaxine 75 mg daily  - Resumed today     Code: Full  Ppx: None due hematuria  Diet: Feeds and meds via G tube  Dispo: Expect discharge today     Gerald Chisholm MD  [facility] Internal Medicine HO-II    [facility] Medicine Hospitalist Pager numbers:   [facility] Hospitalist Medicine Team A (Catherine/Yessica): [phone]  [facility] Hospitalist Medicine Team B (Farhat/Leila):  [phone]

## 2019-10-24 PROBLEM — J18.9 PNEUMONIA: Status: ACTIVE | Noted: 2019-01-01

## 2019-10-25 PROBLEM — R09.02 HYPOXIA: Status: ACTIVE | Noted: 2019-01-01

## 2019-10-25 PROBLEM — D64.9 ANEMIA: Status: ACTIVE | Noted: 2019-01-01

## 2019-10-25 PROBLEM — E11.9 CONTROLLED TYPE 2 DIABETES MELLITUS, WITHOUT LONG-TERM CURRENT USE OF INSULIN: Chronic | Status: ACTIVE | Noted: 2019-01-01

## 2019-10-25 PROBLEM — S81.809A MULTIPLE OPEN WOUNDS OF LOWER LEG: Status: ACTIVE | Noted: 2019-01-01

## 2019-10-25 PROBLEM — L98.499: Status: ACTIVE | Noted: 2019-01-01

## 2019-10-25 NOTE — SUBJECTIVE & OBJECTIVE
Interval History: She is in bed resting. She is oriented to person and place; reoriented her back to time. No family present. Will follow.     Review of Systems   Constitutional: Positive for activity change and appetite change. Negative for chills, fatigue and fever.   HENT: Negative for trouble swallowing.    Respiratory: Negative for cough, shortness of breath and wheezing.    Cardiovascular: Negative for chest pain, palpitations and leg swelling.   Gastrointestinal: Negative for abdominal distention, blood in stool, diarrhea, nausea and vomiting.        Peg tube   Genitourinary: Negative for difficulty urinating and hematuria.   Musculoskeletal: Positive for gait problem. Negative for arthralgias and myalgias.   Skin: Positive for wound (bilateral upper ext).   Neurological: Positive for weakness. Negative for dizziness, tremors, facial asymmetry, light-headedness and headaches.   Hematological: Bruises/bleeds easily.   Psychiatric/Behavioral: Positive for confusion (reoriented to the year). Negative for agitation and hallucinations. The patient is not nervous/anxious.      Objective:     Vital Signs (Most Recent):  Temp: 97.7 °F (36.5 °C) (10/25/19 0759)  Pulse: 73 (10/25/19 0759)  Resp: 18 (10/25/19 0759)  BP: 137/63 (10/25/19 0759)  SpO2: 99 % (10/25/19 0759) Vital Signs (24h Range):  Temp:  [97.7 °F (36.5 °C)-99.4 °F (37.4 °C)] 97.7 °F (36.5 °C)  Pulse:  [51-96] 73  Resp:  [17-33] 18  SpO2:  [94 %-99 %] 99 %  BP: (117-173)/(58-78) 137/63     Weight: 58.5 kg (128 lb 15.5 oz)  Body mass index is 24.37 kg/m².    Intake/Output Summary (Last 24 hours) at 10/25/2019 1038  Last data filed at 10/25/2019 0500  Gross per 24 hour   Intake 550 ml   Output 1100 ml   Net -550 ml      Physical Exam   Constitutional: She appears well-developed and well-nourished.   HENT:   Head: Normocephalic and atraumatic.   Eyes: Pupils are equal, round, and reactive to light.   Neck: Normal range of motion. No JVD present.    Cardiovascular: Intact distal pulses. An irregular rhythm present.   No murmur heard.  Pulmonary/Chest: Effort normal. No respiratory distress. She has no wheezes.   Decreased BS   Abdominal: Soft. Bowel sounds are normal. She exhibits no distension. There is no tenderness. There is no guarding.   PEG tube to right abd    Musculoskeletal: Normal range of motion. She exhibits no edema or tenderness.   Neurological: She is alert.   Skin: Skin is warm and dry. Capillary refill takes less than 2 seconds.   multiple scabs and small sores to the bilateral upper ext   Psychiatric: She has a normal mood and affect. Her behavior is normal. Judgment and thought content normal.   Nursing note and vitals reviewed.      Significant Labs:   BMP:   Recent Labs   Lab 10/25/19  0528   *      K 3.6      CO2 30*   BUN 32*   CREATININE 0.7   CALCIUM 8.8   MG 1.6     CBC:   Recent Labs   Lab 10/24/19  2120 10/25/19  0528   WBC 13.84* 10.09   HGB 11.4* 10.3*   HCT 37.3 33.4*    163     Magnesium:   Recent Labs   Lab 10/25/19  0528   MG 1.6       Significant Imaging: I have reviewed all pertinent imaging results/findings within the past 24 hours.

## 2019-10-25 NOTE — ED PROVIDER NOTES
"Encounter Date: 10/24/2019       History     Chief Complaint   Patient presents with    PEG tube malfunction     Pt presents from [facility] facilty with clogged and leaking PEG tube; RN caring for pt at [facility] also states patient had low O2 sats 88% RA and wheezing once placed on 4L NC O2 sats now 97%.     Shortness of Breath     PAtient denies SOB however family member states she is breathing "heavier" than normal      Patient is a 89-year-old female with a past medical history of coronary artery disease, stroke, congestive heart failure, s/p PEG tube placement who was brought in from [facility] for a PEG tube malfunction.  Nursing staff states that PEG tube is leaking around the insertion site and is also clogged.  It was also reported that patient was having low oxygen saturations prior to arrival.  Reportedly she was 88% on room air.  She was placed on 4 L nasal cannula with improvement of her oxygen saturations.  Daughter is at bedside and states that patient is not on supplemental oxygen at home.  Denies any other recent associated symptoms.  Denies fever, vomiting, abdominal pain, chest pain, dysuria, hematuria.        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    Family history of breast cancer 10/10/2012    Hematuria, gross     History of cardiac arrhythmia 10/10/2012    History of CHF (congestive heart failure) 10/10/2012    History of depression 10/10/2012    History of echocardiogram 10/10/2012    HTN (hypertension) 10/10/2012    Internal carotid artery stenosis 10/10/2012    Personal history of gallstones 10/10/2012    Urinary tract infection      Past Surgical " History:   Procedure Laterality Date    BREAST LUMPECTOMY      ESOPHAGOGASTRODUODENOSCOPY N/A 6/12/2018    Procedure: ESOPHAGOGASTRODUODENOSCOPY (EGD);  Surgeon: Anila Kolb MD;  Location: [facility];  Service: Endoscopy;  Laterality: N/A;    ESOPHAGOGASTRODUODENOSCOPY N/A 6/14/2018    Procedure: ESOPHAGOGASTRODUODENOSCOPY (EGD);  Surgeon: Anila Kolb MD;  Location: [facility];  Service: Endoscopy;  Laterality: N/A;    EYE SURGERY      HYSTERECTOMY       Family History   Problem Relation Age of Onset    Cancer Mother         breast    Heart disease Neg Hx      Social History     Tobacco Use    Smoking status: Never Smoker    Smokeless tobacco: Never Used   Substance Use Topics    Alcohol use: No    Drug use: No     Review of Systems   Constitutional: Negative for chills and fever.   HENT: Negative for congestion and sore throat.    Eyes: Negative for discharge.   Respiratory: Positive for shortness of breath. Negative for cough.    Cardiovascular: Negative for chest pain and leg swelling.   Gastrointestinal: Negative for abdominal pain, diarrhea and vomiting.   Genitourinary: Negative for dysuria and hematuria.   Musculoskeletal: Negative for back pain.   Skin: Positive for wound (multiple chronic wounds, managed by dermatology).   Neurological: Negative for headaches.   Psychiatric/Behavioral: Negative for confusion.       Physical Exam     Initial Vitals   BP Pulse Resp Temp SpO2   10/24/19 2036 10/24/19 2036 10/24/19 2036 10/24/19 2100 10/24/19 2036   (!) 152/69 96 20 98.4 °F (36.9 °C) 97 %      MAP       --                Physical Exam    Nursing note and vitals reviewed.  Constitutional: She is not diaphoretic.   Thin elderly female   HENT:   Head: Normocephalic and atraumatic.   Right Ear: External ear normal.   Left Ear: External ear normal.   Nose: Nose normal.   Eyes: EOM are normal.   Cardiovascular: Normal rate, regular rhythm and normal heart sounds.   Pulmonary/Chest: Tachypnea noted.  She has no wheezes. She has no rhonchi. She has no rales.   Patient is belly breathing.    Diminished lung sounds on left.   Abdominal: Soft. There is no tenderness.   PEG tube to right abd with erythema surrounding insertion site.     Musculoskeletal: She exhibits no edema.   Neurological: She is alert. GCS eye subscore is 4. GCS verbal subscore is 5. GCS motor subscore is 6.   Skin: Skin is warm and dry.   Psychiatric: She has a normal mood and affect.         ED Course   Procedures  Labs Reviewed   CBC W/ AUTO DIFFERENTIAL - Abnormal; Notable for the following components:       Result Value    WBC 13.84 (*)     RBC 3.94 (*)     Hemoglobin 11.4 (*)     Mean Corpuscular Hemoglobin Conc 30.6 (*)     RDW 15.7 (*)     Gran% 86.0 (*)     Lymph% 1.0 (*)     All other components within normal limits   COMPREHENSIVE METABOLIC PANEL - Abnormal; Notable for the following components:    Glucose 267 (*)     BUN, Bld 37 (*)     Calcium 8.4 (*)     Albumin 2.8 (*)     Total Bilirubin 1.1 (*)     Anion Gap 7 (*)     All other components within normal limits   B-TYPE NATRIURETIC PEPTIDE - Abnormal; Notable for the following components:     (*)     All other components within normal limits   TROPONIN I - Abnormal; Notable for the following components:    Troponin I 0.039 (*)     All other components within normal limits   URINALYSIS, REFLEX TO URINE CULTURE - Abnormal; Notable for the following components:    Protein, UA 1+ (*)     Occult Blood UA 3+ (*)     Nitrite, UA Positive (*)     Leukocytes, UA 3+ (*)     All other components within normal limits    Narrative:     Preferred Collection Type->Urine, Clean Catch   URINALYSIS MICROSCOPIC - Abnormal; Notable for the following components:    RBC, UA 10 (*)     WBC, UA 20 (*)     All other components within normal limits    Narrative:     Preferred Collection Type->Urine, Clean Catch   ISTAT PROCEDURE - Abnormal; Notable for the following components:    POC PO2 57 (*)      POC HCO3 23.6 (*)     POC SATURATED O2 89 (*)     All other components within normal limits   INFLUENZA A & B BY MOLECULAR   CULTURE, URINE   LACTIC ACID, PLASMA        ECG Results          EKG 12-lead (Final result)  Result time 10/25/19 13:13:11    Final result by Interface, Lab In [facility] (10/25/19 13:13:11)                 Narrative:    Test Reason : R06.02,    Vent. Rate : 089 BPM     Atrial Rate : 082 BPM     P-R Int : 000 ms          QRS Dur : 112 ms      QT Int : 390 ms       P-R-T Axes : 000 147 -23 degrees     QTc Int : 474 ms    Atrial fibrillation with premature ventricular or aberrantly conducted  complexes  Right axis deviation  Anterior infarct (cited on or before 18-FEB-2019)  ST and T wave abnormality, consider inferior ischemia  Abnormal ECG  When compared with ECG of 08-MAY-2019 03:39,  Questionable change in The axis  T wave inversion now evident in Inferior leads  Confirmed by Mario TELLO MD, Rios RUFF ([id number]) on 10/25/2019 1:12:52 PM    Referred By: [email or web address]   SELF           Confirmed By:Rios Martin III, MD                            Imaging Results          XR Radiologist Performed NG/Gastric Tube Chedk (Final result)  Result time 10/24/19 22:14:20    Final result by Milton Coulter MD (10/24/19 22:14:20)                 Impression:      Contrast present in the gastric lumen and proximal small bowel to the duodenal jejunal junction.  No extravasated contrast seen.  Small hiatal hernia incidentally noted.      Electronically signed by: Milton Coulter MD  Date:    10/24/2019  Time:    22:14             Narrative:    EXAMINATION:  XR RADIOLOGIST PERFORMED NG/GASTRIC TUBE CHECK    CLINICAL HISTORY:  PEG tube malfunction;    TECHNIQUE:  Single view of the abdomen was performed following administration of unspecified amount of contrast material through the patient's G-tube in the emergency department.    COMPARISON:  June 7, 2018.    FINDINGS:  Contrast is seen in the gastric lumen,  including a small hiatal hernia above the diaphragm, as well as in the duodenum up to the region of the duodenal jejunal junction.  No extravasated contrast identified.    Visualized bowel gas pattern is nonobstructive.  Osseous structures appear similar to prior study.                               X-Ray Chest AP Portable (Final result)  Result time 10/24/19 22:22:38    Final result by Charles Abraham MD (10/24/19 22:22:38)                 Impression:      As above.      Electronically signed by: Charles Abraham MD  Date:    10/24/2019  Time:    22:22             Narrative:    EXAMINATION:  XR CHEST AP PORTABLE    CLINICAL HISTORY:  shortness of breath;    TECHNIQUE:  Single frontal view of the chest was performed.    COMPARISON:  05/18/2019.    FINDINGS:  Heart is stable in size.  There is suspected mild pulmonary edema with small left pleural effusion seen.  Abnormal focal opacification is seen within the lateral aspect of the left mid lung zone.  Findings could reflect developing focal pneumonia in the right clinical setting, although potential neoplastic process is not excluded.  This is new compared to previous radiograph from 05/18/2019.  Future radiographic follow-up is recommended at minimum to ensure resolution, otherwise future CT follow-up may be obtained.  No evidence of pneumothorax.                                 Medical Decision Making:   Initial Assessment:   Patient here with PEG problem and hypoxia  Differential Diagnosis:   PEG tube malfunction, pulmonary infectious process, COPD, asthma, pulmonary embolus and congestive heart failure.    Independently Interpreted Test(s):   I have ordered and independently interpreted EKG Reading(s) - see prior notes  Clinical Tests:   Lab Tests: Ordered and Reviewed  Radiological Study: Ordered and Reviewed  Medical Tests: Ordered and Reviewed  ED Management:  Patient requiring supplemental oxygen.  - PaO2 in ED 57  - CXR with pulmonary edema and developing  PNA  - mildly elevated troponin  - no ischemic changes on EKG    Patient given abx and lasix and will be admitting for further management                     ED Course as of Oct 25 1408   Thu Oct 24, 2019   2046 BP(!): 152/69 [LD]   2046 Pulse: 96 [LD]   2046 Resp: 20 [LD]   2046 SpO2: 97 % [LD]   2130 Placed on 2 liter NC   POC PO2(!!): 57 [LD]   2138 EKG with atrial fibrillation, rate of 96 bpm.  + RAD, + PVCs, No STEMI    [LD]   2240 Leukocytes, UA(!): 3+ [LD]   2240 NITRITE UA(!): Positive [LD]   2240 Occult Blood UA(!): 3+ [LD]   2240 WBC, UA(!): 20 [LD]   2240 Bacteria, UA: Occasional [LD]      ED Course User Index  [LD] Angelita Velazquez MD     Clinical Impression:       ICD-10-CM ICD-9-CM   1. Acute pulmonary edema J81.0 518.4   2. Shortness of breath R06.02 786.05   3. Pneumonia J18.9 486   4. HCAP (healthcare-associated pneumonia) J18.9 486   5. Hypoxemia R09.02 799.02         Disposition:   Disposition: Admitted  Condition: Stable                        Angelita Velazquez MD  10/25/19 1410

## 2019-10-25 NOTE — ASSESSMENT & PLAN NOTE
Clogged PEG tube    -[facility] unclogged in ED  -Last placed 9/9/19  -Tolerated po meds via tube

## 2019-10-25 NOTE — ASSESSMENT & PLAN NOTE
Hypoxia    CXR confirmed pulmonary edema with small left pleural effusion and focal pneumonia.  IV Cefepime and IV Vanc started in ED. Continue  Oxygen as needed to keep sats >92%  Monitor pulse ox every 4 hours and as needed  Duo neb treatments as needed

## 2019-10-25 NOTE — HPI
Elizabeth Navarrete is a 89 y.o. 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 Patient was in her usual state of health, which includes staying at [facility], completely bed bound with bowel incontinence, with all feeds and meds through PEG tube, until today when patient was noted to have a clogged PEG tube. Unable to flush at nursing home. ED workup revealed low oxygen saturation,  She was placed on 4 L nasal cannula, does not use O2 at home.  CXR revealed pulmonary edema with small left pleural effusion and focal pneumonia. Admitted to Hospital Medicine for further evaluation and treatment.

## 2019-10-25 NOTE — PLAN OF CARE
VN called [facility] to inquire if patient had already received yearly flu shot.   stated that no one available to assist with this information at the time and to please call back in the morning.  Will pass on information to oncoming ENRIQUE

## 2019-10-25 NOTE — ASSESSMENT & PLAN NOTE
Stage 3 ulcers to left posterior thigh and leg, right anterior leg, bilateral hips, chest, and back.  Receives wound care at [facility]  Wound care consulted

## 2019-10-25 NOTE — ASSESSMENT & PLAN NOTE
Hypoxia    -CXR confirmed pulmonary edema with small left pleural effusion and focal pneumonia.  -IV Cefepime and IV Vanc started in ED. Continue  -Oxygen as needed to keep sats >92%  -Monitor pulse ox every 4 hours and as needed  -Duo neb treatments as needed

## 2019-10-25 NOTE — PROGRESS NOTES
Pharmacokinetic Initial Assessment: IV Vancomycin    Assessment/Plan:    Initiate intravenous vancomycin with loading dose of 1750 mg once followed by a maintenance dose of vancomycin 750mg IV every 24 hours  Desired empiric serum trough concentration is 15 to 20 mcg/mL  Draw vancomycin trough level 30 min prior to third dose on 10-26 at approximately 22:30   Pharmacy will continue to follow and monitor vancomycin.      Please contact pharmacy at extension [phone] with any questions regarding this assessment.     Thank you for the consult,   M[place]       Patient brief summary:  Elizabeth Navarrete is a 89 y.o. female initiated on antimicrobial therapy with IV Vancomycin for treatment of suspected lower respiratory infection    Drug Allergies:   Review of patient's allergies indicates:   Allergen Reactions    Pcn [penicillins] Rash       Actual Body Weight:   59.9 kg    Renal Function:   Estimated Creatinine Clearance: 38 mL/min (based on SCr of 0.8 mg/dL).,     Dialysis Method (if applicable):  N/A    CBC (last 72 hours):  Recent Labs   Lab Result Units 10/24/19  2120   WBC K/uL 13.84*   Hemoglobin g/dL 11.4*   Hematocrit % 37.3   Platelets K/uL 188   Gran% % 86.0*   Lymph% % 1.0*   Mono% % 4.0   Eosinophil% % 3.0   Basophil% % 0.0   Differential Method  Automated       Metabolic Panel (last 72 hours):  Recent Labs   Lab Result Units 10/24/19  2120 10/24/19  2156   Sodium mmol/L 140  --    Potassium mmol/L 4.4  --    Chloride mmol/L 107  --    CO2 mmol/L 26  --    Glucose mg/dL 267*  --    Glucose, UA   --  Negative   BUN, Bld mg/dL 37*  --    Creatinine mg/dL 0.8  --    Albumin g/dL 2.8*  --    Total Bilirubin mg/dL 1.1*  --    Alkaline Phosphatase U/L 120  --    AST U/L 36  --    ALT U/L 29  --        Drug levels (last 3 results):  No results for input(s): VANCOMYCINRA, VANCOMYCINPE, VANCOMYCINTR in the last 72 hours.    Microbiologic Results:  Microbiology Results (last 7 days)       Procedure Component Value  Units Date/Time    Blood culture #1 **CANNOT BE ORDERED STAT** [[id number]]     Order Status:  No result Specimen:  Blood     Blood culture #2 **CANNOT BE ORDERED STAT** [[id number]]     Order Status:  No result Specimen:  Blood     Influenza A & B by Molecular [[id number]] Collected:  10/24/19 2132    Order Status:  Completed Specimen:  Nasopharyngeal Swab Updated:  10/24/19 2219     Influenza A, Molecular Negative     Influenza B, Molecular Negative     Flu A & B Source Nasal swab    Urine culture [[id number]] Collected:  10/24/19 2156    Order Status:  No result Specimen:  Urine Updated:  10/24/19 2216

## 2019-10-25 NOTE — ED NOTES
Patient presents to ED with PEG tube clogged and leaking; per Nurse at [facility] pt needs PEG tube for nutrition and medications. When RN arrived in pt's room at [facility] she also heard some wheezing initial 02 sat at [facility] was 88% on RA; EMS arrived with patient on 4L NC patient noted to be sating 97% at this time denies SOB or CP at this time.

## 2019-10-25 NOTE — ED NOTES
"PEG tube flushed at this time no resistance noted tube patent; however some leakage and drainage noted around tube site.PAtient also noted to have some skin breakdown and redness to tube site daughter states she was recently treated for yeast at the site. Patient's daughter states the wounds to patient are from a "skin condition" patient noted to have multiple wounds to extremities. No sacral wound noted.   "

## 2019-10-25 NOTE — ASSESSMENT & PLAN NOTE
Rate controlled with Coreg BID. Continue via PEG tube  No anticoagulation on home med list.  Continuous cardiac monitoring

## 2019-10-25 NOTE — PROGRESS NOTES
[facility] Medicine  Progress Note    Patient Name: Elizabeth Navarrete  MRN: [id number]  Patient Class: IP- Inpatient   Admission Date: 10/24/2019  Length of Stay: 1 days  Attending Physician: Aby Gordon*  Primary Care Provider: [facility]        Subjective:     Principal Problem:Pneumonia        HPI:  Elizabeth Navarrete is a 89 y.o. 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 Patient was in her usual state of health, which includes staying at [facility], completely bed bound with bowel incontinence, with all feeds and meds through PEG tube, until today when patient was noted to have a clogged PEG tube. Unable to flush at nursing home. ED workup revealed low oxygen saturation,  She was placed on 4 L nasal cannula, does not use O2 at home.  CXR revealed pulmonary edema with small left pleural effusion and focal pneumonia. Admitted to Hospital Medicine for further evaluation and treatment.     Overview/Hospital Course:  She was admitted to the hospital medicine service for further care. CXR showed pneumonia. IV cefepime and IV vancomycin given.    Interval History: She is in bed resting. She is oriented to person and place; reoriented her back to time. No family present. Will follow.     Review of Systems   Constitutional: Positive for activity change and appetite change. Negative for chills, fatigue and fever.   HENT: Negative for trouble swallowing.    Respiratory: Negative for cough, shortness of breath and wheezing.    Cardiovascular: Negative for chest pain, palpitations and leg swelling.   Gastrointestinal: Negative for abdominal distention, blood in stool, diarrhea, nausea and vomiting.        Peg tube   Genitourinary: Negative for difficulty urinating and hematuria.   Musculoskeletal: Positive for gait problem. Negative for arthralgias and myalgias.    Skin: Positive for wound (bilateral upper ext).   Neurological: Positive for weakness. Negative for dizziness, tremors, facial asymmetry, light-headedness and headaches.   Hematological: Bruises/bleeds easily.   Psychiatric/Behavioral: Positive for confusion (reoriented to the year). Negative for agitation and hallucinations. The patient is not nervous/anxious.      Objective:     Vital Signs (Most Recent):  Temp: 97.7 °F (36.5 °C) (10/25/19 0759)  Pulse: 73 (10/25/19 0759)  Resp: 18 (10/25/19 0759)  BP: 137/63 (10/25/19 0759)  SpO2: 99 % (10/25/19 0759) Vital Signs (24h Range):  Temp:  [97.7 °F (36.5 °C)-99.4 °F (37.4 °C)] 97.7 °F (36.5 °C)  Pulse:  [51-96] 73  Resp:  [17-33] 18  SpO2:  [94 %-99 %] 99 %  BP: (117-173)/(58-78) 137/63     Weight: 58.5 kg (128 lb 15.5 oz)  Body mass index is 24.37 kg/m².    Intake/Output Summary (Last 24 hours) at 10/25/2019 1038  Last data filed at 10/25/2019 0500  Gross per 24 hour   Intake 550 ml   Output 1100 ml   Net -550 ml      Physical Exam   Constitutional: She appears well-developed and well-nourished.   HENT:   Head: Normocephalic and atraumatic.   Eyes: Pupils are equal, round, and reactive to light.   Neck: Normal range of motion. No JVD present.   Cardiovascular: Intact distal pulses. An irregular rhythm present.   No murmur heard.  Pulmonary/Chest: Effort normal. No respiratory distress. She has no wheezes.   Decreased BS   Abdominal: Soft. Bowel sounds are normal. She exhibits no distension. There is no tenderness. There is no guarding.   PEG tube to right abd    Musculoskeletal: Normal range of motion. She exhibits no edema or tenderness.   Neurological: She is alert.   Skin: Skin is warm and dry. Capillary refill takes less than 2 seconds.   multiple scabs and small sores to the bilateral upper ext   Psychiatric: She has a normal mood and affect. Her behavior is normal. Judgment and thought content normal.   Nursing note and vitals reviewed.      Significant Labs:    BMP:   Recent Labs   Lab 10/25/19  0528   *      K 3.6      CO2 30*   BUN 32*   CREATININE 0.7   CALCIUM 8.8   MG 1.6     CBC:   Recent Labs   Lab 10/24/19  2120 10/25/19  0528   WBC 13.84* 10.09   HGB 11.4* 10.3*   HCT 37.3 33.4*    163     Magnesium:   Recent Labs   Lab 10/25/19  0528   MG 1.6       Significant Imaging: I have reviewed all pertinent imaging results/findings within the past 24 hours.      Assessment/Plan:      * Pneumonia  Hypoxia    -CXR confirmed pulmonary edema with small left pleural effusion and focal pneumonia.  -IV Cefepime and IV Vanc started in ED. Continue  -Oxygen as needed to keep sats >92%  -Monitor pulse ox every 4 hours and as needed  -Duo neb treatments as needed    Skin ulcer, stage 3  -Stage 3 ulcers to left posterior thigh and leg, right anterior leg, bilateral hips, chest, and back.  -Receives wound care at [facility]  -Wound care consulted      Anemia  -H&H stable, monitor  -CBC daily      Nursing home resident  Resident of [facility], plan to return when medically stable.      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   -Turn every 2 hours  -Continue Aspirin, Plavix and Atorvastatin via PEG tube    Chronic atrial fibrillation  -Rate controlled with Coreg BID. Continue via PEG tube  -Continuous cardiac monitoring      History of depression  -Venlafaxine 75 mg via PEG tube daily. Continue       Chronic diastolic congestive heart failure  -Continue Lasix via PEG tube.  -Follows Cardiology outpatient  -2D Echo 6/2017---  CONCLUSIONS     1 - Low normal to mildly depressed left ventricular systolic function (EF 50-55%).     2 - Impaired LV relaxation, elevated  LAP (grade 2 diastolic dysfunction).     3 - The estimated PA systolic pressure is 32 mmHg.     4 - Mild mitral regurgitation.     5 - Trivial tricuspid regurgitation.     6 - Moderate left atrial enlargement.         Essential hypertension  -Continue Coreg and Norvasc via PEG tube  -Monitor BP        VTE Risk Mitigation (From admission, onward)         Ordered     IP VTE HIGH RISK PATIENT  Once      10/25/19 0111     Place sequential compression device  Until discontinued      10/25/19 0111                      Justine Haney NP  Department of Hospital Medicine   [facility]

## 2019-10-25 NOTE — ASSESSMENT & PLAN NOTE
Coronary Artery Disease    Patient lives in Nursing home, dependent with ADLs and mobility.   Turn every 2 hours  Continue Aspirin, Plavix and Atorvastatin via PEG tube

## 2019-10-25 NOTE — ASSESSMENT & PLAN NOTE
-Continue Lasix via PEG tube.  -Follows Cardiology outpatient  -2D Echo 6/2017---  CONCLUSIONS     1 - Low normal to mildly depressed left ventricular systolic function (EF 50-55%).     2 - Impaired LV relaxation, elevated LAP (grade 2 diastolic dysfunction).     3 - The estimated PA systolic pressure is 32 mmHg.     4 - Mild mitral regurgitation.     5 - Trivial tricuspid regurgitation.     6 - Moderate left atrial enlargement.

## 2019-10-25 NOTE — PLAN OF CARE
Met with patient and daughter Laya, [phone] at bedside. Patient is currently a resident of [facility] in [place]. Per daughter patient has been a resident since June, 2017 after having a CVA. Patient is completely dependent of ADL's  and is bed bound. Upon discharge patient will return to [facility]. Patient and daughter aware.    TN  updated whiteboard with contact information. Blue discharge folder and discharge brochure given to patient's daughter Laya. TN instructed patient and daughter to contact TN if they have any further questions or concerns. Updates have been sent to [facility] via [employer].       10/25/19 3590   Discharge Assessment   Assessment Type Discharge Planning Assessment   Confirmed/corrected address and phone number on facesheet? Yes   Assessment information obtained from? Patient;Medical Record;Other  (Laya Valverde)   Expected Length of Stay (days) 3   Prior to hospitilization cognitive status: Alert/Oriented   Prior to hospitalization functional status: Completely Dependent   Current cognitive status: Alert/Oriented   Current Functional Status: Completely Dependent   Lives With facility resident  (Resident at [facility])   Able to Return to Prior Arrangements yes   Is patient able to care for self after discharge? No   Patient's perception of discharge disposition nursing home   Readmission Within the Last 30 Days no previous admission in last 30 days   Patient currently being followed by outpatient case management? No   Patient currently receives any other outside agency services? No   Equipment Currently Used at Home none   Do you have any problems affording any of your prescribed medications? No   Is the patient taking medications as prescribed? yes   Does the patient have transportation home? Yes   Transportation Anticipated agency   Does the patient receive services at [facility]? No   Discharge Plan A Return to nursing home   DME Needed Upon Discharge   none   Patient/Family in Agreement with Plan yes

## 2019-10-25 NOTE — ASSESSMENT & PLAN NOTE
Continue Lasix via PEG tube.  Follows Cardiology outpatient    2D Echo 6/2017    CONCLUSIONS     1 - Low normal to mildly depressed left ventricular systolic function (EF 50-55%).     2 - Impaired LV relaxation, elevated LAP (grade 2 diastolic dysfunction).     3 - The estimated PA systolic pressure is 32 mmHg.     4 - Mild mitral regurgitation.     5 - Trivial tricuspid regurgitation.     6 - Moderate left atrial enlargement.

## 2019-10-25 NOTE — SUBJECTIVE & OBJECTIVE
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    Family history of breast cancer 10/10/2012    Hematuria, gross     History of cardiac arrhythmia 10/10/2012    History of CHF (congestive heart failure) 10/10/2012    History of depression 10/10/2012    History of echocardiogram 10/10/2012    HTN (hypertension) 10/10/2012    Internal carotid artery stenosis 10/10/2012    Personal history of gallstones 10/10/2012    Urinary tract infection        Past Surgical History:   Procedure Laterality Date    BREAST LUMPECTOMY      ESOPHAGOGASTRODUODENOSCOPY N/A 6/12/2018    Procedure: ESOPHAGOGASTRODUODENOSCOPY (EGD);  Surgeon: Anila Kolb MD;  Location: [facility];  Service: Endoscopy;  Laterality: N/A;    ESOPHAGOGASTRODUODENOSCOPY N/A 6/14/2018    Procedure: ESOPHAGOGASTRODUODENOSCOPY (EGD);  Surgeon: Anila Kolb MD;  Location: [facility];  Service: Endoscopy;  Laterality: N/A;    EYE SURGERY      HYSTERECTOMY         Review of patient's allergies indicates:   Allergen Reactions    Pcn [penicillins] Rash       No current facility-administered medications on file prior to encounter.      Current Outpatient Medications on File Prior to Encounter   Medication Sig    acetaminophen (TYLENOL) 160 mg/5 mL Elix Take 650 mg by mouth every 6 (six) hours as needed.    albuterol (PROVENTIL/VENTOLIN HFA) 90 mcg/actuation inhaler Inhale 1-2 puffs into the lungs every 4 (four) hours as needed for Wheezing. Rescue    amLODIPine (NORVASC) 5 MG tablet 5 mg by Per G Tube route once daily.    ascorbic acid, vitamin C, (VITAMIN C) 500 MG tablet 500 mg by Per G Tube route 2 (two) times daily.    aspirin 325 MG tablet 325 mg by Per G Tube route once daily.    atorvastatin (LIPITOR) 40 MG tablet 1 tablet (40 mg  total) by Per G Tube route once daily.    carvedilol (COREG) 25 MG tablet 25 mg by Per G Tube route 2 (two) times daily.    cholecalciferol, vitamin D3, (VITAMIN D3) 1,000 unit capsule 1,000 Units by Per G Tube route once daily.     clobetasol (TEMOVATE) 0.05 % cream Apply topically 2 (two) times daily.    clopidogrel (PLAVIX) 75 mg tablet 1 tablet (75 mg total) by Per G Tube route once daily.    diclofenac sodium (VOLTAREN) 1 % Gel Apply 4 g topically 4 (four) times daily as needed.    diphenhydrAMINE (BENADRYL) 12.5 mg/5 mL elixir Take by mouth every 12 (twelve) hours as needed for Allergies.    docusate (COLACE) 50 mg/5 mL liquid Take 100 mg by mouth 2 (two) times daily.    ferrous sulfate 325 (65 FE) MG EC tablet Take 325 mg by mouth 3 (three) times daily with meals.    fish oil-omega-3 fatty acids 300-1,000 mg capsule 1 g by Per G Tube route once daily.     glucosamine-chondroitin 500-400 mg tablet 1 tablet by Per G Tube route once daily.     ketoconazole (NIZORAL) 2 % shampoo Apply topically twice a week. Apply twice weekly with showers until seborrheic dermatitis has resolved    multivitamin (ONE DAILY MULTIVITAMIN) per tablet 1 tablet by Per G Tube route once daily.    polyethylene glycol (GLYCOLAX) 17 gram PwPk 17 g by Per G Tube route nightly as needed.    venlafaxine (EFFEXOR) 75 MG tablet 75 mg by Per G Tube route once daily.    zinc sulfate (ZINCATE) 220 (50) mg capsule 220 mg by Per G Tube route once daily.    furosemide (LASIX) 20 MG tablet Take 1 tablet (20 mg total) by mouth once daily. for 5 days    [DISCONTINUED] mupirocin (BACTROBAN) 2 % ointment Apply topically once daily. Apply to left upper back wound, cover with telfa island     Family History     Problem Relation (Age of Onset)    Cancer Mother        Tobacco Use    Smoking status: Never Smoker    Smokeless tobacco: Never Used   Substance and Sexual Activity    Alcohol use: No    Drug use: No    Sexual activity: Never      Review of Systems   Constitutional: Negative for diaphoresis and fever.   HENT: Negative for congestion, dental problem, tinnitus and trouble swallowing.    Eyes: Negative for redness and visual disturbance.   Respiratory: Positive for shortness of breath. Negative for choking and wheezing.    Cardiovascular: Negative for chest pain and palpitations.   Gastrointestinal: Negative for abdominal distention, abdominal pain, nausea and vomiting.   Endocrine: Negative for polydipsia and polyuria.   Genitourinary: Negative for difficulty urinating, dyspareunia, flank pain and hematuria.   Musculoskeletal: Negative for arthralgias and gait problem.   Skin: Positive for wound. Negative for color change and pallor.   Allergic/Immunologic: Negative for food allergies.   Neurological: Negative for dizziness, speech difficulty, weakness and headaches.   Psychiatric/Behavioral: Negative for agitation.     Objective:     Vital Signs (Most Recent):  Temp: 98.3 °F (36.8 °C) (10/25/19 0417)  Pulse: 94 (10/25/19 0424)  Resp: 18 (10/25/19 0417)  BP: 130/61 (10/25/19 0417)  SpO2: 96 % (10/25/19 0417) Vital Signs (24h Range):  Temp:  [98.3 °F (36.8 °C)-99.4 °F (37.4 °C)] 98.3 °F (36.8 °C)  Pulse:  [75-96] 94  Resp:  [17-33] 18  SpO2:  [94 %-98 %] 96 %  BP: (117-173)/(58-78) 130/61     Weight: 58.5 kg (128 lb 15.5 oz)  Body mass index is 24.37 kg/m².    Physical Exam   Constitutional: She appears well-developed and well-nourished. No distress.   HENT:   Head: Normocephalic and atraumatic.   Eyes: Pupils are equal, round, and reactive to light.   Neck: Normal range of motion. No JVD present.   Cardiovascular: Regular rhythm.   No murmur heard.  An irregularly irregular rhythm   Pulmonary/Chest: No respiratory distress.   Patient is belly breathing.    Diminished lung sounds on left.    Abdominal: Soft. Bowel sounds are normal. She exhibits no distension.   PEG tube to right abd with erythema surrounding insertion site.      Musculoskeletal: She exhibits no edema or tenderness.   Neurological: She is alert.   Skin: Skin is warm and dry. Capillary refill takes less than 2 seconds. She is not diaphoretic.   Psychiatric: She has a normal mood and affect.         CRANIAL NERVES     CN III, IV, VI   Pupils are equal, round, and reactive to light.       Significant Labs:   ABGs:   Recent Labs   Lab 10/24/19  2119   PH 7.391   PCO2 38.8   HCO3 23.6*   POCSATURATED 89*   BE -1     Bilirubin:   Recent Labs   Lab 10/24/19  2120   BILITOT 1.1*     BMP:   Recent Labs   Lab 10/24/19  2120   *      K 4.4      CO2 26   BUN 37*   CREATININE 0.8   CALCIUM 8.4*     CBC:   Recent Labs   Lab 10/24/19  2120 10/25/19  0528   WBC 13.84* 10.09   HGB 11.4* 10.3*   HCT 37.3 33.4*    163     CMP:   Recent Labs   Lab 10/24/19  2120      K 4.4      CO2 26   *   BUN 37*   CREATININE 0.8   CALCIUM 8.4*   PROT 6.4   ALBUMIN 2.8*   BILITOT 1.1*   ALKPHOS 120   AST 36   ALT 29   ANIONGAP 7*   EGFRNONAA >60     Cardiac Markers:   Recent Labs   Lab 10/24/19  2120   *     Lactic Acid:   Recent Labs   Lab 10/24/19  2120   LACTATE 0.9     Troponin:   Recent Labs   Lab 10/24/19  2120   TROPONINI 0.039*     Urine Studies:   Recent Labs   Lab 10/24/19  2156   COLORU Yellow   APPEARANCEUA Clear   PHUR 7.0   SPECGRAV 1.010   PROTEINUA 1+*   GLUCUA Negative   KETONESU Negative   BILIRUBINUA Negative   OCCULTUA 3+*   NITRITE Positive*   UROBILINOGEN 1.0   LEUKOCYTESUR 3+*   RBCUA 10*   WBCUA 20*   BACTERIA Occasional   HYALINECASTS 0     All pertinent labs within the past 24 hours have been reviewed.    Significant Imaging: I have reviewed all pertinent imaging results/findings within the past 24 hours.     Imaging Results          XR Radiologist Performed NG/Gastric Tube Chedk (Final result)  Result time 10/24/19 22:14:20    Final result by Milton Coulter MD (10/24/19 22:14:20)                 Impression:      Contrast  present in the gastric lumen and proximal small bowel to the duodenal jejunal junction.  No extravasated contrast seen.  Small hiatal hernia incidentally noted.      Electronically signed by: Milton Coulter MD  Date:    10/24/2019  Time:    22:14             Narrative:    EXAMINATION:  XR RADIOLOGIST PERFORMED NG/GASTRIC TUBE CHECK    CLINICAL HISTORY:  PEG tube malfunction;    TECHNIQUE:  Single view of the abdomen was performed following administration of unspecified amount of contrast material through the patient's G-tube in the emergency department.    COMPARISON:  June 7, 2018.    FINDINGS:  Contrast is seen in the gastric lumen, including a small hiatal hernia above the diaphragm, as well as in the duodenum up to the region of the duodenal jejunal junction.  No extravasated contrast identified.    Visualized bowel gas pattern is nonobstructive.  Osseous structures appear similar to prior study.                               X-Ray Chest AP Portable (Final result)  Result time 10/24/19 22:22:38    Final result by Charles Abraham MD (10/24/19 22:22:38)                 Impression:      As above.      Electronically signed by: Charles Abraham MD  Date:    10/24/2019  Time:    22:22             Narrative:    EXAMINATION:  XR CHEST AP PORTABLE    CLINICAL HISTORY:  shortness of breath;    TECHNIQUE:  Single frontal view of the chest was performed.    COMPARISON:  05/18/2019.    FINDINGS:  Heart is stable in size.  There is suspected mild pulmonary edema with small left pleural effusion seen.  Abnormal focal opacification is seen within the lateral aspect of the left mid lung zone.  Findings could reflect developing focal pneumonia in the right clinical setting, although potential neoplastic process is not excluded.  This is new compared to previous radiograph from 05/18/2019.  Future radiographic follow-up is recommended at minimum to ensure resolution, otherwise future CT follow-up may be obtained.  No evidence of  pneumothorax.

## 2019-10-25 NOTE — H&P
"[facility] Medicine  History & Physical    Patient Name: Elizabeth Navarrete  MRN: [id number]  Admission Date: 10/24/2019  Attending Physician: Aby Gordon*   Primary Care Provider: [facility]         Patient information was obtained from patient, past medical records and ER records.     Subjective:     Principal Problem:Pneumonia    Chief Complaint:   Chief Complaint   Patient presents with    PEG tube malfunction     Pt presents from [facility] nursing facilty with clogged and leaking PEG tube; RN caring for pt at [facility] also states patient had low O2 sats 88% RA and wheezing once placed on 4L NC O2 sats now 97%.     Shortness of Breath     PAtient denies SOB however family member states she is breathing "heavier" than normal         HPI: Elizabeth Navarrete is a 89 y.o. 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 Patient was in her usual state of health, which includes staying at [facility], completely bed bound with bowel incontinence, with all feeds and meds through PEG tube, until today when patient was noted to have a clogged PEG tube. Unable to flush at nursing home. ED workup revealed low oxygen saturation,  She was placed on 4 L nasal cannula, does not use O2 at home.  CXR revealed pulmonary edema with small left pleural effusion and focal pneumonia. Admitted to Hospital Medicine for further evaluation and treatment.     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    Family history of breast cancer 10/10/2012    Hematuria, gross     History of cardiac arrhythmia 10/10/2012    History of CHF (congestive heart failure) 10/10/2012    History " of depression 10/10/2012    History of echocardiogram 10/10/2012    HTN (hypertension) 10/10/2012    Internal carotid artery stenosis 10/10/2012    Personal history of gallstones 10/10/2012    Urinary tract infection        Past Surgical History:   Procedure Laterality Date    BREAST LUMPECTOMY      ESOPHAGOGASTRODUODENOSCOPY N/A 6/12/2018    Procedure: ESOPHAGOGASTRODUODENOSCOPY (EGD);  Surgeon: Anila Kolb MD;  Location: [facility] ENDO;  Service: Endoscopy;  Laterality: N/A;    ESOPHAGOGASTRODUODENOSCOPY N/A 6/14/2018    Procedure: ESOPHAGOGASTRODUODENOSCOPY (EGD);  Surgeon: Anila Kolb MD;  Location: [facility] ENDO;  Service: Endoscopy;  Laterality: N/A;    EYE SURGERY      HYSTERECTOMY         Review of patient's allergies indicates:   Allergen Reactions    Pcn [penicillins] Rash       No current facility-administered medications on file prior to encounter.      Current Outpatient Medications on File Prior to Encounter   Medication Sig    acetaminophen (TYLENOL) 160 mg/5 mL Elix Take 650 mg by mouth every 6 (six) hours as needed.    albuterol (PROVENTIL/VENTOLIN HFA) 90 mcg/actuation inhaler Inhale 1-2 puffs into the lungs every 4 (four) hours as needed for Wheezing. Rescue    amLODIPine (NORVASC) 5 MG tablet 5 mg by Per G Tube route once daily.    ascorbic acid, vitamin C, (VITAMIN C) 500 MG tablet 500 mg by Per G Tube route 2 (two) times daily.    aspirin 325 MG tablet 325 mg by Per G Tube route once daily.    atorvastatin (LIPITOR) 40 MG tablet 1 tablet (40 mg total) by Per G Tube route once daily.    carvedilol (COREG) 25 MG tablet 25 mg by Per G Tube route 2 (two) times daily.    cholecalciferol, vitamin D3, (VITAMIN D3) 1,000 unit capsule 1,000 Units by Per G Tube route once daily.     clobetasol (TEMOVATE) 0.05 % cream Apply topically 2 (two) times daily.    clopidogrel (PLAVIX) 75 mg tablet 1 tablet (75 mg total) by Per G Tube route once daily.    diclofenac sodium (VOLTAREN) 1 %  Gel Apply 4 g topically 4 (four) times daily as needed.    diphenhydrAMINE (BENADRYL) 12.5 mg/5 mL elixir Take by mouth every 12 (twelve) hours as needed for Allergies.    docusate (COLACE) 50 mg/5 mL liquid Take 100 mg by mouth 2 (two) times daily.    ferrous sulfate 325 (65 FE) MG EC tablet Take 325 mg by mouth 3 (three) times daily with meals.    fish oil-omega-3 fatty acids 300-1,000 mg capsule 1 g by Per G Tube route once daily.     glucosamine-chondroitin 500-400 mg tablet 1 tablet by Per G Tube route once daily.     ketoconazole (NIZORAL) 2 % shampoo Apply topically twice a week. Apply twice weekly with showers until seborrheic dermatitis has resolved    multivitamin (ONE DAILY MULTIVITAMIN) per tablet 1 tablet by Per G Tube route once daily.    polyethylene glycol (GLYCOLAX) 17 gram PwPk 17 g by Per G Tube route nightly as needed.    venlafaxine (EFFEXOR) 75 MG tablet 75 mg by Per G Tube route once daily.    zinc sulfate (ZINCATE) 220 (50) mg capsule 220 mg by Per G Tube route once daily.    furosemide (LASIX) 20 MG tablet Take 1 tablet (20 mg total) by mouth once daily. for 5 days    [DISCONTINUED] mupirocin (BACTROBAN) 2 % ointment Apply topically once daily. Apply to left upper back wound, cover with telfa island     Family History     Problem Relation (Age of Onset)    Cancer Mother        Tobacco Use    Smoking status: Never Smoker    Smokeless tobacco: Never Used   Substance and Sexual Activity    Alcohol use: No    Drug use: No    Sexual activity: Never     Review of Systems   Constitutional: Negative for diaphoresis and fever.   HENT: Negative for congestion, dental problem, tinnitus and trouble swallowing.    Eyes: Negative for redness and visual disturbance.   Respiratory: Positive for shortness of breath. Negative for choking and wheezing.    Cardiovascular: Negative for chest pain and palpitations.   Gastrointestinal: Negative for abdominal distention, abdominal pain, nausea and  vomiting.   Endocrine: Negative for polydipsia and polyuria.   Genitourinary: Negative for difficulty urinating, dyspareunia, flank pain and hematuria.   Musculoskeletal: Negative for arthralgias and gait problem.   Skin: Positive for wound. Negative for color change and pallor.   Allergic/Immunologic: Negative for food allergies.   Neurological: Negative for dizziness, speech difficulty, weakness and headaches.   Psychiatric/Behavioral: Negative for agitation.     Objective:     Vital Signs (Most Recent):  Temp: 98.3 °F (36.8 °C) (10/25/19 0417)  Pulse: 94 (10/25/19 0424)  Resp: 18 (10/25/19 0417)  BP: 130/61 (10/25/19 0417)  SpO2: 96 % (10/25/19 0417) Vital Signs (24h Range):  Temp:  [98.3 °F (36.8 °C)-99.4 °F (37.4 °C)] 98.3 °F (36.8 °C)  Pulse:  [75-96] 94  Resp:  [17-33] 18  SpO2:  [94 %-98 %] 96 %  BP: (117-173)/(58-78) 130/61     Weight: 58.5 kg (128 lb 15.5 oz)  Body mass index is 24.37 kg/m².    Physical Exam   Constitutional: She appears well-developed and well-nourished. No distress.   HENT:   Head: Normocephalic and atraumatic.   Eyes: Pupils are equal, round, and reactive to light.   Neck: Normal range of motion. No JVD present.   Cardiovascular: Regular rhythm.   No murmur heard.  An irregularly irregular rhythm   Pulmonary/Chest: No respiratory distress.   Patient is belly breathing.    Diminished lung sounds on left.    Abdominal: Soft. Bowel sounds are normal. She exhibits no distension.   PEG tube to right abd with erythema surrounding insertion site.     Musculoskeletal: She exhibits no edema or tenderness.   Neurological: She is alert.   Skin: Skin is warm and dry. Capillary refill takes less than 2 seconds. She is not diaphoretic.   Psychiatric: She has a normal mood and affect.         CRANIAL NERVES     CN III, IV, VI   Pupils are equal, round, and reactive to light.       Significant Labs:   ABGs:   Recent Labs   Lab 10/24/19  2119   PH 7.391   PCO2 38.8   HCO3 23.6*   POCSATURATED 89*   BE  -1     Bilirubin:   Recent Labs   Lab 10/24/19  2120   BILITOT 1.1*     BMP:   Recent Labs   Lab 10/24/19  2120   *      K 4.4      CO2 26   BUN 37*   CREATININE 0.8   CALCIUM 8.4*     CBC:   Recent Labs   Lab 10/24/19  2120 10/25/19  0528   WBC 13.84* 10.09   HGB 11.4* 10.3*   HCT 37.3 33.4*    163     CMP:   Recent Labs   Lab 10/24/19  2120      K 4.4      CO2 26   *   BUN 37*   CREATININE 0.8   CALCIUM 8.4*   PROT 6.4   ALBUMIN 2.8*   BILITOT 1.1*   ALKPHOS 120   AST 36   ALT 29   ANIONGAP 7*   EGFRNONAA >60     Cardiac Markers:   Recent Labs   Lab 10/24/19  2120   *     Lactic Acid:   Recent Labs   Lab 10/24/19  2120   LACTATE 0.9     Troponin:   Recent Labs   Lab 10/24/19  2120   TROPONINI 0.039*     Urine Studies:   Recent Labs   Lab 10/24/19  2156   COLORU Yellow   APPEARANCEUA Clear   PHUR 7.0   SPECGRAV 1.010   PROTEINUA 1+*   GLUCUA Negative   KETONESU Negative   BILIRUBINUA Negative   OCCULTUA 3+*   NITRITE Positive*   UROBILINOGEN 1.0   LEUKOCYTESUR 3+*   RBCUA 10*   WBCUA 20*   BACTERIA Occasional   HYALINECASTS 0     All pertinent labs within the past 24 hours have been reviewed.    Significant Imaging: I have reviewed all pertinent imaging results/findings within the past 24 hours.     Imaging Results          XR Radiologist Performed NG/Gastric Tube Chedk (Final result)  Result time 10/24/19 22:14:20    Final result by Milton Coulter MD (10/24/19 22:14:20)                 Impression:      Contrast present in the gastric lumen and proximal small bowel to the duodenal jejunal junction.  No extravasated contrast seen.  Small hiatal hernia incidentally noted.      Electronically signed by: Milton Coulter MD  Date:    10/24/2019  Time:    22:14             Narrative:    EXAMINATION:  XR RADIOLOGIST PERFORMED NG/GASTRIC TUBE CHECK    CLINICAL HISTORY:  PEG tube malfunction;    TECHNIQUE:  Single view of the abdomen was performed following  administration of unspecified amount of contrast material through the patient's G-tube in the emergency department.    COMPARISON:  June 7, 2018.    FINDINGS:  Contrast is seen in the gastric lumen, including a small hiatal hernia above the diaphragm, as well as in the duodenum up to the region of the duodenal jejunal junction.  No extravasated contrast identified.    Visualized bowel gas pattern is nonobstructive.  Osseous structures appear similar to prior study.                               X-Ray Chest AP Portable (Final result)  Result time 10/24/19 22:22:38    Final result by Charles Abraham MD (10/24/19 22:22:38)                 Impression:      As above.      Electronically signed by: Charles Abraham MD  Date:    10/24/2019  Time:    22:22             Narrative:    EXAMINATION:  XR CHEST AP PORTABLE    CLINICAL HISTORY:  shortness of breath;    TECHNIQUE:  Single frontal view of the chest was performed.    COMPARISON:  05/18/2019.    FINDINGS:  Heart is stable in size.  There is suspected mild pulmonary edema with small left pleural effusion seen.  Abnormal focal opacification is seen within the lateral aspect of the left mid lung zone.  Findings could reflect developing focal pneumonia in the right clinical setting, although potential neoplastic process is not excluded.  This is new compared to previous radiograph from 05/18/2019.  Future radiographic follow-up is recommended at minimum to ensure resolution, otherwise future CT follow-up may be obtained.  No evidence of pneumothorax.                                  Assessment/Plan:     * Pneumonia  Hypoxia    CXR confirmed pulmonary edema with small left pleural effusion and focal pneumonia.  IV Cefepime and IV Vanc started in ED. Continue  Oxygen as needed to keep sats >92%  Monitor pulse ox every 4 hours and as needed  Duo neb treatments as needed    Skin ulcer, stage 3  Stage 3 ulcers to left posterior thigh and leg, right anterior leg, bilateral hips,  chest, and back.  Receives wound care at [facility]  Wound care consulted      Anemia  H&H stable, monitor  CBC daily      Nursing home resident  Resident of [facility], plan to return when medically stable.      Gastrostomy tube dependent, 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  Clogged PEG tube    Initially sent from [facility] with clogged tube, unclogged in ED  Last placed 9/9/19  Tolerated po meds via tube        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  Coronary Artery Disease    Patient lives in Nursing home, dependent with ADLs and mobility.   Turn every 2 hours  Continue Aspirin, Plavix and Atorvastatin via PEG tube    Chronic atrial fibrillation  Rate controlled with Coreg BID. Continue via PEG tube  No anticoagulation on home med list.  Continuous cardiac monitoring      History of depression  Venlafaxine 75 mg via PEG tube daily. Continue       Chronic diastolic congestive heart failure  Continue Lasix via PEG tube.  Follows Cardiology outpatient    2D Echo 6/2017    CONCLUSIONS     1 - Low normal to mildly depressed left ventricular systolic function (EF 50-55%).     2 - Impaired LV relaxation, elevated LAP (grade 2 diastolic dysfunction).     3 - The estimated PA systolic pressure is 32 mmHg.     4 - Mild mitral regurgitation.     5 - Trivial tricuspid regurgitation.     6 - Moderate left atrial enlargement.         Essential hypertension  Continue Coreg and Norvasc via PEG tube  Monitor BP        VTE Risk Mitigation (From admission, onward)         Ordered     IP VTE HIGH RISK PATIENT  Once      10/25/19 0111     Place sequential compression device  Until discontinued      10/25/19 0111                   Cleopatra Delcid, APRN, FNP-C  Department of Hospital Medicine   [facility]

## 2019-10-25 NOTE — ASSESSMENT & PLAN NOTE
Clogged PEG tube    Initially sent from [facility] with clogged tube, unclogged in ED  Last placed 9/9/19  Tolerated po meds via tube

## 2019-10-25 NOTE — ASSESSMENT & PLAN NOTE
Coronary Artery Disease    -Nursing home resident, dependent with ADLs and mobility.   -Turn every 2 hours  -Continue Aspirin, Plavix and Atorvastatin via PEG tube

## 2019-10-25 NOTE — ASSESSMENT & PLAN NOTE
-Stage 3 ulcers to left posterior thigh and leg, right anterior leg, bilateral hips, chest, and back.  -Receives wound care at [facility]  -Wound care consulted

## 2019-10-26 PROBLEM — N30.00 ACUTE CYSTITIS: Status: ACTIVE | Noted: 2019-01-01

## 2019-10-26 PROBLEM — R79.89 ELEVATED TROPONIN: Status: ACTIVE | Noted: 2019-01-01

## 2019-10-26 NOTE — CONSULTS
Wound care nurse consult for multiple wounds present on admission.    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    Notified BENITO Chair of assessment, wound care orders given. Treatment performed as ordered.     Creamy yellow/gray drainage to right lateral lower leg and right lateral thigh wounds. All other wounds without drainage noted on dressing. Slight odor before cleaning, difficult to tell which wound with odor because of so many small shallow wounds, mostly to the legs and hips.    Clean wounds and scabs of bilateral legs and hips with wound cleanser or Acetic Acid (centeral supply), pat dry.  Apply Xeroform to left and right hip open wounds  Apply slightly moistened Aquacel Ag to right  lower leg and thigh open wounds. Cover bilateral hips with ABD pads and Medipore tape, Cover bilateral thighs with with Aquacel adhesive bandages. Cover bilateral lower legs with ABD pads, wrap from just above toes to just below knee with Kerlix. All changed every M, W, F.

## 2019-10-26 NOTE — NURSING
Small amount of yellowish fluid noted to be leaking around pt's PEG tube. Dressing taken down. Area is reddened and greenish , mucoid secretions noted on gauze. Dressing change done. Dr Major and ABBIE Christopher informed.

## 2019-10-26 NOTE — PROGRESS NOTES
[facility] Medicine  Progress Note    Patient Name: Elizabeth Navarrete  MRN: [id number]  Patient Class: IP- Inpatient   Admission Date: 10/24/2019  Length of Stay: 2 days  Attending Physician: Aby Gordon*  Primary Care Provider: [facility]        Subjective:     Principal Problem:Pneumonia        HPI:  Elizabeth Navarrete is a 89 y.o. 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 Patient was in her usual state of health, which includes staying at [facility], completely bed bound with bowel incontinence, with all feeds and meds through PEG tube, until today when patient was noted to have a clogged PEG tube. Unable to flush at nursing home. ED workup revealed low oxygen saturation,  She was placed on 4 L nasal cannula, does not use O2 at home.  CXR revealed pulmonary edema with small left pleural effusion and focal pneumonia. Admitted to Hospital Medicine for further evaluation and treatment.     Overview/Hospital Course:  She was admitted to the hospital medicine service for further care. CXR showed pneumonia. IV cefepime and IV vancomycin given. She has a UTI as well, cultures note Pseudomonas---cont Cefepime for pneumonia, will stop Vancomycin and start IV Cipro. Redness noted to the peg site---will add Bactroban twice per day. Resume tube feedings--- Isosource tube feedings at 15 ml/hr.      Interval History: Resting comfortably in bed. No distress noted.     Review of Systems   Constitutional: Negative for activity change, appetite change, chills and fatigue.   HENT: Negative for congestion.    Respiratory: Negative for cough, shortness of breath and wheezing.    Cardiovascular: Negative for chest pain, palpitations and leg swelling.   Gastrointestinal: Negative for abdominal distention, abdominal pain, blood in stool, constipation, diarrhea, nausea and  vomiting.   Genitourinary: Negative for difficulty urinating and frequency.   Musculoskeletal: Positive for gait problem. Negative for arthralgias and myalgias.   Skin: Positive for wound (wounds noted to the bilateral upper ext and bilateral lower ext).   Neurological: Positive for weakness. Negative for dizziness, speech difficulty and light-headedness.   Hematological: Does not bruise/bleed easily.   Psychiatric/Behavioral: Negative for agitation and confusion. The patient is not nervous/anxious.      Objective:     Vital Signs (Most Recent):  Temp: 96.6 °F (35.9 °C) (10/26/19 1202)  Pulse: 79 (10/26/19 1202)  Resp: 20 (10/26/19 1202)  BP: 110/61 (10/26/19 1202)  SpO2: 98 % (10/26/19 1202) Vital Signs (24h Range):  Temp:  [96.6 °F (35.9 °C)-98.2 °F (36.8 °C)] 96.6 °F (35.9 °C)  Pulse:  [61-97] 79  Resp:  [16-20] 20  SpO2:  [95 %-98 %] 98 %  BP: (110-131)/(53-77) 110/61     Weight: 55.7 kg (122 lb 12.7 oz)  Body mass index is 23.2 kg/m².    Intake/Output Summary (Last 24 hours) at 10/26/2019 1226  Last data filed at 10/26/2019 0800  Gross per 24 hour   Intake 625 ml   Output 1770 ml   Net -1145 ml      Physical Exam   Constitutional: She is oriented to person, place, and time. She appears well-developed and well-nourished. No distress.   HENT:   Head: Normocephalic and atraumatic.   Eyes: Pupils are equal, round, and reactive to light.   Neck: Normal range of motion.   Cardiovascular: Normal rate, normal heart sounds and intact distal pulses.   No murmur heard.  Abdominal: Soft. She exhibits no distension. There is no tenderness. There is no guarding.   She has a PEG tube to the right upper abd. There is erythema surrounding the insertion site.    Musculoskeletal: She exhibits no edema or tenderness.   Neurological: She is alert and oriented to person, place, and time.   Skin: Capillary refill takes less than 2 seconds. She is not diaphoretic.   Psychiatric: She has a normal mood and affect. Her behavior is  normal. Judgment and thought content normal.   Nursing note and vitals reviewed.      Significant Labs:   BMP:   Recent Labs   Lab 10/26/19  0654         K 3.1*      CO2 29   BUN 25   CREATININE 0.7   CALCIUM 8.7   MG 1.3*     CBC:   Recent Labs   Lab 10/24/19  2120 10/25/19  0528 10/26/19  0654   WBC 13.84* 10.09 8.35   HGB 11.4* 10.3* 10.4*   HCT 37.3 33.4* 33.5*    163 159     Magnesium:   Recent Labs   Lab 10/25/19  0528 10/26/19  0654   MG 1.6 1.3*       Significant Imaging: I have reviewed all pertinent imaging results/findings within the past 24 hours.      Assessment/Plan:      * Pneumonia  Hypoxia    -CXR confirmed pulmonary edema with small left pleural effusion and focal pneumonia.  -IV Cefepime and IV Vanc started in ED. Continue Cefepime  -Oxygen as needed to keep sats >92%  -Monitor pulse ox every 4 hours and as needed  -Duo neb treatments as needed    Acute cystitis  U/A noted UTI. Urine culture noted Pseudomonas.  -will start Cipro IV BID  -monitor      Multiple open wounds of lower leg  Skin ulcer, stage 3  -Stage 3 ulcers to left posterior thigh and leg, right anterior leg, bilateral hips, chest, and back.  -Receives wound care at [facility]  -Wound care consulted---will follow recommendations.      Anemia  -H&H stable, monitor  -CBC daily      Nursing home resident  Resident of [facility], plan to return when medically stable.      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   -Turn every 2 hours  -Continue Aspirin, Plavix and Atorvastatin via PEG tube    Chronic atrial fibrillation  -Rate controlled with Coreg BID. Continue via PEG tube  -Continuous cardiac  monitoring      History of depression  -Venlafaxine 75 mg via PEG tube daily. Continue       Chronic diastolic congestive heart failure  -Continue Lasix via PEG tube.  -Follows Cardiology outpatient  -2D Echo 6/2017---  CONCLUSIONS     1 - Low normal to mildly depressed left ventricular systolic function (EF 50-55%).     2 - Impaired LV relaxation, elevated LAP (grade 2 diastolic dysfunction).     3 - The estimated PA systolic pressure is 32 mmHg.     4 - Mild mitral regurgitation.     5 - Trivial tricuspid regurgitation.     6 - Moderate left atrial enlargement.         Essential hypertension  -Continue Coreg and Norvasc via PEG tube  -Monitor BP    Elevated Troponin  Troponin elevated---could be related to stress from the UTI and pneumonia. Will cont to monitor. No complaints of CP.         VTE Risk Mitigation (From admission, onward)         Ordered     IP VTE HIGH RISK PATIENT  Once      10/25/19 0111     Place sequential compression device  Until discontinued      10/25/19 0111                      Justine Haney NP  Department of Hospital Medicine   [facility]

## 2019-10-26 NOTE — ASSESSMENT & PLAN NOTE
-Stage 3 ulcers to left posterior thigh and leg, right anterior leg, bilateral hips, chest, and back.  -Receives wound care at [facility]  -Wound care consulted---will follow recommendations.

## 2019-10-26 NOTE — PLAN OF CARE
Patient's VS are stable, on tele with NSR, piv is patent, PEG tube is patent with no leaking, antibiotic infusions went well, no complaints of pain, possible PEG tube feedings will start in the morning

## 2019-10-26 NOTE — NURSING
PEG Tube aspirated and flushed. Commenced Isosource 1.5 carlos at 15mls/hr. Pt being nursed  Greater than 45 degrees angle.

## 2019-10-26 NOTE — ASSESSMENT & PLAN NOTE
Troponin elevated---could be related to stress from the UTI and pneumonia. Will cont to monitor. No complaints of CP.

## 2019-10-26 NOTE — SUBJECTIVE & OBJECTIVE
Interval History: Resting comfortably in bed. No distress noted.     Review of Systems   Constitutional: Negative for activity change, appetite change, chills and fatigue.   HENT: Negative for congestion.    Respiratory: Negative for cough, shortness of breath and wheezing.    Cardiovascular: Negative for chest pain, palpitations and leg swelling.   Gastrointestinal: Negative for abdominal distention, abdominal pain, blood in stool, constipation, diarrhea, nausea and vomiting.   Genitourinary: Negative for difficulty urinating and frequency.   Musculoskeletal: Positive for gait problem. Negative for arthralgias and myalgias.   Skin: Positive for wound (wounds noted to the bilateral upper ext and bilateral lower ext).   Neurological: Positive for weakness. Negative for dizziness, speech difficulty and light-headedness.   Hematological: Does not bruise/bleed easily.   Psychiatric/Behavioral: Negative for agitation and confusion. The patient is not nervous/anxious.      Objective:     Vital Signs (Most Recent):  Temp: 96.6 °F (35.9 °C) (10/26/19 1202)  Pulse: 79 (10/26/19 1202)  Resp: 20 (10/26/19 1202)  BP: 110/61 (10/26/19 1202)  SpO2: 98 % (10/26/19 1202) Vital Signs (24h Range):  Temp:  [96.6 °F (35.9 °C)-98.2 °F (36.8 °C)] 96.6 °F (35.9 °C)  Pulse:  [61-97] 79  Resp:  [16-20] 20  SpO2:  [95 %-98 %] 98 %  BP: (110-131)/(53-77) 110/61     Weight: 55.7 kg (122 lb 12.7 oz)  Body mass index is 23.2 kg/m².    Intake/Output Summary (Last 24 hours) at 10/26/2019 1226  Last data filed at 10/26/2019 0800  Gross per 24 hour   Intake 625 ml   Output 1770 ml   Net -1145 ml      Physical Exam   Constitutional: She is oriented to person, place, and time. She appears well-developed and well-nourished. No distress.   HENT:   Head: Normocephalic and atraumatic.   Eyes: Pupils are equal, round, and reactive to light.   Neck: Normal range of motion.   Cardiovascular: Normal rate, normal heart sounds and intact distal pulses.   No  murmur heard.  Abdominal: Soft. She exhibits no distension. There is no tenderness. There is no guarding.   She has a PEG tube to the right upper abd. There is erythema surrounding the insertion site.    Musculoskeletal: She exhibits no edema or tenderness.   Neurological: She is alert and oriented to person, place, and time.   Skin: Capillary refill takes less than 2 seconds. She is not diaphoretic.   Psychiatric: She has a normal mood and affect. Her behavior is normal. Judgment and thought content normal.   Nursing note and vitals reviewed.      Significant Labs:   BMP:   Recent Labs   Lab 10/26/19  0654         K 3.1*      CO2 29   BUN 25   CREATININE 0.7   CALCIUM 8.7   MG 1.3*     CBC:   Recent Labs   Lab 10/24/19  2120 10/25/19  0528 10/26/19  0654   WBC 13.84* 10.09 8.35   HGB 11.4* 10.3* 10.4*   HCT 37.3 33.4* 33.5*    163 159     Magnesium:   Recent Labs   Lab 10/25/19  0528 10/26/19  0654   MG 1.6 1.3*       Significant Imaging: I have reviewed all pertinent imaging results/findings within the past 24 hours.

## 2019-10-26 NOTE — PLAN OF CARE
VN rounding: VN cued into patient's room to complete evening rounding. Patient resting in bed without difficulties. Tube feeing infusing via PEG tube without difficulties at this time. No questions, concerns and needs at this time. Will cont to be available to patient and intervene prn.

## 2019-10-26 NOTE — PLAN OF CARE
Pt received on nasal cannula at   1.5 lpm.  SPO2   95%.  Pt in no apparent respiratory distress.  Will continue to monitor.

## 2019-10-26 NOTE — ASSESSMENT & PLAN NOTE
Clogged PEG tube    -Initially sent from [facility] with clogged tube, unclogged in ED  -Last placed 9/9/19  -Tolerated po meds via tube

## 2019-10-26 NOTE — ASSESSMENT & PLAN NOTE
Hypoxia    -CXR confirmed pulmonary edema with small left pleural effusion and focal pneumonia.  -IV Cefepime and IV Vanc started in ED. Continue Cefepime  -Oxygen as needed to keep sats >92%  -Monitor pulse ox every 4 hours and as needed  -Duo neb treatments as needed

## 2019-10-27 PROBLEM — N39.0 PSEUDOMONAS URINARY TRACT INFECTION: Status: ACTIVE | Noted: 2019-01-01

## 2019-10-27 PROBLEM — B96.5 PSEUDOMONAS URINARY TRACT INFECTION: Status: ACTIVE | Noted: 2019-01-01

## 2019-10-27 NOTE — ASSESSMENT & PLAN NOTE
Hypoxia    -CXR confirmed pulmonary edema with small left pleural effusion and focal pneumonia.  -IV Cefepime and IV Vanc started in ED. Continue Cefepime Stop Vancomycin.   -Oxygen as needed to keep sats >92%.  -Monitor pulse ox every 4 hours and as needed  -Duo neb treatments as needed  -Blood cultures no growth to date.

## 2019-10-27 NOTE — PLAN OF CARE
Pt received on nasal cannula at   1.5 lpm.  SPO2   98%.  Pt in no apparent respiratory distress.  Will continue to monitor.

## 2019-10-27 NOTE — PLAN OF CARE
VN cued into pt's room for introduction. VN informed pt that VN would be working along side bedside nurse and PCT throughout shift. Patient AAO x4 today. Level of present pain assessed. At present no distress noted. Discussed plan of care with patient. Discussed with patient High fall risk protocol and interventions that have been initiated and cont be in place for safety. Patient verbalized clear understanding and cooperation using teach back method. Bed alarm presently activated and in use. Will cont to be available to patient and intervene prn.

## 2019-10-27 NOTE — ASSESSMENT & PLAN NOTE
Clogged PEG tube    -Initially sent from [facility] with clogged tube, unclogged in ED  -Last placed 9/9/19  -Tolerated po meds via tube

## 2019-10-27 NOTE — SUBJECTIVE & OBJECTIVE
Interval History: She is in bed awake, alert, and she is oriented. She looks better today. She states she slept well overnight. No distress noted.     Review of Systems   Constitutional: Negative for activity change, chills, fatigue and fever.   HENT: Positive for dental problem.    Eyes: Negative for visual disturbance.   Respiratory: Negative for cough, shortness of breath and wheezing.    Cardiovascular: Negative for chest pain, palpitations and leg swelling.   Gastrointestinal: Negative for abdominal distention, abdominal pain, blood in stool, diarrhea, nausea and vomiting.        Peg tube    Genitourinary: Negative for difficulty urinating and hematuria.   Musculoskeletal: Positive for gait problem. Negative for arthralgias and myalgias.   Skin: Positive for wound. Negative for color change, pallor and rash.   Neurological: Positive for weakness. Negative for dizziness, light-headedness, numbness and headaches.   Hematological: Does not bruise/bleed easily.   Psychiatric/Behavioral: Negative for agitation, confusion and hallucinations. The patient is not nervous/anxious.      Objective:     Vital Signs (Most Recent):  Temp: 98 °F (36.7 °C) (10/27/19 1124)  Pulse: 77 (10/27/19 1124)  Resp: 18 (10/27/19 1124)  BP: 101/63 (10/27/19 1124)  SpO2: 97 % (10/27/19 1124) Vital Signs (24h Range):  Temp:  [96.5 °F (35.8 °C)-98 °F (36.7 °C)] 98 °F (36.7 °C)  Pulse:  [64-83] 77  Resp:  [14-20] 18  SpO2:  [97 %-99 %] 97 %  BP: (101-131)/(57-85) 101/63     Weight: 55.7 kg (122 lb 12.7 oz)  Body mass index is 23.2 kg/m².    Intake/Output Summary (Last 24 hours) at 10/27/2019 1128  Last data filed at 10/27/2019 0600  Gross per 24 hour   Intake 590 ml   Output 1052 ml   Net -462 ml      Physical Exam   Constitutional: She is oriented to person, place, and time. She appears well-developed and well-nourished. No distress.   HENT:   Head: Normocephalic and atraumatic.   Eyes: Pupils are equal, round, and reactive to light.   Neck:  Normal range of motion.   Cardiovascular: Normal rate, normal heart sounds and intact distal pulses.   No murmur heard.  Pulmonary/Chest: Effort normal. No respiratory distress. She has no wheezes.   Abdominal: Soft. Bowel sounds are normal. She exhibits no distension. There is no tenderness. There is no guarding.   Peg noted noted to the right upper quad with tube feedings infusing.      Genitourinary:   Genitourinary Comments: purwick in place   Musculoskeletal: Normal range of motion. She exhibits no edema or tenderness.   Neurological: She is alert and oriented to person, place, and time.   Skin: Skin is warm and dry. Capillary refill takes less than 2 seconds.   Right lateral lower leg and right lateral thigh wounds  She has blisters and scabbed wounds to the upper extremities bilaterally.    Psychiatric: She has a normal mood and affect. Her behavior is normal. Judgment and thought content normal.   Nursing note and vitals reviewed.      Significant Labs:   BMP:   Recent Labs   Lab 10/27/19  0525   *      K 3.5      CO2 32*   BUN 25   CREATININE 0.8   CALCIUM 9.0   MG 1.8     CBC:   Recent Labs   Lab 10/26/19  0654 10/27/19  0525   WBC 8.35 9.05   HGB 10.4* 11.4*   HCT 33.5* 36.9*    175     Magnesium:   Recent Labs   Lab 10/26/19  0654 10/27/19  0525   MG 1.3* 1.8       Significant Imaging: I have reviewed all pertinent imaging results/findings within the past 24 hours.

## 2019-10-27 NOTE — PROGRESS NOTES
[facility] Medicine  Progress Note    Patient Name: Elizabeth Navarrete  MRN: [id number]  Patient Class: IP- Inpatient   Admission Date: 10/24/2019  Length of Stay: 3 days  Attending Physician: Aby Gordon*  Primary Care Provider: [facility]        Subjective:     Principal Problem:Pneumonia        HPI:  Elizabeth Navarrete is a 89 y.o. 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 Patient was in her usual state of health, which includes staying at [facility], completely bed bound with bowel incontinence, with all feeds and meds through PEG tube, until today when patient was noted to have a clogged PEG tube. Unable to flush at nursing home. ED workup revealed low oxygen saturation,  She was placed on 4 L nasal cannula, does not use O2 at home.  CXR revealed pulmonary edema with small left pleural effusion and focal pneumonia. Admitted to Hospital Medicine for further evaluation and treatment.     Overview/Hospital Course:  She was admitted to the hospital medicine service for further care. CXR showed pneumonia. IV cefepime and IV vancomycin given. She has a UTI as well, cultures note Pseudomonas---cont Cefepime for pneumonia, will stop Vancomycin and start IV Cipro. Redness noted to the peg site---will add Bactroban twice per day. Resume tube feedings--- Increase Isosource tube feedings to 20 ml/hr.      Interval History: She is in bed awake, alert, and she is oriented. She looks better today. She states she slept well overnight. No distress noted.     Review of Systems   Constitutional: Negative for activity change, chills, fatigue and fever.   HENT: Positive for dental problem.    Eyes: Negative for visual disturbance.   Respiratory: Negative for cough, shortness of breath and wheezing.    Cardiovascular: Negative for chest pain, palpitations and leg  swelling.   Gastrointestinal: Negative for abdominal distention, abdominal pain, blood in stool, diarrhea, nausea and vomiting.        Peg tube    Genitourinary: Negative for difficulty urinating and hematuria.   Musculoskeletal: Positive for gait problem. Negative for arthralgias and myalgias.   Skin: Positive for wound. Negative for color change, pallor and rash.   Neurological: Positive for weakness. Negative for dizziness, light-headedness, numbness and headaches.   Hematological: Does not bruise/bleed easily.   Psychiatric/Behavioral: Negative for agitation, confusion and hallucinations. The patient is not nervous/anxious.      Objective:     Vital Signs (Most Recent):  Temp: 98 °F (36.7 °C) (10/27/19 1124)  Pulse: 77 (10/27/19 1124)  Resp: 18 (10/27/19 1124)  BP: 101/63 (10/27/19 1124)  SpO2: 97 % (10/27/19 1124) Vital Signs (24h Range):  Temp:  [96.5 °F (35.8 °C)-98 °F (36.7 °C)] 98 °F (36.7 °C)  Pulse:  [64-83] 77  Resp:  [14-20] 18  SpO2:  [97 %-99 %] 97 %  BP: (101-131)/(57-85) 101/63     Weight: 55.7 kg (122 lb 12.7 oz)  Body mass index is 23.2 kg/m².    Intake/Output Summary (Last 24 hours) at 10/27/2019 1128  Last data filed at 10/27/2019 0600  Gross per 24 hour   Intake 590 ml   Output 1052 ml   Net -462 ml      Physical Exam   Constitutional: She is oriented to person, place, and time. She appears well-developed and well-nourished. No distress.   HENT:   Head: Normocephalic and atraumatic.   Eyes: Pupils are equal, round, and reactive to light.   Neck: Normal range of motion.   Cardiovascular: Normal rate, normal heart sounds and intact distal pulses.   No murmur heard.  Pulmonary/Chest: Effort normal. No respiratory distress. She has no wheezes.   Abdominal: Soft. Bowel sounds are normal. She exhibits no distension. There is no tenderness. There is no guarding.   Peg noted to the right upper quad with tube feedings infusing.      Genitourinary:   Genitourinary Comments: purwick in place    Musculoskeletal: Normal range of motion. She exhibits no edema or tenderness.   Neurological: She is alert and oriented to person, place, and time.   Skin: Skin is warm and dry. Capillary refill takes less than 2 seconds.   Right lateral lower leg and right lateral thigh wounds  She has blisters and scabbed wounds to the upper extremities bilaterally.    Redness around the Peg site.  Psychiatric: She has a normal mood and affect. Her behavior is normal. Judgment and thought content normal.   Nursing note and vitals reviewed.      Significant Labs:   BMP:   Recent Labs   Lab 10/27/19  0525   *      K 3.5      CO2 32*   BUN 25   CREATININE 0.8   CALCIUM 9.0   MG 1.8     CBC:   Recent Labs   Lab 10/26/19  0654 10/27/19  0525   WBC 8.35 9.05   HGB 10.4* 11.4*   HCT 33.5* 36.9*    175     Magnesium:   Recent Labs   Lab 10/26/19  0654 10/27/19  0525   MG 1.3* 1.8       Significant Imaging: I have reviewed all pertinent imaging results/findings within the past 24 hours.      Assessment/Plan:      * Pneumonia  Hypoxia    -CXR confirmed pulmonary edema with small left pleural effusion and focal pneumonia.  -IV Cefepime and IV Vanc started in ED. Continue Cefepime Stop Vancomycin.   -Oxygen as needed to keep sats >92%.  -Monitor pulse ox every 4 hours and as needed  -Duo neb treatments as needed  -Blood cultures no growth to date.     Elevated troponin  Troponin elevated---could be related to stress from the UTI and pneumonia. Will cont to monitor. No complaints of CP.       Pseudomonas urinary tract infection  U/A noted UTI. Urine culture noted Pseudomonas.  -will start Cipro IV BID  -monitor      Skin ulcer, stage 3  -Stage 3 ulcers to left posterior thigh and leg, right anterior leg, bilateral hips, chest, and back.  -Receives wound care at [facility]  -Wound care consulted---will follow recommendations.      Anemia  -H&H stable, monitor  -CBC daily      Nursing home resident  Resident  of [facility], plan to return when medically stable.      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   -Turn every 2 hours  -Continue Aspirin, Plavix and Atorvastatin via PEG tube    Chronic atrial fibrillation  -Rate controlled with Coreg BID. Continue via PEG tube  -Continuous cardiac monitoring      History of depression  -Venlafaxine 75 mg via PEG tube daily. Continue       Chronic diastolic congestive heart failure  -Continue Lasix via PEG tube.  -2D Echo 6/2017---  CONCLUSIONS     1 - Low normal to mildly depressed left ventricular systolic function (EF 50-55%).     2 - Impaired LV relaxation, elevated LAP (grade 2 diastolic dysfunction).     3 - The estimated PA systolic pressure is 32 mmHg.     4 - Mild mitral regurgitation.     5 - Trivial tricuspid regurgitation.     6 - Moderate left atrial enlargement.         Essential hypertension  -Continue Coreg and Norvasc via PEG tube  -Monitor BP        VTE Risk Mitigation (From admission, onward)         Ordered     IP VTE HIGH RISK PATIENT  Once      10/25/19 0111     Place sequential compression device  Until discontinued      10/25/19 0111                      Justine Haney NP  Department of Hospital Medicine   [facility]

## 2019-10-27 NOTE — PLAN OF CARE
Pt Afib on tele. No true red alarms noted. No complaints of discomforted noted as well. Pt repositioned during the night. Dressing changed to peg tube. No residual noted with tube feeding going at 15 cc/hr. Nurse will continue to monitor

## 2019-10-27 NOTE — NURSING
Pt vomitted small amount of yellowish fluid. HR decreased to 30's briefly. V/S rechecked P79 /61 R 20. Pt is alert. Dr Major and ABBIE Christopher informed.

## 2019-10-27 NOTE — ASSESSMENT & PLAN NOTE
-Stage 3 ulcers to left posterior thigh and leg, right anterior leg, bilateral hips, chest, and back.  -Receives wound care at [facility]  -Wound care consulted---will follow recommendations.

## 2019-10-28 PROBLEM — R09.02 HYPOXIA: Status: RESOLVED | Noted: 2019-01-01 | Resolved: 2019-01-01

## 2019-10-28 NOTE — DISCHARGE SUMMARY
[facility] Medicine  Discharge Summary      Patient Name: Elizabeth Navarrete  MRN: [id number]  Admission Date: 10/24/2019  Hospital Length of Stay: 4 days  Discharge Date and Time:  10/28/2019 11:04 AM  Attending Physician: Aby Gordon*   Discharging Provider: Justine Haney NP  Primary Care Provider: [facility]      HPI:   Elizabeth Navarrete is a 89 y.o. 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 Patient was in her usual state of health, which includes staying at [facility], completely bed bound with bowel incontinence, with all feeds and meds through PEG tube, until today when patient was noted to have a clogged PEG tube. Unable to flush at nursing home. ED workup revealed low oxygen saturation,  She was placed on 4 L nasal cannula, does not use O2 at home.  CXR revealed pulmonary edema with small left pleural effusion and focal pneumonia. Admitted to Hospital Medicine for further evaluation and treatment.     * No surgery found *      Hospital Course:   She was admitted to the hospital medicine service for further care. CXR showed pneumonia. IV cefepime and IV vancomycin given. She has a UTI as well, cultures note Pseudomonas---cont Cefepime for pneumonia, will stop Vancomycin and start IV Cipro. Redness noted to the peg site---will add Bactroban twice per day. Resumed tube feedings. She will d/c back to the nursing home on today. She will need 8 more days of Cipro---this will cover her pneumonia and the pseudomonas UTI.      Consults:   Consults (From admission, onward)        Status Ordering Provider     IP consult to case management  Once     Provider:  (Not yet assigned)    Acknowledged INNOCENT-AGATA, ABY NAGEL          * HCAP (healthcare-associated pneumonia)  Hypoxia---resolved    -CXR confirmed pulmonary edema with small left pleural  effusion and focal pneumonia.  -IV Cefepime and IV Vanc started in ED. We continued Cefepime Stop Vancomycin.   -Oxygen as needed to keep sats >92%.  -Monitor pulse ox every 4 hours and as needed  -Duo neb treatments as needed  -Blood cultures no growth to date.   -we will stop Cefepime and cont Cipro for 8 more days to cover HCAP pneumonia    Elevated troponin  Troponin elevated---could be related to stress from the UTI and pneumonia. Will cont to monitor. No complaints of CP.       Pseudomonas urinary tract infection  U/A noted UTI. Urine culture noted Pseudomonas.  -will start Cipro IV BID and cont Cipro PO BID for 8 more days.  -monitor      Skin ulcer, stage 3  -Stage 3 ulcers to left posterior thigh and leg, right anterior leg, bilateral hips, chest, and back.  -Receives wound care at [facility]  -Wound care consulted---will follow recommendations. Will need to cont wound care at the [facility] home.      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   -Turn every 2 hours  -Continue Aspirin, Plavix and Atorvastatin via PEG tube    Chronic atrial fibrillation  -Rate controlled with Coreg BID. Continue via PEG tube  -Continuous cardiac monitoring      History of depression  -Venlafaxine 75 mg via PEG tube daily. Continue       Chronic diastolic congestive heart failure  -Continue Lasix via PEG tube.  -2D Echo 6/2017---  CONCLUSIONS     1 - Low normal to mildly depressed left ventricular systolic function (EF 50-55%).     2 - Impaired LV relaxation, elevated LAP (grade 2 diastolic dysfunction).     3 - The estimated PA systolic pressure is 32 mmHg.      4 - Mild mitral regurgitation.     5 - Trivial tricuspid regurgitation.     6 - Moderate left atrial enlargement.         Essential hypertension  -Continue Coreg and Norvasc via PEG tube  -Monitor BP        Final Active Diagnoses:    Diagnosis Date Noted POA    PRINCIPAL PROBLEM:  HCAP (healthcare-associated pneumonia) [J18.9] 10/24/2019 Yes    Pseudomonas urinary tract infection [N39.0, B96.5] 10/26/2019 Yes    Elevated troponin [R79.89] 10/26/2019 Yes    Anemia [D64.9] 10/25/2019 Yes    Controlled type 2 diabetes mellitus, without long-term current use of insulin [E11.9] 10/25/2019 Yes     Chronic    Skin ulcer, stage 3 [L98.499] 10/25/2019 Yes    Multiple open wounds of lower leg [S81.809A] 10/25/2019 Yes    Nursing home resident [Z59.3] 06/23/2017 Not Applicable     Chronic    Gastrostomy tube dependent, 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 [Z93.1] 06/21/2017 Not Applicable     Chronic    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 [Z86.73] 06/15/2017 Not Applicable     Chronic    Chronic atrial fibrillation [I48.20] 09/06/2016 Yes     Chronic    Essential hypertension [I10] 10/10/2012 Yes     Chronic    Chronic diastolic congestive heart failure [I50.32] 10/10/2012 Yes     Chronic    History of depression [Z86.59] 10/10/2012 Not Applicable      Problems Resolved During this Admission:    Diagnosis Date Noted Date Resolved POA    Hypoxia [R09.02] 10/25/2019 10/28/2019 Yes       Discharged Condition: stable    Disposition: Another Health Care Inst*    Follow Up:  Follow-up Information     [facility] In 1 week.    Specialties:  Nursing Home Agency, SNF Agency  Why:  hospital follow up  Contact information:  [address] [place] [address] [place]  Jennifer HINDS [id number]  [phone]                 Patient Instructions:      Notify your health care provider if you experience any of the following:  temperature >100.4     Notify your health care provider if you experience any of the following:  persistent nausea and vomiting or diarrhea      Notify your health care provider if you experience any of the following:  severe uncontrolled pain     Notify your health care provider if you experience any of the following:  difficulty breathing or increased cough     Notify your health care provider if you experience any of the following:  severe persistent headache     Notify your health care provider if you experience any of the following:  worsening rash     Notify your health care provider if you experience any of the following:  persistent dizziness, light-headedness, or visual disturbances     Notify your health care provider if you experience any of the following:  increased confusion or weakness     Tube Feedings/Formulas     Order Specific Question Answer Comments   Select Adult Formula: Isosource 1.5 carlos    Route: Gastrostomy      Activity as tolerated       Significant Diagnostic Studies: Labs:   BMP:   Recent Labs   Lab 10/27/19  0525 10/28/19  0605   * 101    142   K 3.5 3.4*    98   CO2 32* 33*   BUN 25 26   CREATININE 0.8 0.9   CALCIUM 9.0 9.2   MG 1.8 1.6    and CBC   Recent Labs   Lab 10/27/19  0525 10/28/19  0605   WBC 9.05 8.99   HGB 11.4* 11.8*   HCT 36.9* 37.6    179       Pending Diagnostic Studies:     None         Medications:  Reconciled Home Medications:      Medication List      START taking these medications    ciprofloxacin HCl 500 MG tablet  Commonly known as:  CIPRO  Take 1 tablet (500 mg total) by mouth every 12 (twelve) hours. for 8 days     mupirocin 2 % ointment  Commonly known as:  BACTROBAN  Apply topically 2 (two) times daily. Apply to the skin near the Peg tube as this is reddened.        CONTINUE taking these medications    acetaminophen 160 mg/5 mL Elix  Commonly known as:  TYLENOL  Take 650 mg by mouth every 6 (six) hours as needed.     albuterol 90 mcg/actuation inhaler  Commonly known as:  PROVENTIL/VENTOLIN HFA  Inhale 1-2 puffs into the lungs every 4 (four) hours as needed for Wheezing.  Rescue     amLODIPine 5 MG tablet  Commonly known as:  NORVASC  5 mg by Per G Tube route once daily.     aspirin 325 MG tablet  325 mg by Per G Tube route once daily.     atorvastatin 40 MG tablet  Commonly known as:  LIPITOR  1 tablet (40 mg total) by Per G Tube route once daily.     carvedilol 25 MG tablet  Commonly known as:  COREG  25 mg by Per G Tube route 2 (two) times daily.     clobetasol 0.05 % cream  Commonly known as:  TEMOVATE  Apply topically 2 (two) times daily.     clopidogrel 75 mg tablet  Commonly known as:  PLAVIX  1 tablet (75 mg total) by Per G Tube route once daily.     diclofenac sodium 1 % Gel  Commonly known as:  VOLTAREN  Apply 4 g topically 4 (four) times daily as needed.     diphenhydrAMINE 12.5 mg/5 mL elixir  Commonly known as:  BENADRYL  Take by mouth every 12 (twelve) hours as needed for Allergies.     docusate 50 mg/5 mL liquid  Commonly known as:  COLACE  Take 100 mg by mouth 2 (two) times daily.     ferrous sulfate 325 (65 FE) MG EC tablet  Take 325 mg by mouth 3 (three) times daily with meals.     fish oil-omega-3 fatty acids 300-1,000 mg capsule  1 g by Per G Tube route once daily.     glucosamine-chondroitin 500-400 mg tablet  1 tablet by Per G Tube route once daily.     ketoconazole 2 % shampoo  Commonly known as:  NIZORAL  Apply topically twice a week. Apply twice weekly with showers until seborrheic dermatitis has resolved     ONE DAILY MULTIVITAMIN per tablet  Generic drug:  multivitamin  1 tablet by Per G Tube route once daily.     polyethylene glycol 17 gram Pwpk  Commonly known as:  GLYCOLAX  17 g by Per G Tube route nightly as needed.     venlafaxine 75 MG tablet  Commonly known as:  EFFEXOR  75 mg by Per G Tube route once daily.     VITAMIN C 500 MG tablet  Generic drug:  ascorbic acid (vitamin C)  500 mg by Per G Tube route 2 (two) times daily.     VITAMIN D3 1,000 unit capsule  Generic drug:  cholecalciferol (vitamin D3)  1,000 Units by Per G Tube route once daily.      zinc sulfate 220 (50) mg capsule  Commonly known as:  ZINCATE  220 mg by Per G Tube route once daily.        STOP taking these medications    furosemide 20 MG tablet  Commonly known as:  LASIX            Indwelling Lines/Drains at time of discharge:   Lines/Drains/Airways     Drain                 Gastrostomy/Enterostomy 09/10/19 1726 Gastrostomy tube w/ balloon midline feeding 47 days    Female External Urinary Catheter 10/25/19 0515 3 days                Time spent on the discharge of patient: 35 minutes  Patient was seen and examined on the date of discharge and determined to be suitable for discharge.         Justine Haney NP  Department of Hospital Medicine  [facility]

## 2019-10-28 NOTE — PLAN OF CARE
POC reviewed, pt verbalize understanding. Pt denies pain/discomfort. Isosource 1. 5 infusing @ 20 ml/hr via g-tube continuously. IV ABX administered as charted. Fall precaution maintained: bed on lowest position, call light within reach, bed alarm set, side rail up x 2, non skid sock on, STEVE sitter at the bedside. Will continue to monitor.

## 2019-10-28 NOTE — PLAN OF CARE
Patient is medically ready for discharge. Patient is a resident at [facility]. All updates including discharge orders have been sent to Jena at [facility] via [facility]. TN spoke with Jena and she states she will contact TN as soon as she reviews orders.     1:35pm- TN attemped to reach Jena for updates, unable to reach. TN will try again.       10/28/19 4646   Post-Acute Status   Post-Acute Authorization Placement   Post-Acute Placement Status Referrals Sent

## 2019-10-28 NOTE — ASSESSMENT & PLAN NOTE
-Continue Lasix via PEG tube.  -2D Echo 6/2017---  CONCLUSIONS     1 - Low normal to mildly depressed left ventricular systolic function (EF 50-55%).     2 - Impaired LV relaxation, elevated LAP (grade 2 diastolic dysfunction).     3 - The estimated PA systolic pressure is 32 mmHg.     4 - Mild mitral regurgitation.     5 - Trivial tricuspid regurgitation.     6 - Moderate left atrial enlargement.

## 2019-10-28 NOTE — PLAN OF CARE
Patient and VN rounded and discussed various topics including plan of care.  Patient knows that she is in the hospital but was unsure as to why.  Education given on pnuemonia and antibiotics given. Patient verified that the last time she had a BM was yesterday.  All safety measures maintained with the bed locked and in lowest position with alarm on.  The call light and all personal items is within reach. Education given on fall risk and patient verbalizes understanding of call light use for assistance.

## 2019-10-28 NOTE — ASSESSMENT & PLAN NOTE
U/A noted UTI. Urine culture noted Pseudomonas.  -will start Cipro IV BID and cont Cipro PO BID for 8 more days.  -monitor

## 2019-10-28 NOTE — PLAN OF CARE
[facility]    FACILITY TRANSFER ORDERS      Patient Name: Elizabeth Navarrete  YOB: 1928    PCP: [facility]   PCP Address: [address] [place] [address] / RAJINDER DESHPANDE  PCP Phone Number: [phone]  PCP Fax: [phone]    Encounter Date: 10/28/2019    Admit to: Nursing Home    Vital Signs:  Routine    Diagnoses:   Active Hospital Problems    Diagnosis  POA    *Pneumonia [J18.9]  Yes    Pseudomonas urinary tract infection [N39.0, B96.5]  Yes    Elevated troponin [R79.89]  Yes    Anemia [D64.9]  Yes    Controlled type 2 diabetes mellitus, without long-term current use of insulin [E11.9]  Yes     Chronic    Skin ulcer, stage 3 [L98.499]  Yes    Multiple open wounds of lower leg [S81.809A]  Yes    Nursing home resident [Z59.3]  Not Applicable     Chronic     [facility] ([address] [place] [address] Rd, GUZMAN Rodriguez [address])       Gastrostomy tube dependent, 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 [Z93.1]  Not Applicable     Chronic    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 [Z86.73]  Not Applicable     Chronic    Chronic atrial fibrillation [I48.20]  Yes     Chronic    Essential hypertension [I10]  Yes     Chronic    Chronic diastolic congestive heart failure [I50.32]  Yes     Chronic     Echo 6/98:  LVEF 15-20%      History of depression [Z86.59]  Not Applicable      Resolved Hospital Problems    Diagnosis Date Resolved POA    Hypoxia [R09.02] 10/28/2019 Yes       Allergies:  Review of patient's allergies indicates:   Allergen Reactions    Pcn [penicillins] Rash       Diet: tube feedings: Bolus Isosource 1.5 carlos, 5 times daily    Activities: Activity as tolerated    Nursing: Turn the patient every 2 hours  Use heel lift boots  Keep peg tube site clean and dry  Keep skin clean and dry  Check vitals signs every shift  Keep HOB elevated greater than 45 degrees especially when feeding the patient.  Crush meds completely  Make sure you are flushing the Peg Tube so it does not clog.   Please give O2 prn to keep  sats >92%    CONSULTS:    Physical Therapy to evaluate and treat.  and Occupational Therapy to evaluate and treat.    MISCELLANEOUS CARE:  PEG Care: Clean site every 24 hours.  and Routine Skin for Bedridden Patients: Apply moisture barrier cream to all skin folds and wet areas in perineal area daily and after baths and all bowel movements.    WOUND CARE ORDERS  Clean wounds and scabs of bilateral legs and hips with wound cleanser or Acetic Acid, pat dry.  Apply Xeroform to left and right hip open wounds  Apply slightly moistened Aquacel Ag to right  lower leg and thigh open wounds. Cover bilateral hips with ABD pads and Medipore tape, Cover bilateral thighs with with Aquacel adhesive bandages. Cover bilateral lower legs with ABD pads, wrap from just above toes to just below knee with Kerlix. All changed every M, W, F.    Medications: Review discharge medications with patient and family and provide education.      Current Discharge Medication List      START taking these medications    Details   ciprofloxacin HCl (CIPRO) 750 MG tablet Take 1 tablet (750 mg total) by mouth every 12 (twelve) hours. for 8 days  Qty: 10 tablet, Refills: 0   Start 10/28/2019 at 2100 End 11/5/2019 at 0900      CONTINUE these medications which have NOT CHANGED    Details   acetaminophen (TYLENOL) 160 mg/5 mL Elix Take 650 mg by mouth every 6 (six) hours as needed.      albuterol (PROVENTIL/VENTOLIN HFA) 90 mcg/actuation inhaler Inhale 1-2 puffs into the lungs every 4 (four) hours as needed for Wheezing. Rescue  Qty: 1 Inhaler, Refills: 0      amLODIPine (NORVASC) 5 MG tablet 5 mg by Per G Tube route once daily.      ascorbic acid, vitamin C, (VITAMIN C) 500 MG tablet 500 mg by Per G Tube route 2 (two) times daily.      aspirin 325 MG tablet 325 mg by Per G Tube route once daily.      atorvastatin (LIPITOR) 40 MG tablet 1 tablet (40 mg total) by Per G Tube route once daily.  Qty: 90 tablet, Refills: 3      carvedilol (COREG) 25 MG tablet 25  mg by Per G Tube route 2 (two) times daily.      cholecalciferol, vitamin D3, (VITAMIN D3) 1,000 unit capsule 1,000 Units by Per G Tube route once daily.       clobetasol (TEMOVATE) 0.05 % cream Apply topically 2 (two) times daily.      clopidogrel (PLAVIX) 75 mg tablet 1 tablet (75 mg total) by Per G Tube route once daily.  Qty: 90 tablet, Refills: 3      diclofenac sodium (VOLTAREN) 1 % Gel Apply 4 g topically 4 (four) times daily as needed.  Qty: 500 g, Refills: 2      diphenhydrAMINE (BENADRYL) 12.5 mg/5 mL elixir Take by mouth every 12 (twelve) hours as needed for Allergies.      docusate (COLACE) 50 mg/5 mL liquid Take 100 mg by mouth 2 (two) times daily.      ferrous sulfate 325 (65 FE) MG EC tablet Take 325 mg by mouth 3 (three) times daily with meals.      fish oil-omega-3 fatty acids 300-1,000 mg capsule 1 g by Per G Tube route once daily.       glucosamine-chondroitin 500-400 mg tablet 1 tablet by Per G Tube route once daily.       ketoconazole (NIZORAL) 2 % shampoo Apply topically twice a week. Apply twice weekly with showers until seborrheic dermatitis has resolved  Qty: 120 mL, Refills: 1      multivitamin (ONE DAILY MULTIVITAMIN) per tablet 1 tablet by Per G Tube route once daily.      polyethylene glycol (GLYCOLAX) 17 gram PwPk 17 g by Per G Tube route nightly as needed.      venlafaxine (EFFEXOR) 75 MG tablet 75 mg by Per G Tube route once daily.      zinc sulfate (ZINCATE) 220 (50) mg capsule 220 mg by Per G Tube route once daily.      mupirocin 2 % ointment   Commonly known as: BACTROBAN    Instructions: Apply topically 2 (two) times daily. Apply to the skin near the Peg tube as this is reddened.         STOP taking these medications       furosemide (LASIX) 20 MG tablet Comments:   Reason for Stopping:                    _________________________________  Justine Haney NP  10/28/2019

## 2019-10-28 NOTE — ASSESSMENT & PLAN NOTE
Clogged PEG tube    -Initially sent from [facility] with clogged tube, unclogged in ED  -Last placed 9/9/19  -Tolerated po meds via tube  -we have resumed her peg tube feedings

## 2019-10-28 NOTE — ASSESSMENT & PLAN NOTE
-Stage 3 ulcers to left posterior thigh and leg, right anterior leg, bilateral hips, chest, and back.  -Receives wound care at [facility]  -Wound care consulted---will follow recommendations. Will need to cont wound care at the nursing home.

## 2019-10-28 NOTE — ASSESSMENT & PLAN NOTE
Hypoxia---resolved    -CXR confirmed pulmonary edema with small left pleural effusion and focal pneumonia.  -IV Cefepime and IV Vanc started in ED. We continued Cefepime Stop Vancomycin.   -Oxygen as needed to keep sats >92%.  -Monitor pulse ox every 4 hours and as needed  -Duo neb treatments as needed  -Blood cultures no growth to date.   -we will stop Cefepime and cont Cipro for 8 more days to cover HCAP pneumonia

## 2019-10-29 NOTE — PLAN OF CARE
Patient ready for discharge back to [facility]. Discharge packet placed near patient's chart. Bedside nurse given number to call report. Transportation has been arranged through [employer], with whom [facility] has a contract through.  Patient's daughter Laya notified of discharge plans and transportation arrangements.         10/29/19 1637   Final Note   Assessment Type Final Discharge Note   Anticipated Discharge Disposition [facility]  ([facility])   What phone number can be called within the next 1-3 days to see how you are doing after discharge? [phone]   Right Care Referral Info   Post Acute Recommendation Home-care   Referral Type Nursing Home- [facility]   Facility Name [facility]

## 2019-11-04 NOTE — PROGRESS NOTES
Subjective:       Patient ID: Elizabeth Navarrete is a 89 y.o. female.    Chief Complaint: Follow-up    This is a 89-year-old female here for a follow-up visit regarding her gastrostomy tube.  She has oropharyngeal dysphagia status post gastrostomy tube placement, recently having been changed by interventional Radiology.  Family is present to give additional history is noted intermittent issues with a clogging, unable to flush which last for minutes or longer at a time.  She is currently living in a nursing home.  No abdominal pain at the site, occasional drainage.  No other exacerbating or relieving factors or other associated symptoms.  Outside paperwork was reviewed.    The following portions of the patient's history were reviewed and updated as appropriate: allergies, current medications, past family history, past medical history, past social history, past surgical history and problem list.    (Portions of this note were dictated using voice recognition software and may contain dictation related errors in spelling/grammar/syntax not found on text review)  HPI  Review of Systems   Constitutional: Negative for appetite change and unexpected weight change.   Eyes: Negative for photophobia and visual disturbance.   Gastrointestinal: Negative for abdominal distention and abdominal pain.         Objective:      Physical Exam   Constitutional: She appears well-developed and well-nourished. No distress.   HENT:   Head: Normocephalic and atraumatic.   Eyes: Conjunctivae are normal. No scleral icterus.   Cardiovascular: Normal rate. Exam reveals no friction rub.   Pulmonary/Chest: Effort normal and breath sounds normal. No stridor. No respiratory distress.   Abdominal: Soft. Bowel sounds are normal. She exhibits no distension. There is no tenderness.   Gastrostomy tube in place in LUQ, tube clean and intact   Musculoskeletal: She exhibits no edema or tenderness.   Neurological: She is alert. A cranial nerve deficit is present.    Skin: Skin is warm and dry. No erythema.   Psychiatric: She has a normal mood and affect. Her behavior is normal.   Nursing note and vitals reviewed.        Labs/imaging; reviewed  Assessment:       1. PEG tube malfunction    2. Oropharyngeal dysphagia        Plan:   1. Gastrostomy cleaned, unclogged  2. F/u as needed

## 2019-11-19 PROBLEM — J96.01 ACUTE RESPIRATORY FAILURE WITH HYPOXIA: Status: ACTIVE | Noted: 2019-01-01

## 2019-11-19 PROBLEM — R06.02 SOB (SHORTNESS OF BREATH): Status: ACTIVE | Noted: 2019-01-01

## 2019-11-19 NOTE — ED PROVIDER NOTES
Encounter Date: 11/18/2019       History     Chief Complaint   Patient presents with    Shortness of Breath     call camne in as shortness of breath; Per [facility] patient was 88% when EMS arrived she was already on 3L NC and up to98%. patient noted to have some abdominal m uscle usage with inspirations.      Elizabeth Navarrete is a 89 y.o. 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    The patient presents to the ED due to SOB.   Relative reports she has been keeping an eye on her for 1 week due to noticing the patient had labored breathing. The relative reports the patient's Oxygen levels dipped tonight. Patient has no fever or chills. Patient is on blood thinners for A Fib. Patient does not have Oxygen at home.        The history is provided by a relative.     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    Family history of breast cancer 10/10/2012    Hematuria, gross     History of cardiac arrhythmia 10/10/2012    History of CHF (congestive heart failure) 10/10/2012    History of depression 10/10/2012    History of  echocardiogram 10/10/2012    HTN (hypertension) 10/10/2012    Internal carotid artery stenosis 10/10/2012    Personal history of gallstones 10/10/2012    Urinary tract infection      Past Surgical History:   Procedure Laterality Date    BREAST LUMPECTOMY      ESOPHAGOGASTRODUODENOSCOPY N/A 6/12/2018    Procedure: ESOPHAGOGASTRODUODENOSCOPY (EGD);  Surgeon: Anila Kolb MD;  Location: [facility] ENDO;  Service: Endoscopy;  Laterality: N/A;    ESOPHAGOGASTRODUODENOSCOPY N/A 6/14/2018    Procedure: ESOPHAGOGASTRODUODENOSCOPY (EGD);  Surgeon: Anila Kolb MD;  Location: [facility] ENDO;  Service: Endoscopy;  Laterality: N/A;    EYE SURGERY      HYSTERECTOMY       Family History   Problem Relation Age of Onset    Cancer Mother         breast    Heart disease Neg Hx      Social History     Tobacco Use    Smoking status: Never Smoker    Smokeless tobacco: Never Used   Substance Use Topics    Alcohol use: No    Drug use: No     Review of Systems   Constitutional: Negative for chills and fever.   Respiratory: Positive for shortness of breath.    All other systems reviewed and are negative.      Physical Exam     Initial Vitals [11/18/19 2320]   BP Pulse Resp Temp SpO2   (!) 117/59 68 (!) 26 98.9 °F (37.2 °C) 98 %      MAP       --         Physical Exam    Constitutional: She appears distressed.   Chronically ill appearing, bed-bound   HENT:   Mouth/Throat: Oropharyngeal exudate: Moderate respiratory distress.   Neck: Normal range of motion.   Cardiovascular:   2/6 systolic murmur   Pulmonary/Chest:   Increased accessory muscle use, abdominal breathing, diffuse crackles noted bilaterally   Abdominal: Soft.   PEG tube in place, abdomen soft nontender, no signs of infection   Musculoskeletal:   Chronic wasting of all extremities   Neurological: She is alert.   Skin: Skin is warm and dry. Capillary refill takes less than 2 seconds.   Psychiatric:   Baseline per daughter, somewhat confused, can't answer questions  appropriately         ED Course   Procedures  Labs Reviewed   B-TYPE NATRIURETIC PEPTIDE - Abnormal; Notable for the following components:       Result Value     (*)     All other components within normal limits   COMPREHENSIVE METABOLIC PANEL - Abnormal; Notable for the following components:    Sodium 133 (*)     Glucose 257 (*)     BUN, Bld 55 (*)     Calcium 8.0 (*)     Total Protein 5.4 (*)     Albumin 2.2 (*)     Total Bilirubin 1.1 (*)     Anion Gap 6 (*)     eGFR if  57 (*)     eGFR if non  49 (*)     All other components within normal limits   CBC W/ AUTO DIFFERENTIAL - Abnormal; Notable for the following components:    RBC 3.33 (*)     Hemoglobin 9.5 (*)     Hematocrit 31.3 (*)     Mean Corpuscular Hemoglobin Conc 30.4 (*)     RDW 16.0 (*)     Platelets 135 (*)     Lymph # 0.7 (*)     Eos # 0.7 (*)     Gran% 75.5 (*)     Lymph% 9.3 (*)     Eosinophil% 8.4 (*)     All other components within normal limits   URINALYSIS, REFLEX TO URINE CULTURE - Abnormal; Notable for the following components:    Occult Blood UA Trace (*)     Leukocytes, UA 3+ (*)     All other components within normal limits    Narrative:     Preferred Collection Type->Urine, Clean Catch   D DIMER, QUANTITATIVE - Abnormal; Notable for the following components:    D-Dimer 2.16 (*)     All other components within normal limits   URINALYSIS MICROSCOPIC - Abnormal; Notable for the following components:    WBC, UA 25 (*)     Bacteria Few (*)     All other components within normal limits    Narrative:     Preferred Collection Type->Urine, Clean Catch   ISTAT PROCEDURE - Abnormal; Notable for the following components:    POC PO2 58 (*)     POC SATURATED O2 90 (*)     All other components within normal limits   INFLUENZA A & B BY MOLECULAR   CULTURE, URINE   LIPASE   LACTIC ACID, PLASMA   MAGNESIUM   TROPONIN I   PROTIME-INR   PROTIME-INR   D DIMER, QUANTITATIVE   BASIC METABOLIC PANEL   CBC W/ AUTO DIFFERENTIAL      EKG Readings: (Independently Interpreted)   AFib with a rate is 75 beats per minute, normal intervals, normal axis, no acute ST elevations       Imaging Results          X-Ray Chest 1 View (Final result)  Result time 11/19/19 00:35:43    Final result by Jayne Kemp MD (11/19/19 00:35:43)                 Impression:      No significant change.      Electronically signed by: Jayne Kemp  Date:    11/19/2019  Time:    00:35             Narrative:    EXAMINATION:  CHEST ONE VIEW    CLINICAL HISTORY:  Shortness of breath    TECHNIQUE:  One view of the chest.    COMPARISON:  10/24/2019    FINDINGS:  The cardiac silhouette is stable.  Dense vascular calcifications seen in the aortic knob.  There is continued blunting of the left costophrenic angle, which is likely due to pleural fluid.  There are prominent interstitial lung markings.  There is no pneumothorax.  There is biapical pleural thickening.  The bones diffusely osteopenic.  Right humerus is high riding suggesting previous rotator cuff tear.                                 Medical Decision Making:   Initial Assessment:   89-year-old female, chronically ill, multiple medical problems, presents the ER for evaluation worsening shortness breath.  Patient reports increased work of breathing for the past few days, worsened today.  EMS was called, and noted patient was hypoxic [id number] on air, gave her is obtain a with improvement.  On arrival, patient is chronically ill-appearing, moderate respiratory distress, bilateral crackles, accessory muscle use, patient reports that she feels that her breathing is okay she has no shortness of breath.  She was recently discharged for hospital-acquired UTI.  Differential is broad including pneumonia, sepsis, UTI, electrolyte abnormality ACS/NSTEMI versus other cause.  Obtain work, EKG and chest x-ray will reassess, likely plan admission.  Clinical Tests:   Lab Tests: Ordered and Reviewed  Radiological Study: Ordered and  Reviewed  Medical Tests: Ordered and Reviewed            Scribe Attestation:   Scribe #1: I performed the above scribed service and the documentation accurately describes the services I performed. I attest to the accuracy of the note.    Attending Attestation:           Physician Attestation for Scribe:  Physician Attestation Statement for Scribe #1: I, Dr. Lester, reviewed documentation, as scribed by Jennifer Moreno in my presence, and it is both accurate and complete.                 ED Course as of Nov 19 0500   Tue Nov 19, 2019   0249 RestingIn bed, no acute distress, ABG noted, improves with nasal cannula.  Chest x-ray is stable.  Patient will be admitted to [facility] hospitalist team for further evaluation, she will likely need home O2 upon discharge.    [SE]   0408 Resting in bed, no acute distress, D-dimer noted to be elevated, however with age adjustment within normal limits.  Patient admitted to [facility] medicine.    [SE]      ED Course User Index  [SE] Ian Lester MD                Clinical Impression:       ICD-10-CM ICD-9-CM   1. SOB (shortness of breath) R06.02 786.05   2. SOB (shortness of breath) R06.02 786.05   3. Acute respiratory failure with hypoxia J96.01 518.81   4. Chest pain R07.9 786.50         Disposition:   Disposition: Discharged  Condition: Stable                     Ian Lester MD  11/19/19 0314       Ian Lester MD  11/19/19 0500

## 2019-11-19 NOTE — ASSESSMENT & PLAN NOTE
89 yo female with chronic AFib has mild pulmonary vascular congestion and possible left-sided small effusion on CXR. Though patient denies feeling short of breath she was visibly in respiratory distress on arrival per ED report. O2 sats 80% with PO2 58 on ABG. Patient placed on supplemental O2 with noted improvement.     Continue supplemental O2 to keep sats > 92%   furosemide 20 mg IV x 1 now   Check CT chest

## 2019-11-19 NOTE — PROGRESS NOTES
ABG Results for PRERNA COLEY (MRN [id number]) as of 11/19/2019 02:18  Reported to Dr. Lester     Ref. Range 11/19/2019 02:05   POC PH Latest Ref Range: 7.35 - 7.45  7.403   POC PCO2 Latest Ref Range: 35 - 45 mmHg 39.8   POC PO2 Latest Ref Range: 80 - 100 mmHg 58 (LL)   POC BE Latest Ref Range: -2 to 2 mmol/L 0   POC HCO3 Latest Ref Range: 24 - 28 mmol/L 24.8   POC SATURATED O2 Latest Ref Range: 95 - 100 % 90 (L)   POC TCO2 Latest Ref Range: 23 - 27 mmol/L 26   Sample Unknown ARTERIAL   DelSys Unknown Room Air   Allens Test Unknown Pass   Site Unknown RB

## 2019-11-19 NOTE — NURSING
Patient arrived to room in stretcher from with ER nurse. Patient alert and oriented to person and place only. Tele monitor on reading NSR. VSS. 2L of oxygen in place. Purewick placed on patient. Patient oriented to room and call bell usage. Bed alarm on, bed locked and low, side rails up x2, and call bell in reach. Yellow non skid socks and fall risk band placed on patient. No acute distress noted.

## 2019-11-19 NOTE — ED NOTES
Patient noted to be desating to low 80s 81-85% at this time; noted to be asymptomatic. Respiratory at bedside for RA ABG.

## 2019-11-19 NOTE — SUBJECTIVE & OBJECTIVE
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    Family history of breast cancer 10/10/2012    Hematuria, gross     History of cardiac arrhythmia 10/10/2012    History of CHF (congestive heart failure) 10/10/2012    History of depression 10/10/2012    History of echocardiogram 10/10/2012    HTN (hypertension) 10/10/2012    Internal carotid artery stenosis 10/10/2012    Personal history of gallstones 10/10/2012    Urinary tract infection        Past Surgical History:   Procedure Laterality Date    BREAST LUMPECTOMY      ESOPHAGOGASTRODUODENOSCOPY N/A 6/12/2018    Procedure: ESOPHAGOGASTRODUODENOSCOPY (EGD);  Surgeon: Anila Kolb MD;  Location: [facility];  Service: Endoscopy;  Laterality: N/A;    ESOPHAGOGASTRODUODENOSCOPY N/A 6/14/2018    Procedure: ESOPHAGOGASTRODUODENOSCOPY (EGD);  Surgeon: Anila Kolb MD;  Location: [facility];  Service: Endoscopy;  Laterality: N/A;    EYE SURGERY      HYSTERECTOMY         Review of patient's allergies indicates:   Allergen Reactions    Pcn [penicillins] Rash       No current facility-administered medications on file prior to encounter.      Current Outpatient Medications on File Prior to Encounter   Medication Sig    acetaminophen (TYLENOL) 160 mg/5 mL Elix Take 650 mg by mouth every 6 (six) hours as needed.    albuterol (PROVENTIL/VENTOLIN HFA) 90 mcg/actuation inhaler Inhale 1-2 puffs into the lungs every 4 (four) hours as needed for Wheezing. Rescue    amLODIPine (NORVASC) 5 MG tablet 5 mg by Per G Tube route once daily.    ascorbic acid, vitamin C, (VITAMIN C) 500 MG tablet 500 mg by Per G Tube route 2 (two) times daily.    aspirin 325 MG tablet 325 mg by Per G Tube route once daily.    atorvastatin (LIPITOR) 40 MG tablet 1 tablet (40 mg  total) by Per G Tube route once daily.    carvedilol (COREG) 25 MG tablet 25 mg by Per G Tube route 2 (two) times daily.    cholecalciferol, vitamin D3, (VITAMIN D3) 1,000 unit capsule 1,000 Units by Per G Tube route once daily.     clobetasol (TEMOVATE) 0.05 % cream Apply topically 2 (two) times daily.    clopidogrel (PLAVIX) 75 mg tablet 1 tablet (75 mg total) by Per G Tube route once daily.    diclofenac sodium (VOLTAREN) 1 % Gel Apply 4 g topically 4 (four) times daily as needed.    diphenhydrAMINE (BENADRYL) 12.5 mg/5 mL elixir Take by mouth every 12 (twelve) hours as needed for Allergies.    docusate (COLACE) 50 mg/5 mL liquid Take 100 mg by mouth 2 (two) times daily.    ferrous sulfate 325 (65 FE) MG EC tablet Take 325 mg by mouth 3 (three) times daily with meals.    fish oil-omega-3 fatty acids 300-1,000 mg capsule 1 g by Per G Tube route once daily.     glucosamine-chondroitin 500-400 mg tablet 1 tablet by Per G Tube route once daily.     ketoconazole (NIZORAL) 2 % shampoo Apply topically twice a week. Apply twice weekly with showers until seborrheic dermatitis has resolved    multivitamin (ONE DAILY MULTIVITAMIN) per tablet 1 tablet by Per G Tube route once daily.    mupirocin (BACTROBAN) 2 % ointment Apply topically 2 (two) times daily. Apply to the skin near the Peg tube as this is reddened.    polyethylene glycol (GLYCOLAX) 17 gram PwPk 17 g by Per G Tube route nightly as needed.    venlafaxine (EFFEXOR) 75 MG tablet 75 mg by Per G Tube route once daily.    zinc sulfate (ZINCATE) 220 (50) mg capsule 220 mg by Per G Tube route once daily.     Family History     Problem Relation (Age of Onset)    Cancer Mother        Tobacco Use    Smoking status: Never Smoker    Smokeless tobacco: Never Used   Substance and Sexual Activity    Alcohol use: No    Drug use: No    Sexual activity: Never     Review of Systems   Constitutional: Negative for chills, diaphoresis and fever.   Eyes: Negative  for photophobia.   Respiratory: Positive for shortness of breath. Negative for cough, chest tightness and wheezing.    Cardiovascular: Negative for chest pain, palpitations and leg swelling.   Gastrointestinal: Negative for abdominal pain, diarrhea, nausea and vomiting.   Genitourinary: Negative for dysuria, flank pain, frequency and hematuria.   Musculoskeletal: Negative for back pain and myalgias.   Neurological: Negative for dizziness, syncope, light-headedness and headaches.   Psychiatric/Behavioral: Negative for confusion.     Objective:     Vital Signs (Most Recent):  Temp: 98.7 °F (37.1 °C) (11/19/19 0106)  Pulse: 71 (11/19/19 0402)  Resp: (!) 22 (11/19/19 0402)  BP: (!) 123/59 (11/19/19 0402)  SpO2: 98 % (11/19/19 0402) Vital Signs (24h Range):  Temp:  [98.7 °F (37.1 °C)-98.9 °F (37.2 °C)] 98.7 °F (37.1 °C)  Pulse:  [66-77] 71  Resp:  [22-31] 22  SpO2:  [80 %-98 %] 98 %  BP: (110-123)/(57-72) 123/59     Weight: 61.7 kg (136 lb)  Body mass index is 23.34 kg/m².    Physical Exam   Constitutional: She is oriented to person, place, and time. She appears well-developed.   Frail    HENT:   Head: Normocephalic and atraumatic.   Eyes: Pupils are equal, round, and reactive to light. Conjunctivae are normal.   Neck: Normal range of motion. No JVD present.   Cardiovascular: Normal rate, regular rhythm, normal heart sounds and intact distal pulses.   Pulmonary/Chest: Effort normal. No respiratory distress. She has no wheezes.   Breath sounds diminished to bases    Abdominal: Soft. Bowel sounds are normal. She exhibits no distension. There is no tenderness. There is no guarding.   PEG in place, site erythematous    Musculoskeletal: Normal range of motion. She exhibits no edema or tenderness.   Neurological: She is alert and oriented to person, place, and time.   Skin: Skin is warm and dry. Capillary refill takes less than 2 seconds.   Multiple wounds, skin tears and abrasions    Psychiatric: She has a normal mood and  affect. Her behavior is normal.         CRANIAL NERVES     CN III, IV, VI   Pupils are equal, round, and reactive to light.       Significant Labs:   BMP:   Recent Labs   Lab 11/18/19  2344   *   *   K 4.9      CO2 25   BUN 55*   CREATININE 1.0   CALCIUM 8.0*   MG 2.4     CBC:   Recent Labs   Lab 11/18/19  2344   WBC 7.73   HGB 9.5*   HCT 31.3*   *     Urine Studies:   Recent Labs   Lab 11/19/19  0119   COLORU Yellow   APPEARANCEUA Clear   PHUR 6.0   SPECGRAV 1.010   PROTEINUA Negative   GLUCUA Negative   KETONESU Negative   BILIRUBINUA Negative   OCCULTUA Trace*   NITRITE Negative   UROBILINOGEN Negative   LEUKOCYTESUR 3+*   RBCUA 4   WBCUA 25*   BACTERIA Few*   SQUAMEPITHEL 1       Significant Imaging: I have reviewed all pertinent imaging results/findings within the past 24 hours.

## 2019-11-19 NOTE — ED NOTES
Patient presents to ED from [facility] for SOB per [facility] patient was 86-88% on RA; when EMS arrived patient was already on 3L NC and sats back up to 95%. Patient is noted to have some labored breathing with accessory muscle use at this time however denies SOB. Patient noted to have multiple wounds to whole body which she is recently getting wound care for at [facility] all existing wounds still there.

## 2019-11-19 NOTE — ASSESSMENT & PLAN NOTE
Chronic atrial fibrillation  CAD (coronary artery disease)  Chronic diastolic congestive heart failure  Internal carotid artery stenosis  Non-rheumatic mitral regurgitation    Continue ASA, plavix, statin   Continue amlodipine, carvedilol and statin   Monitor tele

## 2019-11-19 NOTE — HPI
Elizabeth Navarrete is a 89 y.o. w/ hx of HTN, HLD, CAD, atrial fibrillation, chronic diastolic heart failure, carotid artery stenosis, non-rheumatic mitral regurgitation, CVA with left sided weakness and oropharyngeal dysphagia s/p PEG placement, CKD stage 3 and Type 2 Diabetes Mellitus. Her primary care physician is Dr. Jose Juan Abbasi. She is a resident of [place], [place].     Recently admitted 10/24-10/28 for HCAP and UTI.     Patient presented to ED for evaluation of SOB. Per family symptoms present x 1 week progressively becoming worse. Patient noted to have decreased O2 sats at facility prompting transfer to ED. Patient denies all symptoms including chest pain, SOB, n/v and fever/chills. Per ED report patient with noted respiratory distress on arrival with O2 sats 80% and tachypnea. Symptoms improved with supplemental O2. ABG remarkable for PO2 58. CXR shows no acute changes with continued blunting of the left costophrenic angle, which is likely due to pleural fluid. , D-dimer 2.16. Influenza negative. UA shows few bacteria and 25 WBC. Patient admitted to [facility] for further care.

## 2019-11-19 NOTE — H&P
[facility]  History & Physical    Patient Name: Elizabeth Navarrete  MRN: [id number]  Admission Date: 11/18/2019  Attending Physician: Ian Lester MD   Primary Care Provider: [facility]         Patient information was obtained from patient, past medical records and ER records.     Subjective:     Principal Problem:Acute respiratory failure with hypoxia    Chief Complaint:   Chief Complaint   Patient presents with    Shortness of Breath     call camne in as shortness of breath; Per [facility] patient was 88% when EMS arrived she was already on 3L NC and up to98%. patient noted to have some abdominal m uscle usage with inspirations.         HPI: Elizabeth Navarrete is a 89 y.o. w/ hx of HTN, HLD, CAD, atrial fibrillation, chronic diastolic heart failure, carotid artery stenosis, non-rheumatic mitral regurgitation, CVA with left sided weakness and oropharyngeal dysphagia s/p PEG placement, CKD stage 3 and Type 2 Diabetes Mellitus. Her primary care physician is Dr. Jose Juan Abbasi. She is a resident of [facility] of [place], [place].     Recently admitted 10/24-10/28 for HCAP and UTI.     Patient presented to ED for evaluation of SOB. Per family symptoms present x 1 week progressively becoming worse. Patient noted to have decreased O2 sats at facility prompting transfer to ED. Patient denies all symptoms including chest pain, SOB, n/v and fever/chills. Per ED report patient with noted respiratory distress on arrival with O2 sats 80% and tachypnea. Symptoms improved with supplemental O2. ABG remarkable for PO2 58. CXR shows no acute changes with continued blunting of the left costophrenic angle, which is likely due to pleural fluid. , D-dimer 2.16. Influenza negative. UA shows few bacteria and 25 WBC. Patient admitted to [facility] for further care.             Past Medical History:   Diagnosis Date    A-fib     Bladder mass     CAD (coronary  artery disease) 10/10/2012    CHF (congestive heart failure)     CRF (chronic renal failure) 10/10/2012    Embolic stroke involving right carotid artery 6/15/2017    Family history of breast cancer 10/10/2012    Hematuria, gross     History of cardiac arrhythmia 10/10/2012    History of CHF (congestive heart failure) 10/10/2012    History of depression 10/10/2012    History of echocardiogram 10/10/2012    HTN (hypertension) 10/10/2012    Internal carotid artery stenosis 10/10/2012    Personal history of gallstones 10/10/2012    Urinary tract infection        Past Surgical History:   Procedure Laterality Date    BREAST LUMPECTOMY      ESOPHAGOGASTRODUODENOSCOPY N/A 6/12/2018    Procedure: ESOPHAGOGASTRODUODENOSCOPY (EGD);  Surgeon: Anila Kolb MD;  Location: [facility] ENDO;  Service: Endoscopy;  Laterality: N/A;    ESOPHAGOGASTRODUODENOSCOPY N/A 6/14/2018    Procedure: ESOPHAGOGASTRODUODENOSCOPY (EGD);  Surgeon: Anila Kolb MD;  Location: [facility] ENDO;  Service: Endoscopy;  Laterality: N/A;    EYE SURGERY      HYSTERECTOMY         Review of patient's allergies indicates:   Allergen Reactions    Pcn [penicillins] Rash       No current facility-administered medications on file prior to encounter.      Current Outpatient Medications on File Prior to Encounter   Medication Sig    acetaminophen (TYLENOL) 160 mg/5 mL Elix Take 650 mg by mouth every 6 (six) hours as needed.    albuterol (PROVENTIL/VENTOLIN HFA) 90 mcg/actuation inhaler Inhale 1-2 puffs into the lungs every 4 (four) hours as needed for Wheezing. Rescue    amLODIPine (NORVASC) 5 MG tablet 5 mg by Per G Tube route once daily.    ascorbic acid, vitamin C, (VITAMIN C) 500 MG tablet 500 mg by Per G Tube route 2 (two) times daily.    aspirin 325 MG tablet 325 mg by Per G Tube route once daily.    atorvastatin (LIPITOR) 40 MG tablet 1 tablet (40 mg total) by Per G Tube route once daily.    carvedilol (COREG) 25 MG tablet 25 mg by Per G  Tube route 2 (two) times daily.    cholecalciferol, vitamin D3, (VITAMIN D3) 1,000 unit capsule 1,000 Units by Per G Tube route once daily.     clobetasol (TEMOVATE) 0.05 % cream Apply topically 2 (two) times daily.    clopidogrel (PLAVIX) 75 mg tablet 1 tablet (75 mg total) by Per G Tube route once daily.    diclofenac sodium (VOLTAREN) 1 % Gel Apply 4 g topically 4 (four) times daily as needed.    diphenhydrAMINE (BENADRYL) 12.5 mg/5 mL elixir Take by mouth every 12 (twelve) hours as needed for Allergies.    docusate (COLACE) 50 mg/5 mL liquid Take 100 mg by mouth 2 (two) times daily.    ferrous sulfate 325 (65 FE) MG EC tablet Take 325 mg by mouth 3 (three) times daily with meals.    fish oil-omega-3 fatty acids 300-1,000 mg capsule 1 g by Per G Tube route once daily.     glucosamine-chondroitin 500-400 mg tablet 1 tablet by Per G Tube route once daily.     ketoconazole (NIZORAL) 2 % shampoo Apply topically twice a week. Apply twice weekly with showers until seborrheic dermatitis has resolved    multivitamin (ONE DAILY MULTIVITAMIN) per tablet 1 tablet by Per G Tube route once daily.    mupirocin (BACTROBAN) 2 % ointment Apply topically 2 (two) times daily. Apply to the skin near the Peg tube as this is reddened.    polyethylene glycol (GLYCOLAX) 17 gram PwPk 17 g by Per G Tube route nightly as needed.    venlafaxine (EFFEXOR) 75 MG tablet 75 mg by Per G Tube route once daily.    zinc sulfate (ZINCATE) 220 (50) mg capsule 220 mg by Per G Tube route once daily.     Family History     Problem Relation (Age of Onset)    Cancer Mother        Tobacco Use    Smoking status: Never Smoker    Smokeless tobacco: Never Used   Substance and Sexual Activity    Alcohol use: No    Drug use: No    Sexual activity: Never     Review of Systems   Constitutional: Negative for chills, diaphoresis and fever.   Eyes: Negative for photophobia.   Respiratory: Positive for shortness of breath. Negative for cough, chest  tightness and wheezing.    Cardiovascular: Negative for chest pain, palpitations and leg swelling.   Gastrointestinal: Negative for abdominal pain, diarrhea, nausea and vomiting.   Genitourinary: Negative for dysuria, flank pain, frequency and hematuria.   Musculoskeletal: Negative for back pain and myalgias.   Neurological: Negative for dizziness, syncope, light-headedness and headaches.   Psychiatric/Behavioral: Negative for confusion.     Objective:     Vital Signs (Most Recent):  Temp: 98.7 °F (37.1 °C) (11/19/19 0106)  Pulse: 71 (11/19/19 0402)  Resp: (!) 22 (11/19/19 0402)  BP: (!) 123/59 (11/19/19 0402)  SpO2: 98 % (11/19/19 0402) Vital Signs (24h Range):  Temp:  [98.7 °F (37.1 °C)-98.9 °F (37.2 °C)] 98.7 °F (37.1 °C)  Pulse:  [66-77] 71  Resp:  [22-31] 22  SpO2:  [80 %-98 %] 98 %  BP: (110-123)/(57-72) 123/59     Weight: 61.7 kg (136 lb)  Body mass index is 23.34 kg/m².    Physical Exam   Constitutional: She is oriented to person, place, and time. She appears well-developed.   Frail    HENT:   Head: Normocephalic and atraumatic.   Eyes: Pupils are equal, round, and reactive to light. Conjunctivae are normal.   Neck: Normal range of motion. No JVD present.   Cardiovascular: Normal rate, regular rhythm, normal heart sounds and intact distal pulses.   Pulmonary/Chest: Effort normal. No respiratory distress. She has no wheezes.   Breath sounds diminished to bases    Abdominal: Soft. Bowel sounds are normal. She exhibits no distension. There is no tenderness. There is no guarding.   PEG in place, site erythematous    Musculoskeletal: Normal range of motion. She exhibits no edema or tenderness.   Neurological: She is alert and oriented to person, place, and time.   Skin: Skin is warm and dry. Capillary refill takes less than 2 seconds.   Multiple wounds, skin tears and abrasions    Psychiatric: She has a normal mood and affect. Her behavior is normal.         CRANIAL NERVES     CN III, IV, VI   Pupils are equal,  round, and reactive to light.       Significant Labs:   BMP:   Recent Labs   Lab 11/18/19  2344   *   *   K 4.9      CO2 25   BUN 55*   CREATININE 1.0   CALCIUM 8.0*   MG 2.4     CBC:   Recent Labs   Lab 11/18/19  2344   WBC 7.73   HGB 9.5*   HCT 31.3*   *     Urine Studies:   Recent Labs   Lab 11/19/19  0119   COLORU Yellow   APPEARANCEUA Clear   PHUR 6.0   SPECGRAV 1.010   PROTEINUA Negative   GLUCUA Negative   KETONESU Negative   BILIRUBINUA Negative   OCCULTUA Trace*   NITRITE Negative   UROBILINOGEN Negative   LEUKOCYTESUR 3+*   RBCUA 4   WBCUA 25*   BACTERIA Few*   SQUAMEPITHEL 1       Significant Imaging: I have reviewed all pertinent imaging results/findings within the past 24 hours.    Assessment/Plan:     * Acute respiratory failure with hypoxia  89 yo female with chronic AFib has mild pulmonary vascular congestion and possible left-sided small effusion on CXR. Though patient denies feeling short of breath she was visibly in respiratory distress on arrival per ED report. O2 sats 80% with PO2 58 on ABG. Patient placed on supplemental O2 with noted improvement.     Continue supplemental O2 to keep sats > 92%   furosemide 20 mg IV x 1 now   Check CT chest                Controlled type 2 diabetes mellitus, without long-term current use of insulin  SSI, accucheck q6h       Anemia  H/H stable, monitor       Nursing home resident  Return to [facility] when stable for discharge       Oropharyngeal dysphagia  S/p PEG         Essential hypertension  Chronic atrial fibrillation  CAD (coronary artery disease)  Chronic diastolic congestive heart failure  Internal carotid artery stenosis  Non-rheumatic mitral regurgitation    Continue ASA, plavix, statin   Continue amlodipine, carvedilol and statin   Monitor tele           VTE Risk Mitigation (From admission, onward)         Ordered     Place sequential compression device  Until discontinued      11/19/19 0250     IP VTE HIGH RISK  PATIENT  Once      11/19/19 0250                   Tabatha Arevalo NP  Department of Hospital Medicine   [facility]

## 2019-11-20 PROBLEM — J96.01 ACUTE RESPIRATORY FAILURE WITH HYPOXIA: Status: RESOLVED | Noted: 2019-01-01 | Resolved: 2019-01-01

## 2019-11-20 NOTE — PLAN OF CARE
Pt accepted back to [facility] room 716B  and per Jena Woody @ [facility] [place] to arrange ambulance transportation [facility] . [place] called [facility] and was informed ambulance to arrive within the hour. Tn notify Dexter, RN, Floor nurse and VN of above and for nurse to call report to Nurse for room 716B at [phone]. Tn gave nurse d/c packet to give to transporters.     11/20/19 9292   Final Note   Assessment Type Final Discharge Note   Anticipated Discharge Disposition assisted Nu   What phone number can be called within the next 1-3 days to see how you are doing after discharge? [phone]   Hospital Follow Up  Appt(s) scheduled? No  (md at NH to assess)   Discharge plans and expectations educations in teach back method with documentation complete? Yes   Right Care Referral Info   Post Acute Recommendation Other   Referral Type detention NH   Facility Name [facility]

## 2019-11-20 NOTE — PLAN OF CARE
Plan of care reviewed with patient. Normal sinus rhythm on continuous heart monitor, no true red alarms. Safety maintained at this time. Bed in lowest position, side rails up x 2. Call light in reach.  Encouraged patient to use call light for assistance .Verbalized understanding, Patient cwith incontinent  Kept clean and dry per staff assist. Purewick in place draining clear yellow urine. Isosource 1.5 infusing at 20ml/hour per peg tube. Tolerating well. Dressing clean dry and intact. Wounds all over body. Dressing to Bilateral hips and legs. Clean , dry and intact Safety maintained. Will continue to monitor patient.

## 2019-11-20 NOTE — PLAN OF CARE
Pt informed Tn she is a resident at [facility] and is bedbound. . Pt anticipates returning upon d/c via ambulance. Tn gave pt d/c brochure and card and encouraged to call for any needs/concners.     11/20/19 1118   Discharge Assessment   Assessment Type Discharge Planning Assessment   Confirmed/corrected address and phone number on facesheet? Yes   Assessment information obtained from? Patient   Expected Length of Stay (days) 2   Communicated expected length of stay with patient/caregiver yes   Prior to hospitilization cognitive status: Alert/Oriented   Prior to hospitalization functional status: Partially Dependent;Needs Assistance   Current cognitive status: Alert/Oriented   Current Functional Status: Partially Dependent;Needs Assistance   Facility Arrived From: [facility]   Lives With facility resident   Able to Return to Prior Arrangements yes   Is patient able to care for self after discharge? No   Who are your caregiver(s) and their phone number(s)? Laya (daughter) [phone]   Patient's perception of discharge disposition nursing home   Readmission Within the Last 30 Days previous discharge plan unsuccessful   If yes, most recent facility name: [facility]   Patient currently being followed by outpatient case management? No   Patient currently receives any other outside agency services? No   Equipment Currently Used at Home hospital bed;lift device   Do you have any problems affording any of your prescribed medications? No   Is the patient taking medications as prescribed? yes   Does the patient have transportation home? Yes   Transportation Anticipated agency;other (see comments)  (ambulance)   Does the patient receive services at the [facility]? No   Discharge Plan A Return to nursing home   Discharge Plan B Return to Nursing Home;Skilled Nursing Facility   DME Needed Upon Discharge  none   Patient/Family in Agreement with Plan yes   Readmission Questionnaire   At the time of your discharge, did  someone talk to you about what your health problems were? Yes   At the time of discharge, did someone talk to you about what to watch out for regarding worsening of your health problem? Yes   At the time of discharge, did someone talk to you about what to do if you experienced worsening of your health problem? Yes   At the time of discharge, did someone talk to you about which medication to take when you left the hospital and which ones to stop taking? Yes   At the time of discharge, did someone talk to you about when and where to follow up with a doctor after you left the hospital? Yes   What do you believe caused you to be sick enough to be re-admitted? shortness of breath   How often do you need to have someone help you when you read instructions, pamphlets, or other written material from your doctor or pharmacy? Never   Do you have problems taking your medications as prescribed? Yes   Do you have any problems affording any of  your prescribed medications? No   Do you have problems obtaining/receiving your medications? No   Does the patient have transportation to healthcare appointments? Yes   Living Arrangements residential facility   Does the patient have family/friends to help with healtcare needs after discharge? yes   Does your caregiver provide all the help you need? Yes   Are you currently feeling confused? No   Are you currently having problems thinking? No   Are you currently having memory problems? No   Have you felt down, depressed, or hopeless? 0   Have you felt little interest or pleasure in doing things? 0   In the last 7 days, my sleep quality was: fair

## 2019-11-20 NOTE — ASSESSMENT & PLAN NOTE
Contributing Nutrition Diagnosis  Dysphagia    Related to (etiology):   stroke    Signs and Symptoms (as evidenced by):   NPO, PEG tube    Interventions:  Enteral nutrition    Nutrition Diagnosis Status:   New

## 2019-11-20 NOTE — PLAN OF CARE
[facility]    FACILITY TRANSFER ORDERS      Patient Name: Elizabeth Navarrete  YOB: 1928    PCP: [facility]   PCP Address: [address] [place] [address] RD / RAJINDER DESHPANDE  PCP Phone Number: [phone]  PCP Fax: [phone]    Encounter Date: 11/20/2019    Admit to: [facility]     Vital Signs:  Routine    Diagnoses:   Active Hospital Problems    Diagnosis  POA    SOB (shortness of breath) [R06.02]  Yes    Anemia [D64.9]  Yes    Controlled type 2 diabetes mellitus, without long-term current use of insulin [E11.9]  Yes     Chronic    Multiple open wounds of lower leg [S81.809A]  Yes    Nursing home resident [Z59.3]  Not Applicable     Chronic     [facility] ([address] [place] [address] Rd, [place] [address])       Gastrostomy tube dependent, 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 [Z93.1]  Not Applicable     Chronic    Oropharyngeal dysphagia [R13.12]  Yes     Chronic    Hemiparesis affecting left side as late effect of stroke [I69.354]  Not Applicable     Chronic    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 [Z86.73]  Not Applicable     Chronic    Chronic atrial fibrillation [I48.20]  Yes     Chronic    Non-rheumatic mitral regurgitation [I34.0]  Yes     Chronic     9/16- Mild/Mod      CAD (coronary artery disease) [I25.10]  Yes     Chronic    Chronic diastolic congestive heart failure [I50.32]  Yes     Chronic     Echo 6/98:  LVEF 15-20%      Essential hypertension [I10]  Yes     Chronic    Internal carotid artery stenosis [I65.29]  Yes     Chronic     50% Right ICA 7/12        Resolved Hospital Problems    Diagnosis Date Resolved POA    *Acute respiratory failure with hypoxia [J96.01] 11/20/2019 Yes       Allergies:  Review of patient's allergies indicates:   Allergen Reactions    Pcn [penicillins] Rash       Diet: Tube feedings: Isosource 1.5 carlos bolus 5 times daily     Activities: as tolerated      Nursing:   Turn the patient every 2 hours  Use heel lift boots  Keep peg tube site clean and  dry  Keep skin clean and dry  Check vitals signs every shift  Keep HOB elevated greater than 45 degrees especially when feeding the patient.  Crush meds completely  Make sure you are flushing the Peg Tube so it does not clog.   Please give O2 prn to keep sats >92%    CONSULTS:    Physical Therapy to evaluate and treat.  and Occupational Therapy to evaluate and treat.    MISCELLANEOUS CARE:  PEG Care: Clean site every 24 hours.     WOUND CARE ORDERS  Clean wounds and scabs of bilateral legs and hips with wound cleanser or Acetic Acid, pat dry.  Apply Xeroform to left and right hip open wounds  Apply slightly moistened Aquacel Ag to right  lower leg and thigh open wounds. Cover bilateral hips with ABD pads and Medipore tape, Cover bilateral thighs with with Aquacel adhesive bandages. Cover bilateral lower legs with ABD pads, wrap from just above toes to just below knee with Kerlix. Dressing changes every M, W, F.    Medications: Review discharge medications with patient and family and provide education.      Current Discharge Medication List      START taking these medications    Details   furosemide (LASIX) 20 MG tablet 1 tablet (20 mg total) by Per G Tube route once daily.  Qty: 30 tablet, Refills: 0         CONTINUE these medications which have CHANGED    Details   amLODIPine (NORVASC) 5 MG tablet 0.5 tablets (2.5 mg total) by Per G Tube route once daily.  Qty: 30 tablet      carvedilol (COREG) 25 MG tablet 0.5 tablets (12.5 mg total) by Per G Tube route 2 (two) times daily.  Qty: 30 tablet         CONTINUE these medications which have NOT CHANGED    Details   acetaminophen (TYLENOL) 160 mg/5 mL Elix 650 mg by Per G Tube route every 6 (six) hours as needed.       albuterol (PROVENTIL/VENTOLIN HFA) 90 mcg/actuation inhaler Inhale 1-2 puffs into the lungs every 4 (four) hours as needed for Wheezing. Rescue  Qty: 1 Inhaler, Refills: 0      ascorbic acid, vitamin C, (VITAMIN C) 500 MG tablet 500 mg by Per G Tube route  2 (two) times daily.      aspirin 325 MG tablet 325 mg by Per G Tube route once daily.      atorvastatin (LIPITOR) 40 MG tablet 1 tablet (40 mg total) by Per G Tube route once daily.  Qty: 90 tablet, Refills: 3      cholecalciferol, vitamin D3, (VITAMIN D3) 1,000 unit capsule 1,000 Units by Per G Tube route once daily.       clobetasol (TEMOVATE) 0.05 % cream Apply topically 2 (two) times daily.      clopidogrel (PLAVIX) 75 mg tablet 1 tablet (75 mg total) by Per G Tube route once daily.  Qty: 90 tablet, Refills: 3      diclofenac sodium (VOLTAREN) 1 % Gel Apply 4 g topically 4 (four) times daily as needed.  Qty: 500 g, Refills: 2      diphenhydrAMINE (BENADRYL) 12.5 mg/5 mL elixir by Per G Tube route every 12 (twelve) hours as needed for Allergies.       docusate (COLACE) 50 mg/5 mL liquid 100 mg by Per G Tube route 2 (two) times daily.       ferrous sulfate 325 (65 FE) MG EC tablet Take 325 mg by mouth 3 (three) times daily with meals. Per G tube      fish oil-omega-3 fatty acids 300-1,000 mg capsule 1 g by Per G Tube route once daily.       glucosamine-chondroitin 500-400 mg tablet 1 tablet by Per G Tube route once daily.       ketoconazole (NIZORAL) 2 % shampoo Apply topically twice a week. Apply twice weekly with showers until seborrheic dermatitis has resolved  Qty: 120 mL, Refills: 1      multivitamin (ONE DAILY MULTIVITAMIN) per tablet 1 tablet by Per G Tube route once daily.      mupirocin (BACTROBAN) 2 % ointment Apply topically 2 (two) times daily. Apply to the skin near the Peg tube as this is reddened.      polyethylene glycol (GLYCOLAX) 17 gram PwPk 17 g by Per G Tube route nightly as needed.      venlafaxine (EFFEXOR) 75 MG tablet 75 mg by Per G Tube route once daily.      zinc sulfate (ZINCATE) 220 (50) mg capsule 220 mg by Per G Tube route once daily.                  _________________________________  Tabatha Arevalo, NP  11/20/2019

## 2019-11-20 NOTE — PLAN OF CARE
Recommendation:   1. Increase TF as tolerated to goal rate of 35ml/hr to provide 1260 kcal, 57g protein, & 654ml free water.    Goals:   Pt will tolerate TF  Nutrition Goal Status: new  Communication of RD Recs: reviewed with RN(Jess)

## 2019-11-20 NOTE — PLAN OF CARE
Tn sent orders to [facility] via [facility]     11/20/19 8767   Post-Acute Status   Post-Acute Authorization Placement   Post-Acute Placement Status Additional Clinical Requested

## 2019-11-20 NOTE — NURSING
Patient noted to have several skin tears/wounds/scabs to generalized body, with largest being to right hip/buttock, left hip/buttock, left leg, and right leg. Wounds cleansed with NS then dressed with xeroform, gauze, abd pads, and kerlix/tape. Patient tolerated well. Awaiting patient general surgery consult since wound care nurse is not available.

## 2019-11-21 PROBLEM — J96.01 ACUTE RESPIRATORY FAILURE WITH HYPOXIA AND HYPERCARBIA: Status: ACTIVE | Noted: 2019-01-01

## 2019-11-21 PROBLEM — J96.02 ACUTE RESPIRATORY FAILURE WITH HYPOXIA AND HYPERCARBIA: Status: ACTIVE | Noted: 2019-01-01

## 2019-11-21 NOTE — PHYSICIAN QUERY
"PT Name: Elizabeth Navarrete  MR #: [id number]    Physician Query Form - Heart  Condition Clarification     CDS/: AFSHAN Jacome, RN, CCDS               Contact information: [email or web address]  This form is a permanent document in the medical record.     Query Date: November 21, 2019    By submitting this query, we are merely seeking further clarification of documentation. Please utilize your independent clinical judgment when addressing the question(s) below.    The medical record contains the following   Indicators     Supporting Clinical Findings Location in Medical Record   X BNP    H & P: Dr. Gordon 11/19    EF      Radiology findings      Echo Results      "Ascites" documented     X "SOB" or "AYERS" documented presented to ED for evaluation of SOB  noted respiratory distress on arrival with O2 sats 80% and tachypnea  ABG remarkable for PO2 58   H & P: Dr. Gordon 11/19    "Hypoxia" documented     X Heart Failure documented  chronic diastolic heart failure   H & P: Dr. Gordon 11/19    "Edema" documented     X Diuretics/Meds BNP elevated  continue on Lasix    Furosemide injection 20mg  Dose: 20 mg  Freq: Once Route: IV    Furosemide injection 20mg  Dose: 20 mg  Freq: 2 times daily Route: IV H & P: Dr. Gordon 11/19    MAR    Treatment:      Other:      Heart failure (HF) can be acute, chronic or both. It is generally further specificed as systolic, diastolic, or combined. Lastly, it is important to identify an underlying etiology if known or suspected.     Common clues to acute exacerbation:  Rapidly progressive symptoms (w/in 2 weeks of presentation), using IV diuretics to treat, using supplemental O2, pulmonary edema on Xray, MI w/in 4 weeks, and/or BNP >500    Systolic Heart Failure: is defined as chart documentation of a left ventricular ejection fraction (LVEF) less than 40%     Diastolic Heart Failure: is defined as a left ventricular ejection fraction (LVEF) greater " than 40%   +      Evidence of diastolic dysfunction on echocardiography OR    Right heart catheterization wedge pressure above 12 mm Hg OR    Left heart catheterization left ventricular end diastolic pressure 18 mm Hg or above.    References: *American Heart Association    The clinical guidelines noted below are only system guidelines, and do not replace the providers clinical judgment.     Provider, please specify the acuity of diastolic CHF:    [  x ] Acute on Chronic Diastolic Heart Failure -    Pre-existing diastoic HF diagnosis.  EF > 40%  and acute HF symptoms documented       [   ] Chronic Diastolic Heart Failure - Pre-existing diastolic HF diagnosis.  EF > 40%  without  acute HF symptoms documented   [   ] Other (please specify):   [  ] Clinically Undetermined                           Please document in your progress notes daily for the duration of treatment until resolved and include in your discharge summary.

## 2019-11-22 NOTE — DISCHARGE SUMMARY
[facility]  Discharge Summary      Patient Name: Elizabeth Navarrete  MRN: [id number]  Admission Date: 11/18/2019  Hospital Length of Stay: 1 days  Discharge Date and Time: 11/20/2019  4:17 PM  Attending Physician: Therese att. providers found   Discharging Provider: Aby Marie MD  Primary Care Provider: [facility]      HPI:   Elizabeth Navarrete is a 89 y.o. w/ hx of HTN, HLD, CAD, atrial fibrillation, chronic diastolic heart failure, carotid artery stenosis, non-rheumatic mitral regurgitation, CVA with left sided weakness and oropharyngeal dysphagia s/p PEG placement, CKD stage 3 and Type 2 Diabetes Mellitus. Her primary care physician is Dr. Jose Juan Abbasi. She is a resident of [facility] of [place], [place].     Recently admitted 10/24-10/28 for HCAP and UTI.     Patient presented to ED for evaluation of SOB. Per family symptoms present x 1 week progressively becoming worse. Patient noted to have decreased O2 sats at facility prompting transfer to ED. Patient denies all symptoms including chest pain, SOB, n/v and fever/chills. Per ED report patient with noted respiratory distress on arrival with O2 sats 80% and tachypnea. Symptoms improved with supplemental O2. ABG remarkable for PO2 58. CXR shows no acute changes with continued blunting of the left costophrenic angle, which is likely due to pleural fluid. , D-dimer 2.16. Influenza negative. UA shows few bacteria and 25 WBC. Patient admitted to [facility] for further care.             * No surgery found *      Hospital Course:   Admitted with acute respiratory failure with hypoxia, improved with diuresing and weaned off oxygen     Consults:   Consults (From admission, onward)        Status Ordering Provider     Inpatient consult to Registered Dietitian/Nutritionist  Once     Provider:  (Not yet assigned)    Completed ABY MARIE.          * Acute respiratory failure with hypoxia and  hypercarbia  89 yo female with chronic AFib has mild pulmonary vascular congestion and possible left-sided small effusion on CXR. Though patient denies feeling short of breath she was visibly in respiratory distress on arrival per ED report. O2 sats 80% with PO2 58 on ABG. Patient placed on supplemental O2 with noted improvement.   Continue supplemental O2 to keep sats > 92%    IV furosemide   Check CT chest          Controlled type 2 diabetes mellitus, without long-term current use of insulin  SSI, accucheck q6h       Anemia  H/H stable, monitor       Nursing home resident  Return to [facility] when stable for discharge       Oropharyngeal dysphagia  S/p PEG         Essential hypertension  Chronic atrial fibrillation  CAD (coronary artery disease)  Chronic diastolic congestive heart failure  Internal carotid artery stenosis  Non-rheumatic mitral regurgitation    Continue ASA, plavix, statin   Continue amlodipine, carvedilol and statin   Monitor tele           Final Active Diagnoses:    Diagnosis Date Noted POA    PRINCIPAL PROBLEM:  Acute respiratory failure with hypoxia and hypercarbia [J96.01, J96.02] 11/21/2019 Yes    SOB (shortness of breath) [R06.02] 11/19/2019 Yes    Anemia [D64.9] 10/25/2019 Yes    Controlled type 2 diabetes mellitus, without long-term current use of insulin [E11.9] 10/25/2019 Yes     Chronic    Multiple open wounds of lower leg [S81.809A] 10/25/2019 Yes    Nursing home resident [Z59.3] 06/23/2017 Not Applicable     Chronic    Gastrostomy tube dependent, 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 [Z93.1] 06/21/2017 Not Applicable     Chronic    Oropharyngeal dysphagia [R13.12] 06/16/2017 Yes     Chronic    Hemiparesis affecting left side as late effect of stroke [I69.354] 06/15/2017 Not Applicable     Chronic    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 [Z86.73] 06/15/2017 Not Applicable     Chronic    Chronic atrial fibrillation [I48.20] 09/06/2016 Yes     Chronic    Non-rheumatic mitral  regurgitation [I34.0] 09/06/2016 Yes     Chronic    CAD (coronary artery disease) [I25.10] 10/10/2012 Yes     Chronic    Chronic diastolic congestive heart failure [I50.32] 10/10/2012 Yes     Chronic    Essential hypertension [I10] 10/10/2012 Yes     Chronic    Internal carotid artery stenosis [I65.29] 10/10/2012 Yes     Chronic      Problems Resolved During this Admission:    Diagnosis Date Noted Date Resolved POA    Acute respiratory failure with hypoxia [J96.01] 11/19/2019 11/20/2019 Yes       Discharged Condition: stable    Disposition: Another Health Care Inst*    Follow Up:    Patient Instructions:      Tube Feedings/Formulas     Order Specific Question Answer Comments   Select Adult Formula: Isosource 1.5 carlos    Route: Gastrostomy      Activity as tolerated       Significant Diagnostic Studies:     Pending Diagnostic Studies:     None         Medications:  Reconciled Home Medications:      Medication List      START taking these medications    furosemide 20 MG tablet  Commonly known as:  LASIX  1 tablet (20 mg total) by Per G Tube route once daily.        CHANGE how you take these medications    amLODIPine 5 MG tablet  Commonly known as:  NORVASC  0.5 tablets (2.5 mg total) by Per G Tube route once daily.  What changed:  how much to take     carvedilol 25 MG tablet  Commonly known as:  COREG  0.5 tablets (12.5 mg total) by Per G Tube route 2 (two) times daily.  What changed:  how much to take        CONTINUE taking these medications    acetaminophen 160 mg/5 mL Elix  Commonly known as:  TYLENOL  650 mg by Per G Tube route every 6 (six) hours as needed.     albuterol 90 mcg/actuation inhaler  Commonly known as:  PROVENTIL/VENTOLIN HFA  Inhale 1-2 puffs into the lungs every 4 (four) hours as needed for Wheezing. Rescue     aspirin 325 MG tablet  325 mg by Per G Tube route once daily.     atorvastatin 40 MG tablet  Commonly known as:  LIPITOR  1 tablet (40 mg total) by Per G Tube route once daily.      clobetasol 0.05 % cream  Commonly known as:  TEMOVATE  Apply topically 2 (two) times daily.     clopidogrel 75 mg tablet  Commonly known as:  PLAVIX  1 tablet (75 mg total) by Per G Tube route once daily.     diclofenac sodium 1 % Gel  Commonly known as:  Voltaren  Apply 4 g topically 4 (four) times daily as needed.     diphenhydrAMINE 12.5 mg/5 mL elixir  Commonly known as:  BENADRYL  by Per G Tube route every 12 (twelve) hours as needed for Allergies.     docusate 50 mg/5 mL liquid  Commonly known as:  COLACE  100 mg by Per G Tube route 2 (two) times daily.     ferrous sulfate 325 (65 FE) MG EC tablet  Take 325 mg by mouth 3 (three) times daily with meals. Per G tube     fish oil-omega-3 fatty acids 300-1,000 mg capsule  1 g by Per G Tube route once daily.     glucosamine-chondroitin 500-400 mg tablet  1 tablet by Per G Tube route once daily.     ketoconazole 2 % shampoo  Commonly known as:  NIZORAL  Apply topically twice a week. Apply twice weekly with showers until seborrheic dermatitis has resolved     mupirocin 2 % ointment  Commonly known as:  BACTROBAN  Apply topically 2 (two) times daily. Apply to the skin near the Peg tube as this is reddened.     One Daily Multivitamin per tablet  Generic drug:  multivitamin  1 tablet by Per G Tube route once daily.     polyethylene glycol 17 gram Pwpk  Commonly known as:  GLYCOLAX  17 g by Per G Tube route nightly as needed.     venlafaxine 75 MG tablet  Commonly known as:  EFFEXOR  75 mg by Per G Tube route once daily.     Vitamin C 500 MG tablet  Generic drug:  ascorbic acid (vitamin C)  500 mg by Per G Tube route 2 (two) times daily.     Vitamin D3 25 mcg (1,000 unit) capsule  Generic drug:  cholecalciferol (vitamin D3)  1,000 Units by Per G Tube route once daily.     zinc sulfate 220 (50) mg capsule  Commonly known as:  ZINCATE  220 mg by Per G Tube route once daily.            Indwelling Lines/Drains at time of discharge:   Lines/Drains/Airways     Drain                  Gastrostomy/Enterostomy 09/10/19 1726 Gastrostomy tube w/ balloon midline feeding 72 days    Female External Urinary Catheter 11/19/19 0900 2 days                Time spent on the discharge of patient: 30 minutes  Patient was seen and examined on the date of discharge and determined to be suitable for discharge.         Aby Gordon MD  Department of Hospital Medicine  [facility]

## 2019-11-22 NOTE — HOSPITAL COURSE
Admitted with acute respiratory failure with hypoxia, improved with diuresing and weaned off oxygen

## 2019-11-22 NOTE — ASSESSMENT & PLAN NOTE
89 yo female with chronic AFib has mild pulmonary vascular congestion and possible left-sided small effusion on CXR. Though patient denies feeling short of breath she was visibly in respiratory distress on arrival per ED report. O2 sats 80% with PO2 58 on ABG. Patient placed on supplemental O2 with noted improvement.   Continue supplemental O2 to keep sats > 92%   IV furosemide   Check CT chest

## 2020-01-01 ENCOUNTER — HOSPITAL ENCOUNTER (EMERGENCY)
Facility: HOSPITAL | Age: 85
Discharge: NURSING, REHAB OR LONG-TERM CARE | End: 2020-01-21
Attending: EMERGENCY MEDICINE
Payer: MEDICARE

## 2020-01-01 ENCOUNTER — OFFICE VISIT (OUTPATIENT)
Dept: OTOLARYNGOLOGY | Facility: CLINIC | Age: 85
End: 2020-01-01
Payer: MEDICARE

## 2020-01-01 ENCOUNTER — HOSPITAL ENCOUNTER (EMERGENCY)
Facility: HOSPITAL | Age: 85
Discharge: HOME | End: 2020-01-22
Attending: EMERGENCY MEDICINE
Payer: MEDICARE

## 2020-01-01 ENCOUNTER — OFFICE VISIT (OUTPATIENT)
Dept: GASTROENTEROLOGY | Facility: CLINIC | Age: 85
End: 2020-01-01
Payer: MEDICARE

## 2020-01-01 ENCOUNTER — ANESTHESIA EVENT (OUTPATIENT)
Dept: ENDOSCOPY | Facility: HOSPITAL | Age: 85
DRG: 378 | End: 2020-01-01
Payer: MEDICARE

## 2020-01-01 ENCOUNTER — TELEPHONE (OUTPATIENT)
Dept: GASTROENTEROLOGY | Facility: CLINIC | Age: 85
End: 2020-01-01

## 2020-01-01 ENCOUNTER — HOSPITAL ENCOUNTER (INPATIENT)
Facility: HOSPITAL | Age: 85
LOS: 8 days | DRG: 378 | End: 2020-01-16
Attending: EMERGENCY MEDICINE | Admitting: INTERNAL MEDICINE
Payer: MEDICARE

## 2020-01-01 ENCOUNTER — HOSPITAL ENCOUNTER (EMERGENCY)
Facility: HOSPITAL | Age: 85
End: 2020-01-23
Attending: EMERGENCY MEDICINE
Payer: MEDICARE

## 2020-01-01 ENCOUNTER — HOSPITAL ENCOUNTER (INPATIENT)
Facility: HOSPITAL | Age: 85
LOS: 2 days | DRG: 951 | End: 2020-05-08
Attending: FAMILY MEDICINE | Admitting: FAMILY MEDICINE
Payer: OTHER MISCELLANEOUS

## 2020-01-01 ENCOUNTER — ANESTHESIA (OUTPATIENT)
Dept: ENDOSCOPY | Facility: HOSPITAL | Age: 85
DRG: 378 | End: 2020-01-01
Payer: MEDICARE

## 2020-01-01 VITALS
HEART RATE: 42 BPM | BODY MASS INDEX: 27.77 KG/M2 | TEMPERATURE: 96 F | SYSTOLIC BLOOD PRESSURE: 69 MMHG | DIASTOLIC BLOOD PRESSURE: 34 MMHG | OXYGEN SATURATION: 90 % | WEIGHT: 123.44 LBS | RESPIRATION RATE: 28 BRPM | HEIGHT: 56 IN

## 2020-01-01 VITALS
WEIGHT: 127.88 LBS | TEMPERATURE: 98 F | OXYGEN SATURATION: 100 % | BODY MASS INDEX: 28.67 KG/M2 | SYSTOLIC BLOOD PRESSURE: 148 MMHG | RESPIRATION RATE: 16 BRPM | DIASTOLIC BLOOD PRESSURE: 67 MMHG | HEART RATE: 78 BPM

## 2020-01-01 VITALS
SYSTOLIC BLOOD PRESSURE: 127 MMHG | WEIGHT: 135.13 LBS | HEART RATE: 86 BPM | BODY MASS INDEX: 30.4 KG/M2 | HEIGHT: 56 IN | TEMPERATURE: 97 F | RESPIRATION RATE: 20 BRPM | OXYGEN SATURATION: 95 % | DIASTOLIC BLOOD PRESSURE: 67 MMHG

## 2020-01-01 VITALS
DIASTOLIC BLOOD PRESSURE: 59 MMHG | RESPIRATION RATE: 16 BRPM | BODY MASS INDEX: 29.25 KG/M2 | OXYGEN SATURATION: 95 % | TEMPERATURE: 98 F | HEART RATE: 69 BPM | WEIGHT: 130 LBS | SYSTOLIC BLOOD PRESSURE: 126 MMHG | HEIGHT: 56 IN

## 2020-01-01 VITALS
DIASTOLIC BLOOD PRESSURE: 77 MMHG | SYSTOLIC BLOOD PRESSURE: 132 MMHG | HEART RATE: 107 BPM | BODY MASS INDEX: 28.67 KG/M2 | SYSTOLIC BLOOD PRESSURE: 151 MMHG | TEMPERATURE: 98 F | HEIGHT: 56 IN | HEART RATE: 107 BPM | DIASTOLIC BLOOD PRESSURE: 65 MMHG

## 2020-01-01 VITALS
RESPIRATION RATE: 18 BRPM | OXYGEN SATURATION: 96 % | TEMPERATURE: 99 F | SYSTOLIC BLOOD PRESSURE: 133 MMHG | DIASTOLIC BLOOD PRESSURE: 72 MMHG | HEART RATE: 85 BPM

## 2020-01-01 DIAGNOSIS — I50.32 CHRONIC DIASTOLIC CONGESTIVE HEART FAILURE: Chronic | ICD-10-CM

## 2020-01-01 DIAGNOSIS — A41.9 SEVERE SEPSIS: ICD-10-CM

## 2020-01-01 DIAGNOSIS — K11.8 PAROTID GLAND FULLNESS: ICD-10-CM

## 2020-01-01 DIAGNOSIS — I10 ESSENTIAL HYPERTENSION: Chronic | ICD-10-CM

## 2020-01-01 DIAGNOSIS — Z43.1 ATTENTION TO GASTROSTOMY TUBE: ICD-10-CM

## 2020-01-01 DIAGNOSIS — E87.5 HYPERKALEMIA: ICD-10-CM

## 2020-01-01 DIAGNOSIS — K94.23 MALFUNCTION OF PERCUTANEOUS ENDOSCOPIC GASTROSTOMY (PEG) TUBE: ICD-10-CM

## 2020-01-01 DIAGNOSIS — J96.01 ACUTE RESPIRATORY FAILURE WITH HYPOXIA: ICD-10-CM

## 2020-01-01 DIAGNOSIS — K11.20 PAROTITIS: Primary | ICD-10-CM

## 2020-01-01 DIAGNOSIS — K94.23 PEG TUBE MALFUNCTION: ICD-10-CM

## 2020-01-01 DIAGNOSIS — R65.20 SEVERE SEPSIS: ICD-10-CM

## 2020-01-01 DIAGNOSIS — R07.9 CHEST PAIN: ICD-10-CM

## 2020-01-01 DIAGNOSIS — R13.12 OROPHARYNGEAL DYSPHAGIA: Primary | ICD-10-CM

## 2020-01-01 DIAGNOSIS — K25.9 GASTRIC ULCER, UNSPECIFIED CHRONICITY, UNSPECIFIED WHETHER GASTRIC ULCER HEMORRHAGE OR PERFORATION PRESENT: ICD-10-CM

## 2020-01-01 DIAGNOSIS — L12.0 BP (BULLOUS PEMPHIGOID): ICD-10-CM

## 2020-01-01 DIAGNOSIS — K11.20 SIALADENITIS: ICD-10-CM

## 2020-01-01 DIAGNOSIS — K92.2 UGIB (UPPER GASTROINTESTINAL BLEED): Primary | ICD-10-CM

## 2020-01-01 DIAGNOSIS — I25.10 CORONARY ARTERY DISEASE INVOLVING NATIVE CORONARY ARTERY WITHOUT ANGINA PECTORIS, UNSPECIFIED WHETHER NATIVE OR TRANSPLANTED HEART: Chronic | ICD-10-CM

## 2020-01-01 DIAGNOSIS — Z43.1 PEG (PERCUTANEOUS ENDOSCOPIC GASTROSTOMY) ADJUSTMENT/REPLACEMENT/REMOVAL: ICD-10-CM

## 2020-01-01 DIAGNOSIS — R06.02 SOB (SHORTNESS OF BREATH): ICD-10-CM

## 2020-01-01 LAB
ABO + RH BLD: NORMAL
ALBUMIN SERPL BCP-MCNC: 1.9 G/DL (ref 3.5–5.2)
ALBUMIN SERPL BCP-MCNC: 1.9 G/DL (ref 3.5–5.2)
ALBUMIN SERPL BCP-MCNC: 2.1 G/DL (ref 3.5–5.2)
ALBUMIN SERPL BCP-MCNC: 2.1 G/DL (ref 3.5–5.2)
ALBUMIN SERPL BCP-MCNC: 2.4 G/DL (ref 3.5–5.2)
ALBUMIN SERPL BCP-MCNC: 2.5 G/DL (ref 3.5–5.2)
ALP SERPL-CCNC: 135 U/L (ref 55–135)
ALP SERPL-CCNC: 152 U/L (ref 55–135)
ALP SERPL-CCNC: 174 U/L (ref 55–135)
ALP SERPL-CCNC: 197 U/L (ref 55–135)
ALP SERPL-CCNC: 57 U/L (ref 55–135)
ALP SERPL-CCNC: 60 U/L (ref 55–135)
ALP SERPL-CCNC: 61 U/L (ref 55–135)
ALP SERPL-CCNC: 86 U/L (ref 55–135)
ALP SERPL-CCNC: 91 U/L (ref 55–135)
ALT SERPL W/O P-5'-P-CCNC: 17 U/L (ref 10–44)
ALT SERPL W/O P-5'-P-CCNC: 17 U/L (ref 10–44)
ALT SERPL W/O P-5'-P-CCNC: 19 U/L (ref 10–44)
ALT SERPL W/O P-5'-P-CCNC: 19 U/L (ref 10–44)
ALT SERPL W/O P-5'-P-CCNC: 20 U/L (ref 10–44)
ALT SERPL W/O P-5'-P-CCNC: 22 U/L (ref 10–44)
ALT SERPL W/O P-5'-P-CCNC: 23 U/L (ref 10–44)
ALT SERPL W/O P-5'-P-CCNC: 24 U/L (ref 10–44)
ALT SERPL W/O P-5'-P-CCNC: 25 U/L (ref 10–44)
ANION GAP SERPL CALC-SCNC: 10 MMOL/L (ref 8–16)
ANION GAP SERPL CALC-SCNC: 12 MMOL/L (ref 8–16)
ANION GAP SERPL CALC-SCNC: 7 MMOL/L (ref 8–16)
ANION GAP SERPL CALC-SCNC: 8 MMOL/L (ref 8–16)
ANION GAP SERPL CALC-SCNC: 9 MMOL/L (ref 8–16)
ANISOCYTOSIS BLD QL SMEAR: SLIGHT
AST SERPL-CCNC: 21 U/L (ref 10–40)
AST SERPL-CCNC: 22 U/L (ref 10–40)
AST SERPL-CCNC: 23 U/L (ref 10–40)
AST SERPL-CCNC: 27 U/L (ref 10–40)
AST SERPL-CCNC: 27 U/L (ref 10–40)
AST SERPL-CCNC: 28 U/L (ref 10–40)
AST SERPL-CCNC: 28 U/L (ref 10–40)
AST SERPL-CCNC: 29 U/L (ref 10–40)
AST SERPL-CCNC: 31 U/L (ref 10–40)
BACTERIA #/AREA URNS HPF: ABNORMAL /HPF
BACTERIA SPEC AEROBE CULT: ABNORMAL
BACTERIA SPEC ANAEROBE CULT: ABNORMAL
BACTERIA UR CULT: ABNORMAL
BACTERIA UR CULT: ABNORMAL
BASO STIPL BLD QL SMEAR: ABNORMAL
BASOPHILS # BLD AUTO: 0 K/UL (ref 0–0.2)
BASOPHILS # BLD AUTO: 0 K/UL (ref 0–0.2)
BASOPHILS # BLD AUTO: 0.02 K/UL (ref 0–0.2)
BASOPHILS # BLD AUTO: 0.03 K/UL (ref 0–0.2)
BASOPHILS # BLD AUTO: ABNORMAL K/UL (ref 0–0.2)
BASOPHILS # BLD AUTO: ABNORMAL K/UL (ref 0–0.2)
BASOPHILS NFR BLD: 0 % (ref 0–1.9)
BASOPHILS NFR BLD: 0.1 % (ref 0–1.9)
BASOPHILS NFR BLD: 0.2 % (ref 0–1.9)
BASOPHILS NFR BLD: 0.3 % (ref 0–1.9)
BILIRUB SERPL-MCNC: 0.5 MG/DL (ref 0.1–1)
BILIRUB SERPL-MCNC: 0.5 MG/DL (ref 0.1–1)
BILIRUB SERPL-MCNC: 0.6 MG/DL (ref 0.1–1)
BILIRUB SERPL-MCNC: 0.7 MG/DL (ref 0.1–1)
BILIRUB UR QL STRIP: NEGATIVE
BLD GP AB SCN CELLS X3 SERPL QL: NORMAL
BLD PROD TYP BPU: NORMAL
BLD PROD TYP BPU: NORMAL
BLOOD UNIT EXPIRATION DATE: NORMAL
BLOOD UNIT EXPIRATION DATE: NORMAL
BLOOD UNIT TYPE CODE: 5100
BLOOD UNIT TYPE CODE: 5100
BLOOD UNIT TYPE: NORMAL
BLOOD UNIT TYPE: NORMAL
BUN SERPL-MCNC: 108 MG/DL (ref 10–30)
BUN SERPL-MCNC: 111 MG/DL (ref 10–30)
BUN SERPL-MCNC: 112 MG/DL (ref 10–30)
BUN SERPL-MCNC: 113 MG/DL (ref 10–30)
BUN SERPL-MCNC: 48 MG/DL (ref 10–30)
BUN SERPL-MCNC: 54 MG/DL (ref 10–30)
BUN SERPL-MCNC: 56 MG/DL (ref 10–30)
BUN SERPL-MCNC: 56 MG/DL (ref 10–30)
BUN SERPL-MCNC: 57 MG/DL (ref 10–30)
BUN SERPL-MCNC: 62 MG/DL (ref 10–30)
BUN SERPL-MCNC: 64 MG/DL (ref 10–30)
BUN SERPL-MCNC: 68 MG/DL (ref 10–30)
BUN SERPL-MCNC: 69 MG/DL (ref 10–30)
BUN SERPL-MCNC: 73 MG/DL (ref 10–30)
BUN SERPL-MCNC: 76 MG/DL (ref 10–30)
BUN SERPL-MCNC: 82 MG/DL (ref 10–30)
CALCIUM SERPL-MCNC: 7.8 MG/DL (ref 8.7–10.5)
CALCIUM SERPL-MCNC: 7.8 MG/DL (ref 8.7–10.5)
CALCIUM SERPL-MCNC: 7.9 MG/DL (ref 8.7–10.5)
CALCIUM SERPL-MCNC: 8.1 MG/DL (ref 8.7–10.5)
CALCIUM SERPL-MCNC: 8.2 MG/DL (ref 8.7–10.5)
CALCIUM SERPL-MCNC: 8.4 MG/DL (ref 8.7–10.5)
CALCIUM SERPL-MCNC: 8.5 MG/DL (ref 8.7–10.5)
CALCIUM SERPL-MCNC: 8.6 MG/DL (ref 8.7–10.5)
CALCIUM SERPL-MCNC: 8.7 MG/DL (ref 8.7–10.5)
CALCIUM SERPL-MCNC: 8.8 MG/DL (ref 8.7–10.5)
CALCIUM SERPL-MCNC: 9.1 MG/DL (ref 8.7–10.5)
CHLORIDE SERPL-SCNC: 103 MMOL/L (ref 95–110)
CHLORIDE SERPL-SCNC: 105 MMOL/L (ref 95–110)
CHLORIDE SERPL-SCNC: 107 MMOL/L (ref 95–110)
CHLORIDE SERPL-SCNC: 109 MMOL/L (ref 95–110)
CHLORIDE SERPL-SCNC: 110 MMOL/L (ref 95–110)
CHLORIDE SERPL-SCNC: 111 MMOL/L (ref 95–110)
CHLORIDE SERPL-SCNC: 116 MMOL/L (ref 95–110)
CHLORIDE SERPL-SCNC: 117 MMOL/L (ref 95–110)
CHLORIDE SERPL-SCNC: 118 MMOL/L (ref 95–110)
CHLORIDE SERPL-SCNC: 119 MMOL/L (ref 95–110)
CHLORIDE SERPL-SCNC: 119 MMOL/L (ref 95–110)
CHLORIDE SERPL-SCNC: 120 MMOL/L (ref 95–110)
CLARITY UR: CLEAR
CO2 SERPL-SCNC: 21 MMOL/L (ref 23–29)
CO2 SERPL-SCNC: 22 MMOL/L (ref 23–29)
CO2 SERPL-SCNC: 23 MMOL/L (ref 23–29)
CO2 SERPL-SCNC: 24 MMOL/L (ref 23–29)
CO2 SERPL-SCNC: 25 MMOL/L (ref 23–29)
CO2 SERPL-SCNC: 26 MMOL/L (ref 23–29)
CODING SYSTEM: NORMAL
CODING SYSTEM: NORMAL
COLOR UR: YELLOW
CREAT SERPL-MCNC: 0.8 MG/DL (ref 0.5–1.4)
CREAT SERPL-MCNC: 0.9 MG/DL (ref 0.5–1.4)
CREAT SERPL-MCNC: 1 MG/DL (ref 0.5–1.4)
CREAT SERPL-MCNC: 1.1 MG/DL (ref 0.5–1.4)
DIFFERENTIAL METHOD: ABNORMAL
DISPENSE STATUS: NORMAL
DISPENSE STATUS: NORMAL
EOSINOPHIL # BLD AUTO: 0 K/UL (ref 0–0.5)
EOSINOPHIL # BLD AUTO: 0.1 K/UL (ref 0–0.5)
EOSINOPHIL # BLD AUTO: 0.5 K/UL (ref 0–0.5)
EOSINOPHIL # BLD AUTO: 0.7 K/UL (ref 0–0.5)
EOSINOPHIL # BLD AUTO: 1 K/UL (ref 0–0.5)
EOSINOPHIL # BLD AUTO: ABNORMAL K/UL (ref 0–0.5)
EOSINOPHIL # BLD AUTO: ABNORMAL K/UL (ref 0–0.5)
EOSINOPHIL NFR BLD: 0 % (ref 0–8)
EOSINOPHIL NFR BLD: 0.1 % (ref 0–8)
EOSINOPHIL NFR BLD: 0.1 % (ref 0–8)
EOSINOPHIL NFR BLD: 0.2 % (ref 0–8)
EOSINOPHIL NFR BLD: 0.3 % (ref 0–8)
EOSINOPHIL NFR BLD: 1 % (ref 0–8)
EOSINOPHIL NFR BLD: 1.1 % (ref 0–8)
EOSINOPHIL NFR BLD: 1.1 % (ref 0–8)
EOSINOPHIL NFR BLD: 3 % (ref 0–8)
EOSINOPHIL NFR BLD: 3.7 % (ref 0–8)
EOSINOPHIL NFR BLD: 4.2 % (ref 0–8)
EOSINOPHIL NFR BLD: 7.9 % (ref 0–8)
ERYTHROCYTE [DISTWIDTH] IN BLOOD BY AUTOMATED COUNT: 15.5 % (ref 11.5–14.5)
ERYTHROCYTE [DISTWIDTH] IN BLOOD BY AUTOMATED COUNT: 16 % (ref 11.5–14.5)
ERYTHROCYTE [DISTWIDTH] IN BLOOD BY AUTOMATED COUNT: 16.3 % (ref 11.5–14.5)
ERYTHROCYTE [DISTWIDTH] IN BLOOD BY AUTOMATED COUNT: 16.3 % (ref 11.5–14.5)
ERYTHROCYTE [DISTWIDTH] IN BLOOD BY AUTOMATED COUNT: 16.4 % (ref 11.5–14.5)
ERYTHROCYTE [DISTWIDTH] IN BLOOD BY AUTOMATED COUNT: 16.5 % (ref 11.5–14.5)
ERYTHROCYTE [DISTWIDTH] IN BLOOD BY AUTOMATED COUNT: 16.6 % (ref 11.5–14.5)
ERYTHROCYTE [DISTWIDTH] IN BLOOD BY AUTOMATED COUNT: 16.6 % (ref 11.5–14.5)
ERYTHROCYTE [DISTWIDTH] IN BLOOD BY AUTOMATED COUNT: 16.8 % (ref 11.5–14.5)
ERYTHROCYTE [DISTWIDTH] IN BLOOD BY AUTOMATED COUNT: 17 % (ref 11.5–14.5)
ERYTHROCYTE [DISTWIDTH] IN BLOOD BY AUTOMATED COUNT: 17.1 % (ref 11.5–14.5)
ERYTHROCYTE [DISTWIDTH] IN BLOOD BY AUTOMATED COUNT: 17.3 % (ref 11.5–14.5)
ERYTHROCYTE [DISTWIDTH] IN BLOOD BY AUTOMATED COUNT: 17.5 % (ref 11.5–14.5)
ERYTHROCYTE [DISTWIDTH] IN BLOOD BY AUTOMATED COUNT: 17.5 % (ref 11.5–14.5)
EST. GFR  (AFRICAN AMERICAN): 51 ML/MIN/1.73 M^2
EST. GFR  (AFRICAN AMERICAN): 57 ML/MIN/1.73 M^2
EST. GFR  (AFRICAN AMERICAN): >60 ML/MIN/1.73 M^2
EST. GFR  (NON AFRICAN AMERICAN): 44 ML/MIN/1.73 M^2
EST. GFR  (NON AFRICAN AMERICAN): 49 ML/MIN/1.73 M^2
EST. GFR  (NON AFRICAN AMERICAN): 56 ML/MIN/1.73 M^2
EST. GFR  (NON AFRICAN AMERICAN): >60 ML/MIN/1.73 M^2
ESTIMATED AVG GLUCOSE: 108 MG/DL (ref 68–131)
FERRITIN SERPL-MCNC: 81 NG/ML (ref 20–300)
FOLATE SERPL-MCNC: 14.3 NG/ML (ref 4–24)
GLUCOSE SERPL-MCNC: 121 MG/DL (ref 70–110)
GLUCOSE SERPL-MCNC: 139 MG/DL (ref 70–110)
GLUCOSE SERPL-MCNC: 149 MG/DL (ref 70–110)
GLUCOSE SERPL-MCNC: 149 MG/DL (ref 70–110)
GLUCOSE SERPL-MCNC: 150 MG/DL (ref 70–110)
GLUCOSE SERPL-MCNC: 156 MG/DL (ref 70–110)
GLUCOSE SERPL-MCNC: 160 MG/DL (ref 70–110)
GLUCOSE SERPL-MCNC: 166 MG/DL (ref 70–110)
GLUCOSE SERPL-MCNC: 182 MG/DL (ref 70–110)
GLUCOSE SERPL-MCNC: 190 MG/DL (ref 70–110)
GLUCOSE SERPL-MCNC: 193 MG/DL (ref 70–110)
GLUCOSE SERPL-MCNC: 193 MG/DL (ref 70–110)
GLUCOSE SERPL-MCNC: 198 MG/DL (ref 70–110)
GLUCOSE SERPL-MCNC: 200 MG/DL (ref 70–110)
GLUCOSE SERPL-MCNC: 250 MG/DL (ref 70–110)
GLUCOSE SERPL-MCNC: 259 MG/DL (ref 70–110)
GLUCOSE UR QL STRIP: NEGATIVE
HBA1C MFR BLD HPLC: 5.4 % (ref 4–5.6)
HCT VFR BLD AUTO: 22.4 % (ref 37–48.5)
HCT VFR BLD AUTO: 26.3 % (ref 37–48.5)
HCT VFR BLD AUTO: 26.5 % (ref 37–48.5)
HCT VFR BLD AUTO: 27.1 % (ref 37–48.5)
HCT VFR BLD AUTO: 27.8 % (ref 37–48.5)
HCT VFR BLD AUTO: 28.2 % (ref 37–48.5)
HCT VFR BLD AUTO: 28.2 % (ref 37–48.5)
HCT VFR BLD AUTO: 28.3 % (ref 37–48.5)
HCT VFR BLD AUTO: 28.9 % (ref 37–48.5)
HCT VFR BLD AUTO: 28.9 % (ref 37–48.5)
HCT VFR BLD AUTO: 29.1 % (ref 37–48.5)
HCT VFR BLD AUTO: 30.3 % (ref 37–48.5)
HCT VFR BLD AUTO: 32 % (ref 37–48.5)
HCT VFR BLD AUTO: 33.7 % (ref 37–48.5)
HGB BLD-MCNC: 10 G/DL (ref 12–16)
HGB BLD-MCNC: 6.9 G/DL (ref 12–16)
HGB BLD-MCNC: 7.9 G/DL (ref 12–16)
HGB BLD-MCNC: 8.2 G/DL (ref 12–16)
HGB BLD-MCNC: 8.3 G/DL (ref 12–16)
HGB BLD-MCNC: 8.4 G/DL (ref 12–16)
HGB BLD-MCNC: 8.7 G/DL (ref 12–16)
HGB BLD-MCNC: 8.8 G/DL (ref 12–16)
HGB BLD-MCNC: 8.8 G/DL (ref 12–16)
HGB BLD-MCNC: 8.9 G/DL (ref 12–16)
HGB BLD-MCNC: 9.3 G/DL (ref 12–16)
HGB BLD-MCNC: 9.8 G/DL (ref 12–16)
HGB UR QL STRIP: ABNORMAL
HYPOCHROMIA BLD QL SMEAR: ABNORMAL
INR PPP: 1.1 (ref 0.8–1.2)
IRON SERPL-MCNC: 79 UG/DL (ref 30–160)
KETONES UR QL STRIP: NEGATIVE
LEUKOCYTE ESTERASE UR QL STRIP: ABNORMAL
LIPASE SERPL-CCNC: 60 U/L (ref 4–60)
LYMPHOCYTES # BLD AUTO: 0.2 K/UL (ref 1–4.8)
LYMPHOCYTES # BLD AUTO: 0.5 K/UL (ref 1–4.8)
LYMPHOCYTES # BLD AUTO: 0.6 K/UL (ref 1–4.8)
LYMPHOCYTES # BLD AUTO: 0.7 K/UL (ref 1–4.8)
LYMPHOCYTES # BLD AUTO: 0.7 K/UL (ref 1–4.8)
LYMPHOCYTES # BLD AUTO: 1.1 K/UL (ref 1–4.8)
LYMPHOCYTES # BLD AUTO: 1.2 K/UL (ref 1–4.8)
LYMPHOCYTES # BLD AUTO: 1.7 K/UL (ref 1–4.8)
LYMPHOCYTES # BLD AUTO: 1.7 K/UL (ref 1–4.8)
LYMPHOCYTES # BLD AUTO: ABNORMAL K/UL (ref 1–4.8)
LYMPHOCYTES # BLD AUTO: ABNORMAL K/UL (ref 1–4.8)
LYMPHOCYTES NFR BLD: 11.7 % (ref 18–48)
LYMPHOCYTES NFR BLD: 13.6 % (ref 18–48)
LYMPHOCYTES NFR BLD: 2.7 % (ref 18–48)
LYMPHOCYTES NFR BLD: 3 % (ref 18–48)
LYMPHOCYTES NFR BLD: 3.2 % (ref 18–48)
LYMPHOCYTES NFR BLD: 4 % (ref 18–48)
LYMPHOCYTES NFR BLD: 4.4 % (ref 18–48)
LYMPHOCYTES NFR BLD: 4.9 % (ref 18–48)
LYMPHOCYTES NFR BLD: 5.5 % (ref 18–48)
LYMPHOCYTES NFR BLD: 6.7 % (ref 18–48)
LYMPHOCYTES NFR BLD: 6.7 % (ref 18–48)
LYMPHOCYTES NFR BLD: 8.7 % (ref 18–48)
LYMPHOCYTES NFR BLD: 9 % (ref 18–48)
LYMPHOCYTES NFR BLD: 9.3 % (ref 18–48)
MAGNESIUM SERPL-MCNC: 2.1 MG/DL (ref 1.6–2.6)
MAGNESIUM SERPL-MCNC: 2.3 MG/DL (ref 1.6–2.6)
MCH RBC QN AUTO: 29.2 PG (ref 27–31)
MCH RBC QN AUTO: 29.6 PG (ref 27–31)
MCH RBC QN AUTO: 29.7 PG (ref 27–31)
MCH RBC QN AUTO: 29.7 PG (ref 27–31)
MCH RBC QN AUTO: 30 PG (ref 27–31)
MCH RBC QN AUTO: 30.1 PG (ref 27–31)
MCH RBC QN AUTO: 30.3 PG (ref 27–31)
MCH RBC QN AUTO: 30.4 PG (ref 27–31)
MCH RBC QN AUTO: 30.4 PG (ref 27–31)
MCH RBC QN AUTO: 30.5 PG (ref 27–31)
MCH RBC QN AUTO: 31 PG (ref 27–31)
MCHC RBC AUTO-ENTMCNC: 29.7 G/DL (ref 32–36)
MCHC RBC AUTO-ENTMCNC: 29.8 G/DL (ref 32–36)
MCHC RBC AUTO-ENTMCNC: 30 G/DL (ref 32–36)
MCHC RBC AUTO-ENTMCNC: 30.1 G/DL (ref 32–36)
MCHC RBC AUTO-ENTMCNC: 30.4 G/DL (ref 32–36)
MCHC RBC AUTO-ENTMCNC: 30.6 G/DL (ref 32–36)
MCHC RBC AUTO-ENTMCNC: 30.7 G/DL (ref 32–36)
MCHC RBC AUTO-ENTMCNC: 30.8 G/DL (ref 32–36)
MCHC RBC AUTO-ENTMCNC: 30.9 G/DL (ref 32–36)
MCHC RBC AUTO-ENTMCNC: 31.1 G/DL (ref 32–36)
MCHC RBC AUTO-ENTMCNC: 31.6 G/DL (ref 32–36)
MCHC RBC AUTO-ENTMCNC: 32 G/DL (ref 32–36)
MCV RBC AUTO: 100 FL (ref 82–98)
MCV RBC AUTO: 100 FL (ref 82–98)
MCV RBC AUTO: 101 FL (ref 82–98)
MCV RBC AUTO: 101 FL (ref 82–98)
MCV RBC AUTO: 102 FL (ref 82–98)
MCV RBC AUTO: 94 FL (ref 82–98)
MCV RBC AUTO: 94 FL (ref 82–98)
MCV RBC AUTO: 97 FL (ref 82–98)
MCV RBC AUTO: 98 FL (ref 82–98)
MCV RBC AUTO: 99 FL (ref 82–98)
MCV RBC AUTO: 99 FL (ref 82–98)
MICROSCOPIC COMMENT: ABNORMAL
MONOCYTES # BLD AUTO: 0.1 K/UL (ref 0.3–1)
MONOCYTES # BLD AUTO: 0.4 K/UL (ref 0.3–1)
MONOCYTES # BLD AUTO: 0.7 K/UL (ref 0.3–1)
MONOCYTES # BLD AUTO: 0.7 K/UL (ref 0.3–1)
MONOCYTES # BLD AUTO: 0.8 K/UL (ref 0.3–1)
MONOCYTES # BLD AUTO: 0.8 K/UL (ref 0.3–1)
MONOCYTES # BLD AUTO: 1 K/UL (ref 0.3–1)
MONOCYTES # BLD AUTO: 1 K/UL (ref 0.3–1)
MONOCYTES # BLD AUTO: 1.1 K/UL (ref 0.3–1)
MONOCYTES # BLD AUTO: 1.6 K/UL (ref 0.3–1)
MONOCYTES # BLD AUTO: 1.7 K/UL (ref 0.3–1)
MONOCYTES # BLD AUTO: ABNORMAL K/UL (ref 0.3–1)
MONOCYTES # BLD AUTO: ABNORMAL K/UL (ref 0.3–1)
MONOCYTES NFR BLD: 1.7 % (ref 4–15)
MONOCYTES NFR BLD: 10 % (ref 4–15)
MONOCYTES NFR BLD: 11.9 % (ref 4–15)
MONOCYTES NFR BLD: 13.4 % (ref 4–15)
MONOCYTES NFR BLD: 2.8 % (ref 4–15)
MONOCYTES NFR BLD: 4.2 % (ref 4–15)
MONOCYTES NFR BLD: 5.7 % (ref 4–15)
MONOCYTES NFR BLD: 6.8 % (ref 4–15)
MONOCYTES NFR BLD: 7.6 % (ref 4–15)
MONOCYTES NFR BLD: 8 % (ref 4–15)
MONOCYTES NFR BLD: 9 % (ref 4–15)
MONOCYTES NFR BLD: 9 % (ref 4–15)
MONOCYTES NFR BLD: 9.3 % (ref 4–15)
MONOCYTES NFR BLD: 9.5 % (ref 4–15)
NEUTROPHILS # BLD AUTO: 13.3 K/UL (ref 1.8–7.7)
NEUTROPHILS # BLD AUTO: 14.6 K/UL (ref 1.8–7.7)
NEUTROPHILS # BLD AUTO: 15.9 K/UL (ref 1.8–7.7)
NEUTROPHILS # BLD AUTO: 7 K/UL (ref 1.8–7.7)
NEUTROPHILS # BLD AUTO: 7.7 K/UL (ref 1.8–7.7)
NEUTROPHILS # BLD AUTO: 8.1 K/UL (ref 1.8–7.7)
NEUTROPHILS # BLD AUTO: 9 K/UL (ref 1.8–7.7)
NEUTROPHILS # BLD AUTO: 9 K/UL (ref 1.8–7.7)
NEUTROPHILS # BLD AUTO: 9.1 K/UL (ref 1.8–7.7)
NEUTROPHILS # BLD AUTO: 9.7 K/UL (ref 1.8–7.7)
NEUTROPHILS # BLD AUTO: 9.9 K/UL (ref 1.8–7.7)
NEUTROPHILS # BLD AUTO: ABNORMAL K/UL (ref 1.8–7.7)
NEUTROPHILS NFR BLD: 66 % (ref 38–73)
NEUTROPHILS NFR BLD: 67 % (ref 38–73)
NEUTROPHILS NFR BLD: 72.6 % (ref 38–73)
NEUTROPHILS NFR BLD: 76.6 % (ref 38–73)
NEUTROPHILS NFR BLD: 80.2 % (ref 38–73)
NEUTROPHILS NFR BLD: 80.8 % (ref 38–73)
NEUTROPHILS NFR BLD: 80.8 % (ref 38–73)
NEUTROPHILS NFR BLD: 82.5 % (ref 38–73)
NEUTROPHILS NFR BLD: 83.9 % (ref 38–73)
NEUTROPHILS NFR BLD: 86.3 % (ref 38–73)
NEUTROPHILS NFR BLD: 87 % (ref 38–73)
NEUTROPHILS NFR BLD: 87.5 % (ref 38–73)
NEUTROPHILS NFR BLD: 92.2 % (ref 38–73)
NEUTROPHILS NFR BLD: 95.6 % (ref 38–73)
NEUTS BAND NFR BLD MANUAL: 14 %
NEUTS BAND NFR BLD MANUAL: 20 %
NITRITE UR QL STRIP: NEGATIVE
NUM UNITS TRANS PACKED RBC: NORMAL
OB PNL GAST: POSITIVE
OVALOCYTES BLD QL SMEAR: ABNORMAL
PH UR STRIP: 7 [PH] (ref 5–8)
PLATELET # BLD AUTO: 114 K/UL (ref 150–350)
PLATELET # BLD AUTO: 123 K/UL (ref 150–350)
PLATELET # BLD AUTO: 126 K/UL (ref 150–350)
PLATELET # BLD AUTO: 129 K/UL (ref 150–350)
PLATELET # BLD AUTO: 134 K/UL (ref 150–350)
PLATELET # BLD AUTO: 134 K/UL (ref 150–350)
PLATELET # BLD AUTO: 143 K/UL (ref 150–350)
PLATELET # BLD AUTO: 184 K/UL (ref 150–350)
PLATELET # BLD AUTO: 188 K/UL (ref 150–350)
PLATELET # BLD AUTO: 202 K/UL (ref 150–350)
PLATELET # BLD AUTO: 75 K/UL (ref 150–350)
PLATELET # BLD AUTO: 80 K/UL (ref 150–350)
PLATELET # BLD AUTO: 90 K/UL (ref 150–350)
PLATELET # BLD AUTO: 97 K/UL (ref 150–350)
PLATELET BLD QL SMEAR: ABNORMAL
PMV BLD AUTO: 10.9 FL (ref 9.2–12.9)
PMV BLD AUTO: 11.2 FL (ref 9.2–12.9)
PMV BLD AUTO: 11.3 FL (ref 9.2–12.9)
PMV BLD AUTO: 11.3 FL (ref 9.2–12.9)
PMV BLD AUTO: 11.4 FL (ref 9.2–12.9)
PMV BLD AUTO: 11.5 FL (ref 9.2–12.9)
PMV BLD AUTO: 11.6 FL (ref 9.2–12.9)
PMV BLD AUTO: 11.6 FL (ref 9.2–12.9)
PMV BLD AUTO: 11.7 FL (ref 9.2–12.9)
PMV BLD AUTO: 11.8 FL (ref 9.2–12.9)
PMV BLD AUTO: 12 FL (ref 9.2–12.9)
PMV BLD AUTO: 12.1 FL (ref 9.2–12.9)
POCT GLUCOSE: 180 MG/DL (ref 70–110)
POCT GLUCOSE: 182 MG/DL (ref 70–110)
POCT GLUCOSE: 217 MG/DL (ref 70–110)
POIKILOCYTOSIS BLD QL SMEAR: SLIGHT
POLYCHROMASIA BLD QL SMEAR: ABNORMAL
POTASSIUM SERPL-SCNC: 3.6 MMOL/L (ref 3.5–5.1)
POTASSIUM SERPL-SCNC: 3.7 MMOL/L (ref 3.5–5.1)
POTASSIUM SERPL-SCNC: 3.7 MMOL/L (ref 3.5–5.1)
POTASSIUM SERPL-SCNC: 4 MMOL/L (ref 3.5–5.1)
POTASSIUM SERPL-SCNC: 4 MMOL/L (ref 3.5–5.1)
POTASSIUM SERPL-SCNC: 4.1 MMOL/L (ref 3.5–5.1)
POTASSIUM SERPL-SCNC: 4.3 MMOL/L (ref 3.5–5.1)
POTASSIUM SERPL-SCNC: 4.3 MMOL/L (ref 3.5–5.1)
POTASSIUM SERPL-SCNC: 4.4 MMOL/L (ref 3.5–5.1)
POTASSIUM SERPL-SCNC: 4.7 MMOL/L (ref 3.5–5.1)
POTASSIUM SERPL-SCNC: 4.7 MMOL/L (ref 3.5–5.1)
POTASSIUM SERPL-SCNC: 5.2 MMOL/L (ref 3.5–5.1)
POTASSIUM SERPL-SCNC: 5.4 MMOL/L (ref 3.5–5.1)
POTASSIUM SERPL-SCNC: 6 MMOL/L (ref 3.5–5.1)
PROT SERPL-MCNC: 4.8 G/DL (ref 6–8.4)
PROT SERPL-MCNC: 4.8 G/DL (ref 6–8.4)
PROT SERPL-MCNC: 4.9 G/DL (ref 6–8.4)
PROT SERPL-MCNC: 5.1 G/DL (ref 6–8.4)
PROT SERPL-MCNC: 5.3 G/DL (ref 6–8.4)
PROT SERPL-MCNC: 5.3 G/DL (ref 6–8.4)
PROT SERPL-MCNC: 5.4 G/DL (ref 6–8.4)
PROT SERPL-MCNC: 5.5 G/DL (ref 6–8.4)
PROT SERPL-MCNC: 5.7 G/DL (ref 6–8.4)
PROT UR QL STRIP: NEGATIVE
PROTHROMBIN TIME: 12.2 SEC (ref 9–12.5)
RBC # BLD AUTO: 2.26 M/UL (ref 4–5.4)
RBC # BLD AUTO: 2.63 M/UL (ref 4–5.4)
RBC # BLD AUTO: 2.73 M/UL (ref 4–5.4)
RBC # BLD AUTO: 2.8 M/UL (ref 4–5.4)
RBC # BLD AUTO: 2.8 M/UL (ref 4–5.4)
RBC # BLD AUTO: 2.89 M/UL (ref 4–5.4)
RBC # BLD AUTO: 2.9 M/UL (ref 4–5.4)
RBC # BLD AUTO: 2.97 M/UL (ref 4–5.4)
RBC # BLD AUTO: 2.98 M/UL (ref 4–5.4)
RBC # BLD AUTO: 3 M/UL (ref 4–5.4)
RBC # BLD AUTO: 3.22 M/UL (ref 4–5.4)
RBC # BLD AUTO: 3.32 M/UL (ref 4–5.4)
RBC #/AREA URNS HPF: 30 /HPF (ref 0–4)
SATURATED IRON: 22 % (ref 20–50)
SODIUM SERPL-SCNC: 136 MMOL/L (ref 136–145)
SODIUM SERPL-SCNC: 138 MMOL/L (ref 136–145)
SODIUM SERPL-SCNC: 138 MMOL/L (ref 136–145)
SODIUM SERPL-SCNC: 140 MMOL/L (ref 136–145)
SODIUM SERPL-SCNC: 140 MMOL/L (ref 136–145)
SODIUM SERPL-SCNC: 144 MMOL/L (ref 136–145)
SODIUM SERPL-SCNC: 150 MMOL/L (ref 136–145)
SODIUM SERPL-SCNC: 151 MMOL/L (ref 136–145)
SODIUM SERPL-SCNC: 151 MMOL/L (ref 136–145)
SODIUM SERPL-SCNC: 153 MMOL/L (ref 136–145)
SODIUM SERPL-SCNC: 153 MMOL/L (ref 136–145)
SP GR UR STRIP: <=1.005 (ref 1–1.03)
SQUAMOUS #/AREA URNS HPF: 1 /HPF
TOTAL IRON BINDING CAPACITY: 354 UG/DL (ref 250–450)
TRANS ERYTHROCYTES VOL PATIENT: NORMAL ML
TRANSFERRIN SERPL-MCNC: 239 MG/DL (ref 200–375)
TROPONIN I SERPL DL<=0.01 NG/ML-MCNC: 0.02 NG/ML (ref 0–0.03)
URN SPEC COLLECT METH UR: ABNORMAL
UROBILINOGEN UR STRIP-ACNC: NEGATIVE EU/DL
VANCOMYCIN SERPL-MCNC: 19 UG/ML
VANCOMYCIN SERPL-MCNC: 21.1 UG/ML
VANCOMYCIN SERPL-MCNC: 44.6 UG/ML
VIT B12 SERPL-MCNC: 797 PG/ML (ref 210–950)
WBC # BLD AUTO: 10.13 K/UL (ref 3.9–12.7)
WBC # BLD AUTO: 11 K/UL (ref 3.9–12.7)
WBC # BLD AUTO: 11.1 K/UL (ref 3.9–12.7)
WBC # BLD AUTO: 11.12 K/UL (ref 3.9–12.7)
WBC # BLD AUTO: 12.38 K/UL (ref 3.9–12.7)
WBC # BLD AUTO: 12.85 K/UL (ref 3.9–12.7)
WBC # BLD AUTO: 13.13 K/UL (ref 3.9–12.7)
WBC # BLD AUTO: 14.44 K/UL (ref 3.9–12.7)
WBC # BLD AUTO: 17.47 K/UL (ref 3.9–12.7)
WBC # BLD AUTO: 18.44 K/UL (ref 3.9–12.7)
WBC # BLD AUTO: 18.46 K/UL (ref 3.9–12.7)
WBC # BLD AUTO: 18.76 K/UL (ref 3.9–12.7)
WBC # BLD AUTO: 8.05 K/UL (ref 3.9–12.7)
WBC # BLD AUTO: 8.67 K/UL (ref 3.9–12.7)
WBC #/AREA URNS HPF: >100 /HPF (ref 0–5)
WBC CLUMPS URNS QL MICRO: ABNORMAL

## 2020-01-01 PROCEDURE — 93005 ELECTROCARDIOGRAM TRACING: CPT

## 2020-01-01 PROCEDURE — 99233 PR SUBSEQUENT HOSPITAL CARE,LEVL III: ICD-10-PCS | Mod: ,,, | Performed by: OTOLARYNGOLOGY

## 2020-01-01 PROCEDURE — 80053 COMPREHEN METABOLIC PANEL: CPT

## 2020-01-01 PROCEDURE — 36430 TRANSFUSION BLD/BLD COMPNT: CPT

## 2020-01-01 PROCEDURE — 99233 SBSQ HOSP IP/OBS HIGH 50: CPT | Mod: ,,, | Performed by: OTOLARYNGOLOGY

## 2020-01-01 PROCEDURE — 82607 VITAMIN B-12: CPT

## 2020-01-01 PROCEDURE — 96374 THER/PROPH/DIAG INJ IV PUSH: CPT

## 2020-01-01 PROCEDURE — 25000003 PHARM REV CODE 250: Performed by: STUDENT IN AN ORGANIZED HEALTH CARE EDUCATION/TRAINING PROGRAM

## 2020-01-01 PROCEDURE — 85007 BL SMEAR W/DIFF WBC COUNT: CPT

## 2020-01-01 PROCEDURE — 63600175 PHARM REV CODE 636 W HCPCS: Performed by: STUDENT IN AN ORGANIZED HEALTH CARE EDUCATION/TRAINING PROGRAM

## 2020-01-01 PROCEDURE — 11000001 HC ACUTE MED/SURG PRIVATE ROOM

## 2020-01-01 PROCEDURE — 27200997: Performed by: INTERNAL MEDICINE

## 2020-01-01 PROCEDURE — 25500020 PHARM REV CODE 255: Performed by: EMERGENCY MEDICINE

## 2020-01-01 PROCEDURE — 87075 CULTR BACTERIA EXCEPT BLOOD: CPT

## 2020-01-01 PROCEDURE — 94761 N-INVAS EAR/PLS OXIMETRY MLT: CPT

## 2020-01-01 PROCEDURE — 99999 PR PBB SHADOW E&M-EST. PATIENT-LVL III: CPT | Mod: PBBFAC,,, | Performed by: INTERNAL MEDICINE

## 2020-01-01 PROCEDURE — 99232 SBSQ HOSP IP/OBS MODERATE 35: CPT | Mod: ,,, | Performed by: INTERNAL MEDICINE

## 2020-01-01 PROCEDURE — 63600175 PHARM REV CODE 636 W HCPCS: Performed by: INTERNAL MEDICINE

## 2020-01-01 PROCEDURE — 27000221 HC OXYGEN, UP TO 24 HOURS

## 2020-01-01 PROCEDURE — 80202 ASSAY OF VANCOMYCIN: CPT

## 2020-01-01 PROCEDURE — 85610 PROTHROMBIN TIME: CPT

## 2020-01-01 PROCEDURE — 99900035 HC TECH TIME PER 15 MIN (STAT)

## 2020-01-01 PROCEDURE — S0030 INJECTION, METRONIDAZOLE: HCPCS | Performed by: STUDENT IN AN ORGANIZED HEALTH CARE EDUCATION/TRAINING PROGRAM

## 2020-01-01 PROCEDURE — C9113 INJ PANTOPRAZOLE SODIUM, VIA: HCPCS | Performed by: STUDENT IN AN ORGANIZED HEALTH CARE EDUCATION/TRAINING PROGRAM

## 2020-01-01 PROCEDURE — 25000003 PHARM REV CODE 250: Performed by: NURSE ANESTHETIST, CERTIFIED REGISTERED

## 2020-01-01 PROCEDURE — 83690 ASSAY OF LIPASE: CPT

## 2020-01-01 PROCEDURE — 99232 PR SUBSEQUENT HOSPITAL CARE,LEVL II: ICD-10-PCS | Mod: ,,, | Performed by: INTERNAL MEDICINE

## 2020-01-01 PROCEDURE — 36415 COLL VENOUS BLD VENIPUNCTURE: CPT

## 2020-01-01 PROCEDURE — 99214 OFFICE O/P EST MOD 30 MIN: CPT | Mod: S$PBB,,, | Performed by: INTERNAL MEDICINE

## 2020-01-01 PROCEDURE — 80048 BASIC METABOLIC PNL TOTAL CA: CPT

## 2020-01-01 PROCEDURE — 99223 1ST HOSP IP/OBS HIGH 75: CPT | Mod: ,,, | Performed by: INTERNAL MEDICINE

## 2020-01-01 PROCEDURE — 99213 PR OFFICE/OUTPT VISIT, EST, LEVL III, 20-29 MIN: ICD-10-PCS | Mod: S$PBB,,, | Performed by: OTOLARYNGOLOGY

## 2020-01-01 PROCEDURE — 87086 URINE CULTURE/COLONY COUNT: CPT

## 2020-01-01 PROCEDURE — 99999 PR PBB SHADOW E&M-EST. PATIENT-LVL III: ICD-10-PCS | Mod: PBBFAC,,, | Performed by: OTOLARYNGOLOGY

## 2020-01-01 PROCEDURE — 97803 MED NUTRITION INDIV SUBSEQ: CPT

## 2020-01-01 PROCEDURE — 63600175 PHARM REV CODE 636 W HCPCS: Performed by: FAMILY MEDICINE

## 2020-01-01 PROCEDURE — 43762 RPLC GTUBE NO REVJ TRC: CPT

## 2020-01-01 PROCEDURE — 82962 GLUCOSE BLOOD TEST: CPT

## 2020-01-01 PROCEDURE — 63600175 PHARM REV CODE 636 W HCPCS: Performed by: NURSE ANESTHETIST, CERTIFIED REGISTERED

## 2020-01-01 PROCEDURE — 25500020 PHARM REV CODE 255: Performed by: INTERNAL MEDICINE

## 2020-01-01 PROCEDURE — 87186 SC STD MICRODIL/AGAR DIL: CPT

## 2020-01-01 PROCEDURE — 85027 COMPLETE CBC AUTOMATED: CPT

## 2020-01-01 PROCEDURE — 99284 EMERGENCY DEPT VISIT MOD MDM: CPT | Mod: 25

## 2020-01-01 PROCEDURE — 82728 ASSAY OF FERRITIN: CPT

## 2020-01-01 PROCEDURE — 99231 PR SUBSEQUENT HOSPITAL CARE,LEVL I: ICD-10-PCS | Mod: GC,,, | Performed by: INTERNAL MEDICINE

## 2020-01-01 PROCEDURE — 92526 ORAL FUNCTION THERAPY: CPT

## 2020-01-01 PROCEDURE — 99223 PR INITIAL HOSPITAL CARE,LEVL III: ICD-10-PCS | Mod: ,,, | Performed by: INTERNAL MEDICINE

## 2020-01-01 PROCEDURE — 80048 BASIC METABOLIC PNL TOTAL CA: CPT | Mod: 91

## 2020-01-01 PROCEDURE — 85025 COMPLETE CBC W/AUTO DIFF WBC: CPT

## 2020-01-01 PROCEDURE — 92610 EVALUATE SWALLOWING FUNCTION: CPT

## 2020-01-01 PROCEDURE — 37000009 HC ANESTHESIA EA ADD 15 MINS: Performed by: INTERNAL MEDICINE

## 2020-01-01 PROCEDURE — 99999 PR PBB SHADOW E&M-EST. PATIENT-LVL III: CPT | Mod: PBBFAC,,, | Performed by: OTOLARYNGOLOGY

## 2020-01-01 PROCEDURE — 43255 PR EGD, FLEX, W/CTRL BLEED, ANY METHOD: ICD-10-PCS | Mod: ,,, | Performed by: INTERNAL MEDICINE

## 2020-01-01 PROCEDURE — 94640 AIRWAY INHALATION TREATMENT: CPT

## 2020-01-01 PROCEDURE — 87076 CULTURE ANAEROBE IDENT EACH: CPT

## 2020-01-01 PROCEDURE — 83735 ASSAY OF MAGNESIUM: CPT | Mod: 91

## 2020-01-01 PROCEDURE — 97802 MEDICAL NUTRITION INDIV IN: CPT

## 2020-01-01 PROCEDURE — 99291 CRITICAL CARE FIRST HOUR: CPT | Mod: 25

## 2020-01-01 PROCEDURE — 87088 URINE BACTERIA CULTURE: CPT

## 2020-01-01 PROCEDURE — 99283 EMERGENCY DEPT VISIT LOW MDM: CPT | Mod: 25

## 2020-01-01 PROCEDURE — P9016 RBC LEUKOCYTES REDUCED: HCPCS

## 2020-01-01 PROCEDURE — 85025 COMPLETE CBC W/AUTO DIFF WBC: CPT | Mod: 91

## 2020-01-01 PROCEDURE — 99999 PR PBB SHADOW E&M-EST. PATIENT-LVL III: ICD-10-PCS | Mod: PBBFAC,,, | Performed by: INTERNAL MEDICINE

## 2020-01-01 PROCEDURE — 87077 CULTURE AEROBIC IDENTIFY: CPT

## 2020-01-01 PROCEDURE — 99213 OFFICE O/P EST LOW 20 MIN: CPT | Mod: PBBFAC,PO | Performed by: INTERNAL MEDICINE

## 2020-01-01 PROCEDURE — 27201028 HC NEEDLE, SCLERO: Performed by: INTERNAL MEDICINE

## 2020-01-01 PROCEDURE — 99213 OFFICE O/P EST LOW 20 MIN: CPT | Mod: PBBFAC,PN | Performed by: OTOLARYNGOLOGY

## 2020-01-01 PROCEDURE — 84484 ASSAY OF TROPONIN QUANT: CPT

## 2020-01-01 PROCEDURE — 83540 ASSAY OF IRON: CPT

## 2020-01-01 PROCEDURE — C9113 INJ PANTOPRAZOLE SODIUM, VIA: HCPCS | Performed by: EMERGENCY MEDICINE

## 2020-01-01 PROCEDURE — 82271 OCCULT BLOOD OTHER SOURCES: CPT

## 2020-01-01 PROCEDURE — 86920 COMPATIBILITY TEST SPIN: CPT

## 2020-01-01 PROCEDURE — 99214 PR OFFICE/OUTPT VISIT, EST, LEVL IV, 30-39 MIN: ICD-10-PCS | Mod: S$PBB,,, | Performed by: INTERNAL MEDICINE

## 2020-01-01 PROCEDURE — 20000000 HC ICU ROOM

## 2020-01-01 PROCEDURE — 87070 CULTURE OTHR SPECIMN AEROBIC: CPT

## 2020-01-01 PROCEDURE — 86850 RBC ANTIBODY SCREEN: CPT

## 2020-01-01 PROCEDURE — 83735 ASSAY OF MAGNESIUM: CPT

## 2020-01-01 PROCEDURE — 25000242 PHARM REV CODE 250 ALT 637 W/ HCPCS: Performed by: STUDENT IN AN ORGANIZED HEALTH CARE EDUCATION/TRAINING PROGRAM

## 2020-01-01 PROCEDURE — P9021 RED BLOOD CELLS UNIT: HCPCS

## 2020-01-01 PROCEDURE — 25000003 PHARM REV CODE 250: Performed by: FAMILY MEDICINE

## 2020-01-01 PROCEDURE — 99231 SBSQ HOSP IP/OBS SF/LOW 25: CPT | Mod: GC,,, | Performed by: INTERNAL MEDICINE

## 2020-01-01 PROCEDURE — 37000008 HC ANESTHESIA 1ST 15 MINUTES: Performed by: INTERNAL MEDICINE

## 2020-01-01 PROCEDURE — 99213 OFFICE O/P EST LOW 20 MIN: CPT | Mod: S$PBB,,, | Performed by: OTOLARYNGOLOGY

## 2020-01-01 PROCEDURE — 96375 TX/PRO/DX INJ NEW DRUG ADDON: CPT

## 2020-01-01 PROCEDURE — 63600175 PHARM REV CODE 636 W HCPCS: Performed by: EMERGENCY MEDICINE

## 2020-01-01 PROCEDURE — 43255 EGD CONTROL BLEEDING ANY: CPT | Performed by: INTERNAL MEDICINE

## 2020-01-01 PROCEDURE — 96361 HYDRATE IV INFUSION ADD-ON: CPT

## 2020-01-01 PROCEDURE — 27201038 HC PROBE, BI-POLAR: Performed by: INTERNAL MEDICINE

## 2020-01-01 PROCEDURE — 81000 URINALYSIS NONAUTO W/SCOPE: CPT

## 2020-01-01 PROCEDURE — 43255 EGD CONTROL BLEEDING ANY: CPT | Mod: ,,, | Performed by: INTERNAL MEDICINE

## 2020-01-01 PROCEDURE — 25000242 PHARM REV CODE 250 ALT 637 W/ HCPCS: Performed by: EMERGENCY MEDICINE

## 2020-01-01 PROCEDURE — 82746 ASSAY OF FOLIC ACID SERUM: CPT

## 2020-01-01 PROCEDURE — 83036 HEMOGLOBIN GLYCOSYLATED A1C: CPT

## 2020-01-01 RX ORDER — PANTOPRAZOLE SODIUM 40 MG/1
40 FOR SUSPENSION ORAL 2 TIMES DAILY
Qty: 60 PACKET | Refills: 12
Start: 2020-01-01 | End: 2020-01-01

## 2020-01-01 RX ORDER — CLOBETASOL PROPIONATE 0.5 MG/G
CREAM TOPICAL 2 TIMES DAILY
Status: DISCONTINUED | OUTPATIENT
Start: 2020-01-01 | End: 2020-01-01 | Stop reason: HOSPADM

## 2020-01-01 RX ORDER — ETOMIDATE 2 MG/ML
INJECTION INTRAVENOUS
Status: DISCONTINUED | OUTPATIENT
Start: 2020-01-01 | End: 2020-01-01

## 2020-01-01 RX ORDER — SODIUM CHLORIDE 9 MG/ML
INJECTION, SOLUTION INTRAVENOUS CONTINUOUS PRN
Status: DISCONTINUED | OUTPATIENT
Start: 2020-01-01 | End: 2020-01-01

## 2020-01-01 RX ORDER — SCOLOPAMINE TRANSDERMAL SYSTEM 1 MG/1
1 PATCH, EXTENDED RELEASE TRANSDERMAL
Status: DISCONTINUED | OUTPATIENT
Start: 2020-01-01 | End: 2020-01-01 | Stop reason: HOSPADM

## 2020-01-01 RX ORDER — EPINEPHRINE 0.1 MG/ML
INJECTION INTRAVENOUS
Status: COMPLETED | OUTPATIENT
Start: 2020-01-01 | End: 2020-01-01

## 2020-01-01 RX ORDER — ONDANSETRON 2 MG/ML
4 INJECTION INTRAMUSCULAR; INTRAVENOUS EVERY 6 HOURS PRN
Status: DISCONTINUED | OUTPATIENT
Start: 2020-01-01 | End: 2020-01-01 | Stop reason: HOSPADM

## 2020-01-01 RX ORDER — ACETAMINOPHEN 500 MG
1000 TABLET ORAL EVERY 6 HOURS PRN
Status: DISCONTINUED | OUTPATIENT
Start: 2020-01-01 | End: 2020-01-01 | Stop reason: HOSPADM

## 2020-01-01 RX ORDER — FERROUS SULFATE 325(65) MG
325 TABLET, DELAYED RELEASE (ENTERIC COATED) ORAL 2 TIMES DAILY
Qty: 720 TABLET | Refills: 0 | Status: ON HOLD
Start: 2020-01-01 | End: 2020-01-01 | Stop reason: HOSPADM

## 2020-01-01 RX ORDER — METOPROLOL TARTRATE 1 MG/ML
5 INJECTION, SOLUTION INTRAVENOUS EVERY 5 MIN PRN
Status: DISCONTINUED | OUTPATIENT
Start: 2020-01-01 | End: 2020-01-01 | Stop reason: HOSPADM

## 2020-01-01 RX ORDER — AMLODIPINE BESYLATE 2.5 MG/1
2.5 TABLET ORAL DAILY
Status: DISCONTINUED | OUTPATIENT
Start: 2020-01-01 | End: 2020-01-01 | Stop reason: HOSPADM

## 2020-01-01 RX ORDER — MORPHINE SULFATE 4 MG/ML
2 INJECTION, SOLUTION INTRAMUSCULAR; INTRAVENOUS EVERY 6 HOURS PRN
Status: DISCONTINUED | OUTPATIENT
Start: 2020-01-01 | End: 2020-01-01 | Stop reason: HOSPADM

## 2020-01-01 RX ORDER — PHENYLEPHRINE HYDROCHLORIDE 10 MG/ML
INJECTION INTRAVENOUS
Status: DISCONTINUED | OUTPATIENT
Start: 2020-01-01 | End: 2020-01-01

## 2020-01-01 RX ORDER — PANTOPRAZOLE SODIUM 40 MG/1
40 FOR SUSPENSION ORAL 2 TIMES DAILY
Status: DISCONTINUED | OUTPATIENT
Start: 2020-01-01 | End: 2020-01-01 | Stop reason: HOSPADM

## 2020-01-01 RX ORDER — ONDANSETRON 2 MG/ML
INJECTION INTRAMUSCULAR; INTRAVENOUS
Status: DISCONTINUED | OUTPATIENT
Start: 2020-01-01 | End: 2020-01-01

## 2020-01-01 RX ORDER — DOXYCYCLINE HYCLATE 100 MG
100 TABLET ORAL 2 TIMES DAILY
Status: ON HOLD | COMMUNITY
End: 2020-01-01 | Stop reason: HOSPADM

## 2020-01-01 RX ORDER — SODIUM CHLORIDE 0.9 % (FLUSH) 0.9 %
10 SYRINGE (ML) INJECTION
Status: DISCONTINUED | OUTPATIENT
Start: 2020-01-01 | End: 2020-01-01 | Stop reason: HOSPADM

## 2020-01-01 RX ORDER — MUPIROCIN 20 MG/G
OINTMENT TOPICAL 2 TIMES DAILY
Status: DISCONTINUED | OUTPATIENT
Start: 2020-01-01 | End: 2020-01-01 | Stop reason: HOSPADM

## 2020-01-01 RX ORDER — PANTOPRAZOLE SODIUM 40 MG/10ML
80 INJECTION, POWDER, LYOPHILIZED, FOR SOLUTION INTRAVENOUS
Status: COMPLETED | OUTPATIENT
Start: 2020-01-01 | End: 2020-01-01

## 2020-01-01 RX ORDER — ALBUTEROL SULFATE 2.5 MG/.5ML
10 SOLUTION RESPIRATORY (INHALATION)
Status: COMPLETED | OUTPATIENT
Start: 2020-01-01 | End: 2020-01-01

## 2020-01-01 RX ORDER — CARVEDILOL 12.5 MG/1
12.5 TABLET ORAL 2 TIMES DAILY
Status: DISCONTINUED | OUTPATIENT
Start: 2020-01-01 | End: 2020-01-01 | Stop reason: HOSPADM

## 2020-01-01 RX ORDER — ATORVASTATIN CALCIUM 40 MG/1
40 TABLET, FILM COATED ORAL DAILY
Status: DISCONTINUED | OUTPATIENT
Start: 2020-01-01 | End: 2020-01-01 | Stop reason: HOSPADM

## 2020-01-01 RX ORDER — LIDOCAINE HCL/PF 100 MG/5ML
SYRINGE (ML) INTRAVENOUS
Status: DISCONTINUED | OUTPATIENT
Start: 2020-01-01 | End: 2020-01-01

## 2020-01-01 RX ORDER — MORPHINE SULFATE 4 MG/ML
4 INJECTION, SOLUTION INTRAMUSCULAR; INTRAVENOUS
Status: DISCONTINUED | OUTPATIENT
Start: 2020-01-01 | End: 2020-01-01 | Stop reason: HOSPADM

## 2020-01-01 RX ORDER — CETIRIZINE HYDROCHLORIDE 10 MG/1
10 TABLET ORAL DAILY
Status: ON HOLD | COMMUNITY
End: 2020-01-01 | Stop reason: HOSPADM

## 2020-01-01 RX ORDER — PANTOPRAZOLE SODIUM 40 MG/10ML
40 INJECTION, POWDER, LYOPHILIZED, FOR SOLUTION INTRAVENOUS 2 TIMES DAILY
Status: COMPLETED | OUTPATIENT
Start: 2020-01-01 | End: 2020-01-01

## 2020-01-01 RX ORDER — SODIUM CHLORIDE, SODIUM LACTATE, POTASSIUM CHLORIDE, CALCIUM CHLORIDE 600; 310; 30; 20 MG/100ML; MG/100ML; MG/100ML; MG/100ML
INJECTION, SOLUTION INTRAVENOUS CONTINUOUS
Status: DISCONTINUED | OUTPATIENT
Start: 2020-01-01 | End: 2020-01-01

## 2020-01-01 RX ORDER — ONDANSETRON 2 MG/ML
4 INJECTION INTRAMUSCULAR; INTRAVENOUS
Status: COMPLETED | OUTPATIENT
Start: 2020-01-01 | End: 2020-01-01

## 2020-01-01 RX ORDER — VENLAFAXINE 37.5 MG/1
75 TABLET ORAL DAILY
Status: DISCONTINUED | OUTPATIENT
Start: 2020-01-01 | End: 2020-01-01 | Stop reason: HOSPADM

## 2020-01-01 RX ORDER — DEXAMETHASONE SODIUM PHOSPHATE 100 MG/10ML
10 INJECTION INTRAMUSCULAR; INTRAVENOUS EVERY 12 HOURS
Status: DISCONTINUED | OUTPATIENT
Start: 2020-01-01 | End: 2020-01-01

## 2020-01-01 RX ORDER — METRONIDAZOLE 500 MG/100ML
500 INJECTION, SOLUTION INTRAVENOUS
Status: DISCONTINUED | OUTPATIENT
Start: 2020-01-01 | End: 2020-01-01 | Stop reason: HOSPADM

## 2020-01-01 RX ORDER — SULFAMETHOXAZOLE AND TRIMETHOPRIM 200; 40 MG/5ML; MG/5ML
8 SUSPENSION ORAL EVERY 12 HOURS
Qty: 674.3 ML | Refills: 0
Start: 2020-01-01 | End: 2020-01-01

## 2020-01-01 RX ORDER — PANTOPRAZOLE SODIUM 40 MG/1
40 TABLET, DELAYED RELEASE ORAL 2 TIMES DAILY
Status: DISCONTINUED | OUTPATIENT
Start: 2020-01-01 | End: 2020-01-01

## 2020-01-01 RX ORDER — OMEPRAZOLE 40 MG/1
40 CAPSULE, DELAYED RELEASE ORAL DAILY
Qty: 30 CAPSULE | Refills: 11 | Status: SHIPPED | OUTPATIENT
Start: 2020-01-01 | End: 2020-01-01

## 2020-01-01 RX ORDER — CEFEPIME HYDROCHLORIDE 2 G/50ML
2 INJECTION, SOLUTION INTRAVENOUS
Status: DISCONTINUED | OUTPATIENT
Start: 2020-01-01 | End: 2020-01-01 | Stop reason: HOSPADM

## 2020-01-01 RX ORDER — MORPHINE SULFATE 2 MG/ML
2 INJECTION, SOLUTION INTRAMUSCULAR; INTRAVENOUS
Status: DISCONTINUED | OUTPATIENT
Start: 2020-01-01 | End: 2020-01-01

## 2020-01-01 RX ORDER — ALBUTEROL SULFATE 2.5 MG/.5ML
10 SOLUTION RESPIRATORY (INHALATION) ONCE
Status: COMPLETED | OUTPATIENT
Start: 2020-01-01 | End: 2020-01-01

## 2020-01-01 RX ORDER — HYDROCODONE BITARTRATE AND ACETAMINOPHEN 500; 5 MG/1; MG/1
TABLET ORAL
Status: DISCONTINUED | OUTPATIENT
Start: 2020-01-01 | End: 2020-01-01 | Stop reason: HOSPADM

## 2020-01-01 RX ORDER — DEXTROSE MONOHYDRATE 50 MG/ML
INJECTION, SOLUTION INTRAVENOUS CONTINUOUS
Status: DISCONTINUED | OUTPATIENT
Start: 2020-01-01 | End: 2020-01-01

## 2020-01-01 RX ORDER — DEXAMETHASONE SODIUM PHOSPHATE 100 MG/10ML
10 INJECTION INTRAMUSCULAR; INTRAVENOUS EVERY 12 HOURS
Status: COMPLETED | OUTPATIENT
Start: 2020-01-01 | End: 2020-01-01

## 2020-01-01 RX ORDER — ESMOLOL HYDROCHLORIDE 10 MG/ML
INJECTION INTRAVENOUS
Status: DISCONTINUED | OUTPATIENT
Start: 2020-01-01 | End: 2020-01-01

## 2020-01-01 RX ORDER — FUROSEMIDE 20 MG/1
20 TABLET ORAL DAILY
Status: DISCONTINUED | OUTPATIENT
Start: 2020-01-01 | End: 2020-01-01 | Stop reason: HOSPADM

## 2020-01-01 RX ORDER — FUROSEMIDE 10 MG/ML
20 INJECTION INTRAMUSCULAR; INTRAVENOUS
Status: COMPLETED | OUTPATIENT
Start: 2020-01-01 | End: 2020-01-01

## 2020-01-01 RX ADMIN — DEXAMETHASONE SODIUM PHOSPHATE 10 MG: 10 INJECTION, SOLUTION INTRAMUSCULAR; INTRAVENOUS at 10:01

## 2020-01-01 RX ADMIN — ATORVASTATIN CALCIUM 40 MG: 40 TABLET, FILM COATED ORAL at 08:01

## 2020-01-01 RX ADMIN — PANTOPRAZOLE SODIUM 40 MG: 40 GRANULE, DELAYED RELEASE ORAL at 09:01

## 2020-01-01 RX ADMIN — CLOBETASOL PROPIONATE: 0.5 CREAM TOPICAL at 09:01

## 2020-01-01 RX ADMIN — MUPIROCIN: 20 OINTMENT TOPICAL at 09:01

## 2020-01-01 RX ADMIN — CARVEDILOL 12.5 MG: 12.5 TABLET, FILM COATED ORAL at 08:01

## 2020-01-01 RX ADMIN — PANTOPRAZOLE SODIUM 40 MG: 40 INJECTION, POWDER, LYOPHILIZED, FOR SOLUTION INTRAVENOUS at 09:01

## 2020-01-01 RX ADMIN — VENLAFAXINE 75 MG: 37.5 TABLET ORAL at 09:01

## 2020-01-01 RX ADMIN — CEFEPIME HYDROCHLORIDE 2 G: 2 INJECTION, SOLUTION INTRAVENOUS at 09:01

## 2020-01-01 RX ADMIN — ESMOLOL HYDROCHLORIDE 10 MG: 10 INJECTION, SOLUTION INTRAVENOUS at 02:01

## 2020-01-01 RX ADMIN — ATORVASTATIN CALCIUM 40 MG: 40 TABLET, FILM COATED ORAL at 09:01

## 2020-01-01 RX ADMIN — CLOBETASOL PROPIONATE: 0.5 CREAM TOPICAL at 10:01

## 2020-01-01 RX ADMIN — METRONIDAZOLE 500 MG: 500 INJECTION, SOLUTION INTRAVENOUS at 12:01

## 2020-01-01 RX ADMIN — DEXAMETHASONE SODIUM PHOSPHATE 10 MG: 10 INJECTION, SOLUTION INTRAMUSCULAR; INTRAVENOUS at 08:01

## 2020-01-01 RX ADMIN — SCOPALAMINE 1 PATCH: 1 PATCH, EXTENDED RELEASE TRANSDERMAL at 01:05

## 2020-01-01 RX ADMIN — SODIUM CHLORIDE, SODIUM LACTATE, POTASSIUM CHLORIDE, AND CALCIUM CHLORIDE: .6; .31; .03; .02 INJECTION, SOLUTION INTRAVENOUS at 04:01

## 2020-01-01 RX ADMIN — AMLODIPINE BESYLATE 2.5 MG: 2.5 TABLET ORAL at 09:01

## 2020-01-01 RX ADMIN — VENLAFAXINE 75 MG: 37.5 TABLET ORAL at 08:01

## 2020-01-01 RX ADMIN — VANCOMYCIN HYDROCHLORIDE 1750 MG: 100 INJECTION, POWDER, LYOPHILIZED, FOR SOLUTION INTRAVENOUS at 01:01

## 2020-01-01 RX ADMIN — FUROSEMIDE 20 MG: 10 INJECTION, SOLUTION INTRAMUSCULAR; INTRAVENOUS at 06:01

## 2020-01-01 RX ADMIN — ETOMIDATE 4 MG: 2 INJECTION, SOLUTION INTRAVENOUS at 02:01

## 2020-01-01 RX ADMIN — MUPIROCIN: 20 OINTMENT TOPICAL at 10:01

## 2020-01-01 RX ADMIN — CARVEDILOL 12.5 MG: 12.5 TABLET, FILM COATED ORAL at 09:01

## 2020-01-01 RX ADMIN — ALBUTEROL SULFATE 10 MG: 2.5 SOLUTION RESPIRATORY (INHALATION) at 06:01

## 2020-01-01 RX ADMIN — MUPIROCIN: 20 OINTMENT TOPICAL at 08:01

## 2020-01-01 RX ADMIN — SODIUM CHLORIDE: 9 INJECTION, SOLUTION INTRAVENOUS at 02:01

## 2020-01-01 RX ADMIN — FUROSEMIDE 20 MG: 20 TABLET ORAL at 09:01

## 2020-01-01 RX ADMIN — DEXTROSE: 5 SOLUTION INTRAVENOUS at 01:01

## 2020-01-01 RX ADMIN — ACETAMINOPHEN 1000 MG: 500 TABLET ORAL at 12:01

## 2020-01-01 RX ADMIN — PANTOPRAZOLE SODIUM 40 MG: 40 GRANULE, DELAYED RELEASE ORAL at 08:01

## 2020-01-01 RX ADMIN — CLOBETASOL PROPIONATE: 0.5 CREAM TOPICAL at 11:01

## 2020-01-01 RX ADMIN — IOHEXOL 75 ML: 350 INJECTION, SOLUTION INTRAVENOUS at 12:01

## 2020-01-01 RX ADMIN — MORPHINE SULFATE 2 MG: 2 INJECTION, SOLUTION INTRAMUSCULAR; INTRAVENOUS at 01:05

## 2020-01-01 RX ADMIN — PANTOPRAZOLE SODIUM 40 MG: 40 INJECTION, POWDER, LYOPHILIZED, FOR SOLUTION INTRAVENOUS at 08:01

## 2020-01-01 RX ADMIN — VENLAFAXINE 75 MG: 37.5 TABLET ORAL at 10:01

## 2020-01-01 RX ADMIN — PANTOPRAZOLE SODIUM 40 MG: 40 INJECTION, POWDER, LYOPHILIZED, FOR SOLUTION INTRAVENOUS at 10:01

## 2020-01-01 RX ADMIN — METRONIDAZOLE 500 MG: 500 INJECTION, SOLUTION INTRAVENOUS at 09:01

## 2020-01-01 RX ADMIN — DIATRIZOATE MEGLUMINE AND DIATRIZOATE SODIUM 30 ML: 660; 100 LIQUID ORAL; RECTAL at 11:01

## 2020-01-01 RX ADMIN — ONDANSETRON 4 MG: 2 INJECTION INTRAMUSCULAR; INTRAVENOUS at 04:01

## 2020-01-01 RX ADMIN — ACETAMINOPHEN 1000 MG: 500 TABLET ORAL at 03:01

## 2020-01-01 RX ADMIN — ESMOLOL HYDROCHLORIDE 20 MG: 10 INJECTION, SOLUTION INTRAVENOUS at 02:01

## 2020-01-01 RX ADMIN — CLOBETASOL PROPIONATE: 0.5 CREAM TOPICAL at 08:01

## 2020-01-01 RX ADMIN — ATORVASTATIN CALCIUM 40 MG: 40 TABLET, FILM COATED ORAL at 10:01

## 2020-01-01 RX ADMIN — ONDANSETRON 4 MG: 2 INJECTION, SOLUTION INTRAMUSCULAR; INTRAVENOUS at 02:01

## 2020-01-01 RX ADMIN — METRONIDAZOLE 500 MG: 500 INJECTION, SOLUTION INTRAVENOUS at 06:01

## 2020-01-01 RX ADMIN — CARVEDILOL 12.5 MG: 12.5 TABLET, FILM COATED ORAL at 10:01

## 2020-01-01 RX ADMIN — PANTOPRAZOLE SODIUM 40 MG: 40 TABLET, DELAYED RELEASE ORAL at 09:01

## 2020-01-01 RX ADMIN — AMLODIPINE BESYLATE 2.5 MG: 2.5 TABLET ORAL at 10:01

## 2020-01-01 RX ADMIN — AMLODIPINE BESYLATE 2.5 MG: 2.5 TABLET ORAL at 08:01

## 2020-01-01 RX ADMIN — PANTOPRAZOLE SODIUM 40 MG: 40 GRANULE, DELAYED RELEASE ORAL at 10:01

## 2020-01-01 RX ADMIN — LORAZEPAM 1 MG: 2 INJECTION INTRAMUSCULAR; INTRAVENOUS at 12:05

## 2020-01-01 RX ADMIN — VANCOMYCIN HYDROCHLORIDE 750 MG: 750 INJECTION, POWDER, LYOPHILIZED, FOR SOLUTION INTRAVENOUS at 01:01

## 2020-01-01 RX ADMIN — METRONIDAZOLE 500 MG: 500 INJECTION, SOLUTION INTRAVENOUS at 02:01

## 2020-01-01 RX ADMIN — PANTOPRAZOLE SODIUM 40 MG: 40 TABLET, DELAYED RELEASE ORAL at 11:01

## 2020-01-01 RX ADMIN — MORPHINE SULFATE 4 MG: 4 INJECTION INTRAVENOUS at 12:05

## 2020-01-01 RX ADMIN — ALBUTEROL SULFATE 10 MG: 2.5 SOLUTION RESPIRATORY (INHALATION) at 12:01

## 2020-01-01 RX ADMIN — LORAZEPAM 1 MG: 2 INJECTION INTRAMUSCULAR; INTRAVENOUS at 10:05

## 2020-01-01 RX ADMIN — CEFEPIME HYDROCHLORIDE 2 G: 2 INJECTION, SOLUTION INTRAVENOUS at 12:01

## 2020-01-01 RX ADMIN — METRONIDAZOLE 500 MG: 500 INJECTION, SOLUTION INTRAVENOUS at 05:01

## 2020-01-01 RX ADMIN — PANTOPRAZOLE SODIUM 80 MG: 40 INJECTION, POWDER, LYOPHILIZED, FOR SOLUTION INTRAVENOUS at 04:01

## 2020-01-01 RX ADMIN — DEXTROSE: 5 SOLUTION INTRAVENOUS at 10:01

## 2020-01-01 RX ADMIN — LORAZEPAM 1 MG: 2 INJECTION INTRAMUSCULAR; INTRAVENOUS at 11:05

## 2020-01-01 RX ADMIN — CARVEDILOL 12.5 MG: 12.5 TABLET, FILM COATED ORAL at 11:01

## 2020-01-01 RX ADMIN — METRONIDAZOLE 500 MG: 500 INJECTION, SOLUTION INTRAVENOUS at 10:01

## 2020-01-01 RX ADMIN — METRONIDAZOLE 500 MG: 500 INJECTION, SOLUTION INTRAVENOUS at 07:01

## 2020-01-01 RX ADMIN — MORPHINE SULFATE 2 MG: 2 INJECTION, SOLUTION INTRAMUSCULAR; INTRAVENOUS at 06:05

## 2020-01-01 RX ADMIN — METRONIDAZOLE 500 MG: 500 INJECTION, SOLUTION INTRAVENOUS at 01:01

## 2020-01-01 RX ADMIN — MUPIROCIN: 20 OINTMENT TOPICAL at 11:01

## 2020-01-01 RX ADMIN — LIDOCAINE HYDROCHLORIDE 50 MG: 20 INJECTION, SOLUTION INTRAVENOUS at 02:01

## 2020-01-01 RX ADMIN — ACETAMINOPHEN 1000 MG: 500 TABLET ORAL at 10:01

## 2020-01-01 RX ADMIN — METOPROLOL TARTRATE 5 MG: 5 INJECTION, SOLUTION INTRAVENOUS at 03:01

## 2020-01-01 RX ADMIN — ACETAMINOPHEN 1000 MG: 500 TABLET ORAL at 09:01

## 2020-01-01 RX ADMIN — DIATRIZOATE MEGLUMINE AND DIATRIZOATE SODIUM 30 ML: 660; 100 LIQUID ORAL; RECTAL at 07:01

## 2020-01-01 RX ADMIN — CEFEPIME HYDROCHLORIDE 2 G: 2 INJECTION, SOLUTION INTRAVENOUS at 08:01

## 2020-01-01 RX ADMIN — VANCOMYCIN HYDROCHLORIDE 750 MG: 750 INJECTION, POWDER, LYOPHILIZED, FOR SOLUTION INTRAVENOUS at 12:01

## 2020-01-01 RX ADMIN — DIATRIZOATE MEGLUMINE AND DIATRIZOATE SODIUM 10 ML: 660; 100 LIQUID ORAL; RECTAL at 01:01

## 2020-01-01 RX ADMIN — FUROSEMIDE 20 MG: 20 TABLET ORAL at 02:01

## 2020-01-01 RX ADMIN — EPINEPHRINE 0.8 MG: 0.1 INJECTION, SOLUTION ENDOTRACHEAL; INTRACARDIAC; INTRAVENOUS at 02:01

## 2020-01-01 RX ADMIN — PHENYLEPHRINE HYDROCHLORIDE 150 MCG: 10 INJECTION INTRAVENOUS at 02:01

## 2020-01-01 RX ADMIN — DEXAMETHASONE SODIUM PHOSPHATE 10 MG: 10 INJECTION, SOLUTION INTRAMUSCULAR; INTRAVENOUS at 01:01

## 2020-01-01 RX ADMIN — SODIUM CHLORIDE 1000 ML: 0.9 INJECTION, SOLUTION INTRAVENOUS at 04:01

## 2020-01-01 RX ADMIN — CEFEPIME HYDROCHLORIDE 2 G: 2 INJECTION, SOLUTION INTRAVENOUS at 10:01

## 2020-01-01 RX ADMIN — ACETAMINOPHEN 1000 MG: 500 TABLET ORAL at 08:01

## 2020-01-08 PROBLEM — L12.0 BP (BULLOUS PEMPHIGOID): Status: ACTIVE | Noted: 2020-01-01

## 2020-01-08 PROBLEM — E87.5 HYPERKALEMIA: Status: ACTIVE | Noted: 2020-01-01

## 2020-01-08 PROBLEM — K92.2 UGIB (UPPER GASTROINTESTINAL BLEED): Status: ACTIVE | Noted: 2020-01-01

## 2020-01-08 NOTE — ED TRIAGE NOTES
Patient arrives via EMS from [facility] for evaluation of hematemesis. Per EMS report, pt began vomiting dark red fluid last night. Pt has a PEG tube and is NPO. Pt ill appearing, thin, multiple skin tears and wounds in different stages of healing, daughter at bedside states they are caused by an autoimmune disorder. Pt c/o nausea but denies any pain or SOA. Pt A.Fib on monitor, rate controlled.

## 2020-01-08 NOTE — ED NOTES
Pt repositioned, sandi care/oral care provided,  Pt has bullous pemphigoid, with open sores noted to entire body, pt aaox3, slight confusion, asks repeated questions, heals evaluated off bed, bilateral elbows paded, pure wick placed to obtain urine sample, peg tube remains attached to continuous low suction, previous shift out was 550cc of dark liquid from peg,  minimal dark drainage noted in suction tube, pt denies nausea. Daughter at bedside.

## 2020-01-08 NOTE — ANESTHESIA PREPROCEDURE EVALUATION
01/08/2020  Elizabeth Navarrete is a 89 y.o., female admitted with upper GIB and hematemesis, acute hypoxic respiratory failure/poss aspiration, hyperkalemia under MAC/GA.    From nursing home with  Multiple medical problems including hypertension, coronary artery disease, CHF, AFib, kidney disease, bladder CA, CVA, on Plavix and aspirin,  Hyperkalemia 5.4    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    Family history of breast cancer 10/10/2012    Hematuria, gross     History of cardiac arrhythmia 10/10/2012    History of CHF (congestive heart failure) 10/10/2012    History of depression 10/10/2012    History of echocardiogram 10/10/2012    HTN (hypertension) 10/10/2012    Internal carotid artery stenosis 10/10/2012    Personal history of gallstones 10/10/2012    Urinary tract infection      Past Surgical History:   Procedure Laterality Date    BREAST LUMPECTOMY      ESOPHAGOGASTRODUODENOSCOPY N/A 6/12/2018    Procedure: ESOPHAGOGASTRODUODENOSCOPY (EGD);  Surgeon: Anila Kolb MD;  Location: [facility];  Service: Endoscopy;  Laterality: N/A;    ESOPHAGOGASTRODUODENOSCOPY N/A 6/14/2018    Procedure: ESOPHAGOGASTRODUODENOSCOPY (EGD);  Surgeon: Anila Kolb MD;  Location: [facility];  Service: Endoscopy;  Laterality: N/A;    EYE SURGERY      HYSTERECTOMY           Anesthesia Evaluation    I have reviewed the Patient Summary Reports.    I have reviewed the Nursing Notes.   I have reviewed the Medications.     Review of Systems  Anesthesia Hx:  No problems with previous Anesthesia   Denies Personal Hx of Anesthesia complications.   Social:  Non-Smoker, No Alcohol Use    Cardiovascular:    Exercise tolerance: poor Hypertension CAD   CHF PVD    Renal/:   Chronic Renal Disease    Musculoskeletal:   Arthritis     Neurological:   CVA        Physical Exam   Airway/Jaw/Neck:  Airway Findings: Mouth Opening: Normal Tongue: Normal  General Airway Assessment: Adult  Mallampati: II  TM Distance: Normal, at least 6 cm      Dental:  Dental Findings: Edentulous   Chest/Lungs:  Chest/Lungs Findings: Normal Respiratory Rate, Decreased Breathe Sounds Left, Decreased Breathe Sounds Right     Heart/Vascular:  Heart Findings: Rate: Normal  Rhythm: Irregularly Irregular        Mental Status:  Mental Status Findings:  Cooperative, Confusion       Lab Results   Component Value Date    WBC 11.12 01/08/2020    HGB 8.7 (L) 01/08/2020    HCT 28.9 (L) 01/08/2020    MCV 97 01/08/2020     01/08/2020     BMP  Lab Results   Component Value Date     01/08/2020    K 5.4 (H) 01/08/2020     01/08/2020    CO2 24 01/08/2020     (H) 01/08/2020    CREATININE 0.8 01/08/2020    CALCIUM 8.2 (L) 01/08/2020    ANIONGAP 7 (L) 01/08/2020    ESTGFRAFRICA >60 01/08/2020    EGFRNONAA >60 01/08/2020     TTE 6/16/17:  CONCLUSIONS     1 - Low normal to mildly depressed left ventricular systolic function (EF 50-55%).     2 - Impaired LV relaxation, elevated LAP (grade 2 diastolic dysfunction).     3 - The estimated PA systolic pressure is 32 mmHg.     4 - Mild mitral regurgitation.     5 - Trivial tricuspid regurgitation.     6 - Moderate left atrial enlargement.     Anesthesia Plan  Type of Anesthesia, risks & benefits discussed:  Anesthesia Type:  MAC, general  Patient's Preference:   Intra-op Monitoring Plan:   Intra-op Monitoring Plan Comments:   Post Op Pain Control Plan: multimodal analgesia  Post Op Pain Control Plan Comments:   Induction:   IV  Beta Blocker:  Patient is not currently on a Beta-Blocker (No further documentation required).       Informed Consent: Patient representative understands risks and agrees with  Anesthesia plan.  Questions answered. Anesthesia consent signed with patient representative.  ASA Score: 4     Day of Surgery Review of History & Physical:  There are no significant changes.      Anesthesia Plan Notes: Pt is high risk for cardiopulmonary decompensation with anesthesia, intubation, prolonged intubation. D/w daughter and phone consent obtained.         Ready For Surgery From Anesthesia Perspective.

## 2020-01-08 NOTE — ED NOTES
Pt asleep, resp are even and unlabored, pt positioned to right lateral side, bony area padded with pillows. Will continue to monitor

## 2020-01-08 NOTE — ED NOTES
Dr Lester at bedside, verbal order to hook pt PEG tube to low intermittent suction; PEG hooked to suction with immediate dark red fluid return

## 2020-01-08 NOTE — OR NURSING
Report given to GEN Hodges. Dr. Rothman spoke to pt's daughter in [place].     Waiting for ICU bed to be ready for transport.

## 2020-01-08 NOTE — [employer] PATIENT INSTRUCTIONS
Discharge Summary/Instructions after an Endoscopic Procedure  Patient Name: Elizabeth Navarrete  Patient MRN: [id number]  Patient YOB: 1928 Wednesday, January 08, 2020  Richy Rothman MD  RESTRICTIONS:  During your procedure today, you received medications for sedation.  These   medications may affect your judgment, balance and coordination.  Therefore,   for 24 hours, you have the following restrictions:   - DO NOT drive a car, operate machinery, make legal/financial decisions,   sign important papers or drink alcohol.    ACTIVITY:  Today: no heavy lifting, straining or running due to procedural   sedation/anesthesia.  The following day: return to full activity including work.  DIET:  Eat and drink normally unless instructed otherwise.     TREATMENT FOR COMMON SIDE EFFECTS:  - Mild abdominal pain, nausea, belching, bloating or excessive gas:  rest,   eat lightly and use a heating pad.  - Sore Throat: treat with throat lozenges and/or gargle with warm salt   water.  - Because air was used during the procedure, expelling large amounts of air   from your rectum or belching is normal.  - If a bowel prep was taken, you may not have a bowel movement for 1-3 days.    This is normal.  SYMPTOMS TO WATCH FOR AND REPORT TO YOUR PHYSICIAN:  1. Abdominal pain or bloating, other than gas cramps.  2. Chest pain.  3. Back pain.  4. Signs of infection such as: chills or fever occurring within 24 hours   after the procedure.  5. Rectal bleeding, which would show as bright red, maroon, or black stools.   (A tablespoon of blood from the rectum is not serious, especially if   hemorrhoids are present.)  6. Vomiting.  7. Weakness or dizziness.  GO DIRECTLY TO THE NEAREST EMERGENCY ROOM IF YOU HAVE ANY OF THE FOLLOWING:      Difficulty breathing              Chills and/or fever over 101 F   Persistent vomiting and/or vomiting blood   Severe abdominal pain   Severe chest pain   Black, tarry stools   Bleeding- more than one  tablespoon   Any other symptom or condition that you feel may need urgent attention  Your doctor recommends these additional instructions:  If any biopsies were taken, your doctors clinic will contact you in 1 to 2   weeks with any results.  - Return patient to ICU for ongoing care.   - NPO today. Can resume tube feeds tomorrow if stable.  - Watch for aspiration given significant blood clot remains in stomach.  - Continue present medications.   - Use a proton pump inhibitor IV BID for 3 days.   - Will expect her to pass melena over the next few days (old blood passing),   but if continues to drop Hgb or with clinically active bleeding, repeat EGD   would be warranted at that time.  For questions, problems or results please call your physician - Richy Rothman MD at Work:  [phone].  EMERGENCY PHONE NUMBER: [phone],  LAB RESULTS: [phone]  IF A COMPLICATION OR EMERGENCY SITUATION ARISES AND YOU ARE UNABLE TO REACH   YOUR PHYSICIAN - GO DIRECTLY TO THE EMERGENCY ROOM.  Richy Rothman MD  1/8/2020 2:36:05 PM  This report has been verified and signed electronically.  [employer]

## 2020-01-08 NOTE — ED NOTES
Incontinent of urine. Skin care and linens changed. Pure wick placement adjusted. Repositioned in bed for comfort.

## 2020-01-08 NOTE — HPI
This is a 89yo female with a PMHx of a.fib, cva, chf, htn, cad here with serosanginous PEG tube drainage. She denies abdominal pain. No issues with PEG tube feeds. Medications flush easily. Of note, patient recently had a linear gastric ulcer s/p epinephrine, bipolar cauterization and clip x 1 on 1/8

## 2020-01-08 NOTE — CONSULTS
[facility]  Gastroenterology  Consult Note    Patient Name: Elizabeth Navarrete  MRN: [id number]  Admission Date: 1/8/2020  Hospital Length of Stay: 0 days  Code Status: Full Code   Attending Provider: Mervat Alexander MD   Consulting Provider: Anila Kolb MD  Primary Care Physician: [facility]  Principal Problem:UGIB (upper gastrointestinal bleed)    Inpatient consult to [facility]  Consult performed by: Anila Kolb MD  Consult ordered by: Ct Flores MD  Reason for consult: GI bleed        Subjective:     HPI:  This is a 89yo female with a PMHx of a.fib, cva, chf, htn, cad here with hematemesis. History obtained after discussion with her daughter. She was noted to have generalized malaise last evening and her daughter was called this am regarding hematemesis described as dark, multiple episodes, moderate amount. On arrival g-tube was connected to suction with removal of 400cc of dark blood. No overt abdominal pain and no melena. Hct decreased mildly from prior level    The following portions of the patient's history were reviewed and updated as appropriate: allergies, current medications, past family history, past medical history, past social history, past surgical history and problem list.      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    Family history of breast cancer 10/10/2012    Hematuria, gross     History of cardiac arrhythmia 10/10/2012    History of CHF (congestive heart failure) 10/10/2012    History of depression 10/10/2012    History of echocardiogram 10/10/2012    HTN (hypertension) 10/10/2012    Internal carotid artery stenosis 10/10/2012    Personal history of gallstones 10/10/2012    Urinary tract infection        Past Surgical  History:   Procedure Laterality Date    BREAST LUMPECTOMY      ESOPHAGOGASTRODUODENOSCOPY N/A 6/12/2018    Procedure: ESOPHAGOGASTRODUODENOSCOPY (EGD);  Surgeon: Anila Kolb MD;  Location: [facility];  Service: Endoscopy;  Laterality: N/A;    ESOPHAGOGASTRODUODENOSCOPY N/A 6/14/2018    Procedure: ESOPHAGOGASTRODUODENOSCOPY (EGD);  Surgeon: Anila Kolb MD;  Location: [facility];  Service: Endoscopy;  Laterality: N/A;    EYE SURGERY      HYSTERECTOMY         Review of patient's allergies indicates:   Allergen Reactions    Pcn [penicillins] Rash     Family History     Problem Relation (Age of Onset)    Cancer Mother        Tobacco Use    Smoking status: Never Smoker    Smokeless tobacco: Never Used   Substance and Sexual Activity    Alcohol use: No    Drug use: No    Sexual activity: Never     Review of Systems   Constitutional: Positive for activity change and fatigue. Negative for appetite change, chills and fever.   HENT: Negative for postnasal drip and trouble swallowing.    Eyes: Negative for pain and redness.   Respiratory: Negative for cough, choking, chest tightness and shortness of breath.    Cardiovascular: Negative for chest pain and leg swelling.   Gastrointestinal: Negative for abdominal distention, abdominal pain, constipation, diarrhea, nausea, rectal pain and vomiting.   Endocrine: Negative for cold intolerance and heat intolerance.   Genitourinary: Negative for difficulty urinating and hematuria.   Musculoskeletal: Negative for arthralgias and back pain.   Skin: Negative for color change and pallor.   Allergic/Immunologic: Negative for environmental allergies and food allergies.   Neurological: Negative for dizziness and light-headedness.   Hematological: Negative for adenopathy. Does not bruise/bleed easily.   Psychiatric/Behavioral: Negative for agitation and behavioral problems.     Objective:     Vital Signs (Most Recent):  Temp: 97.7 °F (36.5 °C) (01/08/20 0720)  Pulse: 110  (01/08/20 0948)  Resp: (!) 21 (01/08/20 0948)  BP: 117/73 (01/08/20 0901)  SpO2: 100 % (01/08/20 0948) Vital Signs (24h Range):  Temp:  [97.7 °F (36.5 °C)] 97.7 °F (36.5 °C)  Pulse:  [] 110  Resp:  [18-27] 21  SpO2:  [90 %-100 %] 100 %  BP: (116-135)/(55-98) 117/73     Weight: 61.7 kg (136 lb) (01/08/20 0436)  Body mass index is 30.49 kg/m².      Intake/Output Summary (Last 24 hours) at 1/8/2020 1127  Last data filed at 1/8/2020 0605  Gross per 24 hour   Intake 1000 ml   Output --   Net 1000 ml       Lines/Drains/Airways     Drain                 Gastrostomy/Enterostomy 09/10/19 1726 Gastrostomy tube w/ balloon midline feeding 119 days    Female External Urinary Catheter 11/19/19 0900 50 days          Peripheral Intravenous Line                 Peripheral IV - Single Lumen 01/08/20 0459 18 G Right Antecubital less than 1 day         Peripheral IV - Single Lumen 01/08/20 0513 20 G Right Hand less than 1 day                Physical Exam   Constitutional: She is oriented to person, place, and time. She appears well-developed and well-nourished.   Elderly in nad   HENT:   Head: Normocephalic and atraumatic.   Eyes: Conjunctivae are normal. No scleral icterus.   Neck: Normal range of motion. Neck supple. No tracheal deviation present. No thyromegaly present.   Cardiovascular: Normal rate. Exam reveals no gallop and no friction rub.   Pulmonary/Chest: Effort normal. No respiratory distress. She has no wheezes.   Abdominal: Soft. She exhibits no distension. There is no tenderness.   g-tube with dark red blood draining   Musculoskeletal:        Right wrist: She exhibits normal range of motion and no tenderness.        Left wrist: She exhibits normal range of motion and no tenderness.   Lymphadenopathy:        Head (right side): No submental and no submandibular adenopathy present.        Head (left side): No submental and no submandibular adenopathy present.   Neurological: She is alert and oriented to person, place,  and time.   Skin: Skin is warm and dry. No rash noted. She is not diaphoretic. No erythema.   Psychiatric: She has a normal mood and affect. Her behavior is normal.   Nursing note and vitals reviewed.      Significant Labs:  CBC:   Recent Labs   Lab 01/08/20  0448 01/08/20  0746   WBC 12.38 11.12   HGB 8.9* 8.7*   HCT 29.1* 28.9*    188       Significant Imaging:  Imaging results within the past 24 hours have been reviewed.    Assessment/Plan:     * UGIB (upper gastrointestinal bleed)  - overt bleeding noted  - d/w daughter as well, given nature of bleeding will proceed with egd; discussed risk in light of multiple comorbidities  - monitor hct  - ppi  - npo for now  - IVF        Thank you for your consult. I will follow-up with patient. Please contact us if you have any additional questions.    Anila Kolb MD  Gastroenterology  [facility]

## 2020-01-08 NOTE — ASSESSMENT & PLAN NOTE
- overt bleeding noted  - d/w daughter as well, given nature of bleeding will proceed with egd; discussed risk in light of multiple comorbidities  - monitor hct  - ppi  - npo for now  - IVF

## 2020-01-08 NOTE — ED NOTES
Respiratory contacted by phone. I spoke with Richy and notified him of albuterol treatment ordered.

## 2020-01-08 NOTE — SUBJECTIVE & OBJECTIVE
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    Family history of breast cancer 10/10/2012    Hematuria, gross     History of cardiac arrhythmia 10/10/2012    History of CHF (congestive heart failure) 10/10/2012    History of depression 10/10/2012    History of echocardiogram 10/10/2012    HTN (hypertension) 10/10/2012    Internal carotid artery stenosis 10/10/2012    Personal history of gallstones 10/10/2012    Urinary tract infection        Past Surgical History:   Procedure Laterality Date    BREAST LUMPECTOMY      ESOPHAGOGASTRODUODENOSCOPY N/A 6/12/2018    Procedure: ESOPHAGOGASTRODUODENOSCOPY (EGD);  Surgeon: Anila Kolb MD;  Location: [facility];  Service: Endoscopy;  Laterality: N/A;    ESOPHAGOGASTRODUODENOSCOPY N/A 6/14/2018    Procedure: ESOPHAGOGASTRODUODENOSCOPY (EGD);  Surgeon: Anila Kolb MD;  Location: [facility];  Service: Endoscopy;  Laterality: N/A;    EYE SURGERY      HYSTERECTOMY         Review of patient's allergies indicates:   Allergen Reactions    Pcn [penicillins] Rash     Family History     Problem Relation (Age of Onset)    Cancer Mother        Tobacco Use    Smoking status: Never Smoker    Smokeless tobacco: Never Used   Substance and Sexual Activity    Alcohol use: No    Drug use: No    Sexual activity: Never     Review of Systems   Constitutional: Positive for activity change and fatigue. Negative for appetite change, chills and fever.   HENT: Negative for postnasal drip and trouble swallowing.    Eyes: Negative for pain and redness.   Respiratory: Negative for cough, choking, chest tightness and shortness of breath.    Cardiovascular: Negative for chest pain and leg swelling.   Gastrointestinal: Negative for abdominal distention, abdominal pain,  constipation, diarrhea, nausea, rectal pain and vomiting.   Endocrine: Negative for cold intolerance and heat intolerance.   Genitourinary: Negative for difficulty urinating and hematuria.   Musculoskeletal: Negative for arthralgias and back pain.   Skin: Negative for color change and pallor.   Allergic/Immunologic: Negative for environmental allergies and food allergies.   Neurological: Negative for dizziness and light-headedness.   Hematological: Negative for adenopathy. Does not bruise/bleed easily.   Psychiatric/Behavioral: Negative for agitation and behavioral problems.     Objective:     Vital Signs (Most Recent):  Temp: 97.7 °F (36.5 °C) (01/08/20 0720)  Pulse: 110 (01/08/20 0948)  Resp: (!) 21 (01/08/20 0948)  BP: 117/73 (01/08/20 0901)  SpO2: 100 % (01/08/20 0948) Vital Signs (24h Range):  Temp:  [97.7 °F (36.5 °C)] 97.7 °F (36.5 °C)  Pulse:  [] 110  Resp:  [18-27] 21  SpO2:  [90 %-100 %] 100 %  BP: (116-135)/(55-98) 117/73     Weight: 61.7 kg (136 lb) (01/08/20 0436)  Body mass index is 30.49 kg/m².      Intake/Output Summary (Last 24 hours) at 1/8/2020 1127  Last data filed at 1/8/2020 0605  Gross per 24 hour   Intake 1000 ml   Output --   Net 1000 ml       Lines/Drains/Airways     Drain                 Gastrostomy/Enterostomy 09/10/19 1726 Gastrostomy tube w/ balloon midline feeding 119 days    Female External Urinary Catheter 11/19/19 0900 50 days          Peripheral Intravenous Line                 Peripheral IV - Single Lumen 01/08/20 0459 18 G Right Antecubital less than 1 day         Peripheral IV - Single Lumen 01/08/20 0513 20 G Right Hand less than 1 day                Physical Exam   Constitutional: She is oriented to person, place, and time. She appears well-developed and well-nourished.   Elderly in nad   HENT:   Head: Normocephalic and atraumatic.   Eyes: Conjunctivae are normal. No scleral icterus.   Neck: Normal range of motion. Neck supple. No tracheal deviation present. No  thyromegaly present.   Cardiovascular: Normal rate. Exam reveals no gallop and no friction rub.   Pulmonary/Chest: Effort normal. No respiratory distress. She has no wheezes.   Abdominal: Soft. She exhibits no distension. There is no tenderness.   g-tube with dark red blood draining   Musculoskeletal:        Right wrist: She exhibits normal range of motion and no tenderness.        Left wrist: She exhibits normal range of motion and no tenderness.   Lymphadenopathy:        Head (right side): No submental and no submandibular adenopathy present.        Head (left side): No submental and no submandibular adenopathy present.   Neurological: She is alert and oriented to person, place, and time.   Skin: Skin is warm and dry. No rash noted. She is not diaphoretic. No erythema.   Psychiatric: She has a normal mood and affect. Her behavior is normal.   Nursing note and vitals reviewed.      Significant Labs:  CBC:   Recent Labs   Lab 01/08/20  0448 01/08/20  0746   WBC 12.38 11.12   HGB 8.9* 8.7*   HCT 29.1* 28.9*    188       Significant Imaging:  Imaging results within the past 24 hours have been reviewed.

## 2020-01-08 NOTE — ED NOTES
Received call from Larisa Rowan to ask questions about pt hx. Dr Rothman is requesting that patients Potassium be lower than 5.4 which is what it was last measured as. Message sent to Dr Alexander via secure chat.

## 2020-01-08 NOTE — ED PROVIDER NOTES
Encounter Date: 1/8/2020    SCRIBE #1 NOTE: I, Angie Tran, am scribing for, and in the presence of,  Dr. Lester. I have scribed the entire note.       History     Chief Complaint   Patient presents with    Hematemesis     To ER per [facility] from [facility] with c/o vomiting blood starting last night.     Elizabeth Navarrete is a 89 y.o. female who presents to the ED via EMS s/p vomiting dark red liquid that began last night. Pt is a tube feeder and does not take anything by mouth. Pt lives at [facility].  Denies history of upper GI bleeding.  Does complain of nausea, denies fever, chills chest pain shortness of breath.    The history is provided by the nursing home and the EMS personnel.     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    Family history of breast cancer 10/10/2012    Hematuria, gross     History of cardiac arrhythmia 10/10/2012    History of CHF (congestive heart failure) 10/10/2012    History of depression 10/10/2012    History of echocardiogram 10/10/2012    HTN (hypertension) 10/10/2012    Internal carotid artery stenosis 10/10/2012    Personal history of gallstones 10/10/2012    Urinary tract infection      Past Surgical History:   Procedure Laterality Date    BREAST LUMPECTOMY      ESOPHAGOGASTRODUODENOSCOPY N/A 6/12/2018    Procedure: ESOPHAGOGASTRODUODENOSCOPY (EGD);  Surgeon: Anila Kolb MD;  Location: [facility] ENDO;  Service: Endoscopy;  Laterality: N/A;    ESOPHAGOGASTRODUODENOSCOPY N/A 6/14/2018    Procedure: ESOPHAGOGASTRODUODENOSCOPY (EGD);  Surgeon: Anila Kolb MD;  Location: [facility] ENDO;  Service: Endoscopy;  Laterality: N/A;    EYE SURGERY       HYSTERECTOMY       Family History   Problem Relation Age of Onset    Cancer Mother         breast    Heart disease Neg Hx      Social History     Tobacco Use    Smoking status: Never Smoker    Smokeless tobacco: Never Used   Substance Use Topics    Alcohol use: No    Drug use: No     Review of Systems   Constitutional: Negative for fever.   HENT: Negative for sore throat.    Respiratory: Negative for shortness of breath.    Cardiovascular: Negative for chest pain.   Gastrointestinal: Positive for nausea and vomiting.   Genitourinary: Negative for dysuria.   Musculoskeletal: Negative for back pain.   Skin: Negative for rash.   Neurological: Negative for weakness.   Hematological: Does not bruise/bleed easily.   All other systems reviewed and are negative.      Physical Exam     Initial Vitals   BP Pulse Resp Temp SpO2   01/08/20 0436 01/08/20 0436 01/08/20 0436 01/08/20 0720 01/08/20 0436   (!) 135/98 91 18 97.7 °F (36.5 °C) 95 %      MAP       --                Physical Exam    Constitutional:   Elderly, chronically ill-appearing , active nausea vomiting, no acute distress, underweight   HENT:   Head: Normocephalic and atraumatic.   Eyes: Pupils are equal, round, and reactive to light.   Cardiovascular:   Irregular regular rate consistent with AFib   Pulmonary/Chest: No respiratory distress. She has no wheezes. She has no rales.   Abdominal: Soft. She exhibits no distension. There is no tenderness.   PEG tube in place, some erythema around the site, abdomen soft nontender   Musculoskeletal:   Chronic wasting muscle extremities, cachectic   Neurological: She is alert and oriented to person, place, and time.   Skin: Skin is warm and dry. Capillary refill takes less than 2 seconds.   Skin breakdown in various stages of healing   Psychiatric: She has a normal mood and affect. Thought content normal.         ED Course   Critical Care  Date/Time: 1/8/2020 8:10 PM  Performed by: Ian Lester MD  Authorized by:  Ian Lester MD   Total critical care time (exclusive of procedural time) : 35 minutes  Critical care was necessary to treat or prevent imminent or life-threatening deterioration of the following conditions: circulatory failure.  Critical care was time spent personally by me on the following activities: review of old charts, re-evaluation of patient's condition, pulse oximetry, ordering and review of radiographic studies, ordering and review of laboratory studies, ordering and performing treatments and interventions, obtaining history from patient or surrogate, examination of patient, evaluation of patient's response to treatment, discussions with primary provider, discussions with consultants and development of treatment plan with patient or surrogate.        Labs Reviewed   CBC W/ AUTO DIFFERENTIAL - Abnormal; Notable for the following components:       Result Value    RBC 3.00 (*)     Hemoglobin 8.9 (*)     Hematocrit 29.1 (*)     Mean Corpuscular Hemoglobin Conc 30.6 (*)     RDW 16.3 (*)     Gran # (ANC) 9.0 (*)     Eos # 1.0 (*)     Lymph% 13.6 (*)     All other components within normal limits   COMPREHENSIVE METABOLIC PANEL - Abnormal; Notable for the following components:    Potassium 6.0 (*)     Glucose 190 (*)     BUN, Bld 111 (*)     Calcium 8.6 (*)     Total Protein 5.7 (*)     Albumin 2.5 (*)     eGFR if non  56 (*)     All other components within normal limits   OCCULT BLOOD, OTHER SOURCES - Abnormal; Notable for the following components:    Occult Blood Positive (*)     All other components within normal limits   BASIC METABOLIC PANEL - Abnormal; Notable for the following components:    Potassium 5.4 (*)     Glucose 193 (*)     BUN, Bld 108 (*)     Calcium 8.2 (*)     Anion Gap 7 (*)     All other components within normal limits   URINALYSIS, REFLEX TO URINE CULTURE - Abnormal; Notable for the following components:    Specific Gravity, UA <=1.005 (*)     Occult Blood UA 3+ (*)      Leukocytes, UA 3+ (*)     All other components within normal limits    Narrative:     Preferred Collection Type->Urine, Clean Catch   CBC W/ AUTO DIFFERENTIAL - Abnormal; Notable for the following components:    RBC 2.98 (*)     Hemoglobin 8.7 (*)     Hematocrit 28.9 (*)     Mean Corpuscular Hemoglobin Conc 30.1 (*)     RDW 16.4 (*)     Gran # (ANC) 9.0 (*)     Lymph # 0.6 (*)     Gran% 80.8 (*)     Lymph% 5.5 (*)     All other components within normal limits   URINALYSIS MICROSCOPIC - Abnormal; Notable for the following components:    RBC, UA 30 (*)     WBC, UA >100 (*)     WBC Clumps, UA Occasional (*)     Bacteria Few (*)     All other components within normal limits    Narrative:     Preferred Collection Type->Urine, Clean Catch   POCT GLUCOSE - Abnormal; Notable for the following components:    POCT Glucose 180 (*)     All other components within normal limits   POCT GLUCOSE - Abnormal; Notable for the following components:    POCT Glucose 182 (*)     All other components within normal limits   CULTURE, URINE   PROTIME-INR   MAGNESIUM   LIPASE   IRON AND TIBC   FERRITIN   VITAMIN B12   FOLATE   FERRITIN   TYPE & SCREEN   POCT GLUCOSE MONITORING CONTINUOUS   POCT GLUCOSE MONITORING CONTINUOUS     EKG Readings: (Independently Interpreted)   AFib at a rate of 84 beats per minute, left axis, nonspecific ST changes, no elevation, no significant changes from previous EKG     ECG Results          EKG 12-lead (In process)  Result time 01/08/20 15:29:19    In process by Interface, Lab In [facility] (01/08/20 15:29:19)                 Narrative:    Test Reason : E87.5,    Vent. Rate : 103 BPM     Atrial Rate : 102 BPM     P-R Int : 000 ms          QRS Dur : 112 ms      QT Int : 316 ms       P-R-T Axes : 000 -67 075 degrees     QTc Int : 413 ms    Atrial fibrillation with rapid ventricular response  Left axis deviation  Nonspecific ST abnormality  Abnormal ECG  When compared with ECG of 08-JAN-2020 04:51,  Previous ECG has  undetermined rhythm, needs review  T wave inversion no longer evident in Lateral leads  QT has shortened    Referred By: AAAREFNADINE   SELF           Confirmed By:                              EKG 12-lead (In process)  Result time 01/08/20 11:15:23    In process by Interface, Lab In [facility] (01/08/20 11:15:23)                 Narrative:    Test Reason : K92.2,    Vent. Rate : 084 BPM     Atrial Rate : 064 BPM     P-R Int : 000 ms          QRS Dur : 120 ms      QT Int : 402 ms       P-R-T Axes : 000 -53 026 degrees     QTc Int : 475 ms    Undetermined rhythm  Left axis deviation  Nonspecific intraventricular conduction delay  Nonspecific ST and T wave abnormality  Abnormal ECG  When compared with ECG of 24-OCT-2019 21:32,  Current undetermined rhythm precludes rhythm comparison, needs review  Questionable change in The axis  T wave inversion no longer evident in Inferior leads    Referred By: AAAREFNADINE   SELF           Confirmed By:                             Imaging Results          X-Ray Chest 1 View (Final result)  Result time 01/08/20 05:30:17    Final result by Angel Howard MD (01/08/20 05:30:17)                 Impression:      Suspected small amount of pleural fluid and or chronic pleural change at the left lung base, may relate to a persistent or recurrence process, as discussed above.      Electronically signed by: Angel Howard  Date:    01/08/2020  Time:    05:30             Narrative:    EXAMINATION:  XR CHEST 1 VIEW    CLINICAL HISTORY:  Gastrointestinal hemorrhage, unspecified    TECHNIQUE:  Single frontal view of the chest was performed.    COMPARISON:  November 18, 2019    FINDINGS:  Single chest view is submitted.  The patient is rotated, when accounting for this the cardiomediastinal silhouette appears stable.  There is continued appearance of blunting of the left costophrenic angle this may relate to a small amount of pleural fluid and or chronic pleural change, this may relate to a  persistent or recurring process as it appears similar although less prominent than on the prior study.  There is no evidence for significant pleural fluid on the right.  Chronic lung changes are otherwise noted.  There is no pneumothorax.  The osseous structures demonstrate chronic change.                                 Medical Decision Making:   Initial Assessment:   This is a 89-year-old female, chronically ill multiple medical problems including hypertension, coronary artery disease, CHF, AFib, kidney disease, on Plavix and aspirin, presents the ER for evaluation of upper GI bleed.  Onset yesterday, started vomiting dark red blood, came to the ER for further evaluation.  She is full code.  She did have a recent EGD done on June 2018 which revealed a moderate size hiatal hernia, benign esophageal stenosis, no ulcers or varices.  When she arrived in the ER, she was chronically ill but in no acute distress, she answers questions appropriately.  I placed her PEG tube dissection and revealed moderate amount of extremely dark blood.  She remains hemodynamically stable, in AFib.  Differential includes upper versus lower GI bleed, gastritis, peptic ulcer disease, hiatal hernia versus other cause.  Will obtain blood work, type and screen, will plan admission and GI consult.            Scribe Attestation:   Scribe #1: I performed the above scribed service and the documentation accurately describes the services I performed. I attest to the accuracy of the note.            ED Course as of Jan 08 2011 Wed Jan 08, 2020   0656 Patient handed off to me from Dr Lester at 6 AM for disposition.  She is 89 y/o female with extensive PMHx including CVA on plavix, dysphagia s/p PEG placement who was sent from [facility] for hematemesis last night.  No previous h/o UGIB.  Patient NPO.  VSS, she is awake and in NAD.  Thin, elderly woman.  Answering questions appropriately.  Large amount of dark reddish fluid suctioned from PEG.  H&H  stable at this time.  Awaiting [facility] hospitalist return call for admission.  Patient actually unreferred and [facility] internal medicine contacted for admission.  Discussed case with resident.  GI consult placed.  Informed patient and daughter who is at bedside.  All questions answered.      [LD]      ED Course User Index  [LD] Angelita Velazquez MD                Clinical Impression:     1. UGIB (upper gastrointestinal bleed)    2. Hyperkalemia    3. BP (bullous pemphigoid)    4. Acute respiratory failure with hypoxia    5. Coronary artery disease involving native coronary artery without angina pectoris, unspecified whether native or transplanted heart    6. Chronic diastolic congestive heart failure    7. Essential hypertension    8. Chest pain    9. SOB (shortness of breath)            Disposition:   Disposition: Admitted  Condition: Serious                     Ian Lester MD  01/08/20 2012

## 2020-01-08 NOTE — TRANSFER OF CARE
"Anesthesia Transfer of Care Note    Patient: Elizabeth Navarrete    Procedure(s) Performed: Procedure(s) (LRB):  ESOPHAGOGASTRODUODENOSCOPY (EGD) (N/A)    Patient location: ICU    Anesthesia Type: MAC    Transport from OR: Transported from OR on 2-3 L/min O2 by NC with adequate spontaneous ventilation    Post pain: adequate analgesia    Post assessment: no apparent anesthetic complications    Post vital signs: stable    Level of consciousness: responds to stimulation    Nausea/Vomiting: no nausea/vomiting    Complications: none    Transfer of care protocol was followedComments: Report given to GEN Hodges. Pt VSS. Pt is being held in P1 of endoscopy unit awaiting the ICU bed being cleaned.       Last vitals:   Visit Vitals  BP (!) 114/58   Pulse (!) 115   Temp 36.5 °C (97.7 °F) (Oral)   Resp (!) 22   Ht 4' 8" (1.422 m)   Wt 61.7 kg (136 lb)   LMP  (LMP Unknown)   SpO2 96%   Breastfeeding? No   BMI 30.49 kg/m²     "

## 2020-01-08 NOTE — H&P
[facility] Medicine H&P Note     Admitting Team: [facility] Hospitalist Team A    Attending Physician: Mervat Alexander MD  Resident: DELFINO   Intern: REJI    Date of Admit: 1/8/2020    Chief Complaint     Hematemesis (To ER per [facility] from [facility] with c/o vomiting blood starting last night.)   for 1 DAY    Subjective:      History of Present Illness:  Elizabeth Navarrete is a 89 y.o. female who  has a past medical history of A-fib, Bladder mass, CAD (coronary artery disease) (10/10/2012), CHF (congestive heart failure), CRF (chronic renal failure) (10/10/2012), Embolic stroke involving right carotid artery (6/15/2017), Family history of breast cancer (10/10/2012), Hematuria, gross, History of cardiac arrhythmia (10/10/2012), History of CHF (congestive heart failure) (10/10/2012), History of depression (10/10/2012), History of echocardiogram (10/10/2012), HTN (hypertension) (10/10/2012), Internal carotid artery stenosis (10/10/2012), Personal history of gallstones (10/10/2012), and Urinary tract infection.. The patient presented to [facility] on 1/8/2020 with a primary complaint of Hematemesis (To ER per [facility] from [facility] with c/o vomiting blood starting last night.)      The patient was in their usual state of health, which includes staying at [facility], completely bed bound with bowel incontinence, with all feeds and meds through PEG tube, until early this morning when she began having episodes of dark brown/blood emesis. Patient's daughter at bedside who provides majority of the patient's history, as patient is oriented to person, place, and month but has difficulty telling me the events of what happened or her medical history.    Patient has a PMHx of Afib, suspected bladder cancer (patient' is unaware of diagnosis and daughter would like it to stay that way) seen on CT scan in 2018, CHF, CVA in June 2017 with residual left sided weakness, HTN, bullous pemphigoid, and  "multiple urinary tract infections. Patient's daughter saw her mother yesterday evening around 10:30PM and states that she thought that her mother might not be feeling well given that she was acting more "snippy" than usual, but states that she has good and bad days in regards to her mood. States that she was called early this AM around 4o'clock to be alerted that her mother was vomiting blood and was requiring some oxygen (patient does not use oxygen at the nursing home where she has resided s/p CVA in 2017) and that she was being transferred to the hospital.      Patient has never had any episodes of bloody vomiting in the past, but does have a PEG tube placed for dysphagia and difficulty swallowing since her stroke which has been exchanged several times. Patient follows with Dr. Kolb for GI.     No abdominal pain, purulent discharge, nausea, vomiting, diarrhea, constipation, chest pain, or shortness of breath.     Past Medical History:  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    Family history of breast cancer 10/10/2012    Hematuria, gross     History of cardiac arrhythmia 10/10/2012    History of CHF (congestive heart failure) 10/10/2012    History of depression 10/10/2012    History of echocardiogram 10/10/2012    HTN (hypertension) 10/10/2012    Internal carotid artery stenosis 10/10/2012    Personal history of gallstones 10/10/2012    Urinary tract infection        Past Surgical History:  Past Surgical History:   Procedure Laterality Date    BREAST LUMPECTOMY      ESOPHAGOGASTRODUODENOSCOPY N/A 6/12/2018    Procedure: ESOPHAGOGASTRODUODENOSCOPY (EGD);  Surgeon: Anila Kolb MD;  Location: [facility];  Service: Endoscopy;  Laterality: N/A;    ESOPHAGOGASTRODUODENOSCOPY N/A 6/14/2018 "    Procedure: ESOPHAGOGASTRODUODENOSCOPY (EGD);  Surgeon: Anila Kolb MD;  Location: [facility];  Service: Endoscopy;  Laterality: N/A;    EYE SURGERY      HYSTERECTOMY         Allergies:  Review of patient's allergies indicates:   Allergen Reactions    Pcn [penicillins] Rash       Home Medications:  Prior to Admission medications    Medication Sig Start Date End Date Taking? Authorizing Provider   acetaminophen (TYLENOL) 160 mg/5 mL Elix 650 mg by Per G Tube route every 6 (six) hours as needed.     Historical Provider, MD   albuterol (PROVENTIL/VENTOLIN HFA) 90 mcg/actuation inhaler Inhale 1-2 puffs into the lungs every 4 (four) hours as needed for Wheezing. Rescue 5/8/19   Michele Sawyer MD   amLODIPine (NORVASC) 5 MG tablet 0.5 tablets (2.5 mg total) by Per G Tube route once daily. 11/20/19   Tabatha Arevalo NP   ascorbic acid, vitamin C, (VITAMIN C) 500 MG tablet 500 mg by Per G Tube route 2 (two) times daily.    Historical Provider, MD   aspirin 325 MG tablet 325 mg by Per G Tube route once daily.    Historical Provider, MD   atorvastatin (LIPITOR) 40 MG tablet 1 tablet (40 mg total) by Per G Tube route once daily. 9/11/19   Gerald Chisholm MD   carvedilol (COREG) 25 MG tablet 0.5 tablets (12.5 mg total) by Per G Tube route 2 (two) times daily. 11/20/19   Tabatha Arevalo NP   cholecalciferol, vitamin D3, (VITAMIN D3) 1,000 unit capsule 1,000 Units by Per G Tube route once daily.     Historical Provider, MD   clobetasol (TEMOVATE) 0.05 % cream Apply topically 2 (two) times daily. 10/17/19   Historical Provider, MD   clopidogrel (PLAVIX) 75 mg tablet 1 tablet (75 mg total) by Per G Tube route once daily. 9/11/19   Gerald Chisholm MD   diclofenac sodium (VOLTAREN) 1 % Gel Apply 4 g topically 4 (four) times daily as needed. 4/7/16   DANNIE Montoya MD   diphenhydrAMINE (BENADRYL) 12.5 mg/5 mL elixir by Per G Tube route every 12 (twelve) hours as needed for Allergies.     Historical Provider, MD    docusate (COLACE) 50 mg/5 mL liquid 100 mg by Per G Tube route 2 (two) times daily.     Historical Provider, MD   ferrous sulfate 325 (65 FE) MG EC tablet Take 325 mg by mouth 3 (three) times daily with meals. Per G tube    Historical Provider, MD   fish oil-omega-3 fatty acids 300-1,000 mg capsule 1 g by Per G Tube route once daily.     Historical Provider, MD   furosemide (LASIX) 20 MG tablet 1 tablet (20 mg total) by Per G Tube route once daily. 11/20/19 12/20/19  Tabatha Arevalo NP   glucosamine-chondroitin 500-400 mg tablet 1 tablet by Per G Tube route once daily.     Historical Provider, MD   ketoconazole (NIZORAL) 2 % shampoo Apply topically twice a week. Apply twice weekly with showers until seborrheic dermatitis has resolved 9/12/19   Gerald Chisholm MD   multivitamin (ONE DAILY MULTIVITAMIN) per tablet 1 tablet by Per G Tube route once daily.    Historical Provider, MD   mupirocin (BACTROBAN) 2 % ointment Apply topically 2 (two) times daily. Apply to the skin near the Peg tube as this is reddened. 10/28/19   Justine Haney NP   polyethylene glycol (GLYCOLAX) 17 gram PwPk 17 g by Per G Tube route nightly as needed.    Historical Provider, MD   venlafaxine (EFFEXOR) 75 MG tablet 75 mg by Per G Tube route once daily.    Historical Provider, MD   zinc sulfate (ZINCATE) 220 (50) mg capsule 220 mg by Per G Tube route once daily.    Historical Provider, MD       Family History:  Family History   Problem Relation Age of Onset    Cancer Mother         breast    Heart disease Neg Hx        Social History:  Social History     Tobacco Use    Smoking status: Never Smoker    Smokeless tobacco: Never Used   Substance Use Topics    Alcohol use: No    Drug use: No       Review of Systems:  Pertinent items are noted in HPI. All other systems are reviewed and are negative.    Health Maintaince :   Primary Care Physician:  Dr. Jose Juan Abbasi.     Immunizations:   TDap Unknown   Flu UTD   Pna  "UTD    Cancer Screening:  No longer indicated in this patient.      Objective:   Last 24 Hour Vital Signs:  BP  Min: 116/55  Max: 135/98  Pulse  Av  Min: 91  Max: 97  Resp  Av.8  Min: 18  Max: 27  SpO2  Av.6 %  Min: 90 %  Max: 100 %  Height  Av' 8" (142.2 cm)  Min: 4' 8" (142.2 cm)  Max: 4' 8" (142.2 cm)  Weight  Av.7 kg (136 lb)  Min: 61.7 kg (136 lb)  Max: 61.7 kg (136 lb)  Body mass index is 30.49 kg/m².  I/O last 3 completed shifts:  In: 1000 [IV Piggyback:1000]  Out: -     Physical Examination:    /77   Pulse 94   Resp (!) 22   Ht 4' 8" (1.422 m)   Wt 61.7 kg (136 lb)   LMP  (LMP Unknown)   SpO2 98%   Breastfeeding? No   BMI 30.49 kg/m²     General Appearance:    Alert, cooperative, no distress, appears stated age   Head:    Normocephalic, without obvious abnormality, atraumatic   Eyes:    PERRL, conjunctiva/corneas clear, EOM's intact   Nose:   Nares normal, septum midline, mucosa normal, no drainage    or sinus tenderness   Throat:   Lips, mucosa, and tongue normal; dark residue around mouth   Neck:   Supple, symmetrical, trachea midline   Lungs:     Clear to auscultation bilaterally, respirations unlabored, on 4L NC    Heart:    Regular rate and rhythm, no murmurs appreciated   Abdomen:     Soft, non-tender, bowel sounds active all four quadrants,     no masses, no organomegaly, PEG tube in place to wall suction, suction canister with ~1L dark red liquid, slight erythema at base without any discharge    Extremities:   Bilateral lower extremity wounds wrapped    Skin:   Skin with many lesions and bruises diffusely over body    Neurologic:   Residual left sided weakness, CN II-XII grossly intact, moves all four extremities equally         Laboratory:  Most Recent Data:  CBC:   Lab Results   Component Value Date    WBC 12.38 2020    HGB 8.9 (L) 2020    HCT 29.1 (L) 2020     2020    MCV 97 2020    RDW 16.3 (H) 2020     WBC " Differential: 72.6 % N, 0 % Bands, 13.6 % L, 5.7 % M, 7.9 % Eo, 0.2 % Baso, no additional cells seen  BMP:   Lab Results   Component Value Date     01/08/2020    K 6.0 (H) 01/08/2020     01/08/2020    CO2 24 01/08/2020     (H) 01/08/2020    CREATININE 0.9 01/08/2020     (H) 01/08/2020    CALCIUM 8.6 (L) 01/08/2020    MG 2.3 01/08/2020    PHOS 3.3 10/28/2019     LFTs:   Lab Results   Component Value Date    PROT 5.7 (L) 01/08/2020    ALBUMIN 2.5 (L) 01/08/2020    BILITOT 0.6 01/08/2020    AST 22 01/08/2020    ALKPHOS 91 01/08/2020    ALT 19 01/08/2020     Coags:   Lab Results   Component Value Date    INR 1.1 01/08/2020     FLP:   Lab Results   Component Value Date    CHOL 118 (L) 06/15/2017    HDL 41 06/15/2017    LDLCALC 67.4 06/15/2017    TRIG 48 06/15/2017    CHOLHDL 34.7 06/15/2017     DM:   Lab Results   Component Value Date    HGBA1C 6.2 (H) 11/19/2019    HGBA1C 5.6 06/15/2017    LDLCALC 67.4 06/15/2017    CREATININE 0.9 01/08/2020     Thyroid:   Lab Results   Component Value Date    TSH 1.289 02/18/2019     Anemia:   Lab Results   Component Value Date    XMBBENNH86 376 08/17/2016     Cardiac:   Lab Results   Component Value Date    TROPONINI 0.022 11/18/2019     (H) 11/18/2019     Urinalysis:   Lab Results   Component Value Date    LABURIN No growth 11/19/2019    COLORU Yellow 11/19/2019    SPECGRAV 1.010 11/19/2019    NITRITE Negative 11/19/2019    KETONESU Negative 11/19/2019    UROBILINOGEN Negative 11/19/2019    WBCUA 25 (H) 11/19/2019       Trended Lab Data:  Recent Labs   Lab 01/08/20  0448   WBC 12.38   HGB 8.9*   HCT 29.1*      MCV 97   RDW 16.3*      K 6.0*      CO2 24   *   CREATININE 0.9   *   PROT 5.7*   ALBUMIN 2.5*   BILITOT 0.6   AST 22   ALKPHOS 91   ALT 19       Trended Cardiac Data:  No results for input(s): TROPONINI, CKTOTAL, CKMB, BNP in the last 168 hours.    Microbiology Data:      Other Results:  EKG: Undetermined  rhythm  Left axis deviation  Nonspecific intraventricular conduction delay  Nonspecific ST and T wave abnormality  Abnormal ECG  When compared with ECG of 24-OCT-2019 21:32,  Current undetermined rhythm precludes rhythm comparison, needs review  Questionable change in The axis  T wave inversion no longer evident in Inferior leads    Radiology:  Imaging Results          X-Ray Chest 1 View (Final result)  Result time 01/08/20 05:30:17    Final result by Angel Howard MD (01/08/20 05:30:17)                 Impression:      Suspected small amount of pleural fluid and or chronic pleural change at the left lung base, may relate to a persistent or recurrence process, as discussed above.      Electronically signed by: Angel Howard  Date:    01/08/2020  Time:    05:30             Narrative:    EXAMINATION:  XR CHEST 1 VIEW    CLINICAL HISTORY:  Gastrointestinal hemorrhage, unspecified    TECHNIQUE:  Single frontal view of the chest was performed.    COMPARISON:  November 18, 2019    FINDINGS:  Single chest view is submitted.  The patient is rotated, when accounting for this the cardiomediastinal silhouette appears stable.  There is continued appearance of blunting of the left costophrenic angle this may relate to a small amount of pleural fluid and or chronic pleural change, this may relate to a persistent or recurring process as it appears similar although less prominent than on the prior study.  There is no evidence for significant pleural fluid on the right.  Chronic lung changes are otherwise noted.  There is no pneumothorax.  The osseous structures demonstrate chronic change.                                 Assessment:     Elizabeth Navarrete is a 89 y.o. female with:  Patient Active Problem List    Diagnosis Date Noted    UGIB (upper gastrointestinal bleed) 01/08/2020    Acute respiratory failure with hypoxia and hypercarbia 11/21/2019    SOB (shortness of breath) 11/19/2019    Pseudomonas urinary tract infection  10/26/2019    Elevated troponin 10/26/2019    Anemia 10/25/2019    Controlled type 2 diabetes mellitus, without long-term current use of insulin 10/25/2019    Skin ulcer, stage 3 10/25/2019    Multiple open wounds of lower leg 10/25/2019    HCAP (healthcare-associated pneumonia) 10/24/2019    PEG (percutaneous endoscopic gastrostomy) adjustment/replacement/removal 06/12/2018    Nursing home resident 06/23/2017    Gastrostomy tube dependent, 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 06/21/2017    Oropharyngeal dysphagia 06/16/2017    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 06/15/2017    Hemiparesis affecting left side as late effect of stroke 06/15/2017    Chronic atrial fibrillation 09/06/2016    Non-rheumatic mitral regurgitation 09/06/2016    Enlarged LA (left atrium) 09/06/2016    Pulmonary HTN 09/06/2016    Primary osteoarthritis of right knee 11/25/2015    Essential hypertension 10/10/2012    CAD (coronary artery disease) 10/10/2012    Chronic diastolic congestive heart failure 10/10/2012    History of depression 10/10/2012    Personal history of gallstones 10/10/2012    Internal carotid artery stenosis 10/10/2012    Family history of breast cancer 10/10/2012        Plan:     Hematemesis  - Consult GI, will keep patient NPO for possible endoscopy  - Will hold Plavix   - Pantoprazole 40mg IV BID   - Patient currently hemodynamically stable with H/H 8.9/29.1 (baseline 9.7/31.3)    - Will hold tube feeds currently, when workup for bleeding is complete will consult nutrition for tube feed recommendations (Was on Isosource 1.5 carlos at last discharge)     Acute Hypoxic Respiratory Failure  - May be secondary to aspiration with vomiting  - Currently on 4L NC, wean oxygen as tolerated     Hyperkalemia   - Patient was shifted in ED, will continue to monitor with repeat BMP    Bullous Pemphigoid   - Continue home clobetasol and mupirocin ointments   - Will hold home doxycycline as patient does not have PEG tube  access  - Wound care consult     Hx of CVA sp left sided weakness, dysphagia, and urinary incontinence  - Patient at [facility]. Dependent on all ADLs. Refuses to work with PT and OT at nursing home, per daughter patient spends 100% of time in bed and occasionally watches TV, refuses to be put into wheelchair or brought out into common areas  - No acute changes in baseline  - Home meds of , Plavix 75 mg, and Atorvastatin 40 mg;   - Hold home meds as patient does not have PEG tube access  - Will discontinue  daily and transition to 81 daily when bleeding source has been identified and stopped    Bladder mass with history of hematuria and left hydronephrosis   - Patient with hx of hematuria, bladder mass, and left hydronephrosis  - Has followed up with Urology and had lengthy discussions on the likely diagnosis of urothelial carcinoma. Given her poor functional status and high anesthetic risk, further work-up was not pursued   - Patient's daughter would like patient to not be aware of diagnosis   - Will screen patient for possible UTI given hx of multiple UTIs and bed-bound status     Persistent atrial fibrillation  - Home regimen of rate control with Coreg 25 BID and Plavix   - Hold anticoagulation in setting of possible GI procedure  - Hold coreg as patient does not have PEG tube access     HFPEF  - TTE 6/2017 with EF 50-55%, grade II diastolic dysfunction, mild aortic regurgitation  - On home Lasix 20 mg   - Follows with outpatient Cardiology  - No acute issues on this admission  - Holding Lasix at this time as patient does not appear volume overload and patient does not have PEG tube access    Hypertension  - Home medications of Coreg 25 and Norvasc 5 mg  - Hold home meds as patient does not have PEG tube access    Hyperlipidemia   - Home medicine of Atorvastatin    - Hold home meds as patient does not have PEG tube access     Depression  - On home Venlafaxine 75 mg daily  - Held as patient does not  have PEG tube access at this time    Code Status:     Full   DVT PPX: Held pending possible GI procedure     Annelise Wills MD  [facility]     [facility] Medicine Hospitalist Pager numbers:   [facility] Hospitalist Medicine Team A (Catherine/Yessica): [phone]  [facility] Hospitalist Medicine Team B (Farhat/Leila):  [phone]

## 2020-01-09 PROBLEM — I48.91 ATRIAL FIBRILLATION WITH RVR: Status: ACTIVE | Noted: 2020-01-01

## 2020-01-09 NOTE — PLAN OF CARE
Pt lethargic this am; progress notes reviewed. Pt is a resident at [facility] ,has peg, and is bedbound. Pt having hematemesis and had EGD performed and per notes will need 3 days IV PPI.  Tn sent clinicals to [facility] via [facility].     01/09/20 1127   Discharge Assessment   Assessment Type Discharge Planning Assessment   Confirmed/corrected address and phone number on facesheet? Yes   Assessment information obtained from? Patient;Medical Record   Expected Length of Stay (days) 2   Communicated expected length of stay with patient/caregiver yes   Prior to hospitilization cognitive status: Unable to Assess   Prior to hospitalization functional status: Partially Dependent;Needs Assistance   Current cognitive status:   (lethargic)   Current Functional Status: Partially Dependent   Facility Arrived From: [facility]   Lives With facility resident   Able to Return to Prior Arrangements yes   Is patient able to care for self after discharge? No   Who are your caregiver(s) and their phone number(s)? Laya (daughter) [phone]   Patient's perception of discharge disposition nursing home   Readmission Within the Last 30 Days no previous admission in last 30 days   Patient currently being followed by outpatient case management? No   Patient currently receives any other outside agency services? No   Equipment Currently Used at Home nutrition supplies;lift device;hospital bed   Do you have any problems affording any of your prescribed medications? No   Is the patient taking medications as prescribed? yes   Does the patient have transportation home? Yes   Transportation Anticipated other (see comments)  (ambulance)   Does the patient receive services at the [facility]? No   Discharge Plan A Return to nursing home   Discharge Plan B Skilled Nursing Facility;Return to Nursing Home   DME Needed Upon Discharge  none   Patient/Family in Agreement with Plan yes

## 2020-01-09 NOTE — ANESTHESIA POSTPROCEDURE EVALUATION
Anesthesia Post Evaluation    Patient: Elizabeth Navarrete    Procedure(s) Performed: Procedure(s) (LRB):  ESOPHAGOGASTRODUODENOSCOPY (EGD) (N/A)    Final Anesthesia Type: MAC    Patient location during evaluation: OPS  Patient participation: Yes- Able to Participate  Level of consciousness: awake and alert and oriented  Post-procedure vital signs: reviewed and stable  Pain management: adequate  Airway patency: patent    PONV status at discharge: No PONV  Anesthetic complications: no      Cardiovascular status: blood pressure returned to baseline  Respiratory status: unassisted and nasal cannula  Hydration status: euvolemic  Follow-up not needed.          Vitals Value Taken Time   /70 1/9/2020  6:46 AM   Temp 36.8 °C (98.2 °F) 1/9/2020  3:00 AM   Pulse 99 1/9/2020  7:00 AM   Resp 17 1/9/2020  7:00 AM   SpO2 100 % 1/9/2020  7:00 AM   Vitals shown include unvalidated device data.      No case tracking events are documented in the log.      Pain/Toño Score: No data recorded

## 2020-01-09 NOTE — ED NOTES
Report called to Tracie in the ICU via telephone and advised she will be brought to ICU after EGD is complete

## 2020-01-09 NOTE — PROGRESS NOTES
"[facility] Medicine H&P Note     Admitting Team: [facility] Hospitalist Team A    Attending Physician: Mervat Alexander MD  Resident: DELFINO   Intern: REJI    Date of Admit: 2020    Subjective:      Had an episode of RVR yesterday, resolved with IV metoprolol. Had some chest pain with it. Denies any chest pain, SOB, or palpitations this AM     Objective:   Last 24 Hour Vital Signs:  BP  Min: 98/58  Max: 171/74  Temp  Av.1 °F (36.7 °C)  Min: 97.6 °F (36.4 °C)  Max: 98.3 °F (36.8 °C)  Pulse  Av.9  Min: 83  Max: 135  Resp  Av.9  Min: 16  Max: 35  SpO2  Av %  Min: 85 %  Max: 100 %  Weight  Av.3 kg (128 lb 8.5 oz)  Min: 58.3 kg (128 lb 8.5 oz)  Max: 58.3 kg (128 lb 8.5 oz)  Body mass index is 28.82 kg/m².  I/O last 3 completed shifts:  In: 2646.7 [I.V.:1301.7; Blood:345; IV Piggyback:1000]  Out: 900 [Urine:900]    Physical Examination:    BP (!) 155/69   Pulse 96   Temp 97.6 °F (36.4 °C) (Axillary)   Resp (!) 26   Ht 4' 8" (1.422 m)   Wt 58.3 kg (128 lb 8.5 oz)   LMP  (LMP Unknown)   SpO2 100%   Breastfeeding? No   BMI 28.82 kg/m²     General Appearance:    Alert, cooperative, no distress, appears stated age   Head:    Normocephalic, without obvious abnormality, atraumatic   Eyes:    PERRL, conjunctiva/corneas clear, EOM's intact   Nose:   Nares normal, septum midline, mucosa normal, no drainage    or sinus tenderness   Throat:   Lips, mucosa, and tongue normal; dark residue around mouth   Neck:   Supple, symmetrical, trachea midline   Lungs:     Clear to auscultation bilaterally, respirations unlabored, on 4L NC    Heart:    Regular rate and rhythm, no murmurs appreciated   Abdomen:     Soft, non-tender, bowel sounds active all four quadrants,     no masses, no organomegaly, PEG tube in place to wall suction, suction canister with ~1L dark red liquid, slight erythema at base without any discharge    Extremities:   Bilateral lower extremity wounds wrapped    Skin:   Skin with many " lesions and bruises diffusely over body    Neurologic:   Residual left sided weakness, CN II-XII grossly intact, moves all four extremities equally         Laboratory:  Most Recent Data:  CBC:   Lab Results   Component Value Date    WBC 12.85 (H) 01/08/2020    HGB 9.8 (L) 01/08/2020    HCT 32.0 (L) 01/08/2020     (L) 01/08/2020    MCV 99 (H) 01/08/2020    RDW 16.6 (H) 01/08/2020     WBC Differential: 72.6 % N, 0 % Bands, 13.6 % L, 5.7 % M, 7.9 % Eo, 0.2 % Baso, no additional cells seen  BMP:   Lab Results   Component Value Date     01/09/2020    K 4.7 01/09/2020     (H) 01/09/2020    CO2 23 01/09/2020     (H) 01/09/2020    CREATININE 0.9 01/09/2020     (H) 01/09/2020    CALCIUM 8.6 (L) 01/09/2020    MG 2.1 01/08/2020    PHOS 3.3 10/28/2019     LFTs:   Lab Results   Component Value Date    PROT 5.3 (L) 01/09/2020    ALBUMIN 2.5 (L) 01/09/2020    BILITOT 0.7 01/09/2020    AST 28 01/09/2020    ALKPHOS 60 01/09/2020    ALT 23 01/09/2020     Coags:   Lab Results   Component Value Date    INR 1.1 01/08/2020     FLP:   Lab Results   Component Value Date    CHOL 118 (L) 06/15/2017    HDL 41 06/15/2017    LDLCALC 67.4 06/15/2017    TRIG 48 06/15/2017    CHOLHDL 34.7 06/15/2017     DM:   Lab Results   Component Value Date    HGBA1C 6.2 (H) 11/19/2019    HGBA1C 5.6 06/15/2017    LDLCALC 67.4 06/15/2017    CREATININE 0.9 01/09/2020     Thyroid:   Lab Results   Component Value Date    TSH 1.289 02/18/2019     Anemia:   Lab Results   Component Value Date    IRON 79 01/08/2020    TIBC 354 01/08/2020    FERRITIN 81 01/08/2020    [id number] 797 01/08/2020    FOLATE 14.3 01/08/2020     Cardiac:   Lab Results   Component Value Date    TROPONINI 0.016 01/08/2020     (H) 11/18/2019     Urinalysis:   Lab Results   Component Value Date    LABURIN No growth 11/19/2019    COLORU Yellow 01/08/2020    SPECGRAV <=1.005 (A) 01/08/2020    NITRITE Negative 01/08/2020    KETONESU Negative 01/08/2020     UROBILINOGEN Negative 01/08/2020    WBCUA >100 (H) 01/08/2020       Trended Lab Data:  Recent Labs   Lab 01/08/20  0448 01/08/20  0746 01/08/20  1527 01/08/20  2359 01/09/20  0256   WBC 12.38 11.12 11.00 12.85*  --    HGB 8.9* 8.7* 6.9* 9.8*  --    HCT 29.1* 28.9* 22.4* 32.0*  --     188 184 143*  --    MCV 97 97 99* 99*  --    RDW 16.3* 16.4* 16.5* 16.6*  --     136 140  --  144   K 6.0* 5.4* 5.2*  --  4.7    105 109  --  111*   CO2 24 24 21*  --  23   * 108* 112*  --  113*   CREATININE 0.9 0.8 1.0  --  0.9   * 193* 200*  --  182*   PROT 5.7*  --   --   --  5.3*   ALBUMIN 2.5*  --   --   --  2.5*   BILITOT 0.6  --   --   --  0.7   AST 22  --   --   --  28   ALKPHOS 91  --   --   --  60   ALT 19  --   --   --  23       Trended Cardiac Data:  Recent Labs   Lab 01/08/20  1527   TROPONINI 0.016       Microbiology Data:      Other Results:  EKG: Undetermined rhythm  Left axis deviation  Nonspecific intraventricular conduction delay  Nonspecific ST and T wave abnormality  Abnormal ECG  When compared with ECG of 24-OCT-2019 21:32,  Current undetermined rhythm precludes rhythm comparison, needs review  Questionable change in The axis  T wave inversion no longer evident in Inferior leads    Radiology:  Imaging Results          X-Ray Chest 1 View (Final result)  Result time 01/08/20 05:30:17    Final result by Angel Howard MD (01/08/20 05:30:17)                 Impression:      Suspected small amount of pleural fluid and or chronic pleural change at the left lung base, may relate to a persistent or recurrence process, as discussed above.      Electronically signed by: Angel Howard  Date:    01/08/2020  Time:    05:30             Narrative:    EXAMINATION:  XR CHEST 1 VIEW    CLINICAL HISTORY:  Gastrointestinal hemorrhage, unspecified    TECHNIQUE:  Single frontal view of the chest was performed.    COMPARISON:  November 18, 2019    FINDINGS:  Single chest view is submitted.  The  patient is rotated, when accounting for this the cardiomediastinal silhouette appears stable.  There is continued appearance of blunting of the left costophrenic angle this may relate to a small amount of pleural fluid and or chronic pleural change, this may relate to a persistent or recurring process as it appears similar although less prominent than on the prior study.  There is no evidence for significant pleural fluid on the right.  Chronic lung changes are otherwise noted.  There is no pneumothorax.  The osseous structures demonstrate chronic change.                                 Assessment:     Elizabeth Navarrete is a 89 y.o. female with:  Patient Active Problem List    Diagnosis Date Noted    Atrial fibrillation with RVR 01/09/2020    UGIB (upper gastrointestinal bleed) 01/08/2020    BP (bullous pemphigoid) 01/08/2020    Hyperkalemia 01/08/2020    Acute respiratory failure with hypoxia and hypercarbia 11/21/2019    Acute respiratory failure with hypoxia 11/19/2019    SOB (shortness of breath) 11/19/2019    Pseudomonas urinary tract infection 10/26/2019    Elevated troponin 10/26/2019    Anemia 10/25/2019    Controlled type 2 diabetes mellitus, without long-term current use of insulin 10/25/2019    Skin ulcer, stage 3 10/25/2019    Multiple open wounds of lower leg 10/25/2019    HCAP (healthcare-associated pneumonia) 10/24/2019    PEG (percutaneous endoscopic gastrostomy) adjustment/replacement/removal 06/12/2018    Nursing home resident 06/23/2017    Gastrostomy tube dependent, 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 06/21/2017    Oropharyngeal dysphagia 06/16/2017    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 06/15/2017    Hemiparesis affecting left side as late effect of stroke 06/15/2017    Chronic atrial fibrillation 09/06/2016    Non-rheumatic mitral regurgitation 09/06/2016    Enlarged LA (left atrium) 09/06/2016    Pulmonary HTN 09/06/2016    Primary osteoarthritis of right knee 11/25/2015    Essential  hypertension 10/10/2012    CAD (coronary artery disease) 10/10/2012    Chronic diastolic congestive heart failure 10/10/2012    History of depression 10/10/2012    Personal history of gallstones 10/10/2012    Internal carotid artery stenosis 10/10/2012    Family history of breast cancer 10/10/2012        Plan:     Hematemesis, Upper GI Bleed, gastric ulcer with visible vessel   Large volume hematemesis in ED. Consulted GI, underwent EGD with gastric ulcer with visible vessel. Hb 6.8 after EGD. S/p 1 unit of pRBC with rise in Hb to 9.8.   -continue IV PPI x 3 days  -continue CBC q8h for now, likely change to BID later this afternoon    Acute Hypoxic Respiratory Failure  - May be secondary to aspiration with vomiting  - Currently on 4L NC, wean oxygen as tolerated     Hyperkalemia, resolved  Kidney function stable. Patient was shifted in ED x2.   -monitor daily CMP    Persistent atrial fibrillation with RVR   Initially held carvedilol in setting of bleed. Went into RVR yesterday which resolved with IV Metoprolol x1  -restarted home carvedilol      Bullous Pemphigoid   - Continue home clobetasol and mupirocin ointments   - Wound care consult     Hx of CVA sp left sided weakness, dysphagia, and urinary incontinence  - Patient at [facility]. Dependent on all ADLs. Refuses to work with PT and OT at nursing home, per daughter patient spends 100% of time in bed and occasionally watches TV, refuses to be put into wheelchair or brought out into common areas  - No acute changes in baseline  - Home meds of , Plavix 75 mg, and Atorvastatin 40 mg;   - Will discontinue  daily and transition to 81 daily when bleeding source has been identified and stopped    Bladder mass with history of hematuria and left hydronephrosis   - Patient with hx of hematuria, bladder mass, and left hydronephrosis  - Has followed up with Urology and had lengthy discussions on the likely diagnosis of urothelial carcinoma. Given her poor  functional status and high anesthetic risk, further work-up was not pursued   - Patient's daughter would like patient to not be aware of diagnosis   - Will screen patient for possible UTI given hx of multiple UTIs and bed-bound status     HFpEF  - TTE 6/2017 with EF 50-55%, grade II diastolic dysfunction, mild aortic regurgitation  - On home Lasix 20 mg   - Follows with outpatient Cardiology  - No acute issues on this admission  - Holding Lasix at this time as patient does not appear volume overloaded    Hypertension  Held home medications of Coreg 25 and Norvasc 5 mg initially  -restarted as patient stable and becoming hypertensive     Hyperlipidemia   - Home medicine of Atorvastatin      Depression  - On home Venlafaxine 75 mg daily    DVT: SCDs  Diet: NPO; consulted nutrition for tube feeds  Dispo: likely stepdown from ICU today, will need 3 days of IV PPI    Code Status:     Full    Ct Flores MD  [facility] Internal Medicine HO-II    hospitals Medicine Hospitalist Pager numbers:   hospitals Hospitalist Medicine Team A (Catherine/Yessica): [phone]  hospitals Hospitalist Medicine Team B (Farhat/Leila):  [phone]

## 2020-01-09 NOTE — ASSESSMENT & PLAN NOTE
Contributing Nutrition Diagnosis  Altered GI Function    Related to (etiology):   GI bleed     Signs and Symptoms (as evidenced by):   NPO.     Interventions:  Collaboration with other providers.     Nutrition Diagnosis Status:   Improving (on TF)

## 2020-01-09 NOTE — CONSULTS
"  [facility]  Adult Nutrition  Consult Note    SUMMARY     Recommendations    Recommendation:  1. Initiate tube feeding of Isosource 1.5 at 10 ml/hour and advance as tolerated to goal rate of 45 ml/hr which would provide 1620 kcal, 73 grams of protein, and 825 ml of free water. Add an additional 200 ml free water flush QID.     2. If glucose elevated change tube feed to Diabetasource with goal rate of 55 ml/hr.     Goals: 1. Tube feeding will be initiated within 24-48 hrs.   Nutrition Goal Status: new  Communication of [facility] Zamarripa: reviewed with RN(Lily)    Reason for Assessment  Reason For Assessment: consult(Tube feed recommendations)  Diagnosis: (upper GI bleed )  Relevant Medical History: (HTN, DM2, CAD, CHF, CRF, afib, gallstones.)  General Information Comments: Pt is NPO. Has PEG tube, receives TF at nursing home. ST recs water and ice chips PO only. NFPE completed today 1/9/20- moderate wasting of hands and clavicles and mild wasting of temples.    Nutrition Discharge Planning: pt to d/c on tube feeding.      Nutrition Risk Screen  Nutrition Risk Screen: dysphagia or difficulty swallowing    Nutrition/Diet History  Typical Food/Fluid Intake: Tube feeding.  Food Preferences: unable to assess  Spiritual, Cultural Beliefs, Mosque Practices, Values that Affect Care: no  Food Allergies: NKFA  Factors Affecting Nutritional Intake: difficulty/impaired swallowing, NPO    Anthropometrics  Temp: 97.9 °F (36.6 °C)  Height Method: Stated(per nursing home)  Height: 4' 8" (142.2 cm)  Height (inches): 56 in  Weight Method: Bed Scale  Weight: 58.3 kg (128 lb 8.5 oz)  Weight (lb): 128.53 lb  Ideal Body Weight (IBW), Female: 80 lb  % Ideal Body Weight, Female (lb): 160.66 %  BMI (Calculated): 28.8  BMI Grade: 25 - 29.9 - overweight       Lab/Procedures/Meds  Pertinent Labs Comments: BUN 113H, Glucose 182H, Alb 2.5L.   Pertinent Medications Comments: Statin, amlodipine, mupirocin.      Estimated/Assessed " Needs  Weight Used For Calorie Calculations: 58.3 kg (128 lb 8.5 oz)  Energy Calorie Requirements (kcal): 2016-2378 (25-30 kcal/kg)  Energy Need Method: Kcal/kg  Protein Requirements: 58-70 g (1.0-1.2 g/kg)  Weight Used For Protein Calculations: 58.3 kg (128 lb 8.5 oz)     Estimated Fluid Requirement Method: RDA Method  RDA Method (mL): 1457  CHO Requirement: 170 g      Nutrition Prescription Ordered  Current Diet Order: NPO    Evaluation of Received Nutrient/Fluid Intake  I/O: (P) 1646/900  Energy Calories Required: (P) not meeting needs  Protein Required: (P) not meeting needs  Fluid Required: (P) not meeting needs  Comments: (P) LBM 1/9  % Intake of Estimated Energy Needs: Other: NPO  % Meal Intake: NPO    Nutrition Risk  Level of Risk/Frequency of Follow-up: (P) (2x weekly)     Assessment and Plan  * UGIB (upper gastrointestinal bleed)  Contributing Nutrition Diagnosis  Altered GI Function    Related to (etiology):   GI bleed     Signs and Symptoms (as evidenced by):   NPO.     Interventions:  Collaboration with other providers.     Nutrition Diagnosis Status:   New           Monitor and Evaluation  Food and Nutrient Intake: enteral nutrition intake  Food and Nutrient Adminstration: enteral and parenteral nutrition administration  Physical Activity and Function: nutrition-related ADLs and IADLs  Anthropometric Measurements: weight  Biochemical Data, Medical Tests and Procedures: electrolyte and renal panel, glucose/endocrine profile     Malnutrition Assessment   [place] Region (Muscle Loss): mild depletion  Clavicle Bone Region (Muscle Loss): moderate depletion  Dorsal Hand (Muscle Loss): moderate depletion       Subcutaneous Fat Loss (Final Summary): well nourished  Muscle Loss Evaluation (Final Summary): moderate protein-calorie malnutrition         Nutrition Follow-Up  RD Follow-up?: Yes

## 2020-01-09 NOTE — SUBJECTIVE & OBJECTIVE
Subjective:     Interval History: No further overt bleeding, no vomiting/melena. Seen/examined earlier today.     Review of Systems   Constitutional: Negative for fever and unexpected weight change.   Respiratory: Negative for wheezing and stridor.    Cardiovascular: Negative for chest pain and palpitations.   Gastrointestinal: Negative for anal bleeding and blood in stool.     Objective:     Vital Signs (Most Recent):  Temp: 99.1 °F (37.3 °C) (01/09/20 1521)  Pulse: 92 (01/09/20 1521)  Resp: (!) 30 (01/09/20 1521)  BP: 138/65 (01/09/20 1500)  SpO2: 97 % (01/09/20 1521) Vital Signs (24h Range):  Temp:  [97.6 °F (36.4 °C)-99.1 °F (37.3 °C)] 99.1 °F (37.3 °C)  Pulse:  [] 92  Resp:  [16-35] 30  SpO2:  [85 %-100 %] 97 %  BP: ()/(53-84) 138/65     Weight: 58.3 kg (128 lb 8.5 oz) (01/09/20 1120)  Body mass index is 28.82 kg/m².      Intake/Output Summary (Last 24 hours) at 1/9/2020 1545  Last data filed at 1/9/2020 1500  Gross per 24 hour   Intake 1496.67 ml   Output 1200 ml   Net 296.67 ml       Lines/Drains/Airways     Drain                 Gastrostomy/Enterostomy 09/10/19 1726 Gastrostomy tube w/ balloon midline feeding 120 days    Female External Urinary Catheter 01/09/20 0600 less than 1 day          Peripheral Intravenous Line                 Peripheral IV - Single Lumen 01/08/20 0459 18 G Right Antecubital 1 day         Peripheral IV - Single Lumen 01/08/20 2100 20 G Left;Posterior Forearm less than 1 day                Physical Exam   Constitutional: She is oriented to person, place, and time. She appears well-developed and well-nourished.   Elderly in nad   HENT:   Head: Normocephalic and atraumatic.   Eyes: Conjunctivae are normal. No scleral icterus.   Neck: Normal range of motion. Neck supple. No tracheal deviation present.   Cardiovascular: Normal rate. Exam reveals no gallop and no friction rub.   Pulmonary/Chest: Effort normal. No respiratory distress. She has no wheezes.   Abdominal: Soft.  She exhibits no distension. There is no tenderness.   Neurological: She is alert and oriented to person, place, and time.   Skin: Skin is warm and dry. No rash noted. She is not diaphoretic. No erythema.   Psychiatric: She has a normal mood and affect. Her behavior is normal.   Nursing note and vitals reviewed.      Significant Labs:  CBC:   Recent Labs   Lab 01/08/20  1527 01/08/20  2359 01/09/20  0758   WBC 11.00 12.85* 13.13*   HGB 6.9* 9.8* 8.9*   HCT 22.4* 32.0* 27.8*    143* 134*         Significant Imaging:  Imaging results within the past 24 hours have been reviewed.

## 2020-01-09 NOTE — PLAN OF CARE
Recommendation:    1. Initiate tube feeding of Isosource 1.5 at 10 ml/hour and advance as tolerated to goal rate of 45 ml/hr which would provide 1620 kcal, 73 grams of protein, and 825 ml of free water. Add an additional 200 ml free water flush QID.  2. If glucose elevated change tube feed to Diabetasource with goal rate of 55 ml/hr.     Goals: 1. Tube feeding will be initiated within 24-48 hrs.

## 2020-01-09 NOTE — ASSESSMENT & PLAN NOTE
- s/p egd with epi/cautery of bleeding gastric ulcer  - ppi  - monitor hct  - ok to transfer out of ICU from Gi standpoint

## 2020-01-09 NOTE — PT/OT/SLP EVAL
Speech Language Pathology Evaluation  Bedside Swallow    Patient Name:  Elizabeth Navarrete   MRN:  [id number]  Admitting Diagnosis: UGIB (upper gastrointestinal bleed)    Recommendations:                 General Recommendations:  Ongoing swallow assessment  Diet recommendations:  NPO, Other (see comments)(Water/ice chips only)   Aspiration Precautions:   1. HOB to 90* for all trials  2. Small amounts of water/ice chips only   3. Excellent oral care prior to all liquid  4. Monitor for any overt s/s of aspiration (ie coughing/choking, throat clearing, wet/gurgly voice, change in respiratory status, spike in fever, reddened face, watery eyes, nasal emission, etc.)  General Precautions: Standard, aspiration, fall, respiratory(Water/ice chips only)  Communication strategies:  provide increased time to answer, go to room if call light pushed and frequent re-orientation    History:     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    Family history of breast cancer 10/10/2012    Hematuria, gross     History of cardiac arrhythmia 10/10/2012    History of CHF (congestive heart failure) 10/10/2012    History of depression 10/10/2012    History of echocardiogram 10/10/2012    HTN (hypertension) 10/10/2012    Internal carotid artery stenosis 10/10/2012    Personal history of gallstones 10/10/2012    Urinary tract infection        Past Surgical History:   Procedure Laterality Date    BREAST LUMPECTOMY      ESOPHAGOGASTRODUODENOSCOPY N/A 6/12/2018    Procedure: ESOPHAGOGASTRODUODENOSCOPY (EGD);  Surgeon: Anila Kolb MD;  Location: [facility];  Service: Endoscopy;  Laterality: N/A;    ESOPHAGOGASTRODUODENOSCOPY N/A 6/14/2018    Procedure: ESOPHAGOGASTRODUODENOSCOPY (EGD);  Surgeon: Anila Kolb MD;  Location: [facility]  "ENDO;  Service: Endoscopy;  Laterality: N/A;    ESOPHAGOGASTRODUODENOSCOPY N/A 1/8/2020    Procedure: ESOPHAGOGASTRODUODENOSCOPY (EGD);  Surgeon: Richy Rothman MD;  Location: [facility] ENDO;  Service: Endoscopy;  Laterality: N/A;    EYE SURGERY      HYSTERECTOMY         Social History: Patient lives at NH.    Prior Intubation HX:  None this admit.    Modified Barium Swallow: MBSS completed 06/19/2017 which revealed: Impressions  Patient with moderate Oropharyngeal Dysphagia as characterized by decreased oral control of the bolus and delayed initiation of swallow with danyel aspiration of thin, nectar-thickened and honey-thickened liquids. Use of reduced bolus size and throat clears not effective in reducing occurence of aspiration/penetration.  While no aspiration/penetration was observed with presentations of puree, Patient deemed at risk of aspiration/penetration with subsequent bites of puree due to scattered stasis along BOT and in valleculae.  Pateint requires use of multiple swallows to clear residue along BOT and in valleculae with therapeutic/pleasure feeds of puree.     Chest X-Rays:   Impression:       Suspected small amount of pleural fluid and or chronic pleural change at the left lung base, may relate to a persistent or recurrence process, as discussed above.     Prior diet: PEG tube for all feeds. Pt reported drinking water at NH. No family present to confirm this.     Subjective     Pt seen at the bedside for clinical swallow assessment. SLP did check w/ RN, Lily, prior to visit. Pt awake/alert and asking for water upon entry. SLP oriented pt x4 though pt continued to ask date.  Patient goals: Pt stated, "I need some water!" repeatedly. Pt refused PO trials of food consistencies stating, "No I eat through my tube!"     Pain/Comfort:  · Pain Rating 1: 0/10    Objective:     Oral Musculature Evaluation  · Oral Musculature: left weakness, general weakness  · Dentition: edentulous  · Secretion " Management: adequate  · Mucosal Quality: good  · Mandibular Strength and Mobility: impaired  · Oral Labial Strength and Mobility: impaired seal, impaired retraction, impaired coordination  · Lingual Strength and Mobility: impaired strength, functional anterior elevation, functional lateral movement, functional protrusion  · Velar Elevation: WFL  · Buccal Strength and Mobility: decreased tone  · Volitional Cough: (elicited)  · Volitional Swallow: (unable to elicit without PO)  · Voice Prior to PO Intake: (adequate)    Bedside Swallow Eval: HOB elevated to 90*. Total A provided with liquid trials. Suction placed at the bedside by RN. Oral care completed using oral swabs, mouthwash, and suction. Pt incessantly asked for water. Education provided re: ice chips/water allowed sparingly to reduce risk of aspiration. No evidence of learning. SLP reviewed recs with RN and PCTs. Swallow precautions typed and placed above HOB.  Consistencies Assessed:  · Thin liquids : tsp bite ice chip x1, tsp sip water x2, straw sips water x5     Oral Phase:   · Anterior loss  · Decreased closure around utensil  · Slow oral transit time    Pharyngeal Phase:   · Coughing observed x3  · O2 sats remained above 95  · Voice remained clear/dry  · Timely swallow initiation with adequate HLE per SLP palpation  · no overt clinical signs/symptoms of aspiration  · no overt clinical signs/symptoms of pharyngeal dysphagia    Compensatory Strategies  · Multiple swallows  · Volitional cough/throat clear    Treatment: Pt will benefit from SLP tx 2x/week while inpatient to determine safest and least restrictive diet and ensure safety with pleasure feeds.    Assessment:     Elizabeth Navarrete is a 89 y.o. female with an SLP diagnosis of chronic oropharyngeal Dysphagia w/ use of PEG tube for nutrition.  She presents with adequate tolerance of water/ice trials with minimal coughing observed. Pt refused all PO trials 2/2 fear of aspiration. REC: Pleasure feeds of ice  chips/water given excellent oral care immediately prior to trials (3x/day). Pt to follow [place] Free Water Protocol. Close monitoring needed for any overt s/s of aspiration. Recs discussed with medical team. SLP to f/u.     Goals:   Multidisciplinary Problems     SLP Goals        Problem: SLP Goal    Goal Priority Disciplines Outcome   SLP Goal     SLP Ongoing, Progressing   Description:  STGs:  1. Pt will participate in clinical swallow assessment to determine LRD.-ongoing  2. Pt will tolerate pleasure feeds of ice chips/water without any overt s/s of aspiration.  3. Education to be provided to family/caregiver re: dysphagia mgmt and [place] Free Water Protocol.                     Plan:     · Patient to be seen:  2 x/week   · Plan of Care expires:  02/07/20  · Plan of Care reviewed with:  patient(GEN Bautista)   · SLP Follow-Up:  Yes       Discharge recommendations:  (TBD)   Barriers to Discharge:  None    Time Tracking:     SLP Treatment Date:   01/09/20  Speech Start Time:  0948  Speech Stop Time:  1001     Speech Total Time (min):  13 min    Billable Minutes: Eval Swallow and Oral Function 13 mins    Lexus Jaquez CCC-SLP  01/09/2020

## 2020-01-09 NOTE — PLAN OF CARE
Problem: SLP Goal  Goal: SLP Goal  Description  STGs:  1. Pt will participate in clinical swallow assessment to determine LRD.-ongoing  2. Pt will tolerate pleasure feeds of ice chips/water without any overt s/s of aspiration.  3. Education to be provided to family/caregiver re: dysphagia mgmt and [place] Free Water Protocol.    Outcome: Ongoing, Progressing   1/9/2020: Bedside swallow study initiated. Pt only accepting of water trials. Pt tolerated water and ice chips with minimal coughing and acceptable O2 sats on NC. REC: Pleasure feeds of ice chips/water sparingly given [place] Free Water Protocol. Excellent oral care needed prior to all water trials. SLP to f/u.

## 2020-01-09 NOTE — ED NOTES
Bedside report received from Jyoti MCGRAW RN. Plan of care for patient is EGD today. V/S stable. Daughter at bedside. Pt resting comfortably in bed.

## 2020-01-09 NOTE — PROGRESS NOTES
[facility]  Gastroenterology  Progress Note    Patient Name: Elizabeth Navarrete  MRN: [id number]  Admission Date: 1/8/2020  Hospital Length of Stay: 1 days  Code Status: Full Code   Attending Provider: Mervat Alexander MD  Consulting Provider: Anila Kolb MD  Primary Care Physician: [facility]  Principal Problem: UGIB (upper gastrointestinal bleed)      Subjective:     Interval History: No further overt bleeding, no vomiting/melena. Seen/examined earlier today.     Review of Systems   Constitutional: Negative for fever and unexpected weight change.   Respiratory: Negative for wheezing and stridor.    Cardiovascular: Negative for chest pain and palpitations.   Gastrointestinal: Negative for anal bleeding and blood in stool.     Objective:     Vital Signs (Most Recent):  Temp: 99.1 °F (37.3 °C) (01/09/20 1521)  Pulse: 92 (01/09/20 1521)  Resp: (!) 30 (01/09/20 1521)  BP: 138/65 (01/09/20 1500)  SpO2: 97 % (01/09/20 1521) Vital Signs (24h Range):  Temp:  [97.6 °F (36.4 °C)-99.1 °F (37.3 °C)] 99.1 °F (37.3 °C)  Pulse:  [] 92  Resp:  [16-35] 30  SpO2:  [85 %-100 %] 97 %  BP: ()/(53-84) 138/65     Weight: 58.3 kg (128 lb 8.5 oz) (01/09/20 1120)  Body mass index is 28.82 kg/m².      Intake/Output Summary (Last 24 hours) at 1/9/2020 1545  Last data filed at 1/9/2020 1500  Gross per 24 hour   Intake 1496.67 ml   Output 1200 ml   Net 296.67 ml       Lines/Drains/Airways     Drain                 Gastrostomy/Enterostomy 09/10/19 1726 Gastrostomy tube w/ balloon midline feeding 120 days    Female External Urinary Catheter 01/09/20 0600 less than 1 day          Peripheral Intravenous Line                 Peripheral IV - Single Lumen 01/08/20 0459 18 G Right Antecubital 1 day         Peripheral IV - Single Lumen 01/08/20 2100 20 G Left;Posterior Forearm less than 1 day                Physical Exam   Constitutional: She is oriented to person, place, and time. She appears well-developed and  well-nourished.   Elderly in nad   HENT:   Head: Normocephalic and atraumatic.   Eyes: Conjunctivae are normal. No scleral icterus.   Neck: Normal range of motion. Neck supple. No tracheal deviation present.   Cardiovascular: Normal rate. Exam reveals no gallop and no friction rub.   Pulmonary/Chest: Effort normal. No respiratory distress. She has no wheezes.   Abdominal: Soft. She exhibits no distension. There is no tenderness.   Neurological: She is alert and oriented to person, place, and time.   Skin: Skin is warm and dry. No rash noted. She is not diaphoretic. No erythema.   Psychiatric: She has a normal mood and affect. Her behavior is normal.   Nursing note and vitals reviewed.      Significant Labs:  CBC:   Recent Labs   Lab 01/08/20  1527 01/08/20  2359 01/09/20  0758   WBC 11.00 12.85* 13.13*   HGB 6.9* 9.8* 8.9*   HCT 22.4* 32.0* 27.8*    143* 134*         Significant Imaging:  Imaging results within the past 24 hours have been reviewed.    Assessment/Plan:     * UGIB (upper gastrointestinal bleed)  - s/p egd with epi/cautery of bleeding gastric ulcer  - ppi  - monitor hct  - ok to transfer out of ICU from Gi standpoint          Thank you for your consult. I will follow-up with patient. Please contact us if you have any additional questions.    Anila Kolb MD  Gastroenterology  [facility]

## 2020-01-10 NOTE — PLAN OF CARE
Patient awake, alert OX 2-3 at times. Afib on cardiac monitor. NPO maintained. Oral swabs encouraged. AUO via purewick. No BM overnight. Patient repeated the same question over and over again throughout night asking what day it was and whether or not it was day or night. Re-orientated throughout night. Medications given per MAR. Bed locked and low, IV intact, call light within reach, WCTM.

## 2020-01-10 NOTE — NURSING TRANSFER
Nursing Transfer Note      1/9/2020     Transfer To: 420 A     Transfer via bed    Transfer with cardiac monitoring    Transported by Glo BETTENCOURT RN     Medicines sent: none    Chart send with patient: Yes    Notified: daughter    Patient reassessed at: upon arrival to  420     Upon arrival to floor: cardiac monitor applied, call light within reach, side rails up x 3,

## 2020-01-10 NOTE — PROGRESS NOTES
[facility]  Gastroenterology  Progress Note    Patient Name: Elizabeth Navarrete  MRN: [id number]  Admission Date: 1/8/2020  Hospital Length of Stay: 2 days  Code Status: Partial Code   Attending Provider: Mervat Alexander MD  Consulting Provider: Richy Rothman MD  Primary Care Physician: [facility]  Principal Problem: UGIB (upper gastrointestinal bleed)      Subjective:     Interval History:   Doing well, no complaints.  Chart reviewed.    No one at bedside to assist with history.    No reported further melena.    Review of Systems   Constitutional: Negative for fever and unexpected weight change.   Respiratory: Negative for wheezing and stridor.    Cardiovascular: Negative for chest pain and palpitations.   Gastrointestinal: Negative for anal bleeding and blood in stool.     Objective:     Vital Signs (Most Recent):  Temp: 97.4 °F (36.3 °C) (01/10/20 1221)  Pulse: 88 (01/10/20 1221)  Resp: 20 (01/10/20 1221)  BP: (!) 133/59 (01/10/20 1221)  SpO2: 97 % (01/10/20 1221) Vital Signs (24h Range):  Temp:  [97.1 °F (36.2 °C)-99 °F (37.2 °C)] 97.4 °F (36.3 °C)  Pulse:  [] 88  Resp:  [16-30] 20  SpO2:  [91 %-100 %] 97 %  BP: (127-150)/(59-72) 133/59     Weight: 56.5 kg (124 lb 9 oz) (01/10/20 0541)  Body mass index is 27.93 kg/m².      Intake/Output Summary (Last 24 hours) at 1/10/2020 1614  Last data filed at 1/10/2020 1100  Gross per 24 hour   Intake 320 ml   Output 1000 ml   Net -680 ml       Lines/Drains/Airways     Drain                 Gastrostomy/Enterostomy 09/10/19 1726 Gastrostomy tube w/ balloon midline feeding 121 days    Female External Urinary Catheter 01/09/20 0600 1 day          Peripheral Intravenous Line                 Peripheral IV - Single Lumen 01/08/20 0459 18 G Right Antecubital 2 days         Peripheral IV - Single Lumen 01/08/20 2100 20 G Left;Posterior Forearm 1 day                Physical Exam   Constitutional: No distress.   Eyes: Conjunctivae are normal. No scleral  icterus.   Abdominal: Soft. She exhibits no distension and no mass.   Psychiatric: She has a normal mood and affect. Her behavior is normal.   Vitals reviewed.      Significant Labs:  Blood Culture: No results for input(s): LABBLOO in the last 48 hours.  CBC:   Recent Labs   Lab 01/09/20  0758 01/09/20  1831 01/10/20  0815   WBC 13.13* 11.10 8.67   HGB 8.9* 8.3* 8.4*   HCT 27.8* 27.1* 28.2*   * 129* 126*     CMP:   Recent Labs   Lab 01/10/20  0326  01/10/20  1134   *   < > 150*   CALCIUM 8.7   < > 8.8   ALBUMIN 2.5*  --   --    PROT 5.4*  --   --    *   < > 150*   K 4.3   < > 4.0   CO2 24   < > 25   *   < > 116*   BUN 82*   < > 73*   CREATININE 1.0   < > 1.0   ALKPHOS 57  --   --    ALT 25  --   --    AST 28  --   --    BILITOT 0.6  --   --     < > = values in this interval not displayed.         Significant Imaging:  Imaging results within the past 24 hours have been reviewed.    Assessment/Plan:     * UGIB (upper gastrointestinal bleed)  EGD 1/8 with epi/cautery/ clipping of bleeding gastric ulcer    Recs:  PPI IV BID x 72 hours total, then transition to PPI PO BID x 2 weeks then daily indefinitely  Monitor Hgb, watch for re bleeding.    Please contact GI on call for any changes in clinical status.          Thank you for your consult. I will sign off. Please contact us if you have any additional questions.    Richy Rothman MD  Gastroenterology  [facility]

## 2020-01-10 NOTE — PROGRESS NOTES
"[facility]P [facility]     Admitting Team: [facility] Hospitalist Team A    Attending Physician: Mervat Alexander MD  Resident: DELFINO   Intern: REJI    Date of Admit: 2020    Subjective:      Had an episode of RVR , resolved with IV metoprolol. Had some chest pain with it. Denies any chest pain, SOB, or palpitations this AM.       Objective:   Last 24 Hour Vital Signs:  BP  Min: 113/57  Max: 150/69  Temp  Av.2 °F (36.8 °C)  Min: 97.1 °F (36.2 °C)  Max: 99.1 °F (37.3 °C)  Pulse  Av.3  Min: 88  Max: 139  Resp  Av.6  Min: 16  Max: 34  SpO2  Av.3 %  Min: 91 %  Max: 100 %  Height  Av' 8" (142.2 cm)  Min: 4' 8" (142.2 cm)  Max: 4' 8" (142.2 cm)  Weight  Av.4 kg (126 lb 8.7 oz)  Min: 56.5 kg (124 lb 9 oz)  Max: 58.3 kg (128 lb 8.5 oz)  Body mass index is 27.93 kg/m².  I/O last 3 completed shifts:  In: 1816.7 [P.O.:200; I.V.:1151.7; Blood:345; NG/GT:120]  Out: 1950 [Urine:1950]    Physical Examination:    BP (!) 150/69 (BP Location: Right arm, Patient Position: Lying)   Pulse 96   Temp 98.4 °F (36.9 °C) (Axillary)   Resp 16   Ht 4' 8" (1.422 m)   Wt 56.5 kg (124 lb 9 oz)   LMP  (LMP Unknown)   SpO2 97%   Breastfeeding? No   BMI 27.93 kg/m²     General Appearance:    Alert, cooperative, no distress, appears stated age   Head:    Normocephalic, without obvious abnormality, atraumatic   Eyes:    PERRL, conjunctiva/corneas clear, EOM's intact   Nose:   Nares normal, septum midline, mucosa normal, no drainage    or sinus tenderness   Throat:   Lips, mucosa, and tongue normal; dark residue around mouth   Neck:   Supple, symmetrical, trachea midline   Lungs:     Clear to auscultation bilaterally, respirations unlabored, on ra NC    Heart:    Regular rate and rhythm, no murmurs appreciated   Abdomen:     Soft, non-tender, bowel sounds active all four quadrants,     no masses, no organomegaly, PEG tube in place     Extremities:   Bilateral lower extremity wounds wrapped    Skin:   Skin " with many lesions and bruises diffusely over body    Neurologic:   Residual left sided weakness, CN II-XII grossly intact, moves all four extremities equally         Laboratory:  Most Recent Data:  CBC:   Lab Results   Component Value Date    WBC 8.67 01/10/2020    HGB 8.4 (L) 01/10/2020    HCT 28.2 (L) 01/10/2020     (L) 01/10/2020     (H) 01/10/2020    RDW 17.5 (H) 01/10/2020     WBC Differential: 72.6 % N, 0 % Bands, 13.6 % L, 5.7 % M, 7.9 % Eo, 0.2 % Baso, no additional cells seen  BMP:   Lab Results   Component Value Date     (H) 01/10/2020    K 4.4 01/10/2020     (H) 01/10/2020    CO2 26 01/10/2020    BUN 76 (H) 01/10/2020    CREATININE 1.0 01/10/2020     (H) 01/10/2020    CALCIUM 9.1 01/10/2020    MG 2.1 01/08/2020    PHOS 3.3 10/28/2019     LFTs:   Lab Results   Component Value Date    PROT 5.4 (L) 01/10/2020    ALBUMIN 2.5 (L) 01/10/2020    BILITOT 0.6 01/10/2020    AST 28 01/10/2020    ALKPHOS 57 01/10/2020    ALT 25 01/10/2020     Coags:   Lab Results   Component Value Date    INR 1.1 01/08/2020     FLP:   Lab Results   Component Value Date    CHOL 118 (L) 06/15/2017    HDL 41 06/15/2017    LDLCALC 67.4 06/15/2017    TRIG 48 06/15/2017    CHOLHDL 34.7 06/15/2017     DM:   Lab Results   Component Value Date    HGBA1C 5.4 01/09/2020    HGBA1C 6.2 (H) 11/19/2019    HGBA1C 5.6 06/15/2017    LDLCALC 67.4 06/15/2017    CREATININE 1.0 01/10/2020     Thyroid:   Lab Results   Component Value Date    TSH 1.289 02/18/2019     Anemia:   Lab Results   Component Value Date    IRON 79 01/08/2020    TIBC 354 01/08/2020    FERRITIN 81 01/08/2020    ASMDDEUG31 797 01/08/2020    FOLATE 14.3 01/08/2020     Cardiac:   Lab Results   Component Value Date    TROPONINI 0.016 01/08/2020     (H) 11/18/2019     Urinalysis:   Lab Results   Component Value Date    LABURIN (A) 01/08/2020     GRAM NEGATIVE ISABELLA  10,000 - 49,999 cfu/ml  Identification and susceptibility pending      COLORU Yellow  01/08/2020    SPECGRAV <=1.005 (A) 01/08/2020    NITRITE Negative 01/08/2020    KETONESU Negative 01/08/2020    UROBILINOGEN Negative 01/08/2020    WBCUA >100 (H) 01/08/2020       Trended Lab Data:  Recent Labs   Lab 01/08/20  0448  01/09/20  0256 01/09/20  0758 01/09/20  1831 01/10/20  0326 01/10/20  0815 01/10/20  0816   WBC 12.38   < >  --  13.13* 11.10  --  8.67  --    HGB 8.9*   < >  --  8.9* 8.3*  --  8.4*  --    HCT 29.1*   < >  --  27.8* 27.1*  --  28.2*  --       < >  --  134* 129*  --  126*  --    MCV 97   < >  --  94 97  --  101*  --    RDW 16.3*   < >  --  16.8* 17.0*  --  17.5*  --       < > 144  --   --  150*  --  153*   K 6.0*   < > 4.7  --   --  4.3  --  4.4      < > 111*  --   --  117*  --  118*   CO2 24   < > 23  --   --  24  --  26   *   < > 113*  --   --  82*  --  76*   CREATININE 0.9   < > 0.9  --   --  1.0  --  1.0   *   < > 182*  --   --  139*  --  160*   PROT 5.7*  --  5.3*  --   --  5.4*  --   --    ALBUMIN 2.5*  --  2.5*  --   --  2.5*  --   --    BILITOT 0.6  --  0.7  --   --  0.6  --   --    AST 22  --  28  --   --  28  --   --    ALKPHOS 91  --  60  --   --  57  --   --    ALT 19  --  23  --   --  25  --   --     < > = values in this interval not displayed.       Trended Cardiac Data:  Recent Labs   Lab 01/08/20  1527   TROPONINI 0.016       Microbiology Data:      Other Results:  EKG: Undetermined rhythm  Left axis deviation  Nonspecific intraventricular conduction delay  Nonspecific ST and T wave abnormality  Abnormal ECG  When compared with ECG of 24-OCT-2019 21:32,  Current undetermined rhythm precludes rhythm comparison, needs review  Questionable change in The axis  T wave inversion no longer evident in Inferior leads    Radiology:  Imaging Results          X-Ray Chest 1 View (Final result)  Result time 01/08/20 05:30:17    Final result by Angel Howard MD (01/08/20 05:30:17)                 Impression:      Suspected small amount of pleural  fluid and or chronic pleural change at the left lung base, may relate to a persistent or recurrence process, as discussed above.      Electronically signed by: Angel Howard  Date:    01/08/2020  Time:    05:30             Narrative:    EXAMINATION:  XR CHEST 1 VIEW    CLINICAL HISTORY:  Gastrointestinal hemorrhage, unspecified    TECHNIQUE:  Single frontal view of the chest was performed.    COMPARISON:  November 18, 2019    FINDINGS:  Single chest view is submitted.  The patient is rotated, when accounting for this the cardiomediastinal silhouette appears stable.  There is continued appearance of blunting of the left costophrenic angle this may relate to a small amount of pleural fluid and or chronic pleural change, this may relate to a persistent or recurring process as it appears similar although less prominent than on the prior study.  There is no evidence for significant pleural fluid on the right.  Chronic lung changes are otherwise noted.  There is no pneumothorax.  The osseous structures demonstrate chronic change.                                 Assessment:     Elizabeth Navarrete is a 89 y.o. female with:  Patient Active Problem List    Diagnosis Date Noted    Atrial fibrillation with RVR 01/09/2020    UGIB (upper gastrointestinal bleed) 01/08/2020    BP (bullous pemphigoid) 01/08/2020    Hyperkalemia 01/08/2020    Acute respiratory failure with hypoxia and hypercarbia 11/21/2019    Acute respiratory failure with hypoxia 11/19/2019    SOB (shortness of breath) 11/19/2019    Pseudomonas urinary tract infection 10/26/2019    Elevated troponin 10/26/2019    Anemia 10/25/2019    Controlled type 2 diabetes mellitus, without long-term current use of insulin 10/25/2019    Skin ulcer, stage 3 10/25/2019    Multiple open wounds of lower leg 10/25/2019    HCAP (healthcare-associated pneumonia) 10/24/2019    PEG (percutaneous endoscopic gastrostomy) adjustment/replacement/removal 06/12/2018    Nursing home  resident 06/23/2017    Gastrostomy tube dependent, 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 06/21/2017    Oropharyngeal dysphagia 06/16/2017    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 06/15/2017    Hemiparesis affecting left side as late effect of stroke 06/15/2017    Chronic atrial fibrillation 09/06/2016    Non-rheumatic mitral regurgitation 09/06/2016    Enlarged LA (left atrium) 09/06/2016    Pulmonary HTN 09/06/2016    Primary osteoarthritis of right knee 11/25/2015    Essential hypertension 10/10/2012    CAD (coronary artery disease) 10/10/2012    Chronic diastolic congestive heart failure 10/10/2012    History of depression 10/10/2012    Personal history of gallstones 10/10/2012    Internal carotid artery stenosis 10/10/2012    Family history of breast cancer 10/10/2012        Plan:     Hematemesis, Upper GI Bleed, gastric ulcer with visible vessel   Large volume hematemesis in ED. Consulted GI, underwent EGD with gastric ulcer with visible vessel. Hb 6.8 after EGD. S/p 1 unit of pRBC with rise in Hb to 9.8. Now 8.4  -continue IV PPI x 3 days  -continue CBC  BID     Acute Hypoxic Respiratory Failure  - May be secondary to aspiration with vomiting  -  Weaned oxygen to RA    Hyperkalemia, resolved  Kidney function stable. Patient was shifted in ED x2.   -monitor daily CMP    Persistent atrial fibrillation with RVR   Initially held carvedilol in setting of bleed. Went into RVR yesterday which resolved with IV Metoprolol x1  -restarted home carvedilol      Bullous Pemphigoid   - Continue home clobetasol and mupirocin ointments   - Wound care consult     Hx of CVA sp left sided weakness, dysphagia, and urinary incontinence  - Patient at [facility]. Dependent on all ADLs. Refuses to work with PT and OT at nursing home, per daughter patient spends 100% of time in bed and occasionally watches TV, refuses to be put into wheelchair or brought out into common areas  - No acute changes in baseline  - Home meds of ,  Plavix 75 mg, and Atorvastatin 40 mg;   - Will discontinue  daily and transition to 81 daily when bleeding source has been identified and stopped    Bladder mass with history of hematuria and left hydronephrosis   - Patient with hx of hematuria, bladder mass, and left hydronephrosis  - Has followed up with Urology and had lengthy discussions on the likely diagnosis of urothelial carcinoma. Given her poor functional status and high anesthetic risk, further work-up was not pursued   - Patient's daughter would like patient to not be aware of diagnosis   - Will screen patient for possible UTI given hx of multiple UTIs and bed-bound status     HFpEF  - TTE 6/2017 with EF 50-55%, grade II diastolic dysfunction, mild aortic regurgitation  - On home Lasix 20 mg   - Follows with outpatient Cardiology  - No acute issues on this admission  - Holding Lasix at this time as patient does not appear volume overloaded    Hypertension  Held home medications of Coreg 25 and Norvasc 5 mg initially  -restarted as patient stable and becoming hypertensive     Hyperlipidemia   - Home medicine of Atorvastatin      Depression  - On home Venlafaxine 75 mg daily    DVT: SCDs  Diet: NPO; consulted nutrition for tube feeds  Dispo: likely stepdown from ICU today, will need 3 days of IV PPI    Code Status:     Full    Ron Yang MD  [facility] Internal Medicine HO-I    [facility] Medicine Hospitalist Pager numbers:   [facility] Hospitalist Medicine Team A (Catherine/Yessica): [phone]  [facility] Hospitalist Medicine Team B (Farhat/Leila):  [phone]

## 2020-01-10 NOTE — ASSESSMENT & PLAN NOTE
EGD 1/8 with epi/cautery/ clipping of bleeding gastric ulcer    Recs:  PPI IV BID x 72 hours total, then transition to PPI PO BID x 2 weeks then daily indefinitely  Monitor Hgb, watch for re bleeding.    Please contact GI on call for any changes in clinical status.

## 2020-01-10 NOTE — SUBJECTIVE & OBJECTIVE
Subjective:     Interval History:   Doing well, no complaints.  Chart reviewed.    No one at bedside to assist with history.    No reported further melena.    Review of Systems   Constitutional: Negative for fever and unexpected weight change.   Respiratory: Negative for wheezing and stridor.    Cardiovascular: Negative for chest pain and palpitations.   Gastrointestinal: Negative for anal bleeding and blood in stool.     Objective:     Vital Signs (Most Recent):  Temp: 97.4 °F (36.3 °C) (01/10/20 1221)  Pulse: 88 (01/10/20 1221)  Resp: 20 (01/10/20 1221)  BP: (!) 133/59 (01/10/20 1221)  SpO2: 97 % (01/10/20 1221) Vital Signs (24h Range):  Temp:  [97.1 °F (36.2 °C)-99 °F (37.2 °C)] 97.4 °F (36.3 °C)  Pulse:  [] 88  Resp:  [16-30] 20  SpO2:  [91 %-100 %] 97 %  BP: (127-150)/(59-72) 133/59     Weight: 56.5 kg (124 lb 9 oz) (01/10/20 0541)  Body mass index is 27.93 kg/m².      Intake/Output Summary (Last 24 hours) at 1/10/2020 1614  Last data filed at 1/10/2020 1100  Gross per 24 hour   Intake 320 ml   Output 1000 ml   Net -680 ml       Lines/Drains/Airways     Drain                 Gastrostomy/Enterostomy 09/10/19 1726 Gastrostomy tube w/ balloon midline feeding 121 days    Female External Urinary Catheter 01/09/20 0600 1 day          Peripheral Intravenous Line                 Peripheral IV - Single Lumen 01/08/20 0459 18 G Right Antecubital 2 days         Peripheral IV - Single Lumen 01/08/20 2100 20 G Left;Posterior Forearm 1 day                Physical Exam   Constitutional: No distress.   Eyes: Conjunctivae are normal. No scleral icterus.   Abdominal: Soft. She exhibits no distension and no mass.   Psychiatric: She has a normal mood and affect. Her behavior is normal.   Vitals reviewed.      Significant Labs:  Blood Culture: No results for input(s): LABBLOO in the last 48 hours.  CBC:   Recent Labs   Lab 01/09/20  0758 01/09/20  1831 01/10/20  0815   WBC 13.13* 11.10 8.67   HGB 8.9* 8.3* 8.4*   HCT 27.8*  27.1* 28.2*   * 129* 126*     CMP:   Recent Labs   Lab 01/10/20  0326  01/10/20  1134   *   < > 150*   CALCIUM 8.7   < > 8.8   ALBUMIN 2.5*  --   --    PROT 5.4*  --   --    *   < > 150*   K 4.3   < > 4.0   CO2 24   < > 25   *   < > 116*   BUN 82*   < > 73*   CREATININE 1.0   < > 1.0   ALKPHOS 57  --   --    ALT 25  --   --    AST 28  --   --    BILITOT 0.6  --   --     < > = values in this interval not displayed.         Significant Imaging:  Imaging results within the past 24 hours have been reviewed.

## 2020-01-10 NOTE — PLAN OF CARE
Transferred from ICU, reviewed orders with patient, explained plan of care.  Patient is not interested in an I pad.

## 2020-01-10 NOTE — PLAN OF CARE
Patient is snf resident of [facility]  Patient has PEG tube and bed bound    Sent updated clinicals to [facility]       01/10/20 2766   Discharge Reassessment   Assessment Type Discharge Planning Reassessment   Provided patient/caregiver education on the expected discharge date and the discharge plan Yes   Do you have any problems affording any of your prescribed medications? No   Discharge Plan A Return to nursing home   Post-Acute Status   Post-Acute Authorization Placement   Post-Acute Placement Status Additional Clinical Requested     Gladys De La Cruz, RN, CCM, CMSRN  RN Transition Navigator  [phone]

## 2020-01-11 NOTE — PLAN OF CARE
Patient awake, alert OX 2-3 at times. Afib on cardiac monitor. NPO maintained. Oral swabs encouraged. AUO via [employer] (changed @ 0600). small BM overnight. Re-orientated throughout night. Medications given per MAR. Bed locked and low, IV intact, call light within reach, WCTM.

## 2020-01-11 NOTE — NURSING
Patient must remain NPO for 4 hr prior to CT scan of neck due to dye. Feedings placed on hold for test.

## 2020-01-11 NOTE — NURSING
Spoke with Dr. Yang to clarify CT orders. States CT is cancelled for now and patient may receive ultrasound instead. States to keep patient NPO until decision made.

## 2020-01-11 NOTE — CONSULTS
Wound care orders already in place, antibiotic cream.     Cleaned lower leg wounds with NS, applied foam bandages on ulcers, wrapped with kerlix and light ACE.    Notified Dr. Catalan that orders are already in place.

## 2020-01-11 NOTE — NURSING
Plan of care reviewed with patient. Needs reinforcement. Patient very forgetful. AFIB on monitor with no red alarms noted. C/O pain left neck/cheek area. Ultrasound complete and heat applied. 1 unit prbc infusing at this time. No acute distress noted. Side rails x3, bed low, call bell within reach. Maintain bed alarm for patient safety. Patient will be monitored overnight.

## 2020-01-11 NOTE — PROGRESS NOTES
"[facility] Medicine H&P Note     Admitting Team: [facility] Hospitalist Team A    Attending Physician: Mervat Alexander MD  Resident: DELFINO   Intern: REJI    Date of Admit: 2020    Subjective:      Patient did well overnight, no signs of bleeding.  Vitals stable, afebrile. Restarted TF and freewater yesterday. Denies any chest pain, SOB, or palpitations this AM.       Objective:   Last 24 Hour Vital Signs:  BP  Min: 132/59  Max: 169/70  Temp  Av.2 °F (36.2 °C)  Min: 96.3 °F (35.7 °C)  Max: 98.4 °F (36.9 °C)  Pulse  Av.2  Min: 68  Max: 97  Resp  Av.8  Min: 16  Max: 20  SpO2  Av.4 %  Min: 94 %  Max: 100 %  Weight  Av.7 kg (125 lb)  Min: 56.7 kg (125 lb)  Max: 56.7 kg (125 lb)  Body mass index is 28.02 kg/m².  I/O last 3 completed shifts:  In: 1042.5 [I.V.:362.5; NG/GT:680]  Out: 1550 [Urine:1550]    Physical Examination:    BP (!) 132/59 (BP Location: Right arm, Patient Position: Lying)   Pulse 68   Temp 96.3 °F (35.7 °C) (Oral)   Resp 16   Ht 4' 8" (1.422 m)   Wt 56.7 kg (125 lb)   LMP  (LMP Unknown)   SpO2 95%   Breastfeeding? No   BMI 28.02 kg/m²     General Appearance:    Alert, cooperative, no distress, appears stated age   Head:    Normocephalic, without obvious abnormality, atraumatic   Eyes:    PERRL, conjunctiva/corneas clear, EOM's intact   Nose:   Nares normal, septum midline, mucosa normal, no drainage    or sinus tenderness   Throat:   Lips, mucosa, and tongue normal; dark residue around mouth   Neck:   trachea midline.  Left submandibular swelling, tenderness.    Lungs:     Clear to auscultation bilaterally, respirations unlabored, on ra NC    Heart:    Regular rate and rhythm, no murmurs appreciated   Abdomen:     Soft, non-tender, bowel sounds active all four quadrants,     no masses, no organomegaly, PEG tube in place     Extremities:   Bilateral lower extremity wounds wrapped    Skin:   Skin with many lesions and bruises diffusely over body    Neurologic:   Residual " left sided weakness, CN II-XII grossly intact, moves all four extremities equally         Laboratory:  Most Recent Data:  CBC:   Lab Results   Component Value Date    WBC 10.13 01/11/2020    HGB 7.9 (L) 01/11/2020    HCT 26.3 (L) 01/11/2020     (L) 01/11/2020     (H) 01/11/2020    RDW 17.5 (H) 01/11/2020     WBC Differential: 72.6 % N, 0 % Bands, 13.6 % L, 5.7 % M, 7.9 % Eo, 0.2 % Baso, no additional cells seen  BMP:   Lab Results   Component Value Date     (H) 01/11/2020     (H) 01/11/2020    K 3.7 01/11/2020    K 3.7 01/11/2020     (H) 01/11/2020     (H) 01/11/2020    CO2 25 01/11/2020    CO2 25 01/11/2020    BUN 56 (H) 01/11/2020    BUN 56 (H) 01/11/2020    CREATININE 0.9 01/11/2020    CREATININE 0.9 01/11/2020     (H) 01/11/2020     (H) 01/11/2020    CALCIUM 8.7 01/11/2020    CALCIUM 8.7 01/11/2020    MG 2.1 01/08/2020    PHOS 3.3 10/28/2019     LFTs:   Lab Results   Component Value Date    PROT 5.3 (L) 01/11/2020    ALBUMIN 2.5 (L) 01/11/2020    BILITOT 0.6 01/11/2020    AST 29 01/11/2020    ALKPHOS 61 01/11/2020    ALT 24 01/11/2020     Coags:   Lab Results   Component Value Date    INR 1.1 01/08/2020     FLP:   Lab Results   Component Value Date    CHOL 118 (L) 06/15/2017    HDL 41 06/15/2017    LDLCALC 67.4 06/15/2017    TRIG 48 06/15/2017    CHOLHDL 34.7 06/15/2017     DM:   Lab Results   Component Value Date    HGBA1C 5.4 01/09/2020    HGBA1C 6.2 (H) 11/19/2019    HGBA1C 5.6 06/15/2017    LDLCALC 67.4 06/15/2017    CREATININE 0.9 01/11/2020    CREATININE 0.9 01/11/2020     Thyroid:   Lab Results   Component Value Date    TSH 1.289 02/18/2019     Anemia:   Lab Results   Component Value Date    IRON 79 01/08/2020    TIBC 354 01/08/2020    FERRITIN 81 01/08/2020    LNUWJPPQ13 797 01/08/2020    FOLATE 14.3 01/08/2020     Cardiac:   Lab Results   Component Value Date    TROPONINI 0.016 01/08/2020     (H) 11/18/2019     Urinalysis:   Lab Results    Component Value Date    LABURIN (A) 01/08/2020     GRAM NEGATIVE ISABELLA  10,000 - 49,999 cfu/ml  Identification and susceptibility pending      LABURIN PROVIDENCIA STUARTII  10,000 - 49,999 cfu/ml   (A) 01/08/2020    COLORU Yellow 01/08/2020    SPECGRAV <=1.005 (A) 01/08/2020    NITRITE Negative 01/08/2020    KETONESU Negative 01/08/2020    UROBILINOGEN Negative 01/08/2020    WBCUA >100 (H) 01/08/2020       Trended Lab Data:  Recent Labs   Lab 01/09/20  0256  01/09/20  1831 01/10/20  0326 01/10/20  0815  01/10/20  1733 01/11/20  0009 01/11/20  0537 01/11/20  0538   WBC  --    < > 11.10  --  8.67  --   --   --  10.13  --    HGB  --    < > 8.3*  --  8.4*  --   --   --  7.9*  --    HCT  --    < > 27.1*  --  28.2*  --   --   --  26.3*  --    PLT  --    < > 129*  --  126*  --   --   --  134*  --    MCV  --    < > 97  --  101*  --   --   --  100*  --    RDW  --    < > 17.0*  --  17.5*  --   --   --  17.5*  --      --   --  150*  --    < > 150* 150*  --  151*  151*   K 4.7  --   --  4.3  --    < > 4.3 4.1  --  3.7  3.7   *  --   --  117*  --    < > 118* 118*  --  119*  119*   CO2 23  --   --  24  --    < > 23 23  --  25  25   *  --   --  82*  --    < > 68* 62*  --  56*  56*   CREATININE 0.9  --   --  1.0  --    < > 1.0 0.9  --  0.9  0.9   *  --   --  139*  --    < > 166* 121*  --  149*  149*   PROT 5.3*  --   --  5.4*  --   --   --   --   --  5.3*   ALBUMIN 2.5*  --   --  2.5*  --   --   --   --   --  2.5*   BILITOT 0.7  --   --  0.6  --   --   --   --   --  0.6   AST 28  --   --  28  --   --   --   --   --  29   ALKPHOS 60  --   --  57  --   --   --   --   --  61   ALT 23  --   --  25  --   --   --   --   --  24    < > = values in this interval not displayed.       Trended Cardiac Data:  Recent Labs   Lab 01/08/20  1527   TROPONINI 0.016       Microbiology Data:      Other Results:  EKG: Undetermined rhythm  Left axis deviation  Nonspecific intraventricular conduction delay  Nonspecific ST  and T wave abnormality  Abnormal ECG  When compared with ECG of 24-OCT-2019 21:32,  Current undetermined rhythm precludes rhythm comparison, needs review  Questionable change in The axis  T wave inversion no longer evident in Inferior leads    Radiology:  Imaging Results          X-Ray Chest 1 View (Final result)  Result time 01/08/20 05:30:17    Final result by Angel Howard MD (01/08/20 05:30:17)                 Impression:      Suspected small amount of pleural fluid and or chronic pleural change at the left lung base, may relate to a persistent or recurrence process, as discussed above.      Electronically signed by: Angel Howard  Date:    01/08/2020  Time:    05:30             Narrative:    EXAMINATION:  XR CHEST 1 VIEW    CLINICAL HISTORY:  Gastrointestinal hemorrhage, unspecified    TECHNIQUE:  Single frontal view of the chest was performed.    COMPARISON:  November 18, 2019    FINDINGS:  Single chest view is submitted.  The patient is rotated, when accounting for this the cardiomediastinal silhouette appears stable.  There is continued appearance of blunting of the left costophrenic angle this may relate to a small amount of pleural fluid and or chronic pleural change, this may relate to a persistent or recurring process as it appears similar although less prominent than on the prior study.  There is no evidence for significant pleural fluid on the right.  Chronic lung changes are otherwise noted.  There is no pneumothorax.  The osseous structures demonstrate chronic change.                                 Assessment:     Elizabeth Navarrete is a 89 y.o. female with:  Patient Active Problem List    Diagnosis Date Noted    Atrial fibrillation with RVR 01/09/2020    UGIB (upper gastrointestinal bleed) 01/08/2020    BP (bullous pemphigoid) 01/08/2020    Hyperkalemia 01/08/2020    Acute respiratory failure with hypoxia and hypercarbia 11/21/2019    Acute respiratory failure with hypoxia 11/19/2019    SOB  (shortness of breath) 11/19/2019    Pseudomonas urinary tract infection 10/26/2019    Elevated troponin 10/26/2019    Anemia 10/25/2019    Controlled type 2 diabetes mellitus, without long-term current use of insulin 10/25/2019    Skin ulcer, stage 3 10/25/2019    Multiple open wounds of lower leg 10/25/2019    HCAP (healthcare-associated pneumonia) 10/24/2019    PEG (percutaneous endoscopic gastrostomy) adjustment/replacement/removal 06/12/2018    Nursing home resident 06/23/2017    Gastrostomy tube dependent, 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 06/21/2017    Oropharyngeal dysphagia 06/16/2017    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 06/15/2017    Hemiparesis affecting left side as late effect of stroke 06/15/2017    Chronic atrial fibrillation 09/06/2016    Non-rheumatic mitral regurgitation 09/06/2016    Enlarged LA (left atrium) 09/06/2016    Pulmonary HTN 09/06/2016    Primary osteoarthritis of right knee 11/25/2015    Essential hypertension 10/10/2012    CAD (coronary artery disease) 10/10/2012    Chronic diastolic congestive heart failure 10/10/2012    History of depression 10/10/2012    Personal history of gallstones 10/10/2012    Internal carotid artery stenosis 10/10/2012    Family history of breast cancer 10/10/2012        Plan:     Hematemesis, Upper GI Bleed, gastric ulcer with visible vessel   Large volume hematemesis in ED. Consulted GI, underwent EGD with gastric ulcer with visible vessel. Hb 6.8 after EGD. S/p 1 unit of pRBC with rise in Hb to 9.8. 1/11 7.9  -continue IV PPI x 3 days, complete.   -PPI PO bid x 14 days  -continue CBC  BID     Acute Hypoxic Respiratory Failure  - May be secondary to aspiration with vomiting  -  Weaned oxygen to RA    Hyperkalemia, resolved  Kidney function stable. Patient was shifted in ED x2.   -monitor daily CMP    Persistent atrial fibrillation   Initially held carvedilol in setting of bleed. Went into RVR 1/8, which resolved with IV Metoprolol x1.  HR  70-98 overnight  -restarted home carvedilol      Bullous Pemphigoid   - Continue home clobetasol and mupirocin ointments   - Wound care consult     Hx of CVA sp left sided weakness, dysphagia, and urinary incontinence  - Patient at [facility]. Dependent on all ADLs. Refuses to work with PT and OT at nursing home, per daughter patient spends 100% of time in bed and occasionally watches TV, refuses to be put into wheelchair or brought out into common areas  - No acute changes in baseline  - Home meds of , Plavix 75 mg, and Atorvastatin 40 mg;   - recommend not restarting plavix    Bladder mass with history of hematuria and left hydronephrosis   - Patient with hx of hematuria, bladder mass, and left hydronephrosis  - Has followed up with Urology and had lengthy discussions on the likely diagnosis of urothelial carcinoma. Given her poor functional status and high anesthetic risk, further work-up was not pursued   - Patient's daughter would like patient to not be aware of diagnosis     HFpEF  - TTE 6/2017 with EF 50-55%, grade II diastolic dysfunction, mild aortic regurgitation  - On home Lasix 20 mg, coreg, and statin   - Follows with outpatient Cardiology  - No acute issues on this admission  - Holding Lasix at this time as patient does not appear volume overloaded    Hypertension  Held home medications of Coreg 25 and Norvasc 5 mg initially  -restarted as patient stable and becoming hypertensive     Parotid gland swelling, tenderness  Consider obstruction with stone, stricture vs infection given acute onset and pain  -Parotid gland US vs CT    Hyperlipidemia   - Home medicine of Atorvastatin      Depression  - On home Venlafaxine 75 mg daily    DVT: SCDs  Diet: NPO; consulted nutrition for tube feeds  Dispo: likely stepdown from ICU today, will need 3 days of IV PPI    Code Status:     Full    Ron Yang MD  [facility]-I    [facility] Hospitalist Pager numbers:   [facility] Hospitalist  Medicine Team A (Catherine/Yessica): [phone]  hospitals Hospitalist Medicine Team B (Farhat/Leila):  [phone]

## 2020-01-12 PROBLEM — K11.21 ACUTE PAROTITIS: Status: ACTIVE | Noted: 2020-01-01

## 2020-01-12 NOTE — NURSING
Plan of care reviewed with patient. Voiced understanding. AFIB on monitor with no red alarms noted. Left side of cheek/neck still reddened,swollen, and hard. ENT assessed today. Stated to continue lemon swabs, heat compresses, and massages. IV antibiotics administered per orders. No acute distress noted at this time. Side rails x3, bed low, call bell within reach. Maintain bed alarm for patient safety. Patient will be monitored overnight.

## 2020-01-12 NOTE — NURSING
Progress notes, lab values, and orders were reviewed. VN will continue to follow and be available as needed.

## 2020-01-12 NOTE — CONSULTS
[facility]  Otorhinolaryngology-Head & Neck Surgery  Consult Note    Patient Name: Elizabeth Navarrete  MRN: [id number]  Code Status: Partial Code  Admission Date: 1/8/2020  Hospital Length of Stay: 4 days  Attending Physician: Mervat Alexander MD  Primary Care Provider: [facility]    Patient information was obtained from patient, medical records and ER records.     Consults  Subjective:     Chief Complaint/Reason for Consult: parotitis     History of Present Illness: 89 year old female with multiple medica co-morbidities admitted with GI bleed on 1/8. Patient developed left parotid swelling and pain 2 days ago. She was started on warm compresses, massage and sialogogues yesterday. Worsened today. WBC elevated to 18. Started on antibiotics this morning. No prior episodes. Patient with left sided weakness from prior CVA.     Medications:  Continuous Infusions:   dextrose 5 % 75 mL/hr at 01/12/20 1339     Scheduled Meds:   amLODIPine  2.5 mg Per G Tube Daily    atorvastatin  40 mg Per G Tube Daily    carvedilol  12.5 mg Per G Tube BID    ceFEPime (MAXIPIME) IVPB  2 g Intravenous Q12H    clobetasol   Topical (Top) BID    metronidazole  500 mg Intravenous Q8H    mupirocin   Topical (Top) BID    pantoprazole  40 mg Oral BID    vancomycin (VANCOCIN) IVPB  1,750 mg Intravenous Once    [START ON 1/13/2020] vancomycin (VANCOCIN) IVPB  750 mg Intravenous Q24H    venlafaxine  75 mg Per G Tube Daily     PRN Meds:sodium chloride, sodium chloride, acetaminophen, metoprolol, ondansetron, sodium chloride 0.9%, Pharmacy to dose Vancomycin consult **AND** vancomycin - pharmacy to dose     No current facility-administered medications on file prior to encounter.      Current Outpatient Medications on File Prior to Encounter   Medication Sig    acetaminophen (TYLENOL) 160 mg/5 mL Elix 650 mg by Per G Tube route every 6 (six) hours as needed.     albuterol (PROVENTIL/VENTOLIN HFA) 90 mcg/actuation inhaler  Inhale 1-2 puffs into the lungs every 4 (four) hours as needed for Wheezing. Rescue    amLODIPine (NORVASC) 5 MG tablet 0.5 tablets (2.5 mg total) by Per G Tube route once daily.    ascorbic acid, vitamin C, (VITAMIN C) 500 MG tablet 500 mg by Per G Tube route 2 (two) times daily.    atorvastatin (LIPITOR) 40 MG tablet 1 tablet (40 mg total) by Per G Tube route once daily.    carvedilol (COREG) 25 MG tablet 0.5 tablets (12.5 mg total) by Per G Tube route 2 (two) times daily.    cholecalciferol, vitamin D3, (VITAMIN D3) 1,000 unit capsule 1,000 Units by Per G Tube route once daily.     clobetasol (TEMOVATE) 0.05 % cream Apply topically 2 (two) times daily.    clopidogrel (PLAVIX) 75 mg tablet 1 tablet (75 mg total) by Per G Tube route once daily.    diclofenac sodium (VOLTAREN) 1 % Gel Apply 4 g topically 4 (four) times daily as needed.    diphenhydrAMINE (BENADRYL) 12.5 mg/5 mL elixir by Per G Tube route every 12 (twelve) hours as needed for Allergies.     docusate (COLACE) 50 mg/5 mL liquid 100 mg by Per G Tube route 2 (two) times daily.     ferrous sulfate 325 (65 FE) MG EC tablet Take 325 mg by mouth 3 (three) times daily with meals. Per G tube    fish oil-omega-3 fatty acids 300-1,000 mg capsule 1 g by Per G Tube route once daily.     furosemide (LASIX) 20 MG tablet 1 tablet (20 mg total) by Per G Tube route once daily.    glucosamine-chondroitin 500-400 mg tablet 1 tablet by Per G Tube route once daily.     ketoconazole (NIZORAL) 2 % shampoo Apply topically twice a week. Apply twice weekly with showers until seborrheic dermatitis has resolved    multivitamin (ONE DAILY MULTIVITAMIN) per tablet 1 tablet by Per G Tube route once daily.    mupirocin (BACTROBAN) 2 % ointment Apply topically 2 (two) times daily. Apply to the skin near the Peg tube as this is reddened.    polyethylene glycol (GLYCOLAX) 17 gram PwPk 17 g by Per G Tube route nightly as needed.    venlafaxine (EFFEXOR) 75 MG tablet 75  mg by Per G Tube route once daily.    zinc sulfate (ZINCATE) 220 (50) mg capsule 220 mg by Per G Tube route once daily.       Review of patient's allergies indicates:   Allergen Reactions    Pcn [penicillins] Rash       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    Family history of breast cancer 10/10/2012    Hematuria, gross     History of cardiac arrhythmia 10/10/2012    History of CHF (congestive heart failure) 10/10/2012    History of depression 10/10/2012    History of echocardiogram 10/10/2012    HTN (hypertension) 10/10/2012    Internal carotid artery stenosis 10/10/2012    Personal history of gallstones 10/10/2012    Urinary tract infection      Past Surgical History:   Procedure Laterality Date    BREAST LUMPECTOMY      ESOPHAGOGASTRODUODENOSCOPY N/A 6/12/2018    Procedure: ESOPHAGOGASTRODUODENOSCOPY (EGD);  Surgeon: Anila Kolb MD;  Location: [facility];  Service: Endoscopy;  Laterality: N/A;    ESOPHAGOGASTRODUODENOSCOPY N/A 6/14/2018    Procedure: ESOPHAGOGASTRODUODENOSCOPY (EGD);  Surgeon: Anila Kolb MD;  Location: [facility];  Service: Endoscopy;  Laterality: N/A;    ESOPHAGOGASTRODUODENOSCOPY N/A 1/8/2020    Procedure: ESOPHAGOGASTRODUODENOSCOPY (EGD);  Surgeon: Richy Rothman MD;  Location: [facility];  Service: Endoscopy;  Laterality: N/A;    EYE SURGERY      HYSTERECTOMY       Family History     Problem Relation (Age of Onset)    Cancer Mother        Tobacco Use    Smoking status: Never Smoker    Smokeless tobacco: Never Used   Substance and Sexual Activity    Alcohol use: No    Drug use: No    Sexual activity: Never     Review of Systems   Constitutional: Negative for chills and fever.   HENT:        Pain and swelling of left parotid   Eyes: Negative for pain  and redness.   Respiratory: Negative for shortness of breath and stridor.    Cardiovascular: Negative for chest pain.   Gastrointestinal: Negative for abdominal pain.   Musculoskeletal: Positive for neck pain (over left parotid).   Neurological: Positive for weakness (from prior CVA).   Hematological: Bruises/bleeds easily.   Psychiatric/Behavioral: Negative for agitation and behavioral problems. The patient is not nervous/anxious.      Objective:     Vital Signs (Most Recent):  Temp: 97.8 °F (36.6 °C) (01/12/20 1153)  Pulse: 79 (01/12/20 1153)  Resp: 18 (01/12/20 1153)  BP: (!) 105/53 (01/12/20 1153)  SpO2: 96 % (01/12/20 1153) Vital Signs (24h Range):  Temp:  [97 °F (36.1 °C)-98.5 °F (36.9 °C)] 97.8 °F (36.6 °C)  Pulse:  [] 79  Resp:  [18] 18  SpO2:  [93 %-100 %] 96 %  BP: (105-151)/(53-81) 105/53     Weight: 57 kg (125 lb 10.6 oz)  Body mass index is 28.17 kg/m².    Date 01/12/20 0700 - 01/13/20 0659   Shift 8299-0268 0846-2566 5516-4693 24 Hour Total   INTAKE   NG/GT 1105   1105   Shift Total(mL/kg) 1105(19.4)   1105(19.4)   OUTPUT   Shift Total(mL/kg)       Weight (kg) 57 57 57 57       Physical Exam   Constitutional: She appears well-developed and well-nourished. No distress.   HENT:   Head: Normocephalic and atraumatic.   Left parotid gland with overlying edema, induration and slight erythema, ++purulence expressed from Stensen's duct on the left, tender to palpation   Eyes: Pupils are equal, round, and reactive to light. Conjunctivae and EOM are normal.   Neck: No tracheal deviation present. No thyromegaly present.   Parotid edema as above that extends slightly down into neck/tail of parotid region   Cardiovascular: Normal rate and regular rhythm.   Pulmonary/Chest: No stridor. No respiratory distress. She has no wheezes.   Neurological: She is alert.   Left sided weakness   Skin: Skin is warm and dry.   Psychiatric: She has a normal mood and affect. Her behavior is normal.       Significant Labs:  All  pertinent labs from the last 24 hours have been reviewed.    Significant Diagnostics:  US: I have reviewed all pertinent results/findings within the past 24 hours and my personal findings are:  left parotitis, possible stone    Assessment/Plan:     Acute parotitis  89 year old female with left parotitis. Has not yet been on antibiotics for 24 hours. Patient unable to perform massage herself. Discussed with nurse that she needs gland massage with warm compresses every several hours. Would also continue sialogogues (has not has any since yesterday). Avoid patient lying down towards left side as she seems to be getting some dependent edema as well. Hydration with IVF. Continue IV antibiotics per primary team. No surgical intervention necessary. Continue medical management.     Please call with any questions or concerns.      VTE Risk Mitigation (From admission, onward)         Ordered     IP VTE HIGH RISK PATIENT  Once      01/08/20 1139     Place sequential compression device  Until discontinued      01/08/20 1139     Place DEISY hose  Until discontinued      01/08/20 1139                Thank you for your consult.     Radha Nicholas MD  Otorhinolaryngology-Head & Neck Surgery  [facility]

## 2020-01-12 NOTE — HPI
89 year old female with multiple medica co-morbidities admitted with GI bleed on 1/8. Patient developed left parotid swelling and pain 2 days ago. She was started on warm compresses, massage and sialogogues yesterday. Worsened today. WBC elevated to 18. Started on antibiotics this morning. No prior episodes. Patient with left sided weakness from prior CVA.

## 2020-01-12 NOTE — SUBJECTIVE & OBJECTIVE
Medications:  Continuous Infusions:   dextrose 5 % 75 mL/hr at 01/12/20 1339     Scheduled Meds:   amLODIPine  2.5 mg Per G Tube Daily    atorvastatin  40 mg Per G Tube Daily    carvedilol  12.5 mg Per G Tube BID    ceFEPime (MAXIPIME) IVPB  2 g Intravenous Q12H    clobetasol   Topical (Top) BID    metronidazole  500 mg Intravenous Q8H    mupirocin   Topical (Top) BID    pantoprazole  40 mg Oral BID    vancomycin (VANCOCIN) IVPB  1,750 mg Intravenous Once    [START ON 1/13/2020] vancomycin (VANCOCIN) IVPB  750 mg Intravenous Q24H    venlafaxine  75 mg Per G Tube Daily     PRN Meds:sodium chloride, sodium chloride, acetaminophen, metoprolol, ondansetron, sodium chloride 0.9%, Pharmacy to dose Vancomycin consult **AND** vancomycin - pharmacy to dose     No current facility-administered medications on file prior to encounter.      Current Outpatient Medications on File Prior to Encounter   Medication Sig    acetaminophen (TYLENOL) 160 mg/5 mL Elix 650 mg by Per G Tube route every 6 (six) hours as needed.     albuterol (PROVENTIL/VENTOLIN HFA) 90 mcg/actuation inhaler Inhale 1-2 puffs into the lungs every 4 (four) hours as needed for Wheezing. Rescue    amLODIPine (NORVASC) 5 MG tablet 0.5 tablets (2.5 mg total) by Per G Tube route once daily.    ascorbic acid, vitamin C, (VITAMIN C) 500 MG tablet 500 mg by Per G Tube route 2 (two) times daily.    atorvastatin (LIPITOR) 40 MG tablet 1 tablet (40 mg total) by Per G Tube route once daily.    carvedilol (COREG) 25 MG tablet 0.5 tablets (12.5 mg total) by Per G Tube route 2 (two) times daily.    cholecalciferol, vitamin D3, (VITAMIN D3) 1,000 unit capsule 1,000 Units by Per G Tube route once daily.     clobetasol (TEMOVATE) 0.05 % cream Apply topically 2 (two) times daily.    clopidogrel (PLAVIX) 75 mg tablet 1 tablet (75 mg total) by Per G Tube route once daily.    diclofenac sodium (VOLTAREN) 1 % Gel Apply 4 g topically 4 (four) times daily as  needed.    diphenhydrAMINE (BENADRYL) 12.5 mg/5 mL elixir by Per G Tube route every 12 (twelve) hours as needed for Allergies.     docusate (COLACE) 50 mg/5 mL liquid 100 mg by Per G Tube route 2 (two) times daily.     ferrous sulfate 325 (65 FE) MG EC tablet Take 325 mg by mouth 3 (three) times daily with meals. Per G tube    fish oil-omega-3 fatty acids 300-1,000 mg capsule 1 g by Per G Tube route once daily.     furosemide (LASIX) 20 MG tablet 1 tablet (20 mg total) by Per G Tube route once daily.    glucosamine-chondroitin 500-400 mg tablet 1 tablet by Per G Tube route once daily.     ketoconazole (NIZORAL) 2 % shampoo Apply topically twice a week. Apply twice weekly with showers until seborrheic dermatitis has resolved    multivitamin (ONE DAILY MULTIVITAMIN) per tablet 1 tablet by Per G Tube route once daily.    mupirocin (BACTROBAN) 2 % ointment Apply topically 2 (two) times daily. Apply to the skin near the Peg tube as this is reddened.    polyethylene glycol (GLYCOLAX) 17 gram PwPk 17 g by Per G Tube route nightly as needed.    venlafaxine (EFFEXOR) 75 MG tablet 75 mg by Per G Tube route once daily.    zinc sulfate (ZINCATE) 220 (50) mg capsule 220 mg by Per G Tube route once daily.       Review of patient's allergies indicates:   Allergen Reactions    Pcn [penicillins] Rash       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    Family history of breast cancer 10/10/2012    Hematuria, gross     History of cardiac arrhythmia 10/10/2012    History of CHF (congestive heart failure) 10/10/2012    History of depression 10/10/2012    History of echocardiogram 10/10/2012    HTN (hypertension) 10/10/2012    Internal carotid artery stenosis 10/10/2012    Personal history of gallstones  10/10/2012    Urinary tract infection      Past Surgical History:   Procedure Laterality Date    BREAST LUMPECTOMY      ESOPHAGOGASTRODUODENOSCOPY N/A 6/12/2018    Procedure: ESOPHAGOGASTRODUODENOSCOPY (EGD);  Surgeon: Anila Kolb MD;  Location: [facility];  Service: Endoscopy;  Laterality: N/A;    ESOPHAGOGASTRODUODENOSCOPY N/A 6/14/2018    Procedure: ESOPHAGOGASTRODUODENOSCOPY (EGD);  Surgeon: Anila Kolb MD;  Location: [facility] END[facility];  Service: Endoscopy;  Laterality: N/A;    ESOPHAGOGASTRODUODENOSCOPY N/A 1/8/2020    Procedure: ESOPHAGOGASTRODUODENOSCOPY (EGD);  Surgeon: Richy Rothman MD;  Location: [facility];  Service: Endoscopy;  Laterality: N/A;    EYE SURGERY      HYSTERECTOMY       Family History     Problem Relation (Age of Onset)    Cancer Mother        Tobacco Use    Smoking status: Never Smoker    Smokeless tobacco: Never Used   Substance and Sexual Activity    Alcohol use: No    Drug use: No    Sexual activity: Never     Review of Systems   Constitutional: Negative for chills and fever.   HENT:        Pain and swelling of left parotid   Eyes: Negative for pain and redness.   Respiratory: Negative for shortness of breath and stridor.    Cardiovascular: Negative for chest pain.   Gastrointestinal: Negative for abdominal pain.   Musculoskeletal: Positive for neck pain (over left parotid).   Neurological: Positive for weakness (from prior CVA).   Hematological: Bruises/bleeds easily.   Psychiatric/Behavioral: Negative for agitation and behavioral problems. The patient is not nervous/anxious.      Objective:     Vital Signs (Most Recent):  Temp: 97.8 °F (36.6 °C) (01/12/20 1153)  Pulse: 79 (01/12/20 1153)  Resp: 18 (01/12/20 1153)  BP: (!) 105/53 (01/12/20 1153)  SpO2: 96 % (01/12/20 1153) Vital Signs (24h Range):  Temp:  [97 °F (36.1 °C)-98.5 °F (36.9 °C)] 97.8 °F (36.6 °C)  Pulse:  [] 79  Resp:  [18] 18  SpO2:  [93 %-100 %] 96 %  BP: (105-151)/(53-81) 105/53     Weight: 57 kg (125  lb 10.6 oz)  Body mass index is 28.17 kg/m².    Date 01/12/20 0700 - 01/13/20 0659   Shift 0943-0417 6748-9503 [id number] 24 Hour Total   INTAKE   NG/GT 1105   1105   Shift Total(mL/kg) 1105(19.4)   1105(19.4)   OUTPUT   Shift Total(mL/kg)       Weight (kg) 57 57 57 57       Physical Exam   Constitutional: She appears well-developed and well-nourished. No distress.   HENT:   Head: Normocephalic and atraumatic.   Left parotid gland with overlying edema, induration and slight erythema, ++purulence expressed from Stensen's duct on the left, tender to palpation   Eyes: Pupils are equal, round, and reactive to light. Conjunctivae and EOM are normal.   Neck: No tracheal deviation present. No thyromegaly present.   Parotid edema as above that extends slightly down into neck/tail of parotid region   Cardiovascular: Normal rate and regular rhythm.   Pulmonary/Chest: No stridor. No respiratory distress. She has no wheezes.   Neurological: She is alert.   Left sided weakness   Skin: Skin is warm and dry.   Psychiatric: She has a normal mood and affect. Her behavior is normal.       Significant Labs:  All pertinent labs from the last 24 hours have been reviewed.    Significant Diagnostics:  US: I have reviewed all pertinent results/findings within the past 24 hours and my personal findings are:  left parotitis, possible stone

## 2020-01-12 NOTE — PROGRESS NOTES
"[facility] Medicine H&P Note     Admitting Team: [facility] Hospitalist Team A    Attending Physician: Mervat Alexander MD  Resident: DELFINO   Intern: REJI    Date of Admit: 2020    Subjective:      Patient did well overnight, no signs of bleeding.  Vitals stable, afebrile. Has pain to left jaw/parotid. US with 5mm stone. Denies any chest pain, SOB, or palpitations this AM.       Objective:   Last 24 Hour Vital Signs:  BP  Min: 111/60  Max: 151/68  Temp  Av.9 °F (36.6 °C)  Min: 97 °F (36.1 °C)  Max: 98.5 °F (36.9 °C)  Pulse  Av.5  Min: 75  Max: 108  Resp  Av  Min: 18  Max: 18  SpO2  Av.4 %  Min: 93 %  Max: 100 %  Weight  Av kg (125 lb 10.6 oz)  Min: 57 kg (125 lb 10.6 oz)  Max: 57 kg (125 lb 10.6 oz)  Body mass index is 28.17 kg/m².  I/O last 3 completed shifts:  In: 1280 [P.O.:10; Blood:350; NG/GT:920]  Out: 1200 [Urine:1200]    Physical Examination:    /60   Pulse 82   Temp 98 °F (36.7 °C)   Resp 18   Ht 4' 8" (1.422 m)   Wt 57 kg (125 lb 10.6 oz)   LMP  (LMP Unknown)   SpO2 96%   Breastfeeding? No   BMI 28.17 kg/m²     General Appearance:    Alert, cooperative, no distress, appears stated age   Head:    Normocephalic, without obvious abnormality, atraumatic   Eyes:    PERRL, conjunctiva/corneas clear, EOM's intact   Nose:   Nares normal, septum midline, mucosa normal, no drainage    or sinus tenderness   Throat:   Lips, mucosa, and tongue normal; dark residue around mouth   Neck:   trachea midline.  Left submandibular swelling, tenderness.    Lungs:     Clear to auscultation bilaterally, respirations unlabored, on ra NC    Heart:    Regular rate and rhythm, no murmurs appreciated   Abdomen:     Soft, non-tender, bowel sounds active all four quadrants,     no masses, no organomegaly, PEG tube in place     Extremities:   Bilateral lower extremity wounds wrapped    Skin:   Skin with many lesions and bruises diffusely over body    Neurologic:   Residual left sided weakness, CN " II-XII grossly intact, moves all four extremities equally         Laboratory:  Most Recent Data:  CBC:   Lab Results   Component Value Date    WBC 18.46 (H) 01/11/2020    HGB 10.0 (L) 01/11/2020    HCT 33.7 (L) 01/11/2020     (L) 01/11/2020     (H) 01/11/2020    RDW 17.1 (H) 01/11/2020     WBC Differential: 72.6 % N, 0 % Bands, 13.6 % L, 5.7 % M, 7.9 % Eo, 0.2 % Baso, no additional cells seen  BMP:   Lab Results   Component Value Date     (H) 01/12/2020    K 4.0 01/12/2020     (H) 01/12/2020    CO2 21 (L) 01/12/2020    BUN 54 (H) 01/12/2020    CREATININE 1.0 01/12/2020     (H) 01/12/2020    CALCIUM 8.4 (L) 01/12/2020    MG 2.1 01/08/2020    PHOS 3.3 10/28/2019     LFTs:   Lab Results   Component Value Date    PROT 5.1 (L) 01/12/2020    ALBUMIN 2.4 (L) 01/12/2020    BILITOT 0.7 01/12/2020    AST 31 01/12/2020    ALKPHOS 86 01/12/2020    ALT 22 01/12/2020     Coags:   Lab Results   Component Value Date    INR 1.1 01/08/2020     FLP:   Lab Results   Component Value Date    CHOL 118 (L) 06/15/2017    HDL 41 06/15/2017    LDLCALC 67.4 06/15/2017    TRIG 48 06/15/2017    CHOLHDL 34.7 06/15/2017     DM:   Lab Results   Component Value Date    HGBA1C 5.4 01/09/2020    HGBA1C 6.2 (H) 11/19/2019    HGBA1C 5.6 06/15/2017    LDLCALC 67.4 06/15/2017    CREATININE 1.0 01/12/2020     Thyroid:   Lab Results   Component Value Date    TSH 1.289 02/18/2019     Anemia:   Lab Results   Component Value Date    IRON 79 01/08/2020    TIBC 354 01/08/2020    FERRITIN 81 01/08/2020    [id number] 797 01/08/2020    FOLATE 14.3 01/08/2020     Cardiac:   Lab Results   Component Value Date    TROPONINI 0.016 01/08/2020     (H) 11/18/2019     Urinalysis:   Lab Results   Component Value Date    LABURIN (A) 01/08/2020     GRAM NEGATIVE ISABELLA  10,000 - 49,999 cfu/ml  Identification and susceptibility pending      LABURIN PROVIDENCIA STUARTII  10,000 - 49,999 cfu/ml   (A) 01/08/2020    COLORU Yellow 01/08/2020     SPECGRAV <=1.005 (A) 01/08/2020    NITRITE Negative 01/08/2020    KETONESU Negative 01/08/2020    UROBILINOGEN Negative 01/08/2020    WBCUA >100 (H) 01/08/2020       Trended Lab Data:  Recent Labs   Lab 01/10/20  0326 01/10/20  0815  01/11/20  0009 01/11/20  0537 01/11/20  0538 01/11/20  2126 01/12/20  0403   WBC  --  8.67  --   --  10.13  --  18.46*  --    HGB  --  8.4*  --   --  7.9*  --  10.0*  --    HCT  --  28.2*  --   --  26.3*  --  33.7*  --    PLT  --  126*  --   --  134*  --  114*  --    MCV  --  101*  --   --  100*  --  102*  --    RDW  --  17.5*  --   --  17.5*  --  17.1*  --    *  --    < > 150*  --  151*  151*  --  153*   K 4.3  --    < > 4.1  --  3.7  3.7  --  4.0   *  --    < > 118*  --  119*  119*  --  120*   CO2 24  --    < > 23  --  25  25  --  21*   BUN 82*  --    < > 62*  --  56*  56*  --  54*   CREATININE 1.0  --    < > 0.9  --  0.9  0.9  --  1.0   *  --    < > 121*  --  149*  149*  --  198*   PROT 5.4*  --   --   --   --  5.3*  --  5.1*   ALBUMIN 2.5*  --   --   --   --  2.5*  --  2.4*   BILITOT 0.6  --   --   --   --  0.6  --  0.7   AST 28  --   --   --   --  29  --  31   ALKPHOS 57  --   --   --   --  61  --  86   ALT 25  --   --   --   --  24  --  22    < > = values in this interval not displayed.       Trended Cardiac Data:  Recent Labs   Lab 01/08/20  1527   TROPONINI 0.016       Microbiology Data:      Other Results:  EKG: Undetermined rhythm  Left axis deviation  Nonspecific intraventricular conduction delay  Nonspecific ST and T wave abnormality  Abnormal ECG  When compared with ECG of 24-OCT-2019 21:32,  Current undetermined rhythm precludes rhythm comparison, needs review  Questionable change in The axis  T wave inversion no longer evident in Inferior leads    Radiology:  Imaging Results          X-Ray Chest 1 View (Final result)  Result time 01/08/20 05:30:17    Final result by Angel Howard MD (01/08/20 05:30:17)                 Impression:       Suspected small amount of pleural fluid and or chronic pleural change at the left lung base, may relate to a persistent or recurrence process, as discussed above.      Electronically signed by: Angel Howard  Date:    01/08/2020  Time:    05:30             Narrative:    EXAMINATION:  XR CHEST 1 VIEW    CLINICAL HISTORY:  Gastrointestinal hemorrhage, unspecified    TECHNIQUE:  Single frontal view of the chest was performed.    COMPARISON:  November 18, 2019    FINDINGS:  Single chest view is submitted.  The patient is rotated, when accounting for this the cardiomediastinal silhouette appears stable.  There is continued appearance of blunting of the left costophrenic angle this may relate to a small amount of pleural fluid and or chronic pleural change, this may relate to a persistent or recurring process as it appears similar although less prominent than on the prior study.  There is no evidence for significant pleural fluid on the right.  Chronic lung changes are otherwise noted.  There is no pneumothorax.  The osseous structures demonstrate chronic change.                                 Assessment:     Elizabeth Navarrete is a 89 y.o. female with:  Patient Active Problem List    Diagnosis Date Noted    Atrial fibrillation with RVR 01/09/2020    UGIB (upper gastrointestinal bleed) 01/08/2020    BP (bullous pemphigoid) 01/08/2020    Hyperkalemia 01/08/2020    Acute respiratory failure with hypoxia and hypercarbia 11/21/2019    Acute respiratory failure with hypoxia 11/19/2019    SOB (shortness of breath) 11/19/2019    Pseudomonas urinary tract infection 10/26/2019    Elevated troponin 10/26/2019    Anemia 10/25/2019    Controlled type 2 diabetes mellitus, without long-term current use of insulin 10/25/2019    Skin ulcer, stage 3 10/25/2019    Multiple open wounds of lower leg 10/25/2019    HCAP (healthcare-associated pneumonia) 10/24/2019    PEG (percutaneous endoscopic gastrostomy)  adjustment/replacement/removal 06/12/2018    Nursing home resident 06/23/2017    Gastrostomy tube dependent, 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 06/21/2017    Oropharyngeal dysphagia 06/16/2017    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 06/15/2017    Hemiparesis affecting left side as late effect of stroke 06/15/2017    Chronic atrial fibrillation 09/06/2016    Non-rheumatic mitral regurgitation 09/06/2016    Enlarged LA (left atrium) 09/06/2016    Pulmonary HTN 09/06/2016    Primary osteoarthritis of right knee 11/25/2015    Essential hypertension 10/10/2012    CAD (coronary artery disease) 10/10/2012    Chronic diastolic congestive heart failure 10/10/2012    History of depression 10/10/2012    Personal history of gallstones 10/10/2012    Internal carotid artery stenosis 10/10/2012    Family history of breast cancer 10/10/2012        Plan:     Hematemesis, Upper GI Bleed, gastric ulcer with visible vessel   Large volume hematemesis in ED. Consulted GI, underwent EGD with gastric ulcer with visible vessel. Hb 6.8 after EGD. S/p 2 unit of pRBC with rise in Hb to 10.7.   -continue PPI PO for total of 14 days  -continue CBC  Daily  - received 1 unit prbc yesterday with good response    Acute Hypoxic Respiratory Failure, resolved  - May be secondary to aspiration with vomiting  -  Weaned oxygen to RA    Hyperkalemia, resolved  Kidney function stable. Patient was shifted in ED x2.   -monitor daily CMP    Persistent atrial fibrillation   Initially held carvedilol in setting of bleed. Went into RVR 1/8, which resolved with IV Metoprolol x1.  HR 70-98 overnight  -restarted home carvedilol   -currently controlled     Bullous Pemphigoid   - Continue home clobetasol and mupirocin ointments   - Wound care consult     Hx of CVA sp left sided weakness, dysphagia, and urinary incontinence  - Patient at [facility]. Dependent on all ADLs. Refuses to work with PT and OT at nursing home, per daughter patient spends 100% of time in bed  and occasionally watches TV, refuses to be put into wheelchair or brought out into common areas  - No acute changes in baseline  - Home meds of , Plavix 75 mg, and Atorvastatin 40 mg;   - recommend not restarting plavix    Bladder mass with history of hematuria and left hydronephrosis   - Patient with hx of hematuria, bladder mass, and left hydronephrosis  - Has followed up with Urology and had lengthy discussions on the likely diagnosis of urothelial carcinoma. Given her poor functional status and high anesthetic risk, further work-up was not pursued   - Patient's daughter would like patient to not be aware of diagnosis     HFpEF  - TTE 6/2017 with EF 50-55%, grade II diastolic dysfunction, mild aortic regurgitation  - On home Lasix 20 mg, coreg, and statin   - Follows with outpatient Cardiology  - No acute issues on this admission  - Holding Lasix at this time as patient does not appear volume overloaded    Hypertension  Held home medications of Coreg 25 and Norvasc 5 mg initially  -restarted as patient stable and becoming hypertensive     Parotid gland swelling, tenderness  Consider obstruction with stone, stricture vs infection given acute onset and pain  -Parotid gland US with 5 mm stone  -attempted lemon juice swabs, no improvement  -ENT consulted    Leukocytosis  Suspect secondary to PRBC vs Parotid gland infection  -no abx at this point  -repeat cbc this morning    Hyperlipidemia   - Home medicine of Atorvastatin      Depression  - On home Venlafaxine 75 mg daily    DVT: SCDs  Diet:  tube feeds  Dispo: likely discharge tomorrow pending ENT recs    Code Status:     Full    Ron Yang MD  [facility]    [facility] Hospitalist Pager numbers:   [facility] Hospitalist Medicine Team A (Catherine/Yessica): [phone]  [facility] Hospitalist Medicine Team B (Farhat/Leila):  [phone]

## 2020-01-12 NOTE — PLAN OF CARE
Continue with plan of care, voices understanding, denies any pain at this time, left side of face edema present, MD aware. New order for ENT consult, report given to incoming nurse, pt stable at this time.

## 2020-01-12 NOTE — PLAN OF CARE
Care plan reviewed with patient, AAOx2, no respiratory distress noted, on room air. Afib per cardiac monitor, no red alarm noted. C/o pain to left side of face, acetaminophen 1,000 mg administered per G tube as ordered, education provided on medication effect and side effect, voices understanding. Call light within her reach, no apparent distress noted, bed in low position, bed alarm on, educated on the importance of calling as needed, voices understanding, stable at this time.

## 2020-01-12 NOTE — PROGRESS NOTES
VN cued into the patient's room with permission. The patient was awake and alert with visitor's at the bedside. She co/ pain to her L face. Education was provided on the use of warm compresses and parotid massages. She currently has no questions or concerns about her care at this time. Will continue to monitor.      01/12/20 0309   Type of Frequent Check   Type Patient Rounds   Safety/Activity   Patient Rounds visualized patient;ID band on;bed wheels locked   Safety Promotion/Fall Prevention side rails raised x 2   Activity Management activity adjusted per tolerance   Positioning   Body Position supine   Head of Bed (HOB) HOB elevated   Positioning/Transfer Devices pillows;in use   Pain/Comfort/Sleep   Preferred Pain Scale number (Numeric Rating Pain Scale)   Comfort/Acceptable Pain Level 0   Pain Rating (0-10): Rest 4   Pain Rating (0-10): Activity 4        Cardiac/Telemetry Details / Alarms   Cardiac/Telemetry Monitor On Yes   Cardiac/Telemetry Audible Yes   Cardiac/Telemetry Alarms Set Yes   ECG   Lead Monitored Lead II   Rhythm atrial rhythm   Atrial Rhythm atrial fibrillation   Assessments (Pre/Post)   Level of Consciousness (AVPU) alert

## 2020-01-12 NOTE — PROGRESS NOTES
Pharmacokinetic Initial Assessment: IV Vancomycin    Assessment/Plan:    Initiate intravenous vancomycin with loading dose of vancomycin 1750 mg once followed by a maintenance dose of vancomycin 750 mg IV every 24 hours  Desired empiric serum trough concentration is 10 to 15 mcg/mL  Draw vancomycin random level on 11/13/2020 at 0700.  Pharmacy will continue to follow and monitor vancomycin.      Please contact pharmacy at extension [phone] with any questions regarding this assessment.     Thank you for the consult,   Vee Harrison       Patient brief summary:  Elizabeth Navarrete is a 89 y.o. female initiated on antimicrobial therapy with IV Vancomycin for treatment of suspected skin & soft tissue infection    Drug Allergies:   Review of patient's allergies indicates:   Allergen Reactions    Pcn [penicillins] Rash       Actual Body Weight:   57 kg    Renal Function:   Estimated Creatinine Clearance: 33 mL/min (based on SCr of 1 mg/dL).,     Dialysis Method (if applicable):  N/A    CBC (last 72 hours):  Recent Labs   Lab Result Units 01/09/20  1831 01/10/20  0815 01/11/20  0537 01/11/20  2126 01/12/20  0403   WBC K/uL 11.10 8.67 10.13 18.46* 18.76*   Hemoglobin g/dL 8.3* 8.4* 7.9* 10.0* 9.3*   Hematocrit % 27.1* 28.2* 26.3* 33.7* 30.3*   Platelets K/uL 129* 126* 134* 114* 75*   Gran% % 87.5* 80.8* 80.2* 86.3* 67.0   Lymph% % 4.4* 6.7* 11.7* 9.3* 3.0*   Mono% % 7.6 11.9 6.8 4.2 10.0   Eosinophil% % 0.2 0.3 1.0 0.1 0.0   Basophil% % 0.3 0.3 0.3 0.1 0.0   Differential Method  Automated Automated Automated Automated Manual       Metabolic Panel (last 72 hours):  Recent Labs   Lab Result Units 01/10/20  0326 01/10/20  0816 01/10/20  1134 01/10/20  1733 01/11/20  0009 01/11/20  0538 01/12/20  0403   Sodium mmol/L 150* 153* 150* 150* 150* 151*  151* 153*   Potassium mmol/L 4.3 4.4 4.0 4.3 4.1 3.7  3.7 4.0   Chloride mmol/L 117* 118* 116* 118* 118* 119*  119* 120*   CO2 mmol/L 24 26 25 23 23 25  25 21*   Glucose mg/dL 139* 160*  150* 166* 121* 149*  149* 198*   BUN, Bld mg/dL 82* 76* 73* 68* 62* 56*  56* 54*   Creatinine mg/dL 1.0 1.0 1.0 1.0 0.9 0.9  0.9 1.0   Albumin g/dL 2.5*  --   --   --   --  2.5* 2.4*   Total Bilirubin mg/dL 0.6  --   --   --   --  0.6 0.7   Alkaline Phosphatase U/L 57  --   --   --   --  61 86   AST U/L 28  --   --   --   --  29 31   ALT U/L 25  --   --   --   --  24 22       Drug levels (last 3 results):  No results for input(s): VANCOMYCINRA, VANCOMYCINPE, VANCOMYCINTR in the last 72 hours.    Microbiologic Results:  Microbiology Results (last 7 days)       Procedure Component Value Units Date/Time    Urine culture [[id number]]  (Abnormal)  (Susceptibility) Collected:  01/08/20 0903    Order Status:  Completed Specimen:  Urine Updated:  01/10/20 0923     Urine Culture, Routine GRAM NEGATIVE ISABELLA  10,000 - 49,999 cfu/ml  Identification and susceptibility pending        PROVIDENCIA STUARTII  10,000 - 49,999 cfu/ml      Narrative:       Preferred Collection Type->Urine, Clean Catch

## 2020-01-12 NOTE — ASSESSMENT & PLAN NOTE
89 year old female with left parotitis. Has not yet been on antibiotics for 24 hours. Patient unable to perform massage herself. Discussed with nurse that she needs gland massage with warm compresses every several hours. Would also continue sialogogues (has not has any since yesterday). Avoid patient lying down towards left side as she seems to be getting some dependent edema as well. Hydration with IVF. Continue IV antibiotics per primary team. No surgical intervention necessary. Continue medical management.     Please call with any questions or concerns.

## 2020-01-13 NOTE — PROGRESS NOTES
"[facility]  Adult Nutrition  Progress Note    SUMMARY       Recommendations    Recommendation:   1. Continue current TF Diabetisource @ 55 mL/hr which provides 1584 kcal, 79.2 grams of protein, 200 mL free water flushes QID.   2. Monitor blood glucose.    Goals:   1. Tolerate current TF.   2. Maintain blood glucose within range.   Nutrition Goal Status: new  Communication of RD Recs: reviewed with RN(Bethany )    Reason for Assessment  Reason For Assessment: RD follow-up  Diagnosis: (upper GI bleed )  Relevant Medical History: (HTN, DM2, CAD, CHF, CRF, afib, gallstones.)  General Information Comments: Pt is NPO, receives TF at nursing home. Noted TF changed to Diabetisource 1.2 @ 55 mL/hr per elevated glucose. Pt is provideed 1584 kcal, 79.2 grams of protein, with 200 mL flushes QID. NFPE completed 1/9- moderate wasting of hands and clavicles and mild wasting of temples.    Nutrition Discharge Planning: pt to d/c on tube feeding.    Nutrition Risk Screen  Nutrition Risk Screen: dysphagia or difficulty swallowing    Nutrition/Diet History  Typical Food/Fluid Intake: Tube feeding.  Food Preferences: unable to assess  Spiritual, Cultural Beliefs, Baptist Practices, Values that Affect Care: no  Food Allergies: NKFA  Factors Affecting Nutritional Intake: difficulty/impaired swallowing, NPO    Anthropometrics  Temp: 97.5 °F (36.4 °C)  Height Method: Stated(per nursing home)  Height: 4' 8" (142.2 cm)  Height (inches): 56 in  Weight Method: Bed Scale  Weight: 59.5 kg (131 lb 2.8 oz)  Weight (lb): 131.18 lb  Ideal Body Weight (IBW), Female: 80 lb  % Ideal Body Weight, Female (lb): 160.66 %  BMI (Calculated): 29.4  BMI Grade: 25 - 29.9 - overweight     Lab/Procedures/Meds  Pertinent Labs Comments: CO2 22L, BUN 48H, Ca 7.8L, Alb 2.1L, H/H: 28.9L/100H, Glucose 193H.   Pertinent Medications Comments: Statin, amlodipine, mupirocin.    Estimated/Assessed Needs  Weight Used For Calorie Calculations: 59.5 kg " (131 lb 2.8 oz)  Energy Calorie Requirements (kcal): 2176-5080 (25-30 kcal/kg)  Energy Need Method: Kcal/kg  Protein Requirements: 59.62-71.55( 1.0-1.2 kcal/kg)  Weight Used For Protein Calculations: 59.5 kg (131 lb 2.8 oz)  Estimated Fluid Requirement Method: RDA Method  RDA Method (mL): 1487  CHO Requirement: 170 g    Nutrition Prescription Ordered  Current Diet Order: NPO  Current Nutrition Support Formula Ordered: Diabetisource   Current Nutrition Support Rate Ordered: 55 (ml)  Current Nutrition Support Frequency Ordered: mL/hr    Evaluation of Received Nutrient/Fluid Intake  Enteral Calories (kcal): 1584  Enteral Protein (gm): 79.2  Enteral (Free Water) Fluid (mL): 1077  Free Water Flush Fluid (mL): 507  % Kcal Needs: 93.9-88.7 %  % Protein Needs: 133.7-110.7%  I/O: 1772/700  Energy Calories Required: meeting needs  Protein Required: meeting needs  Fluid Required: meeting needs  Comments: LBM 1/11  % Intake of Estimated Energy Needs: 75 - 100 %  % Meal Intake: NPO    Nutrition Risk  Level of Risk/Frequency of Follow-up: (2x weekly)     Assessment and Plan  * UGIB (upper gastrointestinal bleed)  Contributing Nutrition Diagnosis  Altered GI Function    Related to (etiology):   GI bleed     Signs and Symptoms (as evidenced by):   NPO.     Interventions:  Collaboration with other providers.     Nutrition Diagnosis Status:   Improving (on TF)      Monitor and Evaluation  Food and Nutrient Intake: enteral nutrition intake  Food and Nutrient Adminstration: enteral and parenteral nutrition administration  Physical Activity and Function: nutrition-related ADLs and IADLs  Anthropometric Measurements: weight  Biochemical Data, Medical Tests and Procedures: electrolyte and renal panel, glucose/endocrine profile     Malnutrition Assessment  Shinto Region (Muscle Loss): mild depletion  Clavicle Bone Region (Muscle Loss): moderate depletion  Dorsal Hand (Muscle Loss): moderate depletion   Subcutaneous Fat Loss (Final  Summary): well nourished  Muscle Loss Evaluation (Final Summary): moderate protein-calorie malnutrition       Nutrition Follow-Up  RD Follow-up?: Yes

## 2020-01-13 NOTE — PLAN OF CARE
Applied heat pack to patient's face. Did facial massage and cleaned the patients mouth and suctioned

## 2020-01-13 NOTE — NURSING
Permission attained to enter room via virtual system.  Virtual rounds completed as documented.  Today's lab values, notes, and vital signs up to now have been reviewed.  Pt not complaining of any abd distress   Regarding left swollen cheek region instructed to massage   Pt gave no indication she understood   When asked if face hurt any stated hurt enough   Will continue to monitor as time allows

## 2020-01-13 NOTE — PROGRESS NOTES
"[facility] Medicine H&P Note     Admitting Team: [facility] Hospitalist Team A    Attending Physician: Mervat Alexander MD  Resident: DELFINO   Intern: REJI    Date of Admit: 2020    Subjective:      Patient did well overnight, no signs of bleeding.  Vitals stable, afebrile. Has pain to left jaw/parotid. US with 5mm stone. Denies any chest pain, SOB, or palpitations this AM.       Objective:   Last 24 Hour Vital Signs:  BP  Min: 105/53  Max: 140/64  Temp  Av.4 °F (36.3 °C)  Min: 96 °F (35.6 °C)  Max: 98.1 °F (36.7 °C)  Pulse  Av.6  Min: 76  Max: 124  Resp  Av.8  Min: 17  Max: 18  SpO2  Av.7 %  Min: 94 %  Max: 98 %  Weight  Av.5 kg (131 lb 2.8 oz)  Min: 59.5 kg (131 lb 2.8 oz)  Max: 59.5 kg (131 lb 2.8 oz)  Body mass index is 29.41 kg/m².  I/O last 3 completed shifts:  In: 3576.3 [P.O.:10; I.V.:1151.3; NG/GT:1715; IV Piggyback:700]  Out: 1300 [Urine:1300]    Physical Examination:    BP (!) 119/56   Pulse 83   Temp 98 °F (36.7 °C)   Resp 18   Ht 4' 8" (1.422 m)   Wt 59.5 kg (131 lb 2.8 oz)   LMP  (LMP Unknown)   SpO2 96%   Breastfeeding? No   BMI 29.41 kg/m²     General Appearance:    Alert, cooperative, no distress, appears stated age   Head:    Normocephalic, without obvious abnormality, atraumatic   Eyes:    PERRL, conjunctiva/corneas clear, EOM's intact   Nose:   Nares normal, septum midline, mucosa normal, no drainage    or sinus tenderness   Throat:   Lips, mucosa, and tongue normal; dark residue around mouth   Neck:   trachea midline.  Left submandibular swelling, tenderness.    Lungs:     Clear to auscultation bilaterally, respirations unlabored, on ra NC    Heart:    Regular rate and rhythm, no murmurs appreciated   Abdomen:     Soft, non-tender, bowel sounds active all four quadrants,     no masses, no organomegaly, PEG tube in place     Extremities:   Bilateral lower extremity wounds wrapped    Skin:   Skin with many lesions and bruises diffusely over body    Neurologic:   " Residual left sided weakness, CN II-XII grossly intact, moves all four extremities equally         Laboratory:  Most Recent Data:  CBC:   Lab Results   Component Value Date    WBC 18.44 (H) 01/13/2020    HGB 8.8 (L) 01/13/2020    HCT 28.9 (L) 01/13/2020    PLT 90 (L) 01/13/2020     (H) 01/13/2020    RDW 16.6 (H) 01/13/2020     WBC Differential: 72.6 % N, 0 % Bands, 13.6 % L, 5.7 % M, 7.9 % Eo, 0.2 % Baso, no additional cells seen  BMP:   Lab Results   Component Value Date     (H) 01/12/2020    K 4.0 01/12/2020     (H) 01/12/2020    CO2 21 (L) 01/12/2020    BUN 54 (H) 01/12/2020    CREATININE 1.0 01/12/2020     (H) 01/12/2020    CALCIUM 8.4 (L) 01/12/2020    MG 2.1 01/08/2020    PHOS 3.3 10/28/2019     LFTs:   Lab Results   Component Value Date    PROT 5.1 (L) 01/12/2020    ALBUMIN 2.4 (L) 01/12/2020    BILITOT 0.7 01/12/2020    AST 31 01/12/2020    ALKPHOS 86 01/12/2020    ALT 22 01/12/2020     Coags:   Lab Results   Component Value Date    INR 1.1 01/08/2020     FLP:   Lab Results   Component Value Date    CHOL 118 (L) 06/15/2017    HDL 41 06/15/2017    LDLCALC 67.4 06/15/2017    TRIG 48 06/15/2017    CHOLHDL 34.7 06/15/2017     DM:   Lab Results   Component Value Date    HGBA1C 5.4 01/09/2020    HGBA1C 6.2 (H) 11/19/2019    HGBA1C 5.6 06/15/2017    LDLCALC 67.4 06/15/2017    CREATININE 1.0 01/12/2020     Thyroid:   Lab Results   Component Value Date    TSH 1.289 02/18/2019     Anemia:   Lab Results   Component Value Date    IRON 79 01/08/2020    TIBC 354 01/08/2020    FERRITIN 81 01/08/2020    DNFNFICF10 797 01/08/2020    FOLATE 14.3 01/08/2020     Cardiac:   Lab Results   Component Value Date    TROPONINI 0.016 01/08/2020     (H) 11/18/2019     Urinalysis:   Lab Results   Component Value Date    LABURIN (A) 01/08/2020     GRAM NEGATIVE ISABELLA  10,000 - 49,999 cfu/ml  Identification and susceptibility pending      LABURIN PROVIDENCIA STUARTII  10,000 - 49,999 cfu/ml   (A) 01/08/2020     COLORU Yellow 01/08/2020    SPECGRAV <=1.005 (A) 01/08/2020    NITRITE Negative 01/08/2020    KETONESU Negative 01/08/2020    UROBILINOGEN Negative 01/08/2020    WBCUA >100 (H) 01/08/2020       Trended Lab Data:  Recent Labs   Lab 01/10/20  0326  01/11/20  0009  01/11/20  0538 01/11/20  2126 01/12/20  0403 01/13/20  0404   WBC  --    < >  --    < >  --  18.46* 18.76* 18.44*   HGB  --    < >  --    < >  --  10.0* 9.3* 8.8*   HCT  --    < >  --    < >  --  33.7* 30.3* 28.9*   PLT  --    < >  --    < >  --  114* 75* 90*   MCV  --    < >  --    < >  --  102* 101* 100*   RDW  --    < >  --    < >  --  17.1* 17.3* 16.6*   *   < > 150*  --  151*  151*  --  153*  --    K 4.3   < > 4.1  --  3.7  3.7  --  4.0  --    *   < > 118*  --  119*  119*  --  120*  --    CO2 24   < > 23  --  25  25  --  21*  --    BUN 82*   < > 62*  --  56*  56*  --  54*  --    CREATININE 1.0   < > 0.9  --  0.9  0.9  --  1.0  --    *   < > 121*  --  149*  149*  --  198*  --    PROT 5.4*  --   --   --  5.3*  --  5.1*  --    ALBUMIN 2.5*  --   --   --  2.5*  --  2.4*  --    BILITOT 0.6  --   --   --  0.6  --  0.7  --    AST 28  --   --   --  29  --  31  --    ALKPHOS 57  --   --   --  61  --  86  --    ALT 25  --   --   --  24  --  22  --     < > = values in this interval not displayed.       Trended Cardiac Data:  Recent Labs   Lab 01/08/20  1527   TROPONINI 0.016       Microbiology Data:      Other Results:  EKG: Undetermined rhythm  Left axis deviation  Nonspecific intraventricular conduction delay  Nonspecific ST and T wave abnormality  Abnormal ECG  When compared with ECG of 24-OCT-2019 21:32,  Current undetermined rhythm precludes rhythm comparison, needs review  Questionable change in The axis  T wave inversion no longer evident in Inferior leads    Radiology:  Imaging Results          X-Ray Chest 1 View (Final result)  Result time 01/08/20 05:30:17    Final result by Angel Howard MD (01/08/20 05:30:17)                  Impression:      Suspected small amount of pleural fluid and or chronic pleural change at the left lung base, may relate to a persistent or recurrence process, as discussed above.      Electronically signed by: Angel Howard  Date:    01/08/2020  Time:    05:30             Narrative:    EXAMINATION:  XR CHEST 1 VIEW    CLINICAL HISTORY:  Gastrointestinal hemorrhage, unspecified    TECHNIQUE:  Single frontal view of the chest was performed.    COMPARISON:  November 18, 2019    FINDINGS:  Single chest view is submitted.  The patient is rotated, when accounting for this the cardiomediastinal silhouette appears stable.  There is continued appearance of blunting of the left costophrenic angle this may relate to a small amount of pleural fluid and or chronic pleural change, this may relate to a persistent or recurring process as it appears similar although less prominent than on the prior study.  There is no evidence for significant pleural fluid on the right.  Chronic lung changes are otherwise noted.  There is no pneumothorax.  The osseous structures demonstrate chronic change.                                 Assessment:     Elizabeth Navarrete is a 89 y.o. female with:  Patient Active Problem List    Diagnosis Date Noted    Acute parotitis 01/12/2020    Atrial fibrillation with RVR 01/09/2020    UGIB (upper gastrointestinal bleed) 01/08/2020    BP (bullous pemphigoid) 01/08/2020    Hyperkalemia 01/08/2020    Acute respiratory failure with hypoxia and hypercarbia 11/21/2019    Acute respiratory failure with hypoxia 11/19/2019    SOB (shortness of breath) 11/19/2019    Pseudomonas urinary tract infection 10/26/2019    Elevated troponin 10/26/2019    Anemia 10/25/2019    Controlled type 2 diabetes mellitus, without long-term current use of insulin 10/25/2019    Skin ulcer, stage 3 10/25/2019    Multiple open wounds of lower leg 10/25/2019    HCAP (healthcare-associated pneumonia) 10/24/2019    PEG  (percutaneous endoscopic gastrostomy) adjustment/replacement/removal 06/12/2018    Nursing home resident 06/23/2017    Gastrostomy tube dependent, 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 06/21/2017    Oropharyngeal dysphagia 06/16/2017    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 06/15/2017    Hemiparesis affecting left side as late effect of stroke 06/15/2017    Chronic atrial fibrillation 09/06/2016    Non-rheumatic mitral regurgitation 09/06/2016    Enlarged LA (left atrium) 09/06/2016    Pulmonary HTN 09/06/2016    Primary osteoarthritis of right knee 11/25/2015    Essential hypertension 10/10/2012    CAD (coronary artery disease) 10/10/2012    Chronic diastolic congestive heart failure 10/10/2012    History of depression 10/10/2012    Personal history of gallstones 10/10/2012    Internal carotid artery stenosis 10/10/2012    Family history of breast cancer 10/10/2012        Plan:     Hematemesis, Upper GI Bleed, gastric ulcer with visible vessel   Large volume hematemesis in ED. Consulted GI, underwent EGD with gastric ulcer with visible vessel. Hb 6.8 after EGD. S/p 2 unit of pRBC with rise in Hb to 10.7.   -continue PPI PO for total of 14 days  -continue CBC  Daily  - received 1 unit prbc yesterday with good response  -Hgb since last unit 10>9.3>8.8    Acute Hypoxic Respiratory Failure, resolved  - May be secondary to aspiration with vomiting  -  Weaned oxygen to RA    Hyperkalemia, resolved  Kidney function stable. Patient was shifted in ED x2.   -monitor daily CMP    Persistent atrial fibrillation   Initially held carvedilol in setting of bleed. Went into RVR 1/8, which resolved with IV Metoprolol x1.  HR 70-98 overnight  -restarted home carvedilol   -currently controlled     Bullous Pemphigoid   - Continue home clobetasol and mupirocin ointments   - Wound care consult     Hx of CVA sp left sided weakness, dysphagia, and urinary incontinence  - Patient at [facility]. Dependent on all ADLs. Refuses to work with PT  and OT at nursing home, per daughter patient spends 100% of time in bed and occasionally watches TV, refuses to be put into wheelchair or brought out into common areas  - No acute changes in baseline  - Home meds of , Plavix 75 mg, and Atorvastatin 40 mg;   - recommend not restarting plavix    Bladder mass with history of hematuria and left hydronephrosis   - Patient with hx of hematuria, bladder mass, and left hydronephrosis  - Has followed up with Urology and had lengthy discussions on the likely diagnosis of urothelial carcinoma. Given her poor functional status and high anesthetic risk, further work-up was not pursued   - Patient's daughter would like patient to not be aware of diagnosis     HFpEF  - TTE 6/2017 with EF 50-55%, grade II diastolic dysfunction, mild aortic regurgitation  - On home Lasix 20 mg, coreg, and statin   - Follows with outpatient Cardiology  - No acute issues on this admission  - Holding Lasix at this time as patient does not appear volume overloaded    Hypertension  Held home medications of Coreg 25 and Norvasc 5 mg initially  -restarted as patient stable and becoming hypertensive     Parotid gland swelling, tenderness  Consider obstruction with stone, stricture vs infection given acute onset and pain  -Parotid gland US with 5 mm stone  -attempted lemon juice swabs, no improvement  -ENT consulted, no surgical intervention at this time  -recommend continue abx, hydration, sialogogues     Leukocytosis  Suspect secondary to PRBC vs Parotid gland infection  -vanc/cef/flagyl started yesterday  -repeat cbc this morning    Hyperlipidemia   - Home medicine of Atorvastatin      Depression  - On home Venlafaxine 75 mg daily    DVT: SCDs  Diet:  tube feeds  Dispo: likely discharge tomorrow pending ENT recs    Code Status:     Full    Ron Yang MD  [facility]I    [facility] Medicine Hospitalist Pager numbers:   [facility] Hospitalist Medicine Team A  (Catherine/Yessica): [phone]  [facility] Hospitalist Medicine Team B (Farhat/Leila):  [phone]

## 2020-01-13 NOTE — PLAN OF CARE
Recommendation:   1. Continue current TF Diabetisource @ 55 mL/hr which provides 1584 kcal, 79.2 grams of protein, 200 mL free water flushes QID.   2. Monitor blood glucose.    Goals:   1. Tolerate current TF.   2. Maintain blood glucose within range.   Nutrition Goal Status: new  Communication of RD Recs: reviewed with RN(Bethany )

## 2020-01-13 NOTE — PLAN OF CARE
Pt not stable for d/c; updated clinicals sent to [facility] via [facility]     01/13/20 1063   Post-Acute Status   Post-Acute Authorization Placement   Post-Acute Placement Status Additional Clinical Requested

## 2020-01-13 NOTE — PLAN OF CARE
Plan of care reviewed with patient.  Patient verbalized understanding. AAOx4  Warm compresses massage and lemon glycerin swabs was provided.  Continuous Dextrose 5% infusing continuous.  Patient is NPO.  Tube feedings Diabetisource at 55ml/hr.  Purewick in place.   Bed in lowest position call bell in reach. Will continue to monitor patient.

## 2020-01-13 NOTE — PROGRESS NOTES
Pharmacokinetic Assessment Follow Up: IV Vancomycin    Vancomycin serum concentration assessment(s):    The random level was drawn incorrectly and cannot be used to guide therapy at this time.    Vancomycin Regimen Plan:    Continue regimen to Vancomycin 750 mg IV every q24h hours with next serum trough concentration measured at 1200 prior to 3rd dose on 1/15     Drug levels (last 3 results):  Recent Labs   Lab Result Units 01/13/20  0404   Vancomycin, Random ug/mL 19.0       Pharmacy will continue to follow and monitor vancomycin.    Please contact pharmacy at extension [phone] for questions regarding this assessment.    Thank you for the consult,   Tico Ayala       Patient brief summary:  Elizabeth Navarrete is a 89 y.o. female initiated on antimicrobial therapy with IV Vancomycin for treatment of skin & soft tissue infection    The patient's current regimen is vancomycin 750mg    Drug Allergies:   Review of patient's allergies indicates:   Allergen Reactions    Pcn [penicillins] Rash       Actual Body Weight:   59.5kg    Renal Function:   Estimated Creatinine Clearance: 38.2 mL/min (based on SCr of 0.9 mg/dL).,     Dialysis Method (if applicable):      CBC (last 72 hours):  Recent Labs   Lab Result Units 01/11/20  0537 01/11/20  2126 01/12/20  0403 01/13/20  0404   WBC K/uL 10.13 18.46* 18.76* 18.44*   Hemoglobin g/dL 7.9* 10.0* 9.3* 8.8*   Hematocrit % 26.3* 33.7* 30.3* 28.9*   Platelets K/uL 134* 114* 75* 90*   Gran% % 80.2* 86.3* 67.0 66.0   Lymph% % 11.7* 9.3* 3.0* 9.0*   Mono% % 6.8 4.2 10.0 8.0   Eosinophil% % 1.0 0.1 0.0 3.0   Basophil% % 0.3 0.1 0.0 0.0   Differential Method  Automated Automated Manual Manual       Metabolic Panel (last 72 hours):  Recent Labs   Lab Result Units 01/10/20  1134 01/10/20  1733 01/11/20  0009 01/11/20  0538 01/12/20  0403 01/13/20  0404   Sodium mmol/L 150* 150* 150* 151*  151* 153* 140   Potassium mmol/L 4.0 4.3 4.1 3.7  3.7 4.0 3.6   Chloride mmol/L 116* 118* 118* 119*   119* 120* 110   CO2 mmol/L 25 23 23 25  25 21* 22*   Glucose mg/dL 150* 166* 121* 149*  149* 198* 193*   BUN, Bld mg/dL 73* 68* 62* 56*  56* 54* 48*   Creatinine mg/dL 1.0 1.0 0.9 0.9  0.9 1.0 0.9   Albumin g/dL  --   --   --  2.5* 2.4* 2.1*   Total Bilirubin mg/dL  --   --   --  0.6 0.7 0.7   Alkaline Phosphatase U/L  --   --   --  61 86 135   AST U/L  --   --   --  29 31 23   ALT U/L  --   --   --  24 22 19       Vancomycin Administrations:  vancomycin given in the last 96 hours                     vancomycin (VANCOCIN) 1,750 mg in dextrose 5 % 500 mL IVPB (mg) 1,750 mg New Bag 01/12/20 1318                      Microbiologic Results:  Microbiology Results (last 7 days)       Procedure Component Value Units Date/Time    Urine culture [[id number]]  (Abnormal)  (Susceptibility) Collected:  01/08/20 0903    Order Status:  Completed Specimen:  Urine Updated:  01/13/20 0811     Urine Culture, Routine PSEUDOMONAS AERUGINOSA  10,000 - 49,999 cfu/ml        PROVIDENCIA STUARTII  10,000 - 49,999 cfu/ml      Narrative:       Preferred Collection Type->Urine, Clean Catch

## 2020-01-13 NOTE — PLAN OF CARE
Patient awake, alert and oriented.VVS.On tele, no ectopy on tele. complaints of pain when touching her face, heat packs applied throughout the day.SCD's, DEISY'S for VTE prevention, not on patient because patient has lower extremity wounds. IV antibiotics for infection.Purwic in place, changed and cleaned, barrier applied for redness. IV dressing CDI and flushes . Bed in low position and locked. Call light and personal items with in reach. Bed alarm remains on. Instructed to call for assistance, verbalized understanding. Educated on new medications.

## 2020-01-14 PROBLEM — K94.23 LEAKING PEG TUBE: Status: ACTIVE | Noted: 2020-01-01

## 2020-01-14 NOTE — ASSESSMENT & PLAN NOTE
No erythema or purulent drainage noted. No issues tolerating tube feeds.  - It is normal to have mild serosanguinous drainage from PEG tube. Tightened bumper as it was not touching skin and past markings. That should help with drainage.  - Recommend daily or BID dressing changes to prevent excoriation from drainage.

## 2020-01-14 NOTE — SUBJECTIVE & OBJECTIVE
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    Family history of breast cancer 10/10/2012    Hematuria, gross     History of cardiac arrhythmia 10/10/2012    History of CHF (congestive heart failure) 10/10/2012    History of depression 10/10/2012    History of echocardiogram 10/10/2012    HTN (hypertension) 10/10/2012    Internal carotid artery stenosis 10/10/2012    Personal history of gallstones 10/10/2012    Urinary tract infection        Past Surgical History:   Procedure Laterality Date    BREAST LUMPECTOMY      ESOPHAGOGASTRODUODENOSCOPY N/A 6/12/2018    Procedure: ESOPHAGOGASTRODUODENOSCOPY (EGD);  Surgeon: Anila Kolb MD;  Location: [facility];  Service: Endoscopy;  Laterality: N/A;    ESOPHAGOGASTRODUODENOSCOPY N/A 6/14/2018    Procedure: ESOPHAGOGASTRODUODENOSCOPY (EGD);  Surgeon: Anila Kolb MD;  Location: [facility];  Service: Endoscopy;  Laterality: N/A;    ESOPHAGOGASTRODUODENOSCOPY N/A 1/8/2020    Procedure: ESOPHAGOGASTRODUODENOSCOPY (EGD);  Surgeon: Richy Rothman MD;  Location: [facility];  Service: Endoscopy;  Laterality: N/A;    EYE SURGERY      HYSTERECTOMY         Review of patient's allergies indicates:   Allergen Reactions    Pcn [penicillins] Rash     Family History     Problem Relation (Age of Onset)    Cancer Mother        Tobacco Use    Smoking status: Never Smoker    Smokeless tobacco: Never Used   Substance and Sexual Activity    Alcohol use: No    Drug use: No    Sexual activity: Never     Review of Systems   Constitutional: Negative for activity change.   Respiratory: Negative for shortness of breath and wheezing.    Gastrointestinal: Negative for abdominal distention and abdominal pain.     Objective:     Vital Signs (Most Recent):  Temp: 97.4 °F  (36.3 °C) (01/14/20 0826)  Pulse: 68 (01/14/20 1200)  Resp: 18 (01/14/20 0826)  BP: (!) 105/53 (01/14/20 0826)  SpO2: (!) 94 % (01/14/20 1529) Vital Signs (24h Range):  Temp:  [97.4 °F (36.3 °C)-99.5 °F (37.5 °C)] 97.4 °F (36.3 °C)  Pulse:  [68-96] 68  Resp:  [16-18] 18  SpO2:  [89 %-96 %] 94 %  BP: (105-127)/(53-64) 105/53     Weight: 61.3 kg (135 lb 2.3 oz) (01/14/20 0335)  Body mass index is 30.3 kg/m².      Intake/Output Summary (Last 24 hours) at 1/14/2020 1547  Last data filed at 1/14/2020 0745  Gross per 24 hour   Intake 2340 ml   Output 450 ml   Net 1890 ml       Lines/Drains/Airways     Drain                 Gastrostomy/Enterostomy 09/10/19 1726 Gastrostomy tube w/ balloon midline feeding 125 days    Female External Urinary Catheter 01/09/20 0600 5 days          Peripheral Intravenous Line                 Peripheral IV - Double Lumen 01/12/20 0940 22 G Left Antecubital 2 days         Peripheral IV - Single Lumen 01/13/20 1051 22 G Anterior;Right Forearm 1 day                Physical Exam   Constitutional:   Frail, elderly woman, pleasant   HENT:   Head: Normocephalic and atraumatic.   Eyes: Pupils are equal, round, and reactive to light. Conjunctivae and EOM are normal.   Neck: Normal range of motion. Neck supple.   Cardiovascular: Normal rate and normal heart sounds.   Pulmonary/Chest: Effort normal and breath sounds normal.   Abdominal: Soft. Bowel sounds are normal. She exhibits no distension.   PEG tube at 6cm with bumper very loose - not touching the skin (located past markings); gauze saturated with serosanginous drainge   Musculoskeletal: She exhibits edema.   Bruising to b/l UE   Neurological: She is alert.   Skin: Skin is warm.   Vitals reviewed.      Significant Labs:  CBC:   Recent Labs   Lab 01/13/20  0404 01/14/20  0311   WBC 18.44* 17.47*   HGB 8.8* 8.8*   HCT 28.9* 28.3*   PLT 90* 80*     BMP:   Recent Labs   Lab 01/14/20 0311   *      K 4.4      CO2 21*   BUN 57*    CREATININE 1.0   CALCIUM 7.8*       Significant Imaging:  No new images

## 2020-01-14 NOTE — PT/OT/SLP PROGRESS
"Speech Language Pathology Treatment & Discharge Note    Patient Name:  Elizabeth Navarrete   MRN:  [id number]  Admitting Diagnosis: UGIB (upper gastrointestinal bleed)    Recommendations:                 General Recommendations:  Follow-up not indicated  Diet recommendations:  NPO, Liquid Diet Level: Other (see comments)(Water/ice chips only)   Aspiration Precautions:  1. HOB to 90* for all trials  2. Small amounts of water/ice chips only   3. Excellent oral care prior to all liquid  4. Monitor for any overt s/s of aspiration (ie coughing/choking, throat clearing, wet/gurgly voice, change in respiratory status, spike in fever, reddened face, watery eyes, nasal emission, etc.)  General Precautions: Standard, aspiration, fall, hearing impaired, NPO  Communication strategies: provide increased time to answer, go to room if call light pushed and frequent re-orientation    Subjective     Pt seen at the bedside for dysphagia follow up. SLP did check w/ RNBethany, prior to visit. Pt asleep upon entry though awakened to verbal stimuli. She was agreeable to oral care.   Patient goals: Pt stated, "Turn the lights off" and "that's enough" to request termination of session.      Pain/Comfort:  · Pain Rating 1: 0/10    Objective:     Has the patient been evaluated by SLP for swallowing?   Yes  Keep patient NPO? Yes   Current Respiratory Status: room air      HOB elevated to 90*. Yankauer suction connected to wall unit. Pt with significant L sided facial/buccal edema with resulting reduced eye opening on L and impaired labial seal. Massage applied to L buccal cavity. Suction cleared purulent material. Oral care provided using oral swabs, mouthwash, and suction. Pt grunted and turned head away, expressing pain with oral care. She remains edentulous. Moist mucous membranes present. RN reported good tolerance of water via PEG tube. Should pt be awake/alert and requesting water/ice chips, pt may follow [place] Free Water protocol for " pleasure. Oral care needed 3x/day to reduce risk of aspirating bacteria filled saliva. Upon completion of swallow tx, Yankauer suction removed from suction unit, and purewick suction returned.     Assessment:     Elizabeth Navarrete is a 89 y.o. female with an SLP diagnosis of chronic Dysphagia.  She presents with onset of L sided buccal/facial edema since previous ST session. Oral care provided. Pt tolerating tube feeds. She is at baseline swallow function. No further SLP services warranted at this time. Please re-consult should change in status occur. Pt to continue with PEG tube feedings for all nutrition/hydration/medication needs.     Goals:   Multidisciplinary Problems     SLP Goals     Not on file          Multidisciplinary Problems (Resolved)        Problem: SLP Goal    Goal Priority Disciplines Outcome   SLP Goal   (Resolved)     SLP Met   Description:  STGs:  1. Pt will participate in clinical swallow assessment to determine LRD.-MET at baseline swallow function  2. Pt will tolerate pleasure feeds of ice chips/water without any overt s/s of aspiration.-MET at baseline swallow function  3. Education to be provided to family/caregiver re: dysphagia mgmt and [place] Free Water Protocol.-MET                       Plan:     · Patient to be seen:  (n/a)   · Plan of Care expires:  01/14/20  · Plan of Care reviewed with:  patient(GEN Sims)   · SLP Follow-Up:  No       Discharge recommendations: Return to prior level of care at NH  Barriers to Discharge:  None    Time Tracking:     SLP Treatment Date:   01/14/20  Speech Start Time:  1019  Speech Stop Time:  1028     Speech Total Time (min):  9 min    Billable Minutes: Treatment Swallowing Dysfunction 9 mins    Lexus Jaquez CCC-SLP  01/14/2020

## 2020-01-14 NOTE — CONSULTS
[facility]  Gastroenterology  Consult Note    Patient Name: Elizabeth Navarrete  MRN: [id number]  Admission Date: 1/8/2020  Hospital Length of Stay: 6 days  Code Status: Partial Code   Attending Provider: Mervat Alexander MD   Consulting Provider: Dr. Kolb  Primary Care Physician: [facility]  Principal Problem:UGIB (upper gastrointestinal bleed)    Inpatient consult to [facility]  Consult performed by: Karine Oden MD  Consult ordered by: Ron Yang MD  Reason for consult: PEG tube drainage        Subjective:     HPI:  This is a 89yo female with a PMHx of a.fib, cva, chf, htn, cad here with serosanginous PEG tube drainage. She denies abdominal pain. No issues with PEG tube feeds. Medications flush easily. Of note, patient recently had a linear gastric ulcer s/p epinephrine, bipolar cauterization and clip x 1 on 1/8      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    Family history of breast cancer 10/10/2012    Hematuria, gross     History of cardiac arrhythmia 10/10/2012    History of CHF (congestive heart failure) 10/10/2012    History of depression 10/10/2012    History of echocardiogram 10/10/2012    HTN (hypertension) 10/10/2012    Internal carotid artery stenosis 10/10/2012    Personal history of gallstones 10/10/2012    Urinary tract infection        Past Surgical History:   Procedure Laterality Date    BREAST LUMPECTOMY      ESOPHAGOGASTRODUODENOSCOPY N/A 6/12/2018    Procedure: ESOPHAGOGASTRODUODENOSCOPY (EGD);  Surgeon: Anila Kolb MD;  Location: [facility];  Service: Endoscopy;  Laterality: N/A;    ESOPHAGOGASTRODUODENOSCOPY N/A 6/14/2018    Procedure: ESOPHAGOGASTRODUODENOSCOPY (EGD);  Surgeon: Anila Kolb MD;  Location: [facility]  ENDO;  Service: Endoscopy;  Laterality: N/A;    ESOPHAGOGASTRODUODENOSCOPY N/A 1/8/2020    Procedure: ESOPHAGOGASTRODUODENOSCOPY (EGD);  Surgeon: Richy Rothman MD;  Location: [facility] ENDO;  Service: Endoscopy;  Laterality: N/A;    EYE SURGERY      HYSTERECTOMY         Review of patient's allergies indicates:   Allergen Reactions    Pcn [penicillins] Rash     Family History     Problem Relation (Age of Onset)    Cancer Mother        Tobacco Use    Smoking status: Never Smoker    Smokeless tobacco: Never Used   Substance and Sexual Activity    Alcohol use: No    Drug use: No    Sexual activity: Never     Review of Systems   Constitutional: Negative for activity change.   Respiratory: Negative for shortness of breath and wheezing.    Gastrointestinal: Negative for abdominal distention and abdominal pain.     Objective:     Vital Signs (Most Recent):  Temp: 97.4 °F (36.3 °C) (01/14/20 0826)  Pulse: 68 (01/14/20 1200)  Resp: 18 (01/14/20 0826)  BP: (!) 105/53 (01/14/20 0826)  SpO2: (!) 94 % (01/14/20 1529) Vital Signs (24h Range):  Temp:  [97.4 °F (36.3 °C)-99.5 °F (37.5 °C)] 97.4 °F (36.3 °C)  Pulse:  [68-96] 68  Resp:  [16-18] 18  SpO2:  [89 %-96 %] 94 %  BP: (105-127)/(53-64) 105/53     Weight: 61.3 kg (135 lb 2.3 oz) (01/14/20 0335)  Body mass index is 30.3 kg/m².      Intake/Output Summary (Last 24 hours) at 1/14/2020 1547  Last data filed at 1/14/2020 0745  Gross per 24 hour   Intake 2340 ml   Output 450 ml   Net 1890 ml       Lines/Drains/Airways     Drain                 Gastrostomy/Enterostomy 09/10/19 1726 Gastrostomy tube w/ balloon midline feeding 125 days    Female External Urinary Catheter 01/09/20 0600 5 days          Peripheral Intravenous Line                 Peripheral IV - Double Lumen 01/12/20 0940 22 G Left Antecubital 2 days         Peripheral IV - Single Lumen 01/13/20 1051 22 G Anterior;Right Forearm 1 day                Physical Exam   Constitutional:   Frail, elderly woman, pleasant    HENT:   Head: Normocephalic and atraumatic.   Eyes: Pupils are equal, round, and reactive to light. Conjunctivae and EOM are normal.   Neck: Normal range of motion. Neck supple.   Cardiovascular: Normal rate and normal heart sounds.   Pulmonary/Chest: Effort normal and breath sounds normal.   Abdominal: Soft. Bowel sounds are normal. She exhibits no distension.   PEG tube at 6cm with bumper very loose - not touching the skin (located past markings); gauze saturated with serosanginous drainge   Musculoskeletal: She exhibits edema.   Bruising to b/l UE   Neurological: She is alert.   Skin: Skin is warm.   Vitals reviewed.      Significant Labs:  CBC:   Recent Labs   Lab 01/13/20  0404 01/14/20  0311   WBC 18.44* 17.47*   HGB 8.8* 8.8*   HCT 28.9* 28.3*   PLT 90* 80*     BMP:   Recent Labs   Lab 01/14/20  0311   *      K 4.4      CO2 21*   BUN 57*   CREATININE 1.0   CALCIUM 7.8*       Significant Imaging:  No new images    Assessment/Plan:     Leaking PEG tube  No erythema or purulent drainage noted. No issues tolerating tube feeds.  - It is normal to have mild serosanguinous drainage from PEG tube. Tightened bumper as it was not touching skin and past markings. That should help with drainage.  - Recommend daily or BID dressing changes to prevent excoriation from drainage.        Thank you for your consult. I will follow-up with patient. Please contact us if you have any additional questions.    Karine Oden MD  Gastroenterology  [facility]

## 2020-01-14 NOTE — PROGRESS NOTES
"[facility] Medicine H&P Note     Admitting Team: [facility] Hospitalist Team A    Attending Physician: Mervat Alexander MD  Resident: DELFINO   Intern: REJI    Date of Admit: 2020    Subjective:      Patient did well overnight, no signs of bleeding.  Vitals stable, afebrile. Continued pain to left jaw/parotid. US with 5mm stone. Able to express purulent material overnight. Denies any chest pain, SOB, or palpitations this AM.       Objective:   Last 24 Hour Vital Signs:  BP  Min: 110/54  Max: 156/66  Temp  Av.2 °F (36.8 °C)  Min: 97.5 °F (36.4 °C)  Max: 99.5 °F (37.5 °C)  Pulse  Av.1  Min: 76  Max: 96  Resp  Av.7  Min: 16  Max: 18  SpO2  Av.7 %  Min: 89 %  Max: 96 %  Weight  Av.3 kg (135 lb 2.3 oz)  Min: 61.3 kg (135 lb 2.3 oz)  Max: 61.3 kg (135 lb 2.3 oz)  Body mass index is 30.3 kg/m².  I/O last 3 completed shifts:  In: 3578.8 [P.O.:250; I.V.:778.8; NG/GT:2000; IV Piggyback:550]  Out: 752 [Urine:750; Stool:2]    Physical Examination:    BP (!) 110/54   Pulse 89   Temp 98.1 °F (36.7 °C)   Resp 18   Ht 4' 8" (1.422 m)   Wt 61.3 kg (135 lb 2.3 oz)   LMP  (LMP Unknown)   SpO2 96%   Breastfeeding? No   BMI 30.30 kg/m²     General Appearance:    Alert, cooperative, no distress, appears stated age   Head:    Normocephalic, without obvious abnormality, atraumatic   Eyes:    PERRL, conjunctiva/corneas clear, EOM's intact   Nose:   Nares normal, septum midline, mucosa normal, no drainage    or sinus tenderness   Throat:   Lips, mucosa, and tongue normal; dark residue around mouth   Neck:   trachea midline.  Left submandibular swelling, tenderness.    Lungs:     Clear to auscultation bilaterally, respirations unlabored, on ra NC    Heart:    Regular rate and rhythm, no murmurs appreciated   Abdomen:     Soft, non-tender, bowel sounds active all four quadrants,     no masses, no organomegaly, PEG tube in place     Extremities:   Bilateral lower extremity wounds wrapped    Skin:   Skin with many " lesions and bruises diffusely over body    Neurologic:   Residual left sided weakness, CN II-XII grossly intact, moves all four extremities equally         Laboratory:  Most Recent Data:  CBC:   Lab Results   Component Value Date    WBC 17.47 (H) 01/14/2020    HGB 8.8 (L) 01/14/2020    HCT 28.3 (L) 01/14/2020    PLT 80 (L) 01/14/2020    MCV 98 01/14/2020    RDW 16.3 (H) 01/14/2020     WBC Differential: 72.6 % N, 0 % Bands, 13.6 % L, 5.7 % M, 7.9 % Eo, 0.2 % Baso, no additional cells seen  BMP:   Lab Results   Component Value Date     01/14/2020    K 4.4 01/14/2020     01/14/2020    CO2 21 (L) 01/14/2020    BUN 57 (H) 01/14/2020    CREATININE 1.0 01/14/2020     (H) 01/14/2020    CALCIUM 7.8 (L) 01/14/2020    MG 2.1 01/08/2020    PHOS 3.3 10/28/2019     LFTs:   Lab Results   Component Value Date    PROT 4.8 (L) 01/14/2020    ALBUMIN 1.9 (L) 01/14/2020    BILITOT 0.7 01/14/2020    AST 27 01/14/2020    ALKPHOS 174 (H) 01/14/2020    ALT 17 01/14/2020     Coags:   Lab Results   Component Value Date    INR 1.1 01/08/2020     FLP:   Lab Results   Component Value Date    CHOL 118 (L) 06/15/2017    HDL 41 06/15/2017    LDLCALC 67.4 06/15/2017    TRIG 48 06/15/2017    CHOLHDL 34.7 06/15/2017     DM:   Lab Results   Component Value Date    HGBA1C 5.4 01/09/2020    HGBA1C 6.2 (H) 11/19/2019    HGBA1C 5.6 06/15/2017    LDLCALC 67.4 06/15/2017    CREATININE 1.0 01/14/2020     Thyroid:   Lab Results   Component Value Date    TSH 1.289 02/18/2019     Anemia:   Lab Results   Component Value Date    IRON 79 01/08/2020    TIBC 354 01/08/2020    FERRITIN 81 01/08/2020    [id number] 797 01/08/2020    FOLATE 14.3 01/08/2020     Cardiac:   Lab Results   Component Value Date    TROPONINI 0.016 01/08/2020     (H) 11/18/2019     Urinalysis:   Lab Results   Component Value Date    LABURIN PSEUDOMONAS AERUGINOSA  10,000 - 49,999 cfu/ml   (A) 01/08/2020    LABURIN PROVIDENCIA STUARTII  10,000 - 49,999 cfu/ml   (A)  01/08/2020    COLORU Yellow 01/08/2020    SPECGRAV <=1.005 (A) 01/08/2020    NITRITE Negative 01/08/2020    KETONESU Negative 01/08/2020    UROBILINOGEN Negative 01/08/2020    WBCUA >100 (H) 01/08/2020       Trended Lab Data:  Recent Labs   Lab 01/12/20  0403 01/13/20  0404 01/14/20  0311   WBC 18.76* 18.44* 17.47*   HGB 9.3* 8.8* 8.8*   HCT 30.3* 28.9* 28.3*   PLT 75* 90* 80*   * 100* 98   RDW 17.3* 16.6* 16.3*   * 140 138   K 4.0 3.6 4.4   * 110 107   CO2 21* 22* 21*   BUN 54* 48* 57*   CREATININE 1.0 0.9 1.0   * 193* 156*   PROT 5.1* 4.8* 4.8*   ALBUMIN 2.4* 2.1* 1.9*   BILITOT 0.7 0.7 0.7   AST 31 23 27   ALKPHOS 86 135 174*   ALT 22 19 17       Trended Cardiac Data:  Recent Labs   Lab 01/08/20  1527   TROPONINI 0.016       Microbiology Data:      Other Results:  EKG: Undetermined rhythm  Left axis deviation  Nonspecific intraventricular conduction delay  Nonspecific ST and T wave abnormality  Abnormal ECG  When compared with ECG of 24-OCT-2019 21:32,  Current undetermined rhythm precludes rhythm comparison, needs review  Questionable change in The axis  T wave inversion no longer evident in Inferior leads    Radiology:  Imaging Results          X-Ray Chest 1 View (Final result)  Result time 01/08/20 05:30:17    Final result by Angel Howard MD (01/08/20 05:30:17)                 Impression:      Suspected small amount of pleural fluid and or chronic pleural change at the left lung base, may relate to a persistent or recurrence process, as discussed above.      Electronically signed by: Angel Howard  Date:    01/08/2020  Time:    05:30             Narrative:    EXAMINATION:  XR CHEST 1 VIEW    CLINICAL HISTORY:  Gastrointestinal hemorrhage, unspecified    TECHNIQUE:  Single frontal view of the chest was performed.    COMPARISON:  November 18, 2019    FINDINGS:  Single chest view is submitted.  The patient is rotated, when accounting for this the cardiomediastinal silhouette  appears stable.  There is continued appearance of blunting of the left costophrenic angle this may relate to a small amount of pleural fluid and or chronic pleural change, this may relate to a persistent or recurring process as it appears similar although less prominent than on the prior study.  There is no evidence for significant pleural fluid on the right.  Chronic lung changes are otherwise noted.  There is no pneumothorax.  The osseous structures demonstrate chronic change.                                 Assessment:     Elizabeth Navarrete is a 89 y.o. female with:  Patient Active Problem List    Diagnosis Date Noted    Acute parotitis 01/12/2020    Atrial fibrillation with RVR 01/09/2020    UGIB (upper gastrointestinal bleed) 01/08/2020    BP (bullous pemphigoid) 01/08/2020    Hyperkalemia 01/08/2020    Acute respiratory failure with hypoxia and hypercarbia 11/21/2019    Acute respiratory failure with hypoxia 11/19/2019    SOB (shortness of breath) 11/19/2019    Pseudomonas urinary tract infection 10/26/2019    Elevated troponin 10/26/2019    Anemia 10/25/2019    Controlled type 2 diabetes mellitus, without long-term current use of insulin 10/25/2019    Skin ulcer, stage 3 10/25/2019    Multiple open wounds of lower leg 10/25/2019    HCAP (healthcare-associated pneumonia) 10/24/2019    PEG (percutaneous endoscopic gastrostomy) adjustment/replacement/removal 06/12/2018    Nursing home resident 06/23/2017    Gastrostomy tube dependent, 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 06/21/2017    Oropharyngeal dysphagia 06/16/2017    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 06/15/2017    Hemiparesis affecting left side as late effect of stroke 06/15/2017    Chronic atrial fibrillation 09/06/2016    Non-rheumatic mitral regurgitation 09/06/2016    Enlarged LA (left atrium) 09/06/2016    Pulmonary HTN 09/06/2016    Primary osteoarthritis of right knee 11/25/2015    Essential hypertension 10/10/2012    CAD (coronary  artery disease) 10/10/2012    Chronic diastolic congestive heart failure 10/10/2012    History of depression 10/10/2012    Personal history of gallstones 10/10/2012    Internal carotid artery stenosis 10/10/2012    Family history of breast cancer 10/10/2012        Plan:     Hematemesis, Upper GI Bleed, gastric ulcer with visible vessel   Large volume hematemesis in ED. Consulted GI, underwent EGD with gastric ulcer with visible vessel. Hb 6.8 after EGD. S/p 2 unit of pRBC with rise in Hb to 10.7, slowly trended down and stabilized at 8.8.   -continue PPI PO BID for total of 11days, then continue PPI PO daily indefinitely   -continue CBC  Daily  - received 1 unit prbc yesterday with good response  -Hgb since last unit 10>9.3>8.8>8.8    Acute Hypoxic Respiratory Failure, resolved  - May be secondary to aspiration with vomiting  -  Weaned oxygen to RA    Hyperkalemia, resolved  Kidney function stable. Patient was shifted in ED x2.   -monitor daily CMP    Persistent atrial fibrillation, controlled rate  Initially held carvedilol in setting of bleed. Went into RVR 1/8, which resolved with IV Metoprolol x1.  HR 70-98 overnight  -restarted home carvedilol   -currently controlled     Bullous Pemphigoid   - Continue home clobetasol and mupirocin ointments   - Wound care consult     Hx of CVA sp left sided weakness, dysphagia, and urinary incontinence  - Patient at [facility]. Dependent on all ADLs. Refuses to work with PT and OT at nursing home, per daughter patient spends 100% of time in bed and occasionally watches TV, refuses to be put into wheelchair or brought out into common areas  - No acute changes in baseline  - Home meds of , Plavix 75 mg, and Atorvastatin 40 mg;   - recommend not restarting plavix    Bladder mass with history of hematuria and left hydronephrosis   - Patient with hx of hematuria, bladder mass, and left hydronephrosis  - Has followed up with Urology and had lengthy discussions on the likely  diagnosis of urothelial carcinoma. Given her poor functional status and high anesthetic risk, further work-up was not pursued   - Patient's daughter would like patient to not be aware of diagnosis     HFpEF  - TTE 6/2017 with EF 50-55%, grade II diastolic dysfunction, mild aortic regurgitation  - On home Lasix 20 mg, coreg, and statin   - Follows with outpatient Cardiology  - No acute issues on this admission  - Holding Lasix at this time as patient does not appear volume overloaded    Hypertension  Held home medications of Coreg 25 and Norvasc 5 mg initially  -restarted as patient stable and becoming hypertensive     Parotid gland swelling, tenderness  Consider obstruction with stone, stricture vs infection given acute onset and pain  -Parotid gland US with 5 mm stone  -attempted lemon juice swabs, no improvement  -ENT consulted, no surgical intervention at this time  -recommend continue abx, hydration, sialogogues  -continue warm compress with massage, purulent material expressed      Leukocytosis  Suspect secondary to PRBC vs Parotid gland infection  -WBC 18.5>18.5>17.4  -vanc/cef/flagyl started yesterday  -repeat cbc this morning    Hyperlipidemia   - Home medicine of Atorvastatin      Depression  - On home Venlafaxine 75 mg daily    DVT: SCDs  Diet:  tube feeds  Dispo: possible discharge.  No acute intervention necessary for parotiditis/stone per ENT    Code Status:     Full    Ron Yang MD  [facility]    [facility] Medicine Hospitalist Pager numbers:   [facility] Hospitalist Medicine Team A (Catherine/Yessica): [phone]  [facility] Hospitalist Medicine Team B (Farhat/Leila):  [phone]

## 2020-01-14 NOTE — PLAN OF CARE
Patient awake, alert and oriented.VVS.On tele, no ectopy on tele. No complaints of pain. Facial massage every four hours with mouth swabs.Patient positioned to her right side for facial drainage. IV antibiotics for infection. IV dressing CDI and flushes . Bed in low position and locked. Call light and personal items with in reach. Bed alarm remains on. Instructed to call for assistance, verbalized understanding. Educated on new medications.

## 2020-01-14 NOTE — PROGRESS NOTES
Otolaryngology Progress Note    Elizabeth Navarrete  Encounter Date: 01/13/2020  YOB: 1928  Provider: Radha Nicholas MD    Chief complaint: facial swelling    Subjective: Elizabeth Navarrete is a 89 y.o. female with left parotitis. Small stone likely on US    Overnight: No acute events overnight. Has not gotten massages overnight. Has been doing compresses and sialogogues. Afebrile. Continues to complain about pain over left cheek/neck.     Daughter at bedside    Past medical/surgical/family/social history and review of systems reviewed - no interval changes.    Medications  Scheduled Meds:   amLODIPine  2.5 mg Per G Tube Daily    atorvastatin  40 mg Per G Tube Daily    carvedilol  12.5 mg Per G Tube BID    ceFEPime (MAXIPIME) IVPB  2 g Intravenous Q12H    clobetasol   Topical (Top) BID    metronidazole  500 mg Intravenous Q8H    mupirocin   Topical (Top) BID    pantoprazole  40 mg Oral BID    vancomycin (VANCOCIN) IVPB  750 mg Intravenous Q24H    venlafaxine  75 mg Per G Tube Daily     Continuous Infusions:  PRN Meds:.sodium chloride, sodium chloride, acetaminophen, metoprolol, ondansetron, sodium chloride 0.9%      Objective:  Vitals:    01/14/20 0335 01/14/20 0402 01/14/20 0409 01/14/20 0556   BP: (!) 110/54      BP Location:       Patient Position:       Pulse: 89 89     Resp: 18      Temp: 98.1 °F (36.7 °C)      TempSrc:       SpO2: (!) 94%  96% 96%   Weight: 61.3 kg (135 lb 2.3 oz)      Height:           General: NAD, awake and alert  Eye: EOMI bilaterally, PERRLA  Head: normocephalic, atraumatic  Ears: AU: normal external ear. Grossly normal hearing.   Nose: external nose normal  Oral cavity/oropharynx: mucosal membranes dry, purulent drainage expressed from stensen's duct on left  Face/Neck: Left parotid with overlying erythema, induration, edema - has warm compress on at time of exam so increased warmth and redness may be partially secondary to that  Respiratory: nonlabored breathing, no  stridor  Neurologic: left sided weakness      Labs & Imaging    WBC 18     Intake/Output Summary (Last 24 hours) at 1/14/2020 0810  Last data filed at 1/14/2020 0600  Gross per 24 hour   Intake 2490 ml   Output 452 ml   Net 2038 ml       Assessment: Elizabeth Navarrete is a 89 y.o. female with left parotitis      Plan:   - Discussed massage with both medicine team and nurse, very important since patient unable to do herself  - Continue abx per primary, compresses, massage, and sialogogues  - Would not keep warm compresses on at all times, primarily just for massages since that will not help edema  - Really encourage patient to not continue to lay on left side, aware that with left sided weakness she favors that side but also likely getting some dependent edema as well    All discussed with daughter as well who is at bedside    E. Grier de Lauréal, MD [facility]

## 2020-01-14 NOTE — PLAN OF CARE
Problem: SLP Goal  Goal: SLP Goal  Description  STGs:  1. Pt will participate in clinical swallow assessment to determine LRD.-MET at baseline swallow function  2. Pt will tolerate pleasure feeds of ice chips/water without any overt s/s of aspiration.-MET at baseline swallow function  3. Education to be provided to family/caregiver re: dysphagia mgmt and [place] Free Water Protocol.-MET      Outcome: Met   1/14: Follow up completed this date. Pt with onset of L sided buccal/facial edema since previous ST session. Oral care provided. Pt tolerating tube feeds. She is at baseline swallow function. No further SLP services warranted at this time. Please re-consult should change in status occur.

## 2020-01-14 NOTE — PROGRESS NOTES
Otolaryngology Progress Note    Elizabeth Navarrete  Encounter Date: 01/14/2020  YOB: 1928  Provider: Radha Nicholas MD    Chief complaint: facial swelling    Subjective: Elizabeth Navarrete is a 89 y.o. female with left parotitis    Overnight: No acute events overnight. Afebrile. WBC 17. Still reports pain of left cheek. Nursing has been doing massage, compresses, sialogogues. Remains on broad spectrum IV antibiotics.     Past medical/surgical/family/social history and review of systems reviewed - no interval changes.    Medications  Scheduled Meds:   amLODIPine  2.5 mg Per G Tube Daily    atorvastatin  40 mg Per G Tube Daily    carvedilol  12.5 mg Per G Tube BID    ceFEPime (MAXIPIME) IVPB  2 g Intravenous Q12H    clobetasol   Topical (Top) BID    dexamethasone  10 mg Intravenous Q12H    metronidazole  500 mg Intravenous Q8H    mupirocin   Topical (Top) BID    pantoprazole  40 mg Oral BID    vancomycin (VANCOCIN) IVPB  750 mg Intravenous Q24H    venlafaxine  75 mg Per G Tube Daily     Continuous Infusions:  PRN Meds:.sodium chloride, sodium chloride, acetaminophen, metoprolol, ondansetron, sodium chloride 0.9%      Objective:  Vitals:    01/14/20 0556 01/14/20 0826 01/14/20 0859 01/14/20 0901   BP:  (!) 105/53     BP Location:  Right arm     Patient Position:  Lying     Pulse:  87  79   Resp:  18     Temp:  97.4 °F (36.3 °C)     TempSrc:  Oral     SpO2: 96% (!) 94% (!) 93%    Weight:       Height:           General: NAD, awake and alter  Eye: EOMI bilaterally, PERRLA  Head: normocephalic, atraumatic  Ears: AU: normal external ear. Grossly normal hearing.   Nose: external nose normal  Oral cavity/oropharynx: mucosal membranes dry, still able to express purulence from left parotid duct - seems thinner than yesterday  Face/Neck: Edema overlying left parotid along with worsened erythema and induration, no fluctuance, edema extends more onto face and towards lower eyelid as well  Respiratory:  nonlabored breathing, no stridor  Neurologic: left sided weakness, no facial n weakness - good eye closure and movement although slight asymmetry of lower face likely secondary to edema      Labs & Imaging     WBC 17    Intake/Output Summary (Last 24 hours) at 1/14/2020 1108  Last data filed at 1/14/2020 0600  Gross per 24 hour   Intake 2440 ml   Output 452 ml   Net 1988 ml       Assessment: Elizabeth Navarrete is a 89 y.o. female with left parotitis, worsened from yesterday      Plan:   - Continue current management with IV antibiotics, hydration, lemon swabs, massage and compresses  - Check CT with contrast due to clinical worsening  - Re-iterated positioning of patient. She continues to lay with her left side down which is likely worsening edema. She does not like to be on her right side but it is imperative that she not continuously be on her left with a worsening parotitis on that side. May need to use a wedge to force her towards the right  - Cultured parotid secretions this morning  - Recommend adding decadron  - Would recommend continued inpatient care due to patient's age and clinical worsening of parotid despite 48 hours of broad spectrum antibiotics along with massage, compresses and sialogogues      ELZBIETA Rocha MD  [facility]

## 2020-01-15 NOTE — PROGRESS NOTES
Pharmacokinetic Assessment Follow Up: IV Vancomycin    Vancomycin serum concentration assessment(s):    The trough level was drawn correctly and can be used to guide therapy at this time. The measurement is above the desired definitive target range of 15 to 20 mcg/mL.    Vancomycin Regimen Plan:    Re-dose when the random level is less than 15 mcg/mL, next level to be drawn at 1300 on 1/16     Drug levels (last 3 results):  Recent Labs   Lab Result Units 01/13/20 0404 01/15/20  1302   Vancomycin, Random ug/mL 19.0 44.6       Pharmacy will continue to follow and monitor vancomycin.    Please contact pharmacy at extension [phone] for questions regarding this assessment.    Thank you for the consult,   Tico Ayala       Patient brief summary:  Elizabeth Navarrete is a 89 y.o. female initiated on antimicrobial therapy with IV Vancomycin for treatment of skin & soft tissue infection    The patient's current regimen is vancomycin pulse dosing    Drug Allergies:   Review of patient's allergies indicates:   Allergen Reactions    Pcn [penicillins] Rash       Actual Body Weight:   61.3kg    Renal Function:   Estimated Creatinine Clearance: 32.2 mL/min (based on SCr of 1.1 mg/dL).,     Dialysis Method (if applicable):      CBC (last 72 hours):  Recent Labs   Lab Result Units 01/13/20  0404 01/14/20  0311 01/15/20  0325   WBC K/uL 18.44* 17.47* 8.05   Hemoglobin g/dL 8.8* 8.8* 8.2*   Hematocrit % 28.9* 28.3* 26.5*   Platelets K/uL 90* 80* 97*   Gran% % 66.0 83.9* 95.6*   Lymph% % 9.0* 3.2* 2.7*   Mono% % 8.0 9.0 1.7*   Eosinophil% % 3.0 3.7 0.0   Basophil% % 0.0 0.2 0.0   Differential Method  Manual Automated Automated       Metabolic Panel (last 72 hours):  Recent Labs   Lab Result Units 01/13/20  0404 01/14/20  0311 01/15/20  0325   Sodium mmol/L 140 138 136   Potassium mmol/L 3.6 4.4 4.7   Chloride mmol/L 110 107 105   CO2 mmol/L 22* 21* 23   Glucose mg/dL 193* 156* 259*   BUN, Bld mg/dL 48* 57* 64*   Creatinine mg/dL 0.9 1.0  1.1   Albumin g/dL 2.1* 1.9* 1.9*   Total Bilirubin mg/dL 0.7 0.7 0.5   Alkaline Phosphatase U/L 135 174* 152*   AST U/L 23 27 21   ALT U/L 19 17 17       Vancomycin Administrations:  vancomycin given in the last 96 hours                     vancomycin 750 mg in dextrose 5 % 250 mL IVPB (ready to mix system) (mg) 750 mg New Bag 01/14/20 1310     750 mg New Bag 01/13/20 1211                      Microbiologic Results:  Microbiology Results (last 7 days)       Procedure Component Value Units Date/Time    Aerobic culture [[id number]]  (Abnormal) Collected:  01/14/20 1019    Order Status:  Completed Specimen:  Body Fluid from Mouth Updated:  01/15/20 1403     Aerobic Bacterial Culture STAPHYLOCOCCUS AUREUS  Moderate  Susceptibility pending      Narrative:       Left parotid    Culture, Anaerobe [[id number]] Collected:  01/14/20 1019    Order Status:  Completed Specimen:  Body Fluid from Mouth Updated:  01/15/20 0738     Anaerobic Culture Culture in progress    Narrative:       Left parotid    Urine culture [[id number]]  (Abnormal)  (Susceptibility) Collected:  01/08/20 0903    Order Status:  Completed Specimen:  Urine Updated:  01/14/20 0802     Urine Culture, Routine PSEUDOMONAS AERUGINOSA  10,000 - 49,999 cfu/ml        PROVIDENCIA STUARTII  10,000 - 49,999 cfu/ml      Narrative:       Preferred Collection Type->Urine, Clean Catch

## 2020-01-15 NOTE — PROGRESS NOTES
"[facility] Medicine H&P Note     Admitting Team: [facility] Hospitalist Team A    Attending Physician: Mervat Alexander MD  Resident: DELFINO   Intern: REJI    Date of Admit: 2020    Subjective:      Patient did well overnight, no obvious signs of bleeding.  Vitals stable, afebrile. pain and swelling to left jaw/parotid somewhat improved. US with 5mm stone. Denies any chest pain, SOB, or palpitations this AM.        Objective:   Last 24 Hour Vital Signs:  BP  Min: 105/53  Max: 132/63  Temp  Av.3 °F (36.3 °C)  Min: 96.9 °F (36.1 °C)  Max: 97.6 °F (36.4 °C)  Pulse  Av.5  Min: 68  Max: 88  Resp  Av  Min: 15  Max: 19  SpO2  Av.6 %  Min: 91 %  Max: 96 %  Body mass index is 30.3 kg/m².  I/O last 3 completed shifts:  In: 3095 [P.O.:240; NG/GT:2305; IV Piggyback:550]  Out: 850 [Urine:850]    Physical Examination:    /63   Pulse 88   Temp 97.5 °F (36.4 °C)   Resp 16   Ht 4' 8" (1.422 m)   Wt 61.3 kg (135 lb 2.3 oz)   LMP  (LMP Unknown)   SpO2 (!) 93%   Breastfeeding? No   BMI 30.30 kg/m²     General Appearance:    Alert, cooperative, no distress, appears stated age   Head:    Normocephalic, without obvious abnormality, atraumatic   Eyes:    PERRL, conjunctiva/corneas clear, EOM's intact   Nose:   Nares normal, septum midline, mucosa normal, no drainage    or sinus tenderness   Throat:   Lips, mucosa, and tongue normal; dark residue around mouth   Neck:   trachea midline.  Left submandibular swelling, tenderness.    Lungs:     Clear to auscultation bilaterally, respirations unlabored, on ra NC    Heart:    Regular rate and rhythm, no murmurs appreciated   Abdomen:     Soft, non-tender, bowel sounds active all four quadrants,     no masses, no organomegaly, PEG tube in place     Extremities:   Bilateral lower extremity wounds wrapped    Skin:   Skin with many lesions and bruises diffusely over body    Neurologic:   Residual left sided weakness, CN II-XII grossly intact, moves all four " extremities equally         Laboratory:  Most Recent Data:  CBC:   Lab Results   Component Value Date    WBC 8.05 01/15/2020    HGB 8.2 (L) 01/15/2020    HCT 26.5 (L) 01/15/2020    PLT 97 (L) 01/15/2020    MCV 97 01/15/2020    RDW 16.0 (H) 01/15/2020     WBC Differential: 72.6 % N, 0 % Bands, 13.6 % L, 5.7 % M, 7.9 % Eo, 0.2 % Baso, no additional cells seen  BMP:   Lab Results   Component Value Date     01/15/2020    K 4.7 01/15/2020     01/15/2020    CO2 23 01/15/2020    BUN 64 (H) 01/15/2020    CREATININE 1.1 01/15/2020     (H) 01/15/2020    CALCIUM 7.9 (L) 01/15/2020    MG 2.1 01/08/2020    PHOS 3.3 10/28/2019     LFTs:   Lab Results   Component Value Date    PROT 4.9 (L) 01/15/2020    ALBUMIN 1.9 (L) 01/15/2020    BILITOT 0.5 01/15/2020    AST 21 01/15/2020    ALKPHOS 152 (H) 01/15/2020    ALT 17 01/15/2020     Coags:   Lab Results   Component Value Date    INR 1.1 01/08/2020     FLP:   Lab Results   Component Value Date    CHOL 118 (L) 06/15/2017    HDL 41 06/15/2017    LDLCALC 67.4 06/15/2017    TRIG 48 06/15/2017    CHOLHDL 34.7 06/15/2017     DM:   Lab Results   Component Value Date    HGBA1C 5.4 01/09/2020    HGBA1C 6.2 (H) 11/19/2019    HGBA1C 5.6 06/15/2017    LDLCALC 67.4 06/15/2017    CREATININE 1.1 01/15/2020     Thyroid:   Lab Results   Component Value Date    TSH 1.289 02/18/2019     Anemia:   Lab Results   Component Value Date    IRON 79 01/08/2020    TIBC 354 01/08/2020    FERRITIN 81 01/08/2020    [id number] 797 01/08/2020    FOLATE 14.3 01/08/2020     Cardiac:   Lab Results   Component Value Date    TROPONINI 0.016 01/08/2020     (H) 11/18/2019     Urinalysis:   Lab Results   Component Value Date    LABURIN PSEUDOMONAS AERUGINOSA  10,000 - 49,999 cfu/ml   (A) 01/08/2020    LABURIN PROVIDENCIA STUARTII  10,000 - 49,999 cfu/ml   (A) 01/08/2020    COLORU Yellow 01/08/2020    SPECGRAV <=1.005 (A) 01/08/2020    NITRITE Negative 01/08/2020    KETONESU Negative 01/08/2020     UROBILINOGEN Negative 01/08/2020    WBCUA >100 (H) 01/08/2020       Trended Lab Data:  Recent Labs   Lab 01/13/20  0404 01/14/20  0311 01/15/20  0325   WBC 18.44* 17.47* 8.05   HGB 8.8* 8.8* 8.2*   HCT 28.9* 28.3* 26.5*   PLT 90* 80* 97*   * 98 97   RDW 16.6* 16.3* 16.0*    138 136   K 3.6 4.4 4.7    107 105   CO2 22* 21* 23   BUN 48* 57* 64*   CREATININE 0.9 1.0 1.1   * 156* 259*   PROT 4.8* 4.8* 4.9*   ALBUMIN 2.1* 1.9* 1.9*   BILITOT 0.7 0.7 0.5   AST 23 27 21   ALKPHOS 135 174* 152*   ALT 19 17 17       Trended Cardiac Data:  Recent Labs   Lab 01/08/20  1527   TROPONINI 0.016       Microbiology Data:      Other Results:  EKG: Undetermined rhythm  Left axis deviation  Nonspecific intraventricular conduction delay  Nonspecific ST and T wave abnormality  Abnormal ECG  When compared with ECG of 24-OCT-2019 21:32,  Current undetermined rhythm precludes rhythm comparison, needs review  Questionable change in The axis  T wave inversion no longer evident in Inferior leads    Radiology:  Imaging Results          X-Ray Chest 1 View (Final result)  Result time 01/08/20 05:30:17    Final result by Angel Howard MD (01/08/20 05:30:17)                 Impression:      Suspected small amount of pleural fluid and or chronic pleural change at the left lung base, may relate to a persistent or recurrence process, as discussed above.      Electronically signed by: Angel Howard  Date:    01/08/2020  Time:    05:30             Narrative:    EXAMINATION:  XR CHEST 1 VIEW    CLINICAL HISTORY:  Gastrointestinal hemorrhage, unspecified    TECHNIQUE:  Single frontal view of the chest was performed.    COMPARISON:  November 18, 2019    FINDINGS:  Single chest view is submitted.  The patient is rotated, when accounting for this the cardiomediastinal silhouette appears stable.  There is continued appearance of blunting of the left costophrenic angle this may relate to a small amount of pleural fluid and or  chronic pleural change, this may relate to a persistent or recurring process as it appears similar although less prominent than on the prior study.  There is no evidence for significant pleural fluid on the right.  Chronic lung changes are otherwise noted.  There is no pneumothorax.  The osseous structures demonstrate chronic change.                                 Assessment:     Elizabeth [place] is a 89 y.o. female with:  Patient Active Problem List    Diagnosis Date Noted    Leaking PEG tube 01/14/2020    Acute parotitis 01/12/2020    Atrial fibrillation with RVR 01/09/2020    UGIB (upper gastrointestinal bleed) 01/08/2020    BP (bullous pemphigoid) 01/08/2020    Hyperkalemia 01/08/2020    Acute respiratory failure with hypoxia and hypercarbia 11/21/2019    Acute respiratory failure with hypoxia 11/19/2019    SOB (shortness of breath) 11/19/2019    Pseudomonas urinary tract infection 10/26/2019    Elevated troponin 10/26/2019    Anemia 10/25/2019    Controlled type 2 diabetes mellitus, without long-term current use of insulin 10/25/2019    Skin ulcer, stage 3 10/25/2019    Multiple open wounds of lower leg 10/25/2019    HCAP (healthcare-associated pneumonia) 10/24/2019    PEG (percutaneous endoscopic gastrostomy) adjustment/replacement/removal 06/12/2018    Nursing home resident 06/23/2017    Gastrostomy tube dependent, 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 06/21/2017    Oropharyngeal dysphagia 06/16/2017    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 06/15/2017    Hemiparesis affecting left side as late effect of stroke 06/15/2017    Chronic atrial fibrillation 09/06/2016    Non-rheumatic mitral regurgitation 09/06/2016    Enlarged LA (left atrium) 09/06/2016    Pulmonary HTN 09/06/2016    Primary osteoarthritis of right knee 11/25/2015    Essential hypertension 10/10/2012    CAD (coronary artery disease) 10/10/2012    Chronic diastolic congestive heart failure 10/10/2012    History of depression  10/10/2012    Personal history of gallstones 10/10/2012    Internal carotid artery stenosis 10/10/2012    Family history of breast cancer 10/10/2012        Plan:     Hematemesis, Upper GI Bleed, gastric ulcer with visible vessel   Large volume hematemesis in ED. Consulted GI, underwent EGD with gastric ulcer with visible vessel. Hb 6.8 after EGD. S/p 2 unit of pRBC with rise in Hb to 10.7, slowly trended down and stabilized at 8.8.   -continue PPI PO BID for total of 11days, then continue PPI PO daily indefinitely   -continue CBC  Daily  -Hgb since last unit 10>9.3>8.8>8.8>8.2    Acute Hypoxic Respiratory Failure, resolved  - May be secondary to aspiration with vomiting  -  Weaned oxygen to RA  -1/14, mild increase in work of breathing, crackles to bases  - restarted patients home lasix, good uop overnight  - respirations even/unlabored    Hyperkalemia, resolved  Kidney function stable. Patient was shifted in ED x2.   -monitor daily CMP    Persistent atrial fibrillation, controlled rate  Initially held carvedilol in setting of bleed. Went into RVR 1/8, which resolved with IV Metoprolol x1.  HR 70-98 overnight  -restarted home carvedilol   -currently controlled     Bullous Pemphigoid   - Continue home clobetasol and mupirocin ointments   - Wound care consult     Hx of CVA sp left sided weakness, dysphagia, and urinary incontinence  - Patient at [facility]. Dependent on all ADLs. Refuses to work with PT and OT at nursing home, per daughter patient spends 100% of time in bed and occasionally watches TV, refuses to be put into wheelchair or brought out into common areas  - No acute changes in baseline  - Home meds of , Plavix 75 mg, and Atorvastatin 40 mg;   - recommend not restarting plavix    Bladder mass with history of hematuria and left hydronephrosis   - Patient with hx of hematuria, bladder mass, and left hydronephrosis  - Has followed up with Urology and had lengthy discussions on the likely diagnosis of  urothelial carcinoma. Given her poor functional status and high anesthetic risk, further work-up was not pursued   - Patient's daughter would like patient to not be aware of diagnosis     HFpEF  - TTE 6/2017 with EF 50-55%, grade II diastolic dysfunction, mild aortic regurgitation  - On home Lasix 20 mg, coreg, and statin   - Follows with outpatient Cardiology  - No acute issues on this admission  - lasix restarted yesterday    Hypertension  Held home medications of Coreg 25 and Norvasc 5 mg initially  -restarted as patient stable and becoming hypertensive     Parotid gland swelling, tenderness  Consider obstruction with stone, stricture vs infection given acute onset and pain  -Parotid gland US with 5 mm stone  -attempted lemon juice swabs, no improvement  -ENT consulted, no surgical intervention at this time  -recommend continue abx, hydration, sialogogues  -continue warm compress with massage, purulent material expressed  -WBC up to 18.5, now back down to 8 with symptomatic improvement      Leukocytosis  Suspect secondary to PRBC vs Parotid gland infection  -WBC 18.5>18.5>17.4>8  -vanc/cef/flagyl started yesterday  -repeat cbc this morning    Hyperlipidemia   - Home medicine of Atorvastatin      Depression  - On home Venlafaxine 75 mg daily    DVT: SCDs  Diet:  tube feeds  Dispo: possible discharge.  No acute intervention necessary for parotiditis/stone per ENT    Code Status:     Full    Ron Yang MD  [facility]    [facility] Medicine Hospitalist Pager numbers:   [facility] Hospitalist Medicine Team A (Catherine/Yessica): [phone]  [facility] Hospitalist Medicine Team B (Farhat/Leila):  [phone]

## 2020-01-15 NOTE — PROGRESS NOTES
Otolaryngology Progress Note    Elizabeth [place]  Encounter Date: 01/15/2020  YOB: 1928  Provider: Radha Nicholas MD    Chief complaint: facial swelling    Subjective: Elizabeth Navar[place]te is a 89 y.o. female with left parotitis.    Overnight: No acute events overnight. Afebrile. Received decadron yesterday. Nursing staff has repositioned patient which has helped significantly. Also doing compresses, massage and sialogogues. Less pain.    Past medical/surgical/family/social history and review of systems reviewed - no interval changes.    Medications  Scheduled Meds:   amLODIPine  2.5 mg Per G Tube Daily    atorvastatin  40 mg Per G Tube Daily    carvedilol  12.5 mg Per G Tube BID    ceFEPime (MAXIPIME) IVPB  2 g Intravenous Q12H    clobetasol   Topical (Top) BID    furosemide  20 mg Per G Tube Daily    metronidazole  500 mg Intravenous Q8H    mupirocin   Topical (Top) BID    pantoprazole  40 mg Oral BID    vancomycin (VANCOCIN) IVPB  750 mg Intravenous Q24H    venlafaxine  75 mg Per G Tube Daily     Continuous Infusions:  PRN Meds:.sodium chloride, sodium chloride, acetaminophen, metoprolol, morphine, ondansetron, sodium chloride 0.9%      Objective:  Vitals:    01/15/20 0513 01/15/20 0731 01/15/20 0737 01/15/20 0821   BP: 123/66 132/63     BP Location: Right arm      Patient Position: Lying      Pulse: 88 88 83 68   Resp: 16 16  18   Temp: 97.2 °F (36.2 °C) 97.5 °F (36.4 °C)     TempSrc: Axillary      SpO2: (!) 93%   (!) 92%   Weight:       Height:           General: NAD, awake and alert  Eye: EOMI bilaterally, PERRLA  Head: normocephalic, atraumatic  Ears: AU: normal external ear. Grossly normal hearing.   Nose: external nose normal  Oral cavity/oropharynx: mucosal membranes dry, still able to express purulence from parotid duct on the left  Face/Neck: Edema overlying left parotid and face has improved, still has erythema and induration along the tail of the parotid, no  fluctuance  Respiratory: nonlabored breathing, no stridor  Neurologic: CN II to XII intact bilaterally, left sided weakness      Labs & Imaging         Intake/Output Summary (Last 24 hours) at 1/15/2020 1129  Last data filed at 1/15/2020 0745  Gross per 24 hour   Intake 2020 ml   Output 550 ml   Net 1470 ml       Assessment: Elizabeth Navarrete is a 89 y.o. female with left parotitis, slightly improved today      Plan:   - Continue massage, compresses and sialogogues  - Positioning of patient much improved which is also likely contributing to improvement in edema  - Follow up cultures, would probably recommend another day of IV antibiotics since she has just stopped decadron. She still has some pretty severe erythema and induration over the left parotid. Hopefully the cultures will grow something that will help guide transition to PO antibiotics. Would probably give a full 14 day course     E. Grier de Lauréal, MD [facility]

## 2020-01-15 NOTE — PLAN OF CARE
Patient detention resident of [facility]    ENT and GI still following patient    Sent updated clinicals to [facility]    Patient will need ambulance upon discharge to [facility] Nursing home       01/15/20 1042   Discharge Reassessment   Assessment Type Discharge Planning Reassessment   Discharge Plan A Return to nursing home   Post-Acute Status   Post-Acute Authorization Placement   Post-Acute Placement Status Additional Clinical Requested     Gladys De La Cruz, RN, CCM, CMSRN  RN Transition Navigator  [phone]

## 2020-01-15 NOTE — PLAN OF CARE
Did assessment on patient. Went over plan of care. Bed in low position, call light in reach.Saftey measure taken. Answered questions.Swabbed patients mouth, did 5 minute massage to left face.

## 2020-01-15 NOTE — PLAN OF CARE
Patient awake, alert VVS.On tele, no ectopy on tele.  SCD's, DEISY'S for VTE prevention.Facial massage completed every 4 hours with lemon swabs to the mouth.IV antibiotics for infection. IV dressing CDI and flushes . Bed in low position and locked. Call light and personal items with in reach. Bed alarm remains on.Wedge in place to keep in upright position for optimal drainage.

## 2020-01-15 NOTE — PLAN OF CARE
Problem: Adult Inpatient Plan of Care  Goal: Plan of Care Review    Patient is awake and orientedx4. Care plan explained to patient; she verbalized understanding. On room air, O2 saturation at 93%. Hooked to heart monitor running a-fib at 71-84bpm. Purewick in place. No n/v/d during shift. Patient had left neck pain with positioning/massaging. Heat applied to neck. Due medications given. Diabetisource  at goal rate of 55mL/hr. Encouraged/assisted to turn every 2 hours as tolerated. Maintained on fall risk precaution. Bed in lowest position, bed alarm on, call light/personal items within reach and instructed to call for help when needed. Will continue to monitor.    Outcome: Ongoing, Progressing

## 2020-01-16 NOTE — PLAN OF CARE
Received call from Jena at [facility] that patient accepted back.  They are setting up ambulance for pickup    Gave packet to nurse and nurse stated she will call daughter for discharge    Discharge rounds on patient. Discussed followup appointments, blue discharge folder, discharge nurse will go over home medications and reasons for medications and final discharge instructions. All patient/caregiver questions answered. Patient verbalized understanding.    Future Appointments   Date Time Provider Department Center   2/3/2020 11:00 AM Anila Kolb MD [facility]   2/7/2020 10:00 AM Radha Reed MD [facility]          01/16/20 9158   Final Note   Assessment Type Final Discharge Note   Anticipated Discharge Disposition [facility]   Right Care Referral Info   Post Acute Recommendation Other   Referral Type [facility] nursing home   Facility Name [facility]   Post-Acute Status   Post-Acute Authorization Placement   Post-Acute Placement Status Set-up Complete     Gladys De La Cruz, RN, CCM, CMSRN  RN Transition Navigator  [phone]

## 2020-01-16 NOTE — PLAN OF CARE
[facility]    FACILITY TRANSFER ORDERS      Patient Name: Elizabeth Navarrete  YOB: 1928    PCP: [facility]   PCP Address: [address] [place] [address] RD / [place] [id number]  PCP Phone Number: [phone]  PCP Fax: [phone]    Encounter Date: 01/16/2020    Admit to: [facility] care home     Vital Signs:  Routine    Diagnoses:   Active Hospital Problems    Diagnosis  POA    *UGIB (upper gastrointestinal bleed) [K92.2]  Yes    Leaking PEG tube [K94.23]  Yes    Acute parotitis [K11.21]  No    Atrial fibrillation with RVR [I48.91]  Yes    BP (bullous pemphigoid) [L12.0]  Yes    Hyperkalemia [E87.5]  Yes    Acute respiratory failure with hypoxia [J96.01]  Yes    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 [Z86.73]  Not Applicable     Chronic    CAD (coronary artery disease) [I25.10]  Yes     Chronic    Essential hypertension [I10]  Yes     Chronic    Chronic diastolic congestive heart failure [I50.32]  Yes     Chronic     Echo 6/98:  LVEF 15-20%        Resolved Hospital Problems   No resolved problems to display.       Allergies:  Review of patient's allergies indicates:   Allergen Reactions    Pcn [penicillins] Rash       Diet: NPO: Tube Feeds: diabetisource continuous per G tube; goal rate of 55ml hour. Give 250ml of free water in G tube 4x daily     Activities: Activity as tolerated    Nursing: Massage left parotid (cheek)  to express purulent material every 4 hours. Apply heat pack to left cheek 2 x daily. Please keep position propped on her right side. Avoid allowing patient to lay on left cheek. Continue this management for 2 weeks. End date 1/30/20     Consult: Speech therapy for ongoing management     Medications: Review discharge medications with patient and family and provide education.      Current Discharge Medication List      START taking these medications    Details   pantoprazole (PROTONIX) 40 mg GrPS Take 1 packet (40 mg total) by G tube 2 (two) times daily.  Qty: 60  packet, Refills: 12    Comments: Take twice  a day until 1/26/20 then once a day after that      sulfamethoxazole-trimethoprim 200-40 mg/5 ml (BACTRIM,SEPTRA) 200-40 mg/5 mL Susp Take 30.65 mLs Per G tube every 12 (twelve) hours. for 11 days  Qty: 674.3 mL, Refills: 0  End date 1/26/20          CONTINUE these medications which have CHANGED    Details   ferrous sulfate 325 (65 FE) MG EC tablet Take 1 tablet (325 mg total) per G tube  (two) times daily.  Qty: 720 tablet, Refills: 0         CONTINUE these medications which have NOT CHANGED    Details   cetirizine (ZYRTEC) 10 MG tablet 10 mg by Per G Tube route once daily.      doxycycline (VIBRA-TABS) 100 MG tablet 100 mg by Per G Tube route 2 (two) times daily.      acetaminophen (TYLENOL) 160 mg/5 mL Elix 650 mg by Per G Tube route every 6 (six) hours as needed.       albuterol (PROVENTIL/VENTOLIN HFA) 90 mcg/actuation inhaler Inhale 1-2 puffs into the lungs every 4 (four) hours as needed for Wheezing. Rescue  Qty: 1 Inhaler, Refills: 0      amLODIPine (NORVASC) 5 MG tablet 0.5 tablets (2.5 mg total) by Per G Tube route once daily.  Qty: 30 tablet      ascorbic acid, vitamin C, (VITAMIN C) 500 MG tablet 500 mg by Per G Tube route 2 (two) times daily.      atorvastatin (LIPITOR) 40 MG tablet 1 tablet (40 mg total) by Per G Tube route once daily.  Qty: 90 tablet, Refills: 3      carvedilol (COREG) 25 MG tablet 0.5 tablets (12.5 mg total) by Per G Tube route 2 (two) times daily.  Qty: 30 tablet      cholecalciferol, vitamin D3, (VITAMIN D3) 1,000 unit capsule 1,000 Units by Per G Tube route once daily.       clobetasol (TEMOVATE) 0.05 % cream Apply topically 2 (two) times daily to areas of dry skin       diphenhydrAMINE (BENADRYL) 12.5 mg/5 mL elixir 25 mg by Per G Tube route every 6 (six) hours as needed for Itching or Allergies.       docusate (COLACE) 50 mg/5 mL liquid 100 mg by Per G Tube route 2 (two) times daily.       fish oil-omega-3 fatty acids 300-1,000 mg  capsule 1 g by Per G Tube route once daily.       furosemide (LASIX) 20 MG tablet 1 tablet (20 mg total) by Per G Tube route once daily.  Qty: 30 tablet, Refills: 0      glucosamine-chondroitin 500-400 mg tablet 1 tablet by Per G Tube route once daily.       ketoconazole (NIZORAL) 2 % shampoo Apply topically twice a week. Apply twice weekly with showers until seborrheic dermatitis has resolved  Qty: 120 mL, Refills: 1      multivitamin (ONE DAILY MULTIVITAMIN) per tablet 1 tablet by Per G Tube route once daily.      mupirocin (BACTROBAN) 2 % ointment Apply topically 2 (two) times daily. Apply to the skin near the Peg tube as this is reddened.      polyethylene glycol (GLYCOLAX) 17 gram PwPk 17 g by Per G Tube route nightly as needed.      venlafaxine (EFFEXOR) 75 MG tablet 75 mg by Per G Tube route once daily.      zinc sulfate (ZINCATE) 220 (50) mg capsule 220 mg by Per G Tube route once daily.         STOP taking these medications       aspirin 325 MG tablet Comments:   Reason for Stopping:  Massive GI bleed        clopidogrel (PLAVIX) 75 mg tablet Comments:   Reason for Stopping:  Massive GI bleed         diclofenac sodium (VOLTAREN) 1 % Gel Comments:   Reason for Stopping:                    _________________________________  Ct Flores MD  01/16/2020

## 2020-01-16 NOTE — PLAN OF CARE
Plan of care reviewed with patient.  Patient verbalized understanding.  Will continue to monitor patient.  Lemon swabs and message to left side of mouth every 4 hours. A. Fib on tele.  Bed in lowest position call bell in reach.

## 2020-01-16 NOTE — PLAN OF CARE
Sent discharge orders to [facility]       01/16/20 3092   Post-Acute Status   Post-Acute Authorization Placement   Post-Acute Placement Status Patient Evaluation by Facility

## 2020-01-16 NOTE — PROGRESS NOTES
"[facility] Medicine H&P Note     Admitting Team: [facility] [place] Hospitalist Team A    Attending Physician: Mervat Alexander MD  Resident: DELFINO   Intern: REJI    Date of Admit: 2020    Subjective:      Patient did well overnight, resting comfortably.  Vitals stable, afebrile. pain and swelling to left jaw/parotid improved.  Denies any chest pain, SOB, or palpitations this AM.        Objective:   Last 24 Hour Vital Signs:  BP  Min: 111/62  Max: 166/81  Temp  Av.1 °F (36.2 °C)  Min: 96.5 °F (35.8 °C)  Max: 97.5 °F (36.4 °C)  Pulse  Av.8  Min: 68  Max: 100  Resp  Av  Min: 16  Max: 21  SpO2  Av.8 %  Min: 92 %  Max: 95 %  Body mass index is 30.3 kg/m².  I/O last 3 completed shifts:  In: 3720 [NG/GT:2570; IV Piggyback:1150]  Out: 1301 [Urine:1300; Stool:1]    Physical Examination:    /61 (BP Location: Right arm, Patient Position: Lying)   Pulse 82   Temp 97.2 °F (36.2 °C) (Oral)   Resp 18   Ht 4' 8" (1.422 m)   Wt 61.3 kg (135 lb 2.3 oz)   LMP  (LMP Unknown)   SpO2 (!) 93%   Breastfeeding? No   BMI 30.30 kg/m²     General Appearance:    Alert, cooperative, no distress, appears stated age   Head:    Normocephalic, without obvious abnormality, atraumatic   Eyes:    PERRL, conjunctiva/corneas clear, EOM's intact   Nose:   Nares normal, septum midline, mucosa normal, no drainage    or sinus tenderness   Throat:   Lips, mucosa, and tongue normal; dark residue around mouth   Neck:   trachea midline.  Left submandibular swelling, tenderness present, but improved   Lungs:     Clear to auscultation bilaterally, respirations unlabored, on ra NC    Heart:    Regular rate and rhythm, no murmurs appreciated   Abdomen:     Soft, non-tender, bowel sounds active all four quadrants,     no masses, no organomegaly, PEG tube in place     Extremities:   Bilateral lower extremity wounds wrapped    Skin:   Skin with many lesions and bruises diffusely over body    Neurologic:   Residual left sided weakness, CN " II-XII grossly intact, moves all four extremities equally         Laboratory:  Most Recent Data:  CBC:   Lab Results   Component Value Date    WBC 14.44 (H) 01/16/2020    HGB 8.9 (L) 01/16/2020    HCT 28.2 (L) 01/16/2020     (L) 01/16/2020    MCV 94 01/16/2020    RDW 15.5 (H) 01/16/2020     WBC Differential: 72.6 % N, 0 % Bands, 13.6 % L, 5.7 % M, 7.9 % Eo, 0.2 % Baso, no additional cells seen  BMP:   Lab Results   Component Value Date     01/16/2020    K 4.4 01/16/2020     01/16/2020    CO2 25 01/16/2020    BUN 69 (H) 01/16/2020    CREATININE 1.0 01/16/2020     (H) 01/16/2020    CALCIUM 8.5 (L) 01/16/2020    MG 2.1 01/08/2020    PHOS 3.3 10/28/2019     LFTs:   Lab Results   Component Value Date    PROT 5.5 (L) 01/16/2020    ALBUMIN 2.1 (L) 01/16/2020    BILITOT 0.5 01/16/2020    AST 27 01/16/2020    ALKPHOS 197 (H) 01/16/2020    ALT 20 01/16/2020     Coags:   Lab Results   Component Value Date    INR 1.1 01/08/2020     FLP:   Lab Results   Component Value Date    CHOL 118 (L) 06/15/2017    HDL 41 06/15/2017    LDLCALC 67.4 06/15/2017    TRIG 48 06/15/2017    CHOLHDL 34.7 06/15/2017     DM:   Lab Results   Component Value Date    HGBA1C 5.4 01/09/2020    HGBA1C 6.2 (H) 11/19/2019    HGBA1C 5.6 06/15/2017    LDLCALC 67.4 06/15/2017    CREATININE 1.0 01/16/2020     Thyroid:   Lab Results   Component Value Date    TSH 1.289 02/18/2019     Anemia:   Lab Results   Component Value Date    IRON 79 01/08/2020    TIBC 354 01/08/2020    FERRITIN 81 01/08/2020    XCNTIBDJ17 797 01/08/2020    FOLATE 14.3 01/08/2020     Cardiac:   Lab Results   Component Value Date    TROPONINI 0.016 01/08/2020     (H) 11/18/2019     Urinalysis:   Lab Results   Component Value Date    LABURIN PSEUDOMONAS AERUGINOSA  10,000 - 49,999 cfu/ml   (A) 01/08/2020    LABURIN PROVIDENCIA STUARTII  10,000 - 49,999 cfu/ml   (A) 01/08/2020    COLORU Yellow 01/08/2020    SPECGRAV <=1.005 (A) 01/08/2020    NITRITE Negative  01/08/2020    KETONESU Negative 01/08/2020    UROBILINOGEN Negative 01/08/2020    WBCUA >100 (H) 01/08/2020       Trended Lab Data:  Recent Labs   Lab 01/14/20  0311 01/15/20  0325 01/16/20  0425   WBC 17.47* 8.05 14.44*   HGB 8.8* 8.2* 8.9*   HCT 28.3* 26.5* 28.2*   PLT 80* 97* 123*   MCV 98 97 94   RDW 16.3* 16.0* 15.5*    136 138   K 4.4 4.7 4.4    105 105   CO2 21* 23 25   BUN 57* 64* 69*   CREATININE 1.0 1.1 1.0   * 259* 250*   PROT 4.8* 4.9* 5.5*   ALBUMIN 1.9* 1.9* 2.1*   BILITOT 0.7 0.5 0.5   AST 27 21 27   ALKPHOS 174* 152* 197*   ALT 17 17 20       Trended Cardiac Data:  No results for input(s): TROPONINI, CKTOTAL, CKMB, BNP in the last 168 hours.    Microbiology Data:      Other Results:  EKG: Undetermined rhythm  Left axis deviation  Nonspecific intraventricular conduction delay  Nonspecific ST and T wave abnormality  Abnormal ECG  When compared with ECG of 24-OCT-2019 21:32,  Current undetermined rhythm precludes rhythm comparison, needs review  Questionable change in The axis  T wave inversion no longer evident in Inferior leads    Radiology:  Imaging Results          X-Ray Chest 1 View (Final result)  Result time 01/08/20 05:30:17    Final result by Angel Howard MD (01/08/20 05:30:17)                 Impression:      Suspected small amount of pleural fluid and or chronic pleural change at the left lung base, may relate to a persistent or recurrence process, as discussed above.      Electronically signed by: Angel Howard  Date:    01/08/2020  Time:    05:30             Narrative:    EXAMINATION:  XR CHEST 1 VIEW    CLINICAL HISTORY:  Gastrointestinal hemorrhage, unspecified    TECHNIQUE:  Single frontal view of the chest was performed.    COMPARISON:  November 18, 2019    FINDINGS:  Single chest view is submitted.  The patient is rotated, when accounting for this the cardiomediastinal silhouette appears stable.  There is continued appearance of blunting of the left  costophrenic angle this may relate to a small amount of pleural fluid and or chronic pleural change, this may relate to a persistent or recurring process as it appears similar although less prominent than on the prior study.  There is no evidence for significant pleural fluid on the right.  Chronic lung changes are otherwise noted.  There is no pneumothorax.  The osseous structures demonstrate chronic change.                                 Assessment:     Elizabeth Navarrete is a 89 y.o. female with:  Patient Active Problem List    Diagnosis Date Noted    Leaking PEG tube 01/14/2020    Acute parotitis 01/12/2020    Atrial fibrillation with RVR 01/09/2020    UGIB (upper gastrointestinal bleed) 01/08/2020    BP (bullous pemphigoid) 01/08/2020    Hyperkalemia 01/08/2020    Acute respiratory failure with hypoxia and hypercarbia 11/21/2019    Acute respiratory failure with hypoxia 11/19/2019    SOB (shortness of breath) 11/19/2019    Pseudomonas urinary tract infection 10/26/2019    Elevated troponin 10/26/2019    Anemia 10/25/2019    Controlled type 2 diabetes mellitus, without long-term current use of insulin 10/25/2019    Skin ulcer, stage 3 10/25/2019    Multiple open wounds of lower leg 10/25/2019    HCAP (healthcare-associated pneumonia) 10/24/2019    PEG (percutaneous endoscopic gastrostomy) adjustment/replacement/removal 06/12/2018    Nursing home resident 06/23/2017    Gastrostomy tube dependent, 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 06/21/2017    Oropharyngeal dysphagia 06/16/2017    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 06/15/2017    Hemiparesis affecting left side as late effect of stroke 06/15/2017    Chronic atrial fibrillation 09/06/2016    Non-rheumatic mitral regurgitation 09/06/2016    Enlarged LA (left atrium) 09/06/2016    Pulmonary HTN 09/06/2016    Primary osteoarthritis of right knee 11/25/2015    Essential hypertension 10/10/2012    CAD (coronary artery disease) 10/10/2012    Chronic  diastolic congestive heart failure 10/10/2012    History of depression 10/10/2012    Personal history of gallstones 10/10/2012    Internal carotid artery stenosis 10/10/2012    Family history of breast cancer 10/10/2012        Plan:     Hematemesis, Upper GI Bleed, gastric ulcer with visible vessel   Large volume hematemesis in ED. Consulted GI, underwent EGD with gastric ulcer with visible vessel. Hb 6.8 after EGD. S/p 2 unit of pRBC with rise in Hb to 10.7, slowly trended down and stabilized at 8.8.   -continue PPI PO BID for total of 11days, then continue PPI PO daily indefinitely   -continue CBC  Daily  -Hgb since last unit 10>9.3>8.8>8.8>8.2    Acute Hypoxic Respiratory Failure, resolved  - May be secondary to aspiration with vomiting  -  Weaned oxygen to RA  -1/14, mild increase in work of breathing, crackles to bases  - restarted patients home lasix, good uop overnight  - respirations even/unlabored    Hyperkalemia, resolved  Kidney function stable. Patient was shifted in ED x2.   -monitor daily CMP    Persistent atrial fibrillation, controlled rate  Initially held carvedilol in setting of bleed. Went into RVR 1/8, which resolved with IV Metoprolol x1.  HR 70-98 overnight  -restarted home carvedilol   -currently controlled     Bullous Pemphigoid   - Continue home clobetasol and mupirocin ointments   - Wound care consult     Hx of CVA sp left sided weakness, dysphagia, and urinary incontinence  - Patient at [facility]. Dependent on all ADLs. Refuses to work with PT and OT at nursing home, per daughter patient spends 100% of time in bed and occasionally watches TV, refuses to be put into wheelchair or brought out into common areas  - No acute changes in baseline  - Home meds of , Plavix 75 mg, and Atorvastatin 40 mg;   - recommend not restarting plavix    Bladder mass with history of hematuria and left hydronephrosis   - Patient with hx of hematuria, bladder mass, and left hydronephrosis  - Has followed  up with Urology and had lengthy discussions on the likely diagnosis of urothelial carcinoma. Given her poor functional status and high anesthetic risk, further work-up was not pursued   - Patient's daughter would like patient to not be aware of diagnosis     HFpEF  - TTE 6/2017 with EF 50-55%, grade II diastolic dysfunction, mild aortic regurgitation  - On home Lasix 20 mg, coreg, and statin   - Follows with outpatient Cardiology  - No acute issues on this admission  - lasix restarted yesterday    Hypertension  Held home medications of Coreg 25 and Norvasc 5 mg initially  -restarted as patient stable and becoming hypertensive     Parotid gland swelling, tenderness  Consider obstruction with stone, stricture vs infection given acute onset and pain  -Parotid gland US with 5 mm stone  -attempted lemon juice swabs, no improvement  -ENT consulted, no surgical intervention at this time  -recommend continue abx, hydration, sialogogues  -continue warm compress with massage, purulent material expressed  -WBC up to 18.5>8>14.5 with symptomatic improvement      Leukocytosis  Suspect secondary to PRBC vs Parotid gland infection  -WBC 18.5>18.5>17.4>8>14.5  -vanc/cef/flagyl started yesterday, transition to PO   -repeat cbc this morning  -f/u parotid cx    Hyperlipidemia   - Home medicine of Atorvastatin      Depression  - On home Venlafaxine 75 mg daily    DVT: SCDs  Diet:  tube feeds  Dispo: possible discharge.  No acute intervention necessary for parotiditis/stone per ENT    Code Status:     Full    Ron Yang MD  [facility]I    [facility] Medicine Hospitalist Pager numbers:   [facility] Hospitalist Medicine Team A (Catherine/Yessica): [phone]  [facility] Hospitalist Medicine Team B (Farhat/Leila):  [phone]

## 2020-01-16 NOTE — PROGRESS NOTES
Future Appointments   Date Time Provider Department Center   2/3/2020 11:00 AM Anila Kolb MD [facility]   2/7/2020 10:00 AM Radha Reed MD [facility]

## 2020-01-16 NOTE — PROGRESS NOTES
.  Otolaryngology Progress Note    Elizabeth Navarrete  Encounter Date: 01/16/2020  YOB: 1928  Provider: Radha Nicholas MD    Chief complaint: facial swelling    Subjective: Elizabeth Navarrete is a 89 y.o. female with left parotitis    Overnight: No acute events overnight. Afebrile. Unsure if nurse or other team able to get out purulence with massage. Patient says her cheek is feeling better.    Past medical/surgical/family/social history and review of systems reviewed - no interval changes.    Medications  Scheduled Meds:   amLODIPine  2.5 mg Per G Tube Daily    atorvastatin  40 mg Per G Tube Daily    carvedilol  12.5 mg Per G Tube BID    ceFEPime (MAXIPIME) IVPB  2 g Intravenous Q12H    clobetasol   Topical (Top) BID    furosemide  20 mg Per G Tube Daily    metronidazole  500 mg Intravenous Q8H    mupirocin   Topical (Top) BID    pantoprazole  40 mg Oral BID    venlafaxine  75 mg Per G Tube Daily     Continuous Infusions:  PRN Meds:.sodium chloride, sodium chloride, acetaminophen, metoprolol, morphine, ondansetron, sodium chloride 0.9%, vancomycin - pharmacy to dose      Objective:  Vitals:    01/16/20 0446 01/16/20 0719 01/16/20 0802 01/16/20 0826   BP: 132/61  129/66    BP Location: Right arm  Right arm    Patient Position: Lying  Lying    Pulse: 82 78 81    Resp: 18  18    Temp: 97.2 °F (36.2 °C)  98.2 °F (36.8 °C)    TempSrc: Oral  Oral    SpO2: (!) 93%  (!) 92% (!) 93%   Weight:       Height:           General: NAD, awake and alert  Eye: EOMI bilaterally, PERRLA  Head: normocephalic, atraumatic  Ears: AU: normal external ear. Grossly normal hearing.   Nose: external nose normal  Oral cavity/oropharynx: mucosal membranes dry, unable to express anything from duct this morning  Face/Neck: Edema over left parotid and face has much improved, still with erythema and induration at angle of mandible/tail of parotid but seems to be slightly better  Respiratory: nonlabored breathing, no  stridor  Neurologic: CN II to XII intact bilaterally, left sided weakness      Labs & Imaging         Intake/Output Summary (Last 24 hours) at 1/16/2020 0840  Last data filed at 1/16/2020 0450  Gross per 24 hour   Intake 1450 ml   Output 1951 ml   Net -501 ml       Assessment: Elizabeth Navarrete is a 89 y.o. female with left parotitis. Culture growing MRSA      Plan:   - Follow up susceptibilities and base oral therapy on that  - Continue massage, compresses, sialogogues  - Continue to have patient positioned towards right side     ELZBIETA Rocha MD  [facility]

## 2020-01-16 NOTE — PROGRESS NOTES
Pharmacokinetic Assessment Follow Up: IV Vancomycin    Vancomycin serum concentration assessment(s):    The random level was drawn correctly and can be used to guide therapy at this time. The measurement is above the desired definitive target range of 15 to 20 mcg/mL.    Vancomycin Regimen Plan:    Re-dose when the random level is less than 20 mcg/mL, next level to be drawn at 0400 on 1/17     Drug levels (last 3 results):  Recent Labs   Lab Result Units 01/15/20  1302 01/16/20  1023   Vancomycin, Random ug/mL 44.6 21.1       Pharmacy will continue to follow and monitor vancomycin.    Please contact pharmacy at extension [phone] for questions regarding this assessment.    Thank you for the consult,   Tico Ayala       Patient brief summary:  Elizabeth Navarrete is a 89 y.o. female initiated on antimicrobial therapy with IV Vancomycin for treatment of skin & soft tissue infection    The patient's current regimen is vancomycin pulse dosing    Drug Allergies:   Review of patient's allergies indicates:   Allergen Reactions    Pcn [penicillins] Rash       Actual Body Weight:   61.3kg    Renal Function:   Estimated Creatinine Clearance: 35.5 mL/min (based on SCr of 1 mg/dL).,     Dialysis Method (if applicable):      CBC (last 72 hours):  Recent Labs   Lab Result Units 01/14/20  0311 01/15/20  0325 01/16/20  0425   WBC K/uL 17.47* 8.05 14.44*   Hemoglobin g/dL 8.8* 8.2* 8.9*   Hematocrit % 28.3* 26.5* 28.2*   Platelets K/uL 80* 97* 123*   Gran% % 83.9* 95.6* 92.2*   Lymph% % 3.2* 2.7* 4.9*   Mono% % 9.0 1.7* 2.8*   Eosinophil% % 3.7 0.0 0.1   Basophil% % 0.2 0.0 0.0   Differential Method  Automated Automated Automated       Metabolic Panel (last 72 hours):  Recent Labs   Lab Result Units 01/14/20  0311 01/15/20  0325 01/16/20  0425   Sodium mmol/L 138 136 138   Potassium mmol/L 4.4 4.7 4.4   Chloride mmol/L 107 105 105   CO2 mmol/L 21* 23 25   Glucose mg/dL 156* 259* 250*   BUN, Bld mg/dL 57* 64* 69*   Creatinine mg/dL 1.0  1.1 1.0   Albumin g/dL 1.9* 1.9* 2.1*   Total Bilirubin mg/dL 0.7 0.5 0.5   Alkaline Phosphatase U/L 174* 152* 197*   AST U/L 27 21 27   ALT U/L 17 17 20       Vancomycin Administrations:  vancomycin given in the last 96 hours                     vancomycin 750 mg in dextrose 5 % 250 mL IVPB (ready to mix system) (mg) 750 mg New Bag 01/15/20 1300     750 mg New Bag 01/14/20 1310     750 mg New Bag 01/13/20 1211                      Microbiologic Results:  Microbiology Results (last 7 days)       Procedure Component Value Units Date/Time    Culture, Anaerobe [[id number]] Collected:  01/14/20 1019    Order Status:  Completed Specimen:  Body Fluid from Mouth Updated:  01/16/20 1047     Anaerobic Culture Culture in progress    Narrative:       Left parotid    Aerobic culture [[id number]]  (Abnormal)  (Susceptibility) Collected:  01/14/20 1019    Order Status:  Completed Specimen:  Body Fluid from Mouth Updated:  01/16/20 1011     Aerobic Bacterial Culture METHICILLIN RESISTANT STAPHYLOCOCCUS AUREUS  Moderate      Narrative:       Left parotid    Urine culture [[id number]]  (Abnormal)  (Susceptibility) Collected:  01/08/20 0903    Order Status:  Completed Specimen:  Urine Updated:  01/14/20 0802     Urine Culture, Routine PSEUDOMONAS AERUGINOSA  10,000 - 49,999 cfu/ml        PROVIDENCIA STUARTII  10,000 - 49,999 cfu/ml      Narrative:       Preferred Collection Type->Urine, Clean Catch

## 2020-01-16 NOTE — PHYSICIAN QUERY
"PT Name: Elizabeth Navarrete                                                            Withdrawn 1/16 [facility].   MR #: [id number]    Physician Query Form - Hematology Clarification   Litzy Ledbetter RN, CCDS  Desk # [phone]; [facility] # [phone] [email or web address]    This form is a permanent document in the medical record.      Query Date: January 16, 2020    By submitting this query, we are merely seeking further clarification of documentation. Please utilize your independent clinical judgment when addressing the question(s) below.    The Medical record contains the following:   Indicators  Supporting Clinical Findings Location in Medical Record   x "Anemia" documented Anemia - mild iron deficiency Hosp Pn 1/12   x H & H =    1/8  1/8  1/9  1/10  1/11  1/11  1/12  1/13  1/14  1/15  1/16    Hgb 8.9 (L) 6.9 (L) 8.3 (L) 8.4 (L) 7.9 (L) 10.0 (L) 9.3 (L) 8.8 (L) 8.8 (L) 8.2 (L) 8.9 (L)   Hct 29.1 (L) 22.4 (L) 27.1 (L) 28.2 (L) 26.3 (L) 33.7 (L) 30.3 (L) 28.9 (L) 28.3 (L) 26.5 (L) 28.2 (L)        BP =                     HR=     x "GI bleeding" documented Anemia related to GIB  UGIB (upper gastrointestinal bleed)  -?s/p egd with epi/cautery of bleeding gastric ulcer    Large volume hematemesis in ED. Hosp Pn 1/12  GI PN 1/19      IM PN 1/19    Acute bleeding (Non GI site)     x Transfusion(s)  2 units pRBC  Blood Bank   x Treatment: Transfuse  CBC q8 hours  Monitor  GI CN  PPI  s/p egd with epi/cautery of bleeding gastric ulcer Orders          GI PN 1/19    Other:        Provider, please specify diagnosis or diagnoses associated with above clinical findings.                                        Ramón all that apply.    [  ] Acute blood loss anemia   [  ] Iron deficiency anemia   [  ] Chronic blood loss anemia     [  ] Other Hematological Diagnosis (please specify):  _______     [  ] Clinically Undetermined       Please document in your progress notes daily for the duration of treatment, until resolved, and include in your " discharge summary.

## 2020-01-16 NOTE — PHYSICIAN QUERY
PT Name: Elizabeth Navarrete  MR #: [id number]    Physician Query Form - Respiratory Condition Clarification   Litzy Ledbetter RN, CCDS  Desk # [phone]; lisa # [phone] [email or web address]    This form is a permanent document in the medical record.  Query Date: January 16, 2020    By submitting this query, we are merely seeking further clarification of documentation. Please utilize your independent clinical judgment when addressing the question(s) below.    The Medical record contains the following   Indicators   Supporting Clinical Findings Location in Medical Record   x SOB, AYERS, Wheezing, Productive Cough, Use of Accessory Muscles, etc. C/o SOB  No SOB   Ed md  H&P   x   Acute/Chronic Illness Upper GIB  Hx of CVA sp left sided weakness, dysphagia, and urinary incontinence  Persistent atrial fibrillation  Bladder mass  HFPEF  Bullous Pemphigoid  PEG tube     EGD noted for gastric ulcer with bleeding visible vessel.   ENT: Parotitis   H&P                          IM PN 1/9  ENT CN   x   Radiology Findings Suspected small amount of pleural fluid and or chronic pleural change at the left lung base, may relate to a persistent or recurrence process, as discussed above. cxr 1/8   x   Respiratory Distress or Failure No respiratory distress. She has no wheezes. She has no rales.  resp even and unlabored  No apparent respiratory distress noted  respirations unlabored, on 4L NC    acute resp failure with hypoxia  Acute hypoxic resp failure  -may be 2/2 aspiration w/vomiting    Acute Hypoxic Respiratory Failure, resolved    - May be secondary to aspiration with vomiting  -1/14, mild increase in work of breathing, crackles to bases  - respirations even/unlabored Ed md  Ed RN  RRT 1/8  H&P    ED MD  H&P      Hosp Pn 1/15-16    Hypoxia or Hypercapnia     x   RR         ABGs         O2 sat RR: 22 - 17  HR: 139 - 87  MAP: 76 - 115  Sats: 1/8 - 1/9   =  2 lpm nc sats 96 - 100%            1/10 - 11 =   Room Air w/sats 92 - 97%        "     1/12 - 13 =   Room Air w/sats 92 - 97%            1/14 - 15 =   Room Air w/sats 92 - 97%            1/15-16   =   Room Air w/sats 92 - 97%    Currently on 4 lpm nc, wean as tolerated      Clear to auscultation bilaterally, respirations unlabored on RA NC.  VS Flow Sheet                  H&P; MD PN 1/9    MD PN 11/10-16    BiPAP/Intubation     x Supplemental O2 4 lpm NC  2 lpm on 1/8-9 H&P  Vs flow sheet    Home O2, Oxygen Dependence     x   Treatment - Weaned oxygen to RA  -1/14, mild increase in work of breathing, crackles to bases  - restarted patients home lasix, good uop overnight Hosp PN `1/15    Other       Respiratory failure can be acute, chronic or both. It is generally further specified as hypoxic, hypercapnic or both. Lastly, it is important to identify an etiology, if known or suspected.   References::  https://www.acphospitalist.org/archives/2013/10/coding.htm http://C4M/acute-respiratory-failure-know   The clinical guidelines noted below are only system guidelines, and do not replace the providers clinical judgment.    Provider, please specify diagnosis or diagnoses associated with above clinical findings.                    Please clarify or validate "acute respiratory failure with hypoxia".     [   ] Acute Respiratory Failure with Hypoxia - ABG pO2 < 60 mmHg or O2 sat of 88% on RA and respiratory symptoms documented   [ x  ] Acute Respiratory Insufficiency - Generally describes less severe respiratory symptoms and measurements (pO2, SpO2, pH, and pCO2) NOT meeting criteria for respiratory failure     [   ] Acute Respiratory Distress - Generally describes less severe respiratory symptoms (tachypnea, in respiratory distress, increased work of breathing, unable to speak in complete sentences, labored breathing, use of accessory muscles, RR> 24, cyanosis, dyspnea, wheezing, stridor, lethargy) without sufficient measurements (pO2, SpO2, pH, and pCO2) to meet criteria for " respiratory failure    [   ] Other Respiratory Diagnosis (please specify): _________________________________     [   ]   Acute Respiratory failure ruled out   [   ]    Clinically Undetermined     Please document in your progress notes daily for the duration of treatment until resolved and include in your discharge summary.

## 2020-01-16 NOTE — PHYSICIAN QUERY
"PT Name: Elizabeth Navarrete  MR #: [id number]     PHYSICIAN QUERY -  ACUITY OF CONDITION CLARIFICATION    Litzy Ledbetter RN, CCDS  Desk # [phone]; lisa # [phone] [email or web address]    This form is a permanent document in the medical record.     Query Date: January 16, 2020    By submitting this query, we are merely seeking further clarification of documentation to reflect the severity of illness of your patient. Please utilize your independent clinical judgment when addressing the question(s) below.    The Medical record reflects the following:     Indicators  Supporting Clinical Findings Location in Medical Record   x Documentation of condition underwent EGD with gastric ulcer with visible vessel    UGIB (upper gastrointestinal bleed)  -?s/p egd with epi/cautery of bleeding gastric ulcer IM PN 1/9    GI PN 1/9     x Lab Value(s) H&H 1/8 = 6.9/22.4 Lab    Radiology Findings     x Treatment                                 Medication Hb 6.8 after EGD. S/p 1 unit of pRBC with rise in Hb to 9.8.  -continue IV PPI x 3 days  -continue CBC q8h for now, likely change to BID later this afternoon    - ppi  - monitor hct    Transfused 2 u PRBC IM PN 1/9          GI PN 1/9      Blood Bank   x Other ABLA    EGD  Impression:   - No gross lesions in esophagus.  - Large hiatal hernia.  - Oozing gastric ulcer with oozing hemorrhage with vissible       vessel (Car Class Ib).Injected.     Treated with bipolar cautery.    Clip was placed.  - Clotted blood in the greater curvature of the stomach.  - Gastrostomy present characterized by healthy appearing    mucosa.  - Blood in the duodenal bulb.  - No specimens collected. EGD 1/8     Provider, please specify the acuity/chronicity of "Gastric Ulcer"     [  x ] Acute   [   ] Acute and/on chronic   [   ] Other - specify: ________   [   ]  Clinically Undetermined     Please document in your progress notes daily for the duration of treatment until resolved, and include in your " discharge summary.

## 2020-01-16 NOTE — PHYSICIAN QUERY
"PT Name: Elizabeth Navarrete  MR #: [id number]    Physician Query Form - Nutrition Clarification   Litzy Ledbetter RN, CCDS  Desk # [phone]; lisa # [phone] [email or web address]    This form is a permanent document in the medical record.     Query Date: January 16, 2020    By submitting this query, we are merely seeking further clarification of documentation.. Please utilize your independent clinical judgment when addressing the question(s) below.    The Medical record contains the following:   Indicators  Supporting Clinical Findings Location in Medical Record   x % of Estimated Energy Intake over a time frame from p.o., TF, or TPN Energy Calories Required: (P) not meeting needs  Protein Required: (P) not meeting needs  Fluid Required: (P) not meeting needs  % Intake of Estimated Energy Needs: Other: NPO  % Meal Intake: NPO    NFPE completed today 1/9/20- moderate wasting of hands and clavicles and mild wasting of temples.    Energy Calories Required: meeting needs  Protein Required: meeting needs  Fluid Required: meeting needs  Comments: LBM 1/11  % Intake of Estimated Energy Needs: 75 - 100 %  % Meal Intake: NPO RD CN 1/9            RD  CN 1/9;   RD PN 1/13        RD PN 1/13    Weight Status over a time frame     x Subcutaneous Fat and/or Muscle Loss Malnutrition Assessment   Faith Region (Muscle Loss): mild depletion  Clavicle Bone Region (Muscle Loss): moderate depletion  Dorsal Hand (Muscle Loss): moderate depletion       Subcutaneous Fat Loss (Final Summary): well nourished  Muscle Loss Evaluation (Final Summary): moderate protein-calorie malnutrition     Chronic wasting muscle extremities  Cachectic RD CN 1/9 & 13                  ED MD   x Fluid Accumulation or Edema L side face edema RN Care Plan 1/12    Reduced  Strength     x Wt / BMI / Usual Body Weight Height: 4' 8" (142.2 cm)         Weight: 58.3 kg (128 lb 8.5 oz)  BMI (Calculated): 28.8    Ideal Body Weight (IBW), Female: 80 lb  % Ideal Body " Weight, Female (lb): 160.66 %  BMI Grade: 25 - 29.9 - overweight RD CN 1/9   x Delayed Wound Healing / Failure to Thrive Skin breakdown various stages of healing   Ed md   x Acute or Chronic Illness Upper GIB  Hx of CVA sp left sided weakness, dysphagia, and urinary incontinence  Persistent atrial fibrillation  Bladder mass  HFPEF  Bullous Pemphigoid  PEG tube    EGD noted for gastric ulcer with bleeding visible vessel.   ENT: Parotitis H&P                  IM PN 1/9  ENT CN    Medication     x Treatment General Information Comments:  · Pt is NPO.   · Has PEG tube, receives TF at nursing home.   · ST recs water and ice chips PO only.       Rec:   1. Initiate tube feeding of Isosource 1.5 at 10 ml/hour and advance as tolerated to goal rate of 45 ml/hr which would provide 1620 kcal, 73 grams of protein, and 825 ml of free water. Add an additional 200 ml free water flush QID.   2. If glucose elevated change tube feed to Diabetasource with goal rate of 55 ml/hr. RD CN 1/9    Other       AND / ASPEN Clinical Characteristics (October 2011)  A minimum of two characteristics is recommended for diagnosing either moderate or severe malnutrition   Mild Malnutrition Moderate Malnutrition Severe Malnutrition   Energy Intake from p.o., TF or TPN. < 75% intake of estimated energy needs for less than 7 days < 75% intake of estimated energy needs for greater than 7 days < 50% intake of estimated energy needs for > 5 days   Weight Loss 1-2% in 1 month  5% in 3 months  7.5% in 6 months  10% in 1 year 1-2 % in 1 week  5% in 1 month  7.5% in 3 months  10% in 6 months  20% in 1 year > 2% in 1 week  > 5% in 1 month  > 7.5% in 3 months  > 10% in 6 months  > 20% in 1 year   Physical Findings     None *Mild subcutaneous fat and/or muscle loss  *Mild fluid accumulation  *Stage II decubitus  *Surgical wound or non-healing wound *Mod/severe subcutaneous fat and/or muscle loss  *Mod/severe fluid accumulation  *Stage III or IV  decubitus  *Non-healing surgical wound     Provider, please specify diagnosis or diagnoses associated with above clinical findings.    [ x ] Mild Protein-Calorie Malnutrition   [  ] Moderate Protein-Calorie Malnutrition   [  ] Cachexia   [  ] Other Nutritional Diagnosis (please specify): _____________   [  ] Other: ______________   [  ] Clinically Undetermined       Please document in your progress notes daily for the duration of treatment until resolved and include in your discharge summary.

## 2020-01-16 NOTE — PLAN OF CARE
01/16/20 1458   Post-Acute Status   Post-Acute Authorization Placement   Post-Acute Placement Status Set-up Complete

## 2020-01-16 NOTE — DISCHARGE SUMMARY
[facility] Hospital Medicine Discharge Summary    Primary Team: [facility] Hospitalist Team A  Attending Physician: Mervat Alexander MD  Resident: Ct Flores MD  Intern: Trey     Date of Admit: 1/8/2020  Date of Discharge: 1/16/2020    Discharge to: Nursing home  Condition: stable    Discharge Diagnoses     Patient Active Problem List   Diagnosis    Essential hypertension    CAD (coronary artery disease)    Chronic diastolic congestive heart failure    History of depression    Personal history of gallstones    Internal carotid artery stenosis    Family history of breast cancer    Primary osteoarthritis of right knee    Chronic atrial fibrillation    Non-rheumatic mitral regurgitation    Enlarged LA (left atrium)    Pulmonary HTN    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    Oropharyngeal dysphagia    Hemiparesis affecting left side as late effect of stroke    Gastrostomy tube dependent, 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    PEG (percutaneous endoscopic gastrostomy) adjustment/replacement/removal    Nursing home resident    HCAP (healthcare-associated pneumonia)    Anemia    Controlled type 2 diabetes mellitus, without long-term current use of insulin    Skin ulcer, stage 3    Multiple open wounds of lower leg    Pseudomonas urinary tract infection    Elevated troponin    Acute respiratory failure with hypoxia    SOB (shortness of breath)    Acute respiratory failure with hypoxia and hypercarbia    UGIB (upper gastrointestinal bleed)    BP (bullous pemphigoid)    Hyperkalemia    Atrial fibrillation with RVR    Acute parotitis    Leaking PEG tube       Consultants and Procedures     Consultants:  GI  ENT      Procedures:   EGD    Imaging:  Parotid US  CT neck  CXR    Brief History of Present Illness      Elizabeth Navarrete is a 89 y.o. female who  has a past medical history of A-fib, Bladder mass, CAD (coronary artery disease) (10/10/2012), CHF (congestive heart failure), CRF (chronic renal failure)  (10/10/2012), Embolic stroke involving right carotid artery (6/15/2017), Family history of breast cancer (10/10/2012), Hematuria, gross, History of cardiac arrhythmia (10/10/2012), History of CHF (congestive heart failure) (10/10/2012), History of depression (10/10/2012), History of echocardiogram (10/10/2012), HTN (hypertension) (10/10/2012), Internal carotid artery stenosis (10/10/2012), Personal history of gallstones (10/10/2012), and Urinary tract infection.. The patient presented to [facility] on 1/8/2020 with a primary complaint of Hematemesis (To ER per [facility] from [facility] with c/o vomiting blood starting last night.)        The patient was in their usual state of health, which includes staying at [facility], completely bed bound with bowel incontinence, with all feeds and meds through PEG tube, until early this morning when she began having episodes of dark brown/blood emesis.    Patient has a PMHx of Afib, suspected bladder cancer (patient' is unaware of diagnosis and daughter would like it to stay that way) seen on CT scan in 2018, CHF, CVA in June 2017 with residual left sided weakness, HTN, bullous pemphigoid, and multiple urinary tract infections.  Daughter states that she was called early this AM around 4o'clock to be alerted that her mother was vomiting blood and was requiring some oxygen (patient does not use oxygen at the nursing home where she has resided s/p CVA in 2017) and that she was being transferred to the hospital.       Patient has never had any episodes of bloody vomiting in the past, but does have a PEG tube placed for dysphagia and difficulty swallowing since her stroke which has been exchanged several times. Patient follows with Dr. Kolb for GI.      No abdominal pain, purulent discharge, nausea, vomiting, diarrhea, constipation, chest pain, or shortness of breath.       For the full HPI please refer to the History & Physical from this  admission.    Hospital Course By Problem with Pertinent Findings     Hematemesis, Upper GI Bleed, gastric ulcer with visible vessel   Large volume hematemesis in ED. Consulted GI, underwent EGD with gastric ulcer with visible vessel. Hb 6.8 after EGD. S/p 2 unit of pRBC with rise in Hb to 10.7, slowly trended down and stabilized at 8.8.   -continue PPI PO BID for total of 11days, then continue PPI PO daily indefinitely   -continue CBC  Daily  -Hgb since last unit 10>9.3>8.8>8.8>8.2>8.9     Acute Hypoxic Respiratory Failure, resolved  - May be secondary to aspiration with vomiting  -  Weaned oxygen to RA  -1/14, mild increase in work of breathing, crackles to bases  - restarted patients home lasix, good uop overnight  - respirations even/unlabored, on RA prior to discharge     Hyperkalemia, resolved  Kidney function stable. Patient was shifted in ED x2.   -monitor daily CMP     Persistent atrial fibrillation, controlled rate  Initially held carvedilol in setting of bleed. Went into RVR 1/8, which resolved with IV Metoprolol x1.  HR 70-98 overnight  -restarted home carvedilol   -currently controlled     Bullous Pemphigoid   - Continue home clobetasol and mupirocin ointments   - Wound care consult      Hx of CVA sp left sided weakness, dysphagia, and urinary incontinence  - Patient at [facility]. Dependent on all ADLs. Refuses to work with PT and OT at nursing home, per daughter patient spends 100% of time in bed and occasionally watches TV, refuses to be put into wheelchair or brought out into common areas  - No acute changes in baseline  - Home meds of , Plavix 75 mg, and Atorvastatin 40 mg;   - recommend not restarting plavix     Bladder mass with history of hematuria and left hydronephrosis   - Patient with hx of hematuria, bladder mass, and left hydronephrosis  - Has followed up with Urology and had lengthy discussions on the likely diagnosis of urothelial carcinoma. Given her poor functional status and high  anesthetic risk, further work-up was not pursued   - Patient's daughter would like patient to not be aware of diagnosis      HFpEF  - TTE 6/2017 with EF 50-55%, grade II diastolic dysfunction, mild aortic regurgitation  - On home Lasix 20 mg, coreg, and statin   - Follows with outpatient Cardiology  - No acute issues on this admission  - lasix restarted      Hypertension  Held home medications of Coreg 25 and Norvasc 5 mg initially  -restarted as patient became normotensive     Parotid gland swelling, tenderness  Consider obstruction with stone, stricture vs infection given acute onset and pain  -Parotid gland US with 5 mm stone  -attempted lemon juice swabs, no improvement  -ENT consulted, no surgical intervention at this time  -recommend continue abx, hydration, sialogogues  -continue warm compress with massage, purulent material expressed  -WBC up to 18.5>8>14.5 with symptomatic improvement  -Discharge on PO bactrim        Leukocytosis  Suspect secondary to PRBC vs Parotid gland infection  -WBC 18.5>18.5>17.4>8>14.5  -vanc/cef/flagyl started yesterday, transition to PO abx  -repeat cbc this morning  -f/u parotid cx with s.areus sensitive to bactrim      Hyperlipidemia   - Home medicine of Atorvastatin       Depression  - On home Venlafaxine 75 mg daily      Discharge Medications      Elizabeth Navarrete   Home Medication Instructions SHANNAN:[id number]    Printed on:01/16/20 7971   Medication Information                      acetaminophen (TYLENOL) 160 mg/5 mL Elix  650 mg by Per G Tube route every 6 (six) hours as needed.              albuterol (PROVENTIL/VENTOLIN HFA) 90 mcg/actuation inhaler  Inhale 1-2 puffs into the lungs every 4 (four) hours as needed for Wheezing. Rescue             amLODIPine (NORVASC) 5 MG tablet  0.5 tablets (2.5 mg total) by Per G Tube route once daily.             ascorbic acid, vitamin C, (VITAMIN C) 500 MG tablet  500 mg by Per G Tube route 2 (two) times daily.             atorvastatin  (LIPITOR) 40 MG tablet  1 tablet (40 mg total) by Per G Tube route once daily.             carvedilol (COREG) 25 MG tablet  0.5 tablets (12.5 mg total) by Per G Tube route 2 (two) times daily.             cetirizine (ZYRTEC) 10 MG tablet  10 mg by Per G Tube route once daily.             cholecalciferol, vitamin D3, (VITAMIN D3) 1,000 unit capsule  1,000 Units by Per G Tube route once daily.              clobetasol (TEMOVATE) 0.05 % cream  Apply topically 2 (two) times daily.             diphenhydrAMINE (BENADRYL) 12.5 mg/5 mL elixir  25 mg by Per G Tube route every 6 (six) hours as needed for Itching or Allergies.              docusate (COLACE) 50 mg/5 mL liquid  100 mg by Per G Tube route 2 (two) times daily.              doxycycline (VIBRA-TABS) 100 MG tablet  100 mg by Per G Tube route 2 (two) times daily.             ferrous sulfate 325 (65 FE) MG EC tablet  Take 1 tablet (325 mg total) by mouth 2 (two) times daily. Per G tube             fish oil-omega-3 fatty acids 300-1,000 mg capsule  1 g by Per G Tube route once daily.              furosemide (LASIX) 20 MG tablet  1 tablet (20 mg total) by Per G Tube route once daily.             glucosamine-chondroitin 500-400 mg tablet  1 tablet by Per G Tube route once daily.              ketoconazole (NIZORAL) 2 % shampoo  Apply topically twice a week. Apply twice weekly with showers until seborrheic dermatitis has resolved             multivitamin (ONE DAILY MULTIVITAMIN) per tablet  1 tablet by Per G Tube route once daily.             mupirocin (BACTROBAN) 2 % ointment  Apply topically 2 (two) times daily. Apply to the skin near the Peg tube as this is reddened.             pantoprazole (PROTONIX) 40 mg GrPS  Take 1 packet (40 mg total) by mouth 2 (two) times daily.             polyethylene glycol (GLYCOLAX) 17 gram PwPk  17 g by Per G Tube route nightly as needed.             sulfamethoxazole-trimethoprim 200-40 mg/5 ml (BACTRIM,SEPTRA) 200-40 mg/5 mL Susp  Take  30.65 mLs by mouth every 12 (twelve) hours. for 11 days             venlafaxine (EFFEXOR) 75 MG tablet  75 mg by Per G Tube route once daily.             zinc sulfate (ZINCATE) 220 (50) mg capsule  220 mg by Per G Tube route once daily.                 Discharge Information:   Diet:  Tube feeds with free water flush    Physical Activity:  As tolerated             Instructions:  1. Take all medications as prescribed  2. Keep all follow-up appointments  3. Return to the hospital or call your primary care physicians if any worsening symptoms such as fever, chest pain, shortness of breath, return of symptoms, or any other concerns.      Follow-Up Appointments:  ENT   PCP    Ron Yang MD  [facility] Internal Medicine, [facility]

## 2020-01-17 NOTE — NURSING
Pt discharged per MD order to [facility]. PIV removed, catheter tip intact. Tube feeding disconnected, purewick removed. Telemetry monitor removed, cleaned, and returned to monitor [facility]. Discharge paperwork given to daughter (celia) and transport team. Pt left unit via [facility] ambulance services. Pt in no acute distress upon departure.

## 2020-01-21 NOTE — ED NOTES
Pt transferred back to [facility] via [facility] ems unit [id number]. Report given to EMS and called to [facility].

## 2020-01-21 NOTE — ED NOTES
Pt presents to the ED via ems from [facility] secondary to peg tube dislodgement. Pt is a poor historian due to hx of dementia. Pt has no complaints at this time. Peg tube replaced by dr Bee and placement was confirmed with xray.

## 2020-01-21 NOTE — ED PROVIDER NOTES
Encounter Date: 1/20/2020       History     Chief Complaint   Patient presents with    PEG tube     from [facility] with PEG tube dislodgment. staff did not tell EMS when PEG tube became dislodged. pt hx of dementia.      Elizabeth Navarrete is a 89 y.o. 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    The patient presents to the ED from [facility] s/p PEG tube dislodgement.  EMS was not informed of when PEG tube became dislodged. Due to the patient's dementia, the history is limited to EMS report and medical records.         The history is provided by the EMS personnel.     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    Family history of breast cancer 10/10/2012    Hematuria, gross     History of cardiac arrhythmia 10/10/2012    History of CHF (congestive heart failure) 10/10/2012    History of depression 10/10/2012    History of echocardiogram 10/10/2012    HTN (hypertension) 10/10/2012    Internal carotid artery stenosis 10/10/2012    Personal history of gallstones 10/10/2012    Urinary tract infection      Past Surgical History:    Procedure Laterality Date    BREAST LUMPECTOMY      ESOPHAGOGASTRODUODENOSCOPY N/A 6/12/2018    Procedure: ESOPHAGOGASTRODUODENOSCOPY (EGD);  Surgeon: Anila Kolb MD;  Location: [facility] ENDO;  Service: Endoscopy;  Laterality: N/A;    ESOPHAGOGASTRODUODENOSCOPY N/A 6/14/2018    Procedure: ESOPHAGOGASTRODUODENOSCOPY (EGD);  Surgeon: Anila Kolb MD;  Location: [facility] ENDO;  Service: Endoscopy;  Laterality: N/A;    ESOPHAGOGASTRODUODENOSCOPY N/A 1/8/2020    Procedure: ESOPHAGOGASTRODUODENOSCOPY (EGD);  Surgeon: Richy Rothman MD;  Location: [facility] ENDO;  Service: Endoscopy;  Laterality: N/A;    EYE SURGERY      HYSTERECTOMY       Family History   Problem Relation Age of Onset    Cancer Mother         breast    Heart disease Neg Hx      Social History     Tobacco Use    Smoking status: Never Smoker    Smokeless tobacco: Never Used   Substance Use Topics    Alcohol use: No    Drug use: No     Review of Systems   Unable to perform ROS: Dementia       Physical Exam     Initial Vitals [01/20/20 2210]   BP Pulse Resp Temp SpO2   (!) 148/88 88 20 98.5 °F (36.9 °C) 96 %      MAP       --         Physical Exam    Nursing note and vitals reviewed.  Constitutional: She appears well-developed and well-nourished. She is not diaphoretic. No distress.   Appears thin, pleasantly demented.   HENT:   Head: Normocephalic and atraumatic.   Mouth/Throat: Oropharynx is clear and moist.   Eyes: EOM are normal. Pupils are equal, round, and reactive to light.   Neck: No tracheal deviation present.   Cardiovascular: Normal rate, regular rhythm, normal heart sounds and intact distal pulses.   Pulmonary/Chest: Breath sounds normal. No stridor. No respiratory distress. She has no wheezes.   Abdominal: Soft. Bowel sounds are normal. She exhibits no distension and no mass. There is no tenderness. There is no rigidity, no rebound, no guarding, no CVA tenderness, no tenderness at McBurney's point and negative Woody's sign.   Peg  tube site patent, no erythema or tenderness.   Musculoskeletal: Normal range of motion. She exhibits no edema.   Neurological: She is alert and oriented to person, place, and time. She has normal strength. No cranial nerve deficit or sensory deficit.   Skin: Skin is warm and dry. Capillary refill takes less than 2 seconds. No pallor.   Psychiatric: She has a normal mood and affect. Her behavior is normal. Thought content normal.         ED Course   Feeding Tube  Date/Time: 1/20/2020 11:38 PM  Performed by: Delmar Bee MD  Authorized by: Delmar Bee MD   Consent: The procedure was performed in an emergent situation.  Indications: tube dislodged and tube removed by patient    Sedation:  Patient sedated: no    Local anesthesia used: no    Anesthesia:  Local anesthesia used: no  Tube type: gastrostomy  Patient position: supine  Procedure type: replacement  Endoscope used: no  Bulb inflation volume: 5 (ml)  Placement/position confirmation: x-ray  Tube placement difficulty: minimal  Patient tolerance: Patient tolerated the procedure well with no immediate complications  Comments: Confirmed in place via gastroview injection        Labs Reviewed - No data to display         X-Rays:   Independently Interpreted Readings:   Other Readings:  Reviewed by myself, read by radiology.     Imaging Results          X-Ray Abdomen AP 1 View (KUB) (Final result)  Result time 01/20/20 23:26:52    Final result by Charles Abraham MD (01/20/20 23:26:52)                 Impression:      Intraluminal position of gastrostomy tube.      Electronically signed by: Charles Abraham MD  Date:    01/20/2020  Time:    23:26             Narrative:    EXAMINATION:  XR ABDOMEN AP 1 VIEW    CLINICAL HISTORY:  Encounter for attention to gastrostomy    TECHNIQUE:  AP View(s) of the abdomen was performed.    COMPARISON:  October 2019.    FINDINGS:  10 cc Gastroview contrast was administered through patient's gastrostomy tube which confirms  intraluminal position.  No definite leak seen on this single AP view.  Gallstones are seen.  Prominent stool is visualized in the colon.  Nonspecific bowel gas pattern.  No evidence for obstruction.                              Medical Decision Making:   History:   Old Medical Records: I decided to obtain old medical records.  Old Records Summarized: other records.       <> Summary of Records: Patient with chronic PEG tube, last placed by IR 09/2019.   Initial Assessment:   89 yo F NH resident PEG dependent presents to ED after PEG tube displaced.  Clinical Tests:   Radiological Study: Reviewed and Ordered  ED Management:  Peg replaced at bedside. X-ray with gastroview confirmed appropriate placement. Patient stable for D/C.    On re-evaluation, the patient's status has improved.  After complete ED evaluation, clinical impression is most consistent with PEG tube displacement.  PCP follow-up within 2-3 days was recommended.    After taking into careful account the patient's history, physical exam findings, as well as empirical and objective data obtained throughout ED workup, I feel no emergent medical condition has been identified. No further evaluation or admission was felt to be required, and the patient is stable for discharge from the ED. The patient and any additional family present were updated with test results, overall clinical impression, and recommended further plan of care, including discharge instructions as provided and outpatient follow-up for continued evaluation and management as needed. All questions were answered. The patient expressed understanding and agreed with current plan for discharge and follow-up plan of care. Strict ED return precautions were provided, including return/worsening of current symptoms, new symptoms, or any other concerns.                                   Clinical Impression:       ICD-10-CM ICD-9-CM   1. PEG (percutaneous endoscopic gastrostomy)  adjustment/replacement/removal Z43.1 V55.1         Disposition:   Disposition: Discharged  Condition: Stable                     Delmar Bee MD  01/21/20 0404

## 2020-01-22 NOTE — ED PROVIDER NOTES
Encounter Date: 1/22/2020    SCRIBE #1 NOTE: I, Deanna Owens, am scribing for, and in the presence of,  Dr. Lester. I have scribed the entire note.       History     Chief Complaint   Patient presents with    PEG tube problem     pt from [facility] for pulling out PEG tube. PEG tube was placed by Dr. Bee last night.      Elizabeth Navarrete is a 89 y.o. 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    The patient presents to the ED due to PEG tube dislodgement. As per nursing home the patient's symptoms began an unknown amount of time ago. Staff at [facility] report the patient's PEG tube was found out of place. The staff believe the patient may be pulling out the tube. Per medical record the patient pulled out the tube last night. It was replaced and the patient was discharged back to the nursing home. The patient has no other complaints including abdominal pain, nausea, vomiting, diarrhea, urinary symptoms, fever or chills.       The history is provided by the nursing home and the patient.     Review of patient's allergies indicates:   Allergen Reactions    Pcn [penicillins] Rash     Past Medical History:   Diagnosis Date    A-fib     Bladder mass     CAD (coronary artery disease) 10/10/2012    CHF (congestive heart failure)     CRF (chronic renal failure) 10/10/2012    Embolic stroke involving right carotid artery  6/15/2017    Family history of breast cancer 10/10/2012    Hematuria, gross     History of cardiac arrhythmia 10/10/2012    History of CHF (congestive heart failure) 10/10/2012    History of depression 10/10/2012    History of echocardiogram 10/10/2012    HTN (hypertension) 10/10/2012    Internal carotid artery stenosis 10/10/2012    Personal history of gallstones 10/10/2012    Urinary tract infection      Past Surgical History:   Procedure Laterality Date    BREAST LUMPECTOMY      ESOPHAGOGASTRODUODENOSCOPY N/A 6/12/2018    Procedure: ESOPHAGOGASTRODUODENOSCOPY (EGD);  Surgeon: Anila Kolb MD;  Location: [facility];  Service: Endoscopy;  Laterality: N/A;    ESOPHAGOGASTRODUODENOSCOPY N/A 6/14/2018    Procedure: ESOPHAGOGASTRODUODENOSCOPY (EGD);  Surgeon: Anila Kolb MD;  Location: [facility];  Service: Endoscopy;  Laterality: N/A;    ESOPHAGOGASTRODUODENOSCOPY N/A 1/8/2020    Procedure: ESOPHAGOGASTRODUODENOSCOPY (EGD);  Surgeon: Richy Rothman MD;  Location: [facility];  Service: Endoscopy;  Laterality: N/A;    EYE SURGERY      HYSTERECTOMY       Family History   Problem Relation Age of Onset    Cancer Mother         breast    Heart disease Neg Hx      Social History     Tobacco Use    Smoking status: Never Smoker    Smokeless tobacco: Never Used   Substance Use Topics    Alcohol use: No    Drug use: No     Review of Systems   Constitutional: Negative for chills and fever.   Gastrointestinal: Negative for abdominal pain, diarrhea, nausea and vomiting.   All other systems reviewed and are negative.      Physical Exam     Initial Vitals [01/22/20 0055]   BP Pulse Resp Temp SpO2   (!) 160/76 73 18 98.2 °F (36.8 °C) 99 %      MAP       --         Physical Exam    Nursing note and vitals reviewed.  Constitutional: She appears well-developed and well-nourished. She is not diaphoretic. She appears ill (chronically). No distress.   HENT:   Head: Normocephalic and atraumatic.   Right Ear:  Tympanic membrane normal.   Left Ear: Tympanic membrane normal.   Mouth/Throat: Oropharynx is clear and moist.   Eyes: Conjunctivae and EOM are normal. Pupils are equal, round, and reactive to light.   Neck: Normal range of motion. Neck supple.   Cardiovascular: Normal rate, regular rhythm and normal heart sounds. Exam reveals no gallop and no friction rub.    No murmur heard.  Pulmonary/Chest: Breath sounds normal. She has no wheezes. She has no rhonchi. She has no rales.   Abdominal: Soft. Bowel sounds are normal. There is no tenderness. There is no rebound and no guarding.   PEG tube site with mild erythema. No signs of infection.    Musculoskeletal: Normal range of motion. She exhibits no edema or tenderness.   Lymphadenopathy:     She has no cervical adenopathy.   Neurological: She is alert and oriented to person, place, and time. She has normal strength.   Skin: Skin is warm and dry. Capillary refill takes less than 2 seconds. No rash noted.         ED Course   Feeding Tube  Date/Time: 1/22/2020 6:27 AM  Performed by: Ian Lester MD  Authorized by: Ian Lester MD   Indications: tube removed by patient  Preparation: Patient was prepped and draped in the usual sterile fashion.    Sedation:  Patient sedated: no    Local anesthesia used: no    Anesthesia:  Local anesthesia used: no  Tube type: gastrostomy  Patient position: supine  Endoscope used: no  Placement/position confirmation: x-ray  Tube placement difficulty: none  Patient tolerance: Patient tolerated the procedure well with no immediate complications  Comments: PEG tube successfully replaced using patent trach, confirmed with Gastrografin, no complications.        Labs Reviewed - No data to display       Imaging Results          X-Ray Abdomen AP 1 View (KUB) (Final result)  Result time 01/22/20 01:51:58    Final result by Jayne Kemp MD (01/22/20 01:51:58)                 Impression:      Hyperdense contrast within the stomach and  jejunum, which suggests intraluminal position of the G-tube.      Electronically signed by: Jayne Kemp  Date:    01/22/2020  Time:    01:51             Narrative:    EXAMINATION:  ABDOMEN AP    CLINICAL HISTORY:  Gastrostomy malfunction    TECHNIQUE:  Abdomen AP one view    COMPARISON:  01/20/2020    FINDINGS:  Single AP view of the abdomen demonstrates hyperdense contrast in the stomach and proximal jejunum, after the injection of 10 cc of Gastroview.  A nonspecific bowel gas pattern is present.  Gallstones are present.  Scoliosis and spondylitic changes are present.  The bones are osteopenic.                                 Medical Decision Making:   Initial Assessment:   89-year-old female, with multiple medical problems, presents the ER for G tube being pulled out.  Apparently happened yesterday, replaced in this ER.  Patient denied point attic, per nursing home staff noted that it was out.  The, chills or chest pain shortness of breath or any other complaints. Some erythema around the G-tube site, but no sign of infection or purulent discharge. Will plan to replace bedside and confirmed with Gastrografin.  Family understands , agrees with plan.  Clinical Tests:   Radiological Study: Ordered and Reviewed            Scribe Attestation:   Scribe #1: I performed the above scribed service and the documentation accurately describes the services I performed. I attest to the accuracy of the note.    Attending Attestation:           Physician Attestation for Scribe:  Physician Attestation Statement for Scribe #1: I, Dr. Lester, reviewed documentation, as scribed by Deanna Owens in my presence, and it is both accurate and complete.                 ED Course as of Jan 22 0631 Wed Jan 22, 2020   0202 Resting in bed in no acute distress KUB confirmed intraluminal position.  Discussed with family for discharge home, return precautions, peg tube care.  Family under stood agreed with plan, patient will be  discharged.    [SE]      ED Course User Index  [SE] Ian Lester MD                Clinical Impression:     1. Malfunction of percutaneous endoscopic gastrostomy (PEG) tube        Disposition:   Disposition: Discharged  Condition: Stable                     Ian Lester MD  01/22/20 0631

## 2020-01-22 NOTE — ED TRIAGE NOTES
Patient arrives via EMS from [facility] after she was found with her PEG tube pulled out. Per EMS report, the nurse found her with her tube out at approximately 0000 but is unsure when it was pulled out. Pt was seen in this ED for the same thing last night and had it replaced by Dr Bee at the bedside. Pt denies any pain, does have mild erythema around PEG insertion site.

## 2020-01-23 NOTE — ED PROVIDER NOTES
Encounter Date: 1/23/2020    SCRIBE #1 NOTE: I, Kendra Andres, am scribing for, and in the presence of,  Meliton Quispe Jr, MD. I have scribed the entire note.       History     Chief Complaint   Patient presents with    PEG Tube Replacement     Nursing home reports that PEG tube came out during the night. Presents in no distress.      Elizabeth Navarrete is a 89 y.o. 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    The patient presents to the ED due to PEG tube dislodgement last night. The patient has no other associated symptoms and denies any pain, vomiting or any other concerning symptoms. She presented to the ED with the same complaint on 01/20 and 01/22. As per nursing staff, chateau concerned that balloon fell off of G tube causing it to dislodge.     The history is provided by the patient.     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    Family history of breast cancer 10/10/2012    Hematuria, gross     History of cardiac arrhythmia 10/10/2012    History of CHF (congestive heart failure) 10/10/2012    History of depression  10/10/2012    History of echocardiogram 10/10/2012    HTN (hypertension) 10/10/2012    Internal carotid artery stenosis 10/10/2012    Personal history of gallstones 10/10/2012    Urinary tract infection      Past Surgical History:   Procedure Laterality Date    BREAST LUMPECTOMY      ESOPHAGOGASTRODUODENOSCOPY N/A 6/12/2018    Procedure: ESOPHAGOGASTRODUODENOSCOPY (EGD);  Surgeon: Anila Kolb MD;  Location: [facility];  Service: Endoscopy;  Laterality: N/A;    ESOPHAGOGASTRODUODENOSCOPY N/A 6/14/2018    Procedure: ESOPHAGOGASTRODUODENOSCOPY (EGD);  Surgeon: Anila Kolb MD;  Location: [facility] ENDO;  Service: Endoscopy;  Laterality: N/A;    ESOPHAGOGASTRODUODENOSCOPY N/A 1/8/2020    Procedure: ESOPHAGOGASTRODUODENOSCOPY (EGD);  Surgeon: Richy Rothman MD;  Location: [facility];  Service: Endoscopy;  Laterality: N/A;    EYE SURGERY      HYSTERECTOMY       Family History   Problem Relation Age of Onset    Cancer Mother         breast    Heart disease Neg Hx      Social History     Tobacco Use    Smoking status: Never Smoker    Smokeless tobacco: Never Used   Substance Use Topics    Alcohol use: No    Drug use: No     Review of Systems   Constitutional: Negative for chills and fever.   HENT: Negative for ear pain and sore throat.    Eyes: Negative for redness.   Respiratory: Negative for shortness of breath.    Cardiovascular: Negative for chest pain.   Gastrointestinal: Negative for abdominal pain, diarrhea and vomiting.        No PEG tube in place   Genitourinary: Negative for dysuria.   Musculoskeletal: Negative for back pain.   Skin: Negative for rash.   Neurological: Negative for headaches.       Physical Exam     Initial Vitals [01/23/20 0641]   BP Pulse Resp Temp SpO2   (!) 146/70 66 16 98.7 °F (37.1 °C) 100 %      MAP       --         Physical Exam    Nursing note and vitals reviewed.  Constitutional: She appears well-developed and well-nourished. She is not diaphoretic. No distress.  "  HENT:   Head: Normocephalic and atraumatic.   Eyes: Conjunctivae and EOM are normal.   Neck: Normal range of motion. Neck supple.   Cardiovascular: Normal rate, regular rhythm and normal heart sounds.   Pulmonary/Chest: Breath sounds normal. No respiratory distress.   Abdominal: Soft. There is no tenderness.   No G tube with surrounding mild erythema at site but no tenderness and no abnormal purulent drainage   Musculoskeletal: Normal range of motion. She exhibits no edema or tenderness.   Neurological: She is alert and oriented to person, place, and time. She has normal strength.   Skin: Skin is warm and dry. Capillary refill takes less than 2 seconds.   Sores at different stages of healing at bilateral LE         ED Course   Feeding Tube  Date/Time: 1/23/2020 7:36 AM  Location procedure was performed: [facility] EMERGENCY DEPARTMENT  Performed by: Meliton Quispe Jr., MD  Authorized by: Meliton Quispe Jr., MD   Consent: The procedure was performed in an emergent situation.  Site marked: the operative site was marked  Imaging studies: imaging studies available  Patient identity confirmed: verbally with patient and arm band  Time out: Immediately prior to procedure a "time out" was called to verify the correct patient, procedure, equipment, support staff and site/side marked as required.  Indications: tube dislodged  Preparation: Patient was prepped and draped in the usual sterile fashion.  Tube type: gastrostomy  Patient position: supine  Procedure type: replacement  Placement/position confirmation: x-ray  Tube placement difficulty: none  Complications: No  Specimens: No  Implants: No  Patient tolerance: Patient tolerated the procedure well with no immediate complications        Labs Reviewed - No data to display            Medical Decision Making:   History:   Old Medical Records: I decided to obtain old medical records.  Initial Assessment:   89-year-old past medical history of AFib, bladder cancer, CHF, CAD, " hypertension, G-tube in place presenting with the G-tube dislodgement from [facility].  This is patient's 3rd presentation for same problem.  Concern with wound may have fallen off tubing as per living center.  Tube transported with patient as balloon still connected inflating and deflating without any difficulty with this provider.  Differential Diagnosis:   G-tube dislodgement  Clinical Tests:   Radiological Study: Ordered and Reviewed  ED Management:  Plan:  Replace new G-tube x-ray and reassess likely DC home if G-tube in place.                   ED Course as of Jan 23 0918   Thu Jan 23, 2020   0851 PegTube in place patient tolerated procedure well x-ray obtained with good Gastroview flow will DC to nursing home.  Discussed plans with patient's daughter at bedside.  No further questions safe for planned discharge.  Spoke with nurse at [facility] considering this is patients 3rd time presenting for tube displacement. Discussed concern that incorrect port may be being used.    [DC]      ED Course User Index  [DC] Meliton Quispe Jr., MD                Clinical Impression:       ICD-10-CM ICD-9-CM   1. PEG tube malfunction K94.23 536.42                      I, Meliton Quispe,  personally performed the services described in this documentation. All medical record entries made by the scribe were at my direction and in my presence.  I have reviewed the chart and agree that the record reflects my personal performance and is accurate and complete. Meliton Quispe Jr., MD  01/23/20 4746

## 2020-01-23 NOTE — ED TRIAGE NOTES
Pt. To the ER via [facility] from [facility]. Pt. Is awake, alert and oriented to person, date of birth and year. Pt. Denies pulling out her peg tube. She states she is bed bound at the nursing home and does not get in a w/c. Perineal care performed. Pt. Had a bowel movement and new adult brief placed on pt. Pt. Has multiple 0.5 cm to 1 cm sores to right hip area. Mepelex pad placed on the right hip for comfort. Redness noted to the right flank area. Pt. Was actively scratching area while repositioning her in bed and instructed to not pick at her skin. Wound dressing behind the left calf removed and Vaseline gauze and kerlex removed. Pt. Tolerated well. Bed in the low position, side rails elevated x 2 and call light at the bedside.

## 2020-02-03 NOTE — PROGRESS NOTES
Subjective:       Patient ID: Elizabeth Navarrete is a 89 y.o. female.    Chief Complaint: Follow-up    This is a 89-year-old female here for a follow-up visit.  She was hospitalized with upper GI bleed, undergoing upper endoscopy with epinephrine injection and clipping of a linear gastric ulcer.  She underwent in tolerated the procedure well. No further melena, hematochezia.  Family present to give additional history.  She inadvertently removed the gastrostomy tube several times after that and had these replaced emergency room.  His flushing well. No other exacerbating or relieving factors or other associated symptoms.     The following portions of the patient's history were reviewed and updated as appropriate: allergies, current medications, past family history, past medical history, past social history, past surgical history and problem list.    (Portions of this note were dictated using voice recognition software and may contain dictation related errors in spelling/grammar/syntax not found on text review)  HPI  Review of Systems   Constitutional: Negative for appetite change and unexpected weight change.   Eyes: Negative for redness and visual disturbance.   Respiratory: Negative for apnea and chest tightness.    Gastrointestinal: Negative for abdominal distention and abdominal pain.         Objective:      Physical Exam   Constitutional: She appears well-developed and well-nourished. No distress.   HENT:   Head: Normocephalic and atraumatic.   Eyes: Conjunctivae are normal. No scleral icterus.   Cardiovascular: Normal rate. Exam reveals no friction rub.   Pulmonary/Chest: Effort normal. No stridor. No respiratory distress.   Abdominal: Soft. She exhibits no distension. There is no tenderness.   Gastrostomy tube in place in LUQ, tube clean and intact   Musculoskeletal: She exhibits no edema or tenderness.   Neurological: She is alert. A cranial nerve deficit is present.   Skin: Skin is warm and dry. No erythema.    Psychiatric: She has a normal mood and affect. Her behavior is normal.   Nursing note and vitals reviewed.        Labs/imaging; reviewed  Assessment:       1. Oropharyngeal dysphagia    2. Attention to gastrostomy tube    3. Gastric ulcer, unspecified chronicity, unspecified whether gastric ulcer hemorrhage or perforation present        Plan:   1. Continue PPI  2. G-tube site cleaned, flushed  3. F/u 3mo  4. H.pylori with next lab draw

## 2020-02-07 NOTE — PROGRESS NOTES
Otolaryngology Clinic Note    Elizabeth Navarrete  Encounter Date: 2/7/2020   YOB: 1928  Referring Physician: Radha Nicholas Md  [address]  [address]  [place] [address]   PCP: [facility]    Chief Complaint:   Chief Complaint   Patient presents with    Other     salivary stone       HPI: Elizabeth Navarrete is a 89 y.o. female here for hospital follow up. Was admitted 1/8/2020 with a GI bleed. Noted to have a severe left parotitis at that time. Patient in nursing home. 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    No issues with parotid since discharge. Swelling and redness has completely resolved. Daughter with her today. Reports some nausea last night and today. No vomiting. Patient denies. Also thinks she may be getting a cold. Some sneezing. No coughing, shortness of breath or congestion. No fevers.    Review of Systems   Constitutional: Negative for chills and fever.   HENT: Negative for ear pain and sore throat.    Respiratory: Negative for cough and shortness of breath.    Gastrointestinal: Positive for nausea. Negative for diarrhea and vomiting.        Review of patient's allergies indicates:   Allergen Reactions    Phenergan [promethazine] Hallucinations    Pcn [penicillins] Rash       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    Family history of breast cancer 10/10/2012    Hematuria, gross     History of cardiac arrhythmia 10/10/2012    History of CHF (congestive heart failure) 10/10/2012    History of depression 10/10/2012    History of echocardiogram 10/10/2012    HTN (hypertension) 10/10/2012    Internal carotid artery stenosis 10/10/2012    Personal history of gallstones  10/10/2012    Urinary tract infection        Past Surgical History:   Procedure Laterality Date    BREAST LUMPECTOMY      ESOPHAGOGASTRODUODENOSCOPY N/A 6/12/2018    Procedure: ESOPHAGOGASTRODUODENOSCOPY (EGD);  Surgeon: Anila Kolb MD;  Location: [facility];  Service: Endoscopy;  Laterality: N/A;    ESOPHAGOGASTRODUODENOSCOPY N/A 6/14/2018    Procedure: ESOPHAGOGASTRODUODENOSCOPY (EGD);  Surgeon: Anila Kolb MD;  Location: [facility] ENDO;  Service: Endoscopy;  Laterality: N/A;    ESOPHAGOGASTRODUODENOSCOPY N/A 1/8/2020    Procedure: ESOPHAGOGASTRODUODENOSCOPY (EGD);  Surgeon: Richy Rothman MD;  Location: [facility];  Service: Endoscopy;  Laterality: N/A;    EYE SURGERY      HYSTERECTOMY         Social History     Socioeconomic History    Marital status:      Spouse name: Not on file    Number of children: y    Years of education: Not on file    Highest education level: Not on file   Occupational History    Not on file   Social Needs    Financial resource strain: Not on file    Food insecurity:     Worry: Not on file     Inability: Not on file    Transportation needs:     Medical: Not on file     Non-medical: Not on file   Tobacco Use    Smoking status: Never Smoker    Smokeless tobacco: Never Used   Substance and Sexual Activity    Alcohol use: No    Drug use: No    Sexual activity: Never   Lifestyle    Physical activity:     Days per week: Not on file     Minutes per session: Not on file    Stress: Not on file   Relationships    Social connections:     Talks on phone: Not on file     Gets together: Not on file     Attends Taoist service: Not on file     Active member of club or organization: Not on file     Attends meetings of clubs or organizations: Not on file     Relationship status: Not on file   Other Topics Concern    Not on file   Social History Narrative    Lives alone.    Daughter (Laya) calls every 3 hours during the day.       Family History   Problem Relation Age  of Onset    Cancer Mother         breast    Heart disease Neg Hx        Outpatient Encounter Medications as of 2/7/2020   Medication Sig Dispense Refill    acetaminophen (TYLENOL) 160 mg/5 mL Elix 650 mg by Per G Tube route every 6 (six) hours as needed.       albuterol (PROVENTIL/VENTOLIN HFA) 90 mcg/actuation inhaler Inhale 1-2 puffs into the lungs every 4 (four) hours as needed for Wheezing. Rescue 1 Inhaler 0    amLODIPine (NORVASC) 5 MG tablet 0.5 tablets (2.5 mg total) by Per G Tube route once daily. 30 tablet     ascorbic acid, vitamin C, (VITAMIN C) 500 MG tablet 500 mg by Per G Tube route 2 (two) times daily.      atorvastatin (LIPITOR) 40 MG tablet 1 tablet (40 mg total) by Per G Tube route once daily. 90 tablet 3    carvedilol (COREG) 25 MG tablet 0.5 tablets (12.5 mg total) by Per G Tube route 2 (two) times daily. 30 tablet     cetirizine (ZYRTEC) 10 MG tablet 10 mg by Per G Tube route once daily.      cholecalciferol, vitamin D3, (VITAMIN D3) 1,000 unit capsule 1,000 Units by Per G Tube route once daily.       clobetasol (TEMOVATE) 0.05 % cream Apply topically 2 (two) times daily.      diphenhydrAMINE (BENADRYL) 12.5 mg/5 mL elixir 25 mg by Per G Tube route every 6 (six) hours as needed for Itching or Allergies.       docusate (COLACE) 50 mg/5 mL liquid 100 mg by Per G Tube route 2 (two) times daily.       doxycycline (VIBRA-TABS) 100 MG tablet 100 mg by Per G Tube route 2 (two) times daily.      ferrous sulfate 325 (65 FE) MG EC tablet Take 1 tablet (325 mg total) by mouth 2 (two) times daily. Per G tube 720 tablet 0    fish oil-omega-3 fatty acids 300-1,000 mg capsule 1 g by Per G Tube route once daily.       furosemide (LASIX) 20 MG tablet 1 tablet (20 mg total) by Per G Tube route once daily. 30 tablet 0    glucosamine-chondroitin 500-400 mg tablet 1 tablet by Per G Tube route once daily.       ketoconazole (NIZORAL) 2 % shampoo Apply topically twice a week. Apply twice weekly  with showers until seborrheic dermatitis has resolved 120 mL 1    multivitamin (ONE DAILY MULTIVITAMIN) per tablet 1 tablet by Per G Tube route once daily.      mupirocin (BACTROBAN) 2 % ointment Apply topically 2 (two) times daily. Apply to the skin near the Peg tube as this is reddened.      polyethylene glycol (GLYCOLAX) 17 gram PwPk 17 g by Per G Tube route nightly as needed.      venlafaxine (EFFEXOR) 75 MG tablet 75 mg by Per G Tube route once daily.      zinc sulfate (ZINCATE) 220 (50) mg capsule 220 mg by Per G Tube route once daily.      omeprazole (PRILOSEC) 40 MG capsule Take 1 capsule (40 mg total) by mouth once daily. (Patient not taking: Reported on 2/7/2020) 30 capsule 11     No facility-administered encounter medications on file as of 2/7/2020.        Physical Exam:    Vitals:    02/07/20 1008   BP: 132/77   Pulse: 107   Temp: 97.6 °F (36.4 °C)       Constitutional  General Appearance: thin and frail, in wheelchair  Communication: ability, understanding, voice quality normal  Head and Face  Inspection: normocephalic, atraumatic, no scars, lesions or masses    Palpation: no stepoffs, sinus tenderness or masses  Parotid glands: no masses, swelling or tenderness - no palpable stone, clear saliva expressed  Eyes  Ocular Motility / Alignment: normal alignment, motility, no proptosis, enophthalmus or nystagmus  Conjunctiva: not injected  Eyelids: no entropion or ectropion, no edema  Ears  Hearing: speech reception thresholds grossly normal  External Ears: no auricle lesions, non-tender, mobile to palpation  Otoscopy:  Right Ear: cerumen obstructing good view of TM but no apparent perforation or effusion, normal EAC  Left Ear: no tympanic membrane lesions, perforations, or effusion, normal EAC  Nose  External Nose: no lesions, tenderness, trauma or deformity  Intranasal Exam: no edema, erythema, discharge, mass or obstruction  Oral Cavity / Oropharynx  Lips: upper and lower lips pink and moist  Teeth:  dentition absent  Oral Mucosa: moist, no mucosal lesions  Floor of Mouth: normal, no lesions, salivary ducts patent  Tongue: moist, normal mobility, no lesions  Oropharynx: tonsils and walls without erythema, exudate, lesions, fullness or obstruction  Neck  Inspection and Palpation: no erythema, induration, emphysema, tenderness or masses  Lymphatic:  Anterior, Posterior, Submandibular, Submental, Supraclavicular: no lymphadenopathy present  Chest / Respiratory  Chest: no stridor or retractions, normal effort and expansion  Cardiovascular:  Pulses: 2+ radial pulses, regular rhythm and rate  Auscultation: deferred  Neurological  Cranial Nerves: grossly intact  General: in wheelchair, left sided weakness  Psychiatric  Orientation: oriented to time, place and person  Mood and Affect: no depression, anxiety or agitation      Procedures:  No notes on file    Pertinent Data:  ? LABS:   ? AUDIO:  ? PATH:      I personally reviewed the following pertinent data at today's visit:    Imaging:   ? XRAY:  ? Ultrasound:  ? CT Scan:    ? MRI Scan:  ? PET/CT Scan:    I personally reviewed the following images:    Summary of Outside Records:      Assessment and Plan:  Elizabeth Navarrete is a 89 y.o. female with     Parotitis       Left parotitis resolved. Questionable stone on US. No palpable stone. Patient has multiple medical co-morbidities. Would not take to surgery for sialoendoscopy unless becomes a repeated problem. No concerning URI findings on exam. No congestion. Not sneezing or coughing on exam. Told daughter to watch. Monitor for fever. May try some nasal saline in nose. RTC as needed        ELZBIETA Rocha MD  [facility]

## 2020-02-21 NOTE — TELEPHONE ENCOUNTER
----- Message from Nas Davis sent at 2/21/2020 10:20 AM CST -----  Contact: [phone]/ DaughterLaya   Patients daughter requesting to speak with you regarding sponges that patient uses. Please call.

## 2020-02-27 NOTE — TELEPHONE ENCOUNTER
She can have sponges, I know them very well. Her daughter is focused on quality of life for her mother and this will provide some comfort as long as she accepts there may be a degree of slight aspiration and those risks. Happy to f/u with them in clinic anytime as always, ok to overbook if needed    Previous Messages            Patient Calls     Anila Kolb MD  You 3 days ago      She can have sponges, I know them very well. Her daughter is focused on quality of life for her mother and this will provide some comfort as long as she accepts there may be a degree of slight aspiration and those risks. Happy to f/u with them in clinic anytime as always, ok to overbook if needed    Routing comment       You  Anila Kolb MD 6 days ago      So I spoke to patient daughter regarding her mom, her mom had a stroke and they use the sponge to give her water. She stated her mom has been hoarse since they do not give her mom nothing to drink at night due to the nurses stated they will loose there license if they do,but they give her the sponges when she in the hospital at night. Can you clear her that she can have the sponges to drink at night at the nursing home. Thanks    Routing comment       You 6 days ago         ----- Message from Nas Davis sent at 2/21/2020 10:20 AM CST -----  Contact: [phone]/ ConnieLaya   Patients daughter requesting to speak with you regarding sponges that patient uses. Please call.

## 2020-03-05 NOTE — TELEPHONE ENCOUNTER
Discussed with GEN Orourke the letter signed by Dr. Kolb for patient to have sponges at the bedside.

## 2020-03-05 NOTE — TELEPHONE ENCOUNTER
----- Message from Ash Mena sent at 3/5/2020  2:59 PM CST -----  Contact: [facility] [phone]  [facility] is calling for a verbal order. When you call for her please say doctors office so they can get her to come to the line. Please call and advise.

## 2020-05-06 PROBLEM — Z51.5 END OF LIFE CARE: Status: ACTIVE | Noted: 2020-01-01

## 2020-05-06 PROBLEM — R65.20 SEVERE SEPSIS: Status: ACTIVE | Noted: 2020-01-01

## 2020-05-06 PROBLEM — Z51.5 PALLIATIVE CARE ENCOUNTER: Status: ACTIVE | Noted: 2020-01-01

## 2020-05-06 PROBLEM — J18.9 CAP (COMMUNITY ACQUIRED PNEUMONIA): Status: ACTIVE | Noted: 2020-01-01

## 2020-05-06 PROBLEM — A41.9 SEVERE SEPSIS: Status: ACTIVE | Noted: 2020-01-01

## 2020-05-07 NOTE — CHAPLAIN
Patient is on Hospice, unresponsive, breaths very lightly and fairly rapidly intermittently.   She gives the appearance that she has already Passed. I provided prayer and support and then inquired from Fr Jiménez if he was available before offering his anointing.  When Fr Jiménez entered, pt appeared that she had already passed.FrAdelina Provided the sacrament and pt breathed rapidly again. Daughter was at bedside and was very attentive to her mother, and she was tearful at times. I stayed with her for an extended period of time. Patient was still when I suggested that I would give daughter alone time with Mom, but that I am just outside if needed. Daughter was grateful for all of the Spiritual Care provided.

## 2020-05-07 NOTE — PLAN OF CARE
Permission attained to enter room via virtual system.  Virtual rounds completed as documented.  Today's lab values, notes, and vital signs up to now have been reviewed.  Pt is currently a hospice pt .  Comfort measures in effect   Daughter is at bedside   No tension seen on pt's face.  No resp distress seen or heard

## 2020-05-07 NOTE — SUBJECTIVE & OBJECTIVE
Interval History: lethargic, unresponsive, daughter by bedside, requesting to spend the night with patient    Medications:  Continuous Infusions:  Scheduled Meds:  PRN Meds:    Objective:     Vital Signs (Most Recent):    Vital Signs (24h Range):  Temp:  [92.7 °F (33.7 °C)] 92.7 °F (33.7 °C)  Pulse:  [46-65] 47  Resp:  [20-38] 32  SpO2:  [93 %-100 %] 97 %  BP: (51-67)/(24-32) 55/29        There is no height or weight on file to calculate BMI.    Review of Symptoms  Symptom Assessment (ESAS 0-10 scale)  ESAS 0 1 2 3 4 5 6 7 8 9 10   Pain              Dyspnea              Anxiety              Nausea              Depression               Anorexia              Fatigue              Insomnia              Restlessness               Agitation              CAM / Delirium __ --  ___+   Constipation     __ --  ___+   Diarrhea           __ --  ___+  Bowel Management Plan (BMP): Yes      Performance Status: 20    ECOG Performance Status Grade: 4 - Completely disabled    Physical Exam   Constitutional: She appears well-developed.   frail   HENT:   Head: Normocephalic.   Cardiovascular: Bradycardia present.   Abdominal: Soft. She exhibits no distension.   PEG tube in place   Neurological: She is unresponsive.       Significant Labs: None  CBC:   Recent Labs   Lab 05/06/20  1654   WBC 7.59   HGB 10.4*   HCT 37.1   *   *     BMP:  Recent Labs   Lab 05/06/20  1654   *   *   K 5.6*   *   CO2 31*   *   CREATININE 5.5*   CALCIUM 9.8     LFT:  Lab Results   Component Value Date    AST 30 05/06/2020    ALKPHOS 128 05/06/2020    BILITOT 0.4 05/06/2020     Albumin:   Albumin   Date Value Ref Range Status   05/06/2020 1.6 (L) 3.5 - 5.2 g/dL Final     Protein:   Total Protein   Date Value Ref Range Status   05/06/2020 5.6 (L) 6.0 - 8.4 g/dL Final     Lactic acid:   Lab Results   Component Value Date    LACTATE 4.9 (HH) 05/06/2020    LACTATE 1.1 11/18/2019       Significant Imaging: None    Advanced  Directives::  Living Will: Yes. Copy on chart: Yes  LaPOST: Yes  Do Not Resuscitate Status: Yes  Medical Power of : Yes. Agent's Name: Laya. Agent's Contact Number:     Decision-Making Capacity: Family answered questions, Patient unable to communicate due to disease severity/cognitive impairment    Living Arrangements: Lives in nursing home    Psychosocial/Cultural:  Patient's most important priorities:  Unable to assess    Patient's biggest concerns/fears:  Unable to assess    Previous death/end of life care history:  Unable to assess    Patient's goals/hopes:  Unable to assess

## 2020-05-07 NOTE — PLAN OF CARE
this pt is on in hospice service        05/07/20 1532   Discharge Assessment   Assessment Type Discharge Planning Assessment

## 2020-05-07 NOTE — PLAN OF CARE
Permission attained to enter room via virtual system from pt's daughter.  Pt breathing rapidly and shallowly.  Neck appears rigid.  Daughter remains with pt.   Pt was seen by spiritual care dept.  This vn requested floor staff to evaluate if need for antianxiety med would benefit pt muscle tension

## 2020-05-07 NOTE — NURSING
Patient came to the unit around 12 midnight under the care of Hospice. Daughter present at the bedside. She is unresponsive with the very low BP 53/26 at 2 am. Her skin is very dry flaky and excoriated on her sacrum, perineum, back and lower extremities. Mouth care given. On O2 2L/mt via nasal cannula. Regular turns done. IV Morphine given twice for the Respiratory rate above 25/mt. Care handed over to the morning staff Sheron.

## 2020-05-07 NOTE — PROGRESS NOTES
[facility]  Palliative Medicine  Progress Note    Patient Name: Elizabeth Navarrete  MRN: [id number]  Admission Date: 5/6/2020  Hospital Length of Stay: 1 days  Code Status: Prior   Attending Provider: Aby Gordon*  Consulting Provider: Lyndsey Lynch NP  Primary Care Physician: [facility]  Principal Problem:Severe sepsis    Patient information was obtained from relative(s), past medical records and ER records.      Assessment/Plan:     * Severe sepsis  CAP (community acquired pneumonia)  Bradycardia  Hypotension  Received IVF in the ED   started on empiric antibiotics  Declining - family transition to comfort measures    End of life care  Palliative care encounter  Transition to comfort measures per family  D/c antibiotics  Morphine for dyspnea  Ativan for anxiety  Scopolamine  Bisacodyl          I will follow-up with patient. Please contact us if you have any additional questions.    Subjective:     Chief Complaint:   Chief Complaint   Patient presents with    Pneumonia       HPI:   Elizabeth Navarrete is a 89 y/o F with PMH of Afib, HTN, depression, CAD. CHF, CKD, bladder mass brought in by EMS from NH for evaluation for a concern of worsening pneumonia on chest x-ray.  Patient is aphasic and has a G-tube in place. In the ED patient is hypotensive, bradycardic, and hypothermic likely due to sepsis.      Palliative care has been consulted for end of life/ Hospice care     Palliative care met with patient lying in bed, lethargic unresponsive. Daughter -Laya by bedside reported patient has been sickly in the past few weeks but unable to visit her at the NH due to visitation restriction. Laya stated she just want patient to be comfortable. Hospice education provided. Questions and concerns addressed. Emotional comfort provided.     Patient too unstable for transportation to inpatient hospice care. Plan is to consult hospice [facility].       Hospital Course:  No notes on file    Interval  History: Patient unresponsive this am. Daughter at bedside. Continue comfort care    Medications:  Continuous Infusions:  Scheduled Meds:   scopolamine  1 patch Transdermal Q3 Days     PRN Meds:lorazepam, morphine    Objective:     Vital Signs (Most Recent):  Temp: 97.8 °F (36.6 °C) (05/07/20 0832)  Pulse: 60 (05/07/20 0832)  Resp: 16 (05/07/20 0832)  BP: (!) 55/27 (05/07/20 0832)  SpO2: 99 % (05/07/20 0832) Vital Signs (24h Range):  Temp:  [92.7 °F (33.7 °C)-97.8 °F (36.6 °C)] 97.8 °F (36.6 °C)  Pulse:  [39-65] 60  Resp:  [16-38] 16  SpO2:  [93 %-100 %] 99 %  BP: (47-67)/(23-32) 55/27     Weight: 56 kg (123 lb 7.3 oz)  Body mass index is 27.68 kg/m².    Review of Symptoms  Symptom Assessment (ESAS 0-10 scale)  ESAS 0 1 2 3 4 5 6 7 8 9 10   Pain              Dyspnea              Anxiety              Nausea              Depression               Anorexia              Fatigue              Insomnia              Restlessness               Agitation              CAM / Delirium __ --  ___+   Constipation     __ --  ___+   Diarrhea           __ --  ___+    Bowel Management Plan (BMP): Yes        Performance Status: 20     ECOG Performance Status Grade: 4 - Completely disabled  Physical Exam   Constitutional: She appears well-developed.   frail   HENT:   Head: Normocephalic.   Cardiovascular: Bradycardia present.   Abdominal: Soft. She exhibits no distension.   PEG tube in place   Neurological: She is unresponsive.       Significant Labs: All pertinent labs within the past 24 hours have been reviewed.  CBC:   Recent Labs   Lab 05/06/20  1654   WBC 7.59   HGB 10.4*   HCT 37.1   *   *     BMP:  Recent Labs   Lab 05/06/20  1654   *   *   K 5.6*   *   CO2 31*   *   CREATININE 5.5*   CALCIUM 9.8     LFT:  Lab Results   Component Value Date    AST 30 05/06/2020    ALKPHOS 128 05/06/2020    BILITOT 0.4 05/06/2020     Albumin:   Albumin   Date Value Ref Range Status   05/06/2020 1.6 (L) 3.5 -  5.2 g/dL Final     Protein:   Total Protein   Date Value Ref Range Status   05/06/2020 5.6 (L) 6.0 - 8.4 g/dL Final     Lactic acid:   Lab Results   Component Value Date    LACTATE 4.9 (HH) 05/06/2020    LACTATE 1.1 11/18/2019       Significant Imaging: I have reviewed all pertinent imaging results/findings within the past 24 hours.    Advanced Directives::  Living Will: Yes. Copy on chart: Yes  LaPOST: Yes  Do Not Resuscitate Status: Yes  Medical Power of : Yes. Agent's Name: Laya. Agent's Contact Number:      Decision-Making Capacity: Family answered questions, Patient unable to communicate due to disease severity/cognitive impairment     Living Arrangements: Lives in nursing home     Psychosocial/Cultural:  Patient's most important priorities:  Unable to assess     Patient's biggest concerns/fears:  Unable to assess     Previous death/end of life care history:  Unable to assess     Patient's goals/hopes:  Unable to assess      Lyndsey Lynch, MSN, APRN, NP-C  Palliative Medicine  [facility]  [phone]        > 50% of 40 min visit spent in chart review, face to face discussion of goals of care,  symptom assessment, coordination of care and emotional support.

## 2020-05-07 NOTE — ASSESSMENT & PLAN NOTE
CAD (coronary artery disease)  Enlarged LA (left atrium)  Chronic atrial fibrillation  chronic

## 2020-05-07 NOTE — PROGRESS NOTES
[facility]  Palliative Medicine  Progress Note    Patient Name: Elizabeth Navarrete  MRN: [id number]  Admission Date: (Not on file)  Hospital Length of Stay: 0 days  Code Status: Prior   Attending Provider: Aby Gordon*  Consulting Provider: Aby Gordon MD  Primary Care Physician: [facility]  Principal Problem:Severe sepsis    Patient information was obtained from relative(s) and ER records.      Assessment/Plan:     * Severe sepsis  CAP (community acquired pneumonia)  Bradycardia  Hypotension  Received IVF in the ED   started on empiric antibiotics  Declining - family transition to comfort measures    End of life care  Palliative care encounter  Transition to comfort measures per family  D/c antibiotics  Morphine for dyspnea  Ativan for anxiety  Scopolamine  Bisacodyl      Gastrostomy tube dependent, 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  Oropharyngeal dysphagia  stable    Chronic diastolic congestive heart failure  CAD (coronary artery disease)  Enlarged LA (left atrium)  Chronic atrial fibrillation  chronic            none    Subjective:     Chief Complaint:   Chief Complaint   Patient presents with    Pneumonia       HPI:   Elizabeth Navarrete is a 89 y/o F with PMH of Afib, HTN, depression, CAD. CHF, CKD, bladder mass brought in by EMS from NH for evaluation for a concern of worsening pneumonia on chest x-ray.  Patient is aphasic and has a G-tube in place. In the ED patient is hypotensive, bradycardic, and hypothermic likely due to sepsis.      Palliative care has been consulted for end of life/ Hospice care     Palliative care met with patient lying in bed, lethargic unresponsive. Daughter -Laya by bedside reported patient has been sickly in the past few weeks but unable to visit her at the NH due to visitation restriction. Laya stated she just want patient to be comfortable. Hospice education provided. Questions and concerns addressed. Emotional comfort provided.     Patient too  unstable for transportation to inpatient hospice care. Plan is to consult hospice [facility].       Hospital Course:  No notes on file    Interval History: lethargic, unresponsive, daughter by bedside, requesting to spend the night with patient    Medications:  Continuous Infusions:  Scheduled Meds:  PRN Meds:    Objective:     Vital Signs (Most Recent):    Vital Signs (24h Range):  Temp:  [92.7 °F (33.7 °C)] 92.7 °F (33.7 °C)  Pulse:  [46-65] 47  Resp:  [20-38] 32  SpO2:  [93 %-100 %] 97 %  BP: (51-67)/(24-32) 55/29        There is no height or weight on file to calculate BMI.    Review of Symptoms  Symptom Assessment (ESAS 0-10 scale)  ESAS 0 1 2 3 4 5 6 7 8 9 10   Pain              Dyspnea              Anxiety              Nausea              Depression               Anorexia              Fatigue              Insomnia              Restlessness               Agitation              CAM / Delirium __ --  ___+   Constipation     __ --  ___+   Diarrhea           __ --  ___+  Bowel Management Plan (BMP): Yes      Performance Status: 20    ECOG Performance Status Grade: 4 - Completely disabled    Physical Exam   Constitutional: She appears well-developed.   frail   HENT:   Head: Normocephalic.   Cardiovascular: Bradycardia present.   Abdominal: Soft. She exhibits no distension.   PEG tube in place   Neurological: She is unresponsive.       Significant Labs: None  CBC:   Recent Labs   Lab 05/06/20  1654   WBC 7.59   HGB 10.4*   HCT 37.1   *   *     BMP:  Recent Labs   Lab 05/06/20  1654   *   *   K 5.6*   *   CO2 31*   *   CREATININE 5.5*   CALCIUM 9.8     LFT:  Lab Results   Component Value Date    AST 30 05/06/2020    ALKPHOS 128 05/06/2020    BILITOT 0.4 05/06/2020     Albumin:   Albumin   Date Value Ref Range Status   05/06/2020 1.6 (L) 3.5 - 5.2 g/dL Final     Protein:   Total Protein   Date Value Ref Range Status   05/06/2020 5.6 (L) 6.0 - 8.4 g/dL Final     Lactic acid:    Lab Results   Component Value Date    LACTATE 4.9 (HH) 05/06/2020    LACTATE 1.1 11/18/2019       Significant Imaging: None    Advanced Directives::  Living Will: Yes. Copy on chart: Yes  LaPOST: Yes  Do Not Resuscitate Status: Yes  Medical Power of : Yes. Agent's Name: Laya. Agent's Contact Number:     Decision-Making Capacity: Family answered questions, Patient unable to communicate due to disease severity/cognitive impairment    Living Arrangements: Lives in nursing home    Psychosocial/Cultural:  Patient's most important priorities:  Unable to assess    Patient's biggest concerns/fears:  Unable to assess    Previous death/end of life care history:  Unable to assess    Patient's goals/hopes:  Unable to assess          > 50% of 40 min visit spent in chart review, face to face discussion of goals of care,  symptom assessment, coordination of care and emotional support.    Aby Gordon MD  Palliative Medicine  [facility]

## 2020-05-07 NOTE — ASSESSMENT & PLAN NOTE
CAP (community acquired pneumonia)  Bradycardia  Hypotension  Received IVF in the ED   started on empiric antibiotics  Declining - family transition to comfort measures

## 2020-05-07 NOTE — PLAN OF CARE
Pt. Transitioned to Hospice care from ED . Pt.is unconscious unable to complete admit questions at this time .

## 2020-05-07 NOTE — ASSESSMENT & PLAN NOTE
Palliative care encounter  Transition to comfort measures per family  D/c antibiotics  Morphine for dyspnea  Ativan for anxiety  Scopolamine  Bisacodyl

## 2020-05-07 NOTE — SUBJECTIVE & OBJECTIVE
Interval History: Patient unresponsive this am. Daughter at bedside. Continue comfort care    Medications:  Continuous Infusions:  Scheduled Meds:   scopolamine  1 patch Transdermal Q3 Days     PRN Meds:lorazepam, morphine    Objective:     Vital Signs (Most Recent):  Temp: 97.8 °F (36.6 °C) (05/07/20 0832)  Pulse: 60 (05/07/20 0832)  Resp: 16 (05/07/20 0832)  BP: (!) 55/27 (05/07/20 0832)  SpO2: 99 % (05/07/20 0832) Vital Signs (24h Range):  Temp:  [92.7 °F (33.7 °C)-97.8 °F (36.6 °C)] 97.8 °F (36.6 °C)  Pulse:  [39-65] 60  Resp:  [16-38] 16  SpO2:  [93 %-100 %] 99 %  BP: (47-67)/(23-32) 55/27     Weight: 56 kg (123 lb 7.3 oz)  Body mass index is 27.68 kg/m².    Review of Symptoms  Symptom Assessment (ESAS 0-10 scale)  ESAS 0 1 2 3 4 5 6 7 8 9 10   Pain              Dyspnea              Anxiety              Nausea              Depression               Anorexia              Fatigue              Insomnia              Restlessness               Agitation              CAM / Delirium __ --  ___+   Constipation     __ --  ___+   Diarrhea           __ --  ___+    Bowel Management Plan (BMP): Yes        Performance Status: 20     ECOG Performance Status Grade: 4 - Completely disabled  Physical Exam   Constitutional: She appears well-developed.   frail   HENT:   Head: Normocephalic.   Cardiovascular: Bradycardia present.   Abdominal: Soft. She exhibits no distension.   PEG tube in place   Neurological: She is unresponsive.       Significant Labs: All pertinent labs within the past 24 hours have been reviewed.  CBC:   Recent Labs   Lab 05/06/20  1654   WBC 7.59   HGB 10.4*   HCT 37.1   *   *     BMP:  Recent Labs   Lab 05/06/20  1654   *   *   K 5.6*   *   CO2 31*   *   CREATININE 5.5*   CALCIUM 9.8     LFT:  Lab Results   Component Value Date    AST 30 05/06/2020    ALKPHOS 128 05/06/2020    BILITOT 0.4 05/06/2020     Albumin:   Albumin   Date Value Ref Range Status   05/06/2020 1.6  (L) 3.5 - 5.2 g/dL Final     Protein:   Total Protein   Date Value Ref Range Status   05/06/2020 5.6 (L) 6.0 - 8.4 g/dL Final     Lactic acid:   Lab Results   Component Value Date    LACTATE 4.9 (HH) 05/06/2020    LACTATE 1.1 11/18/2019       Significant Imaging: I have reviewed all pertinent imaging results/findings within the past 24 hours.    Advanced Directives::  Living Will: Yes. Copy on chart: Yes  LaPOST: Yes  Do Not Resuscitate Status: Yes  Medical Power of : Yes. Agent's Name: Laya. Agent's Contact Number:      Decision-Making Capacity: Family answered questions, Patient unable to communicate due to disease severity/cognitive impairment     Living Arrangements: Lives in nursing home     Psychosocial/Cultural:  Patient's most important priorities:  Unable to assess     Patient's biggest concerns/fears:  Unable to assess     Previous death/end of life care history:  Unable to assess     Patient's goals/hopes:  Unable to assess      Lyndsey Lynch, MSN, APRN, NP-C  Palliative Medicine  [facility]  [phone]        > 50% of 40 min visit spent in chart review, face to face discussion of goals of care,  symptom assessment, coordination of care and emotional support.

## 2020-05-08 PROBLEM — J96.01 ACUTE RESPIRATORY FAILURE WITH HYPOXIA AND HYPERCARBIA: Status: RESOLVED | Noted: 2019-01-01 | Resolved: 2020-01-01

## 2020-05-08 PROBLEM — E87.5 HYPERKALEMIA: Status: RESOLVED | Noted: 2020-01-01 | Resolved: 2020-01-01

## 2020-05-08 PROBLEM — R06.02 SOB (SHORTNESS OF BREATH): Status: RESOLVED | Noted: 2019-01-01 | Resolved: 2020-01-01

## 2020-05-08 PROBLEM — L98.499: Status: RESOLVED | Noted: 2019-01-01 | Resolved: 2020-01-01

## 2020-05-08 PROBLEM — I69.354 HEMIPARESIS AFFECTING LEFT SIDE AS LATE EFFECT OF STROKE: Chronic | Status: RESOLVED | Noted: 2017-06-15 | Resolved: 2020-01-01

## 2020-05-08 PROBLEM — K11.21 ACUTE PAROTITIS: Status: RESOLVED | Noted: 2020-01-01 | Resolved: 2020-01-01

## 2020-05-08 PROBLEM — K92.2 UGIB (UPPER GASTROINTESTINAL BLEED): Status: RESOLVED | Noted: 2020-01-01 | Resolved: 2020-01-01

## 2020-05-08 PROBLEM — Z43.1 PEG (PERCUTANEOUS ENDOSCOPIC GASTROSTOMY) ADJUSTMENT/REPLACEMENT/REMOVAL: Status: RESOLVED | Noted: 2018-06-12 | Resolved: 2020-01-01

## 2020-05-08 PROBLEM — Z86.73 HISTORY OF EMBOLIC STROKE: Chronic | Status: RESOLVED | Noted: 2017-06-15 | Resolved: 2020-01-01

## 2020-05-08 PROBLEM — J18.9 HCAP (HEALTHCARE-ASSOCIATED PNEUMONIA): Status: RESOLVED | Noted: 2019-01-01 | Resolved: 2020-01-01

## 2020-05-08 PROBLEM — Z51.5 END OF LIFE CARE: Status: RESOLVED | Noted: 2020-01-01 | Resolved: 2020-01-01

## 2020-05-08 PROBLEM — Z93.1 GASTROSTOMY TUBE DEPENDENT: Chronic | Status: RESOLVED | Noted: 2017-06-21 | Resolved: 2020-01-01

## 2020-05-08 PROBLEM — I48.91 ATRIAL FIBRILLATION WITH RVR: Status: RESOLVED | Noted: 2020-01-01 | Resolved: 2020-01-01

## 2020-05-08 PROBLEM — J96.01 ACUTE RESPIRATORY FAILURE WITH HYPOXIA: Status: RESOLVED | Noted: 2019-01-01 | Resolved: 2020-01-01

## 2020-05-08 PROBLEM — Z51.5 PALLIATIVE CARE ENCOUNTER: Status: RESOLVED | Noted: 2020-01-01 | Resolved: 2020-01-01

## 2020-05-08 PROBLEM — K94.23 LEAKING PEG TUBE: Status: RESOLVED | Noted: 2020-01-01 | Resolved: 2020-01-01

## 2020-05-08 PROBLEM — D64.9 ANEMIA: Status: RESOLVED | Noted: 2019-01-01 | Resolved: 2020-01-01

## 2020-05-08 PROBLEM — J18.9 CAP (COMMUNITY ACQUIRED PNEUMONIA): Status: RESOLVED | Noted: 2020-01-01 | Resolved: 2020-01-01

## 2020-05-08 PROBLEM — R13.12 OROPHARYNGEAL DYSPHAGIA: Chronic | Status: RESOLVED | Noted: 2017-06-16 | Resolved: 2020-01-01

## 2020-05-08 PROBLEM — E11.9 CONTROLLED TYPE 2 DIABETES MELLITUS, WITHOUT LONG-TERM CURRENT USE OF INSULIN: Chronic | Status: RESOLVED | Noted: 2019-01-01 | Resolved: 2020-01-01

## 2020-05-08 PROBLEM — A41.9 SEVERE SEPSIS: Status: RESOLVED | Noted: 2020-01-01 | Resolved: 2020-01-01

## 2020-05-08 PROBLEM — S81.809A MULTIPLE OPEN WOUNDS OF LOWER LEG: Status: RESOLVED | Noted: 2019-01-01 | Resolved: 2020-01-01

## 2020-05-08 PROBLEM — B96.5 PSEUDOMONAS URINARY TRACT INFECTION: Status: RESOLVED | Noted: 2019-01-01 | Resolved: 2020-01-01

## 2020-05-08 PROBLEM — R65.20 SEVERE SEPSIS: Status: RESOLVED | Noted: 2020-01-01 | Resolved: 2020-01-01

## 2020-05-08 PROBLEM — J96.02 ACUTE RESPIRATORY FAILURE WITH HYPOXIA AND HYPERCARBIA: Status: RESOLVED | Noted: 2019-01-01 | Resolved: 2020-01-01

## 2020-05-08 PROBLEM — N39.0 PSEUDOMONAS URINARY TRACT INFECTION: Status: RESOLVED | Noted: 2019-01-01 | Resolved: 2020-01-01

## 2020-05-08 PROBLEM — L12.0 BP (BULLOUS PEMPHIGOID): Status: RESOLVED | Noted: 2020-01-01 | Resolved: 2020-01-01

## 2020-05-08 PROBLEM — R79.89 ELEVATED TROPONIN: Status: RESOLVED | Noted: 2019-01-01 | Resolved: 2020-01-01

## 2020-05-08 NOTE — SUBJECTIVE & OBJECTIVE
Interval History: Patient unresponsive, daughter at bedside, questions and concerns addressed. Continue comfort care.     Medications:  Continuous Infusions:  Scheduled Meds:   scopolamine  1 patch Transdermal Q3 Days     PRN Meds:lorazepam, morphine    Objective:     Vital Signs (Most Recent):  Temp: (!) 95.9 °F (35.5 °C) (05/07/20 2203)  Pulse: (!) 57 (05/07/20 2203)  Resp: (!) 28 (05/07/20 2203)  BP: (!) 65/35 (05/07/20 2203)  SpO2: (!) 90 % (05/08/20 0738) Vital Signs (24h Range):  Temp:  [95.9 °F (35.5 °C)] 95.9 °F (35.5 °C)  Pulse:  [57] 57  Resp:  [28] 28  SpO2:  [78 %-90 %] 90 %  BP: (65)/(35) 65/35     Weight: 56 kg (123 lb 7.3 oz)  Body mass index is 27.68 kg/m².    Review of Symptoms: unable to assess  Symptom Assessment (ESAS 0-10 scale)  ESAS 0 1 2 3 4 5 6 7 8 9 10   Pain              Dyspnea              Anxiety              Nausea              Depression               Anorexia              Fatigue              Insomnia              Restlessness               Agitation              CAM / Delirium __ --  ___+   Constipation     __ --  ___+   Diarrhea           __ --  ___+    Bowel Management Plan (BMP): Yes        Performance Status: 20     ECOG Performance Status Grade: 4 - Completely disabled  Physical Exam   Constitutional: She appears well-developed.   frail   HENT:   Head: Normocephalic.   Cardiovascular: Bradycardia present.   Abdominal: Soft. She exhibits no distension.   PEG tube in place   Neurological: She is unresponsive.       Significant Labs: All pertinent labs within the past 24 hours have been reviewed.  CBC:   Recent Labs   Lab 05/06/20  1654   WBC 7.59   HGB 10.4*   HCT 37.1   *   *     BMP:  No results for input(s): GLU, NA, K, CL, CO2, BUN, CREATININE, CALCIUM, MG in the last 24 hours.  LFT:  Lab Results   Component Value Date    AST 30 05/06/2020    ALKPHOS 128 05/06/2020    BILITOT 0.4 05/06/2020     Albumin:   Albumin   Date Value Ref Range Status   05/06/2020 1.6  (L) 3.5 - 5.2 g/dL Final     Protein:   Total Protein   Date Value Ref Range Status   05/06/2020 5.6 (L) 6.0 - 8.4 g/dL Final     Lactic acid:   Lab Results   Component Value Date    LACTATE 4.9 (HH) 05/06/2020    LACTATE 1.1 11/18/2019       Significant Imaging: I have reviewed all pertinent imaging results/findings within the past 24 hours.    Advanced Directives::  Living Will: Yes. Copy on chart: Yes  LaPOST: Yes  Do Not Resuscitate Status: Yes  Medical Power of : Yes. Agent's Name: Laya. Agent's Contact Number:      Decision-Making Capacity: Family answered questions, Patient unable to communicate due to disease severity/cognitive impairment     Living Arrangements: Lives in nursing home     Psychosocial/Cultural:  Patient's most important priorities:  Unable to assess     Patient's biggest concerns/fears:  Unable to assess     Previous death/end of life care history:  Unable to assess     Patient's goals/hopes:  Unable to assess

## 2020-05-08 NOTE — PROGRESS NOTES
[facility]  Palliative Medicine  Progress Note    Patient Name: Elizabeth Navarrete  MRN: [id number]  Admission Date: 5/6/2020  Hospital Length of Stay: 2 days  Code Status: DNR   Attending Provider: Aby Gordon*  Consulting Provider: Aby Gordon MD  Primary Care Physician: [facility]  Principal Problem:Severe sepsis    Patient information was obtained from relative(s) and ER records.      Assessment/Plan:     * Severe sepsis  CAP (community acquired pneumonia)  Bradycardia  Hypotension  Received IVF in the ED   started on empiric antibiotics  Declining - family transition to comfort measures    End of life care  Palliative care encounter  Transition to comfort measures per family  D/c antibiotics  Morphine for dyspnea  Ativan for anxiety  Scopolamine  Bisacodyl          none    Subjective:     Chief Complaint:   Chief Complaint   Patient presents with    Pneumonia       HPI:   Elizabeth Navarrete is a 89 y/o F with PMH of Afib, HTN, depression, CAD. CHF, CKD, bladder mass brought in by EMS from NH for evaluation for a concern of worsening pneumonia on chest x-ray.  Patient is aphasic and has a G-tube in place. In the ED patient is hypotensive, bradycardic, and hypothermic likely due to sepsis.      Palliative care has been consulted for end of life/ Hospice care     Palliative care met with patient lying in bed, lethargic unresponsive. Daughter -Laya by bedside reported patient has been sickly in the past few weeks but unable to visit her at the NH due to visitation restriction. Laya stated she just want patient to be comfortable. Hospice education provided. Questions and concerns addressed. Emotional comfort provided.     Patient too unstable for transportation to inpatient hospice care. Plan is to consult [facility]ice [facility].       Hospital Course:  No notes on file    Interval History: Patient unresponsive, daughter at bedside, questions and concerns addressed. Continue  comfort care.     Medications:  Continuous Infusions:  Scheduled Meds:   scopolamine  1 patch Transdermal Q3 Days     PRN Meds:lorazepam, morphine    Objective:     Vital Signs (Most Recent):  Temp: (!) 95.9 °F (35.5 °C) (05/07/20 2203)  Pulse: (!) 57 (05/07/20 2203)  Resp: (!) 28 (05/07/20 2203)  BP: (!) 65/35 (05/07/20 2203)  SpO2: (!) 90 % (05/08/20 0738) Vital Signs (24h Range):  Temp:  [95.9 °F (35.5 °C)] 95.9 °F (35.5 °C)  Pulse:  [57] 57  Resp:  [28] 28  SpO2:  [78 %-90 %] 90 %  BP: (65)/(35) 65/35     Weight: 56 kg (123 lb 7.3 oz)  Body mass index is 27.68 kg/m².    Review of Symptoms: unable to assess  Symptom Assessment (ESAS 0-10 scale)  ESAS 0 1 2 3 4 5 6 7 8 9 10   Pain              Dyspnea              Anxiety              Nausea              Depression               Anorexia              Fatigue              Insomnia              Restlessness               Agitation              CAM / Delirium __ --  ___+   Constipation     __ --  ___+   Diarrhea           __ --  ___+    Bowel Management Plan (BMP): Yes        Performance Status: 20     ECOG Performance Status Grade: 4 - Completely disabled  Physical Exam   Constitutional: She appears well-developed.   frail   HENT:   Head: Normocephalic.   Cardiovascular: Bradycardia present.   Abdominal: Soft. She exhibits no distension.   PEG tube in place   Neurological: She is unresponsive.       Significant Labs: All pertinent labs within the past 24 hours have been reviewed.  CBC:   Recent Labs   Lab 05/06/20  1654   WBC 7.59   HGB 10.4*   HCT 37.1   *   *     BMP:  No results for input(s): GLU, NA, K, CL, CO2, BUN, CREATININE, CALCIUM, MG in the last 24 hours.  LFT:  Lab Results   Component Value Date    AST 30 05/06/2020    ALKPHOS 128 05/06/2020    BILITOT 0.4 05/06/2020     Albumin:   Albumin   Date Value Ref Range Status   05/06/2020 1.6 (L) 3.5 - 5.2 g/dL Final     Protein:   Total Protein   Date Value Ref Range Status   05/06/2020 5.6 (L)  6.0 - 8.4 g/dL Final     Lactic acid:   Lab Results   Component Value Date    LACTATE 4.9 (HH) 05/06/2020    LACTATE 1.1 11/18/2019       Significant Imaging: I have reviewed all pertinent imaging results/findings within the past 24 hours.    Advanced Directives::  Living Will: Yes. Copy on chart: Yes  LaPOST: Yes  Do Not Resuscitate Status: Yes  Medical Power of : Yes. Agent's Name: Laya. Agent's Contact Number:      Decision-Making Capacity: Family answered questions, Patient unable to communicate due to disease severity/cognitive impairment     Living Arrangements: Lives in nursing home     Psychosocial/Cultural:  Patient's most important priorities:  Unable to assess     Patient's biggest concerns/fears:  Unable to assess     Previous death/end of life care history:  Unable to assess     Patient's goals/hopes:  Unable to assess            > 50% of 35 min visit spent in chart review, face to face  symptom assessment, coordination of care and emotional support.    Aby Gordon MD  Palliative Medicine  [facility]

## 2020-05-08 NOTE — SUBJECTIVE & OBJECTIVE
Called to see patient without spontaneous respiration, on exam no heart sound, pupils fixed and dilated. Patient pronounced on 5/8/2020 at 1502

## 2020-05-08 NOTE — PLAN OF CARE
Brat Diet    Treatment of nausea, vomiting and/or diarrhea      Nausea is a sick queasy feeling in the stomach.  When you are nauseated you may feel like you have to vomit or have actual vomiting of foodstuff contents of the gastrointestinal system.  They are symptoms of some underlying process frequently related to diseases of the gastrointestinal system, which may be caused by viruses, food poisoning, medications, alcohol, anxiety and pregnancy.  In addition, nausea may be a sign of an upper respiratory illness with a post-nasal drip.    Diarrhea is a symptom of gastrointestinal disease resulting in loose, watery often frequent bowel movements.  It may be acute, beginning suddenly and resolving over a few days with dietary changes, or of a chronic ongoing process.  Causes of this symptom are similar to the ones listed for nausea and vomiting.    BRAT stands for Bananas, Rice, Apples and Toast.    Treatment:  A short-term gastrointestinal (stomach or bowel) illness requires a change in your diet.    For Nausea and/or vomiting:  First 24 hours: (Day One) Gradually add clear liquids if the vomiting has ceased.  Beginning with a sip or two every ten minutes is a good way to start.  Suggestions include Gatorade, Propel, Pedialyte, water, apple juice, flat soda, weak tea, jello (in liquid or gelatin form), broth or bouillon (Clear based from non-greasy soup).  If symptoms of nausea or vomiting return, begin the process again, taking nothing by mouth for an hour or so.    (Day Two) - Begin to add bland foods like bananas, rice applesauce, cracker, cooked cereals, ([place], Cream of Wheat), toast and jelly.    (Day Three) - Progress to add \"regular\" diet by adding such things as soft cooked eggs, sherbet, stewed fruits , cooked vegetables, white meat of chicken or turkey.    What foods to avoid:  Avoid milk and dairy products for 3 days.  Avoid fried, fatty, greasy and spicy foods.  Avoid pork, veal, salmon and  Spoke with MD regarding DNR status . Dr. Major to update file . Charge nurse notified.   sardines.  Avoid raw vegetables such as parsnips, beets, sauerkraut, corn on the cob, cabbage family, onions.  Avoid citrus fruits:  Pineapples, oranges, grapefruits, tomatoes.  Other fruits to avoid are cherries, grapes, figs, currants, raisins, rhubarb, seeded berries.  Avoid extremely hot or cold beverages.  Avoid alcohol.  Avoid coffee and caffeinated sodas.  Drink plenty of water or liquids to avoid dehydration from fluid losses due to your illness.    Rest and avoid exertion to give your body a chance to recover.    Make an appointment if you are not getting better with the diet changes after 24 hours, if you have a problem with chronic diarrhea or if you have additional symptoms of fever, weight loss, lightheadedness (feeling of faintness), rectal bleeding or abdominal pain.      Tailbone (Coccyx) Fracture  Your tailbone (coccyx) is the bone at the very end of your spine. Most tailbone injuries are caused by a “seated” fall or direct blow. Often, the area around your tailbone is just bruised. But sometimes the bone itself may break (fracture). A tailbone fracture can be very painful and may take some time to heal.    Risk factors  Women are more likely to injure their tailbones than men. That's because the bone is more exposed in women. In some cases, tailbones can fracture during childbirth.  When to go to the Emergency Room (ER)  Tailbone injuries are likely to cause pain, swelling, and bruising. Sitting or having a bowel movement may be especially painful. Still, most tailbone fractures are not medical emergencies. Go to your healthcare provider for treatment. Seek emergency care if you have extreme pain, tingling, or weakness in one or both legs.  What to expect in the ER  Here is what will happen in the ER:   · A healthcare provider will examine your tailbone. This is done by gently inserting a gloved finger into your rectum.  · X-rays may be taken to check the extent of the injury.  · You may be  given medicine to ease discomfort.  Treatment  There is no way to hold a fractured tailbone in place. For that reason, treatment focuses on making you more comfortable while the injury heals. You may be told to ice your injury for a day or two to help relieve swelling and pain. During healing, a special pillow or cushion may be recommended to protect your tailbone while you sit. Surgery for a traumatic coccyx injury may be indicated if there is no response to the above treatments.  © 1591-0054 [employer]. [address] [place] [address], [place] [address]. All rights reserved. This information is not intended as a substitute for professional medical care. Always follow your healthcare professional's instructions.

## 2020-05-08 NOTE — DISCHARGE SUMMARY
[facility]  Discharge Summary      Admit Date: 5/6/2020    Discharge Date and Time: 5/8/2020 at 1502      Attending Physician: Aby Gordon*     Reason for Admission: comfort measures    Procedures Performed: * No surgery found *    Hospital Course (synopsis of major diagnoses, care, treatment, and services provided during the course of the hospital stay):     Elizabeth Navarrete is a 89 y/o F with PMH of Afib, HTN, depression, CAD. CHF, CKD, bladder mass brought in by EMS from NH for evaluation for a concern of worsening pneumonia on chest x-ray.  Patient is aphasic and has a G-tube in place. In the ED patient is hypotensive, bradycardic, and hypothermic likely due to sepsis.      Palliative care has been consulted for end of life/ Hospice care     Palliative care met with patient lying in bed, lethargic unresponsive. Daughter -Laya by bedside reported patient has been sickly in the past few weeks but unable to visit her at the NH due to visitation restriction. Laya stated she just want patient to be comfortable. Hospice education provided. Questions and concerns addressed. Emotional comfort provided.      Called to see patient without spontaneous respiration, on exam no heart sound, pupils fixed and dilated. Patient pronounced on 5/8/2020 at 1502    Final Diagnoses:    Principal Problem: Severe sepsis   Secondary Diagnoses:   Active Hospital Problems   No active problems to display.      Resolved Hospital Problems    Diagnosis Date Resolved POA    *Severe sepsis [A41.9, R65.20] 05/08/2020 Yes    Palliative care encounter [Z51.5] 05/08/2020 Not Applicable    End of life care [Z51.5] 05/08/2020 Not Applicable    CAP (community acquired pneumonia) [J18.9] 05/08/2020 Yes    Gastrostomy tube dependent, 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 [Z93.1] 05/08/2020 Not Applicable     Chronic    Oropharyngeal dysphagia [R13.12] 05/08/2020 Yes     Chronic    Chronic atrial fibrillation [I48.20] 05/08/2020 Yes      Chronic    Enlarged LA (left atrium) [I51.7] 2020 Yes     Chronic           Essential hypertension [I10] 2020 Yes     Chronic    CAD (coronary artery disease) [I25.10] 2020 Yes     Chronic    Chronic diastolic congestive heart failure [I50.32] 2020 Yes     Chronic     Echo :  LVEF 15-20%         Discharged Condition:     Disposition:     Follow Up/Patient Instructions:     Medications:  None (patient  at medical facility)  No discharge procedures on file.     Aby Gordon MD  Palliative Medicine  [facility]      > 50% of 20 min visit spent in chart review, symptom assessment, coordination of care and emotional support.

## 2022-06-03 NOTE — ASSESSMENT & PLAN NOTE
88 y/o female with PMHx of A fib, HTN presenting with left hemiparesis. Imaging remarkable for R hemispheric infarcts; embolic pattern. CTA remarkable for 90% stenosis of the right carotid artery and R M2. Etiology of stroke is likely artery to artery - atheroembolic from symptomatic R IC rather than cardio-embolic. Vascular surgery consult deferred as patient does not want surgical management of the R IC even if offered; will medically optimize.     Of note patient has prior history of strokes noted on imaging with a CHADsVASc of 7 => high risk; 1 year stroke risk of 15.7%. Patient and family aware that anticoagulation would be optimal management in the setting of known A fib and prior strokes for secondary stroke prevention but patient chosen to NOT be anticoagulated due to concerns by the family regarding patient's frequent falls.   -Antithrombotics for secondary stroke prevention: Antiplatelets:  Aspirin: 81 mg oral now and daily and Clopidogrel: 75 mg oral daily   -Statins for secondary stroke prevention and hyperlipidemia, if present: Atorvastatin- 40 mg oral daily,   -Aggressive risk factor modification: Hypertension, Carotid Artery disease and CHF   -Rehab Efforts: Physical Therapy, Occupational Therapy and Speech and Language Pathology.   -Diagnostics: Ordered/Pending: none  -VTE Prophylaxis: Heparin 5000 units SQ every 8 hours   The microscopic examination of the urine did not show red cells. The dip stick showed trace , that is not significant, not confirmed with the micro. No need to repeat.

## 2022-07-20 NOTE — ASSESSMENT & PLAN NOTE
-Venlafaxine 75 mg via PEG tube daily. Continue      Transfer for Combined Heart/Lung Transplant Eval

## 2022-09-13 NOTE — ED NOTES
Attempted in and out catheter at this time for urine unable to obtain; ELOISA Camargo attempted unable to obtain patient cleaned thoroughly and pure wic placed at this time.    MEDICATIONS  (STANDING):  ampicillin/sulbactam  IVPB 3 Gram(s) IV Intermittent every 6 hours  ampicillin/sulbactam  IVPB      fluconAZOLE IVPB      fluconAZOLE IVPB 400 milliGRAM(s) IV Intermittent every 24 hours  influenza   Vaccine 0.5 milliLiter(s) IntraMuscular once  magnesium oxide 400 milliGRAM(s) Oral three times a day with meals  pantoprazole    Tablet 40 milliGRAM(s) Oral before breakfast  polyethylene glycol 3350 17 Gram(s) Oral two times a day  senna 2 Tablet(s) Oral at bedtime  tamsulosin 0.4 milliGRAM(s) Oral at bedtime

## 2024-11-25 NOTE — ED NOTES
"Pt in bed resting comfortably. NAD noted. States "I'm ready to go home". Breathing does not appear labored. O2 sat 94-97% on room air. Comfort and safety measures maintained. Will continue to monitor.   "
"Pt presents to the ED via ems for SOB. Pt lives at [facility] where the staff called ems because pt appeared to be short of breath. Pt states "I don't have no trouble breathing." Pt is alert and able to speak in full sentences but does appear to have labored breathing. Pt placed on cardiac monitor and continuous pulse ox. Pt denies pain of any kind. Daughter at bedside  "
Patient taken off of O2 NC per verbal order by Dr Sawyer.   
Portable xray at bedside.   
Pt's breathing appears to have improved. Pt's breathing no longer labored. NAD noted at this time. Cardiac monitor and continuous pulse ox in place. Will continue to monitor. Pt's daughter still at bedside. Call light within reach.   
rrt @ bedside  
evaluate which will also give us an indication if she has kidney stones.  -     XR ABDOMEN (KUB) (SINGLE AP VIEW); Future    Hypokalemia-last potassium was low.  She did take some potassium tablets.  Will recheck potassium levels today.  -     Comprehensive Metabolic Panel; Future    Primary hypertension-blood pressure at goal.  Continue metoprolol 25 mg twice daily    Kidney stone-she states she has a history of kidney stones.   -She did have some blood in her urine on last UA.  Given continued flank pain which I think is related to constipation we will check a KUB.    Return in about 6 months (around 5/25/2025).         Gita Pisano MD

## (undated) DEVICE — SEE L#95700

## (undated) DEVICE — SEE MEDLINE ITEM 152487

## (undated) DEVICE — GAUZE SPONGE 4X4 12PLY

## (undated) DEVICE — KIT PEG PULL STANDARD 20F

## (undated) DEVICE — LUBRICANT SURGILUBE 2 OZ

## (undated) DEVICE — SEE MEDLINE ITEM 146313

## (undated) DEVICE — SEE MEDLINE ITEM 146420